# Patient Record
Sex: FEMALE | Race: WHITE | NOT HISPANIC OR LATINO | Employment: OTHER | URBAN - METROPOLITAN AREA
[De-identification: names, ages, dates, MRNs, and addresses within clinical notes are randomized per-mention and may not be internally consistent; named-entity substitution may affect disease eponyms.]

---

## 2018-01-01 ENCOUNTER — APPOINTMENT (OUTPATIENT)
Dept: GENERAL RADIOLOGY | Facility: HOSPITAL | Age: 78
End: 2018-01-01

## 2018-01-01 ENCOUNTER — APPOINTMENT (OUTPATIENT)
Dept: CT IMAGING | Facility: HOSPITAL | Age: 78
End: 2018-01-01

## 2018-01-01 ENCOUNTER — HOSPITAL ENCOUNTER (INPATIENT)
Facility: HOSPITAL | Age: 78
LOS: 21 days | Discharge: NURSING, REHAB OR LONG-TERM CARE | End: 2018-01-22
Attending: EMERGENCY MEDICINE | Admitting: INTERNAL MEDICINE
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
## 2018-01-01 DIAGNOSIS — R42 DIZZINESS: ICD-10-CM

## 2018-01-01 DIAGNOSIS — R26.81 UNSTEADINESS ON FEET: ICD-10-CM

## 2018-01-01 DIAGNOSIS — N28.9 RENAL INSUFFICIENCY: ICD-10-CM

## 2018-01-01 DIAGNOSIS — T79.6XXA TRAUMATIC RHABDOMYOLYSIS, INITIAL ENCOUNTER (HCC): Primary | ICD-10-CM

## 2018-01-01 PROBLEM — I10 HTN (HYPERTENSION): Status: ACTIVE | Noted: 2018-01-01

## 2018-01-01 PROBLEM — R53.1 GENERALIZED WEAKNESS: Status: ACTIVE | Noted: 2018-01-01

## 2018-01-01 PROBLEM — R41.82 ALTERED MENTAL STATUS: Status: ACTIVE | Noted: 2018-01-01

## 2018-01-01 PROBLEM — E11.9 DM (DIABETES MELLITUS) (HCC): Status: ACTIVE | Noted: 2018-01-01

## 2018-01-01 PROBLEM — N39.0 UTI (URINARY TRACT INFECTION): Status: ACTIVE | Noted: 2018-01-01

## 2018-01-01 PROBLEM — N18.9 CKD (CHRONIC KIDNEY DISEASE): Status: ACTIVE | Noted: 2018-01-01

## 2018-01-01 LAB
ALBUMIN SERPL-MCNC: 3.2 G/DL (ref 3.5–5.2)
ALBUMIN/GLOB SERPL: 0.7 G/DL
ALP SERPL-CCNC: 90 U/L (ref 39–117)
ALT SERPL W P-5'-P-CCNC: 27 U/L (ref 1–33)
ANION GAP SERPL CALCULATED.3IONS-SCNC: 15.8 MMOL/L
AST SERPL-CCNC: 49 U/L (ref 1–32)
BACTERIA UR QL AUTO: ABNORMAL /HPF
BASOPHILS # BLD AUTO: 0.03 10*3/MM3 (ref 0–0.2)
BASOPHILS NFR BLD AUTO: 0.1 % (ref 0–1.5)
BILIRUB SERPL-MCNC: 0.3 MG/DL (ref 0.1–1.2)
BILIRUB UR QL STRIP: NEGATIVE
BUN BLD-MCNC: 41 MG/DL (ref 8–23)
BUN/CREAT SERPL: 28.1 (ref 7–25)
CALCIUM SPEC-SCNC: 9.5 MG/DL (ref 8.6–10.5)
CHLORIDE SERPL-SCNC: 93 MMOL/L (ref 98–107)
CK SERPL-CCNC: 1161 U/L (ref 20–180)
CLARITY UR: ABNORMAL
CO2 SERPL-SCNC: 23.2 MMOL/L (ref 22–29)
COLOR UR: YELLOW
CREAT BLD-MCNC: 1.46 MG/DL (ref 0.57–1)
D-LACTATE SERPL-SCNC: 1.6 MMOL/L (ref 0.5–2)
DEPRECATED RDW RBC AUTO: 45.4 FL (ref 37–54)
EOSINOPHIL # BLD AUTO: 0 10*3/MM3 (ref 0–0.7)
EOSINOPHIL NFR BLD AUTO: 0 % (ref 0.3–6.2)
ERYTHROCYTE [DISTWIDTH] IN BLOOD BY AUTOMATED COUNT: 13.2 % (ref 11.7–13)
GFR SERPL CREATININE-BSD FRML MDRD: 35 ML/MIN/1.73
GLOBULIN UR ELPH-MCNC: 4.3 GM/DL
GLUCOSE BLD-MCNC: 297 MG/DL (ref 65–99)
GLUCOSE BLDC GLUCOMTR-MCNC: 155 MG/DL (ref 70–130)
GLUCOSE UR STRIP-MCNC: NEGATIVE MG/DL
HCT VFR BLD AUTO: 31.9 % (ref 35.6–45.5)
HGB BLD-MCNC: 10.6 G/DL (ref 11.9–15.5)
HGB UR QL STRIP.AUTO: ABNORMAL
HOLD SPECIMEN: NORMAL
HOLD SPECIMEN: NORMAL
HYALINE CASTS UR QL AUTO: ABNORMAL /LPF
IMM GRANULOCYTES # BLD: 0.12 10*3/MM3 (ref 0–0.03)
IMM GRANULOCYTES NFR BLD: 0.6 % (ref 0–0.5)
KETONES UR QL STRIP: NEGATIVE
LEUKOCYTE ESTERASE UR QL STRIP.AUTO: ABNORMAL
LYMPHOCYTES # BLD AUTO: 1.93 10*3/MM3 (ref 0.9–4.8)
LYMPHOCYTES NFR BLD AUTO: 9 % (ref 19.6–45.3)
MAGNESIUM SERPL-MCNC: 1.6 MG/DL (ref 1.6–2.4)
MCH RBC QN AUTO: 31.5 PG (ref 26.9–32)
MCHC RBC AUTO-ENTMCNC: 33.2 G/DL (ref 32.4–36.3)
MCV RBC AUTO: 94.7 FL (ref 80.5–98.2)
MONOCYTES # BLD AUTO: 2.17 10*3/MM3 (ref 0.2–1.2)
MONOCYTES NFR BLD AUTO: 10.1 % (ref 5–12)
NEUTROPHILS # BLD AUTO: 17.18 10*3/MM3 (ref 1.9–8.1)
NEUTROPHILS NFR BLD AUTO: 80.2 % (ref 42.7–76)
NITRITE UR QL STRIP: NEGATIVE
PH UR STRIP.AUTO: 6 [PH] (ref 5–8)
PLAT MORPH BLD: NORMAL
PLATELET # BLD AUTO: 218 10*3/MM3 (ref 140–500)
PMV BLD AUTO: 10.2 FL (ref 6–12)
POTASSIUM BLD-SCNC: 4.4 MMOL/L (ref 3.5–5.2)
PROT SERPL-MCNC: 7.5 G/DL (ref 6–8.5)
PROT UR QL STRIP: ABNORMAL
RBC # BLD AUTO: 3.37 10*6/MM3 (ref 3.9–5.2)
RBC # UR: ABNORMAL /HPF
RBC MORPH BLD: NORMAL
REF LAB TEST METHOD: ABNORMAL
SODIUM BLD-SCNC: 132 MMOL/L (ref 136–145)
SP GR UR STRIP: 1.02 (ref 1–1.03)
SQUAMOUS #/AREA URNS HPF: ABNORMAL /HPF
TROPONIN T SERPL-MCNC: 0.04 NG/ML (ref 0–0.03)
UROBILINOGEN UR QL STRIP: ABNORMAL
WBC MORPH BLD: NORMAL
WBC NRBC COR # BLD: 21.43 10*3/MM3 (ref 4.5–10.7)
WBC UR QL AUTO: ABNORMAL /HPF
WHOLE BLOOD HOLD SPECIMEN: NORMAL
WHOLE BLOOD HOLD SPECIMEN: NORMAL

## 2018-01-01 PROCEDURE — 83605 ASSAY OF LACTIC ACID: CPT | Performed by: EMERGENCY MEDICINE

## 2018-01-01 PROCEDURE — 81001 URINALYSIS AUTO W/SCOPE: CPT | Performed by: EMERGENCY MEDICINE

## 2018-01-01 PROCEDURE — 85025 COMPLETE CBC W/AUTO DIFF WBC: CPT | Performed by: EMERGENCY MEDICINE

## 2018-01-01 PROCEDURE — 87798 DETECT AGENT NOS DNA AMP: CPT | Performed by: INTERNAL MEDICINE

## 2018-01-01 PROCEDURE — 87186 SC STD MICRODIL/AGAR DIL: CPT | Performed by: EMERGENCY MEDICINE

## 2018-01-01 PROCEDURE — 87486 CHLMYD PNEUM DNA AMP PROBE: CPT | Performed by: INTERNAL MEDICINE

## 2018-01-01 PROCEDURE — 87633 RESP VIRUS 12-25 TARGETS: CPT | Performed by: INTERNAL MEDICINE

## 2018-01-01 PROCEDURE — 85007 BL SMEAR W/DIFF WBC COUNT: CPT | Performed by: EMERGENCY MEDICINE

## 2018-01-01 PROCEDURE — 87150 DNA/RNA AMPLIFIED PROBE: CPT | Performed by: EMERGENCY MEDICINE

## 2018-01-01 PROCEDURE — 87581 M.PNEUMON DNA AMP PROBE: CPT | Performed by: INTERNAL MEDICINE

## 2018-01-01 PROCEDURE — 83735 ASSAY OF MAGNESIUM: CPT | Performed by: EMERGENCY MEDICINE

## 2018-01-01 PROCEDURE — 99284 EMERGENCY DEPT VISIT MOD MDM: CPT

## 2018-01-01 PROCEDURE — 82550 ASSAY OF CK (CPK): CPT | Performed by: EMERGENCY MEDICINE

## 2018-01-01 PROCEDURE — 93010 ELECTROCARDIOGRAM REPORT: CPT | Performed by: INTERNAL MEDICINE

## 2018-01-01 PROCEDURE — 25010000002 CEFTRIAXONE IN SWFI 1 GRAM/10ML IV PUSH SYRINGE (SIMPLE): Performed by: INTERNAL MEDICINE

## 2018-01-01 PROCEDURE — 93005 ELECTROCARDIOGRAM TRACING: CPT

## 2018-01-01 PROCEDURE — 36415 COLL VENOUS BLD VENIPUNCTURE: CPT | Performed by: EMERGENCY MEDICINE

## 2018-01-01 PROCEDURE — 63710000001 INSULIN ASPART PER 5 UNITS: Performed by: INTERNAL MEDICINE

## 2018-01-01 PROCEDURE — 71046 X-RAY EXAM CHEST 2 VIEWS: CPT

## 2018-01-01 PROCEDURE — 80053 COMPREHEN METABOLIC PANEL: CPT | Performed by: EMERGENCY MEDICINE

## 2018-01-01 PROCEDURE — 82962 GLUCOSE BLOOD TEST: CPT

## 2018-01-01 PROCEDURE — 84484 ASSAY OF TROPONIN QUANT: CPT | Performed by: EMERGENCY MEDICINE

## 2018-01-01 PROCEDURE — 87040 BLOOD CULTURE FOR BACTERIA: CPT | Performed by: EMERGENCY MEDICINE

## 2018-01-01 PROCEDURE — 70450 CT HEAD/BRAIN W/O DYE: CPT

## 2018-01-01 PROCEDURE — 87086 URINE CULTURE/COLONY COUNT: CPT | Performed by: EMERGENCY MEDICINE

## 2018-01-01 RX ORDER — DEXTROSE MONOHYDRATE 25 G/50ML
25 INJECTION, SOLUTION INTRAVENOUS
Status: DISCONTINUED | OUTPATIENT
Start: 2018-01-01 | End: 2018-01-22 | Stop reason: HOSPADM

## 2018-01-01 RX ORDER — SODIUM CHLORIDE 0.9 % (FLUSH) 0.9 %
10 SYRINGE (ML) INJECTION AS NEEDED
Status: DISCONTINUED | OUTPATIENT
Start: 2018-01-01 | End: 2018-01-22 | Stop reason: HOSPADM

## 2018-01-01 RX ORDER — SODIUM CHLORIDE 0.9 % (FLUSH) 0.9 %
1-10 SYRINGE (ML) INJECTION AS NEEDED
Status: DISCONTINUED | OUTPATIENT
Start: 2018-01-01 | End: 2018-01-12

## 2018-01-01 RX ORDER — NICOTINE POLACRILEX 4 MG
15 LOZENGE BUCCAL
Status: DISCONTINUED | OUTPATIENT
Start: 2018-01-01 | End: 2018-01-22 | Stop reason: HOSPADM

## 2018-01-01 RX ORDER — ONDANSETRON 2 MG/ML
4 INJECTION INTRAMUSCULAR; INTRAVENOUS EVERY 6 HOURS PRN
Status: DISCONTINUED | OUTPATIENT
Start: 2018-01-01 | End: 2018-01-12

## 2018-01-01 RX ORDER — ACETAMINOPHEN 325 MG/1
650 TABLET ORAL EVERY 4 HOURS PRN
Status: DISCONTINUED | OUTPATIENT
Start: 2018-01-01 | End: 2018-01-05 | Stop reason: SDUPTHER

## 2018-01-01 RX ORDER — SODIUM CHLORIDE 9 MG/ML
125 INJECTION, SOLUTION INTRAVENOUS CONTINUOUS
Status: DISCONTINUED | OUTPATIENT
Start: 2018-01-01 | End: 2018-01-03

## 2018-01-01 RX ORDER — METOPROLOL SUCCINATE 25 MG/1
25 TABLET, EXTENDED RELEASE ORAL DAILY
Status: DISCONTINUED | OUTPATIENT
Start: 2018-01-02 | End: 2018-01-03

## 2018-01-01 RX ORDER — AMLODIPINE BESYLATE 5 MG/1
5 TABLET ORAL DAILY
Status: DISCONTINUED | OUTPATIENT
Start: 2018-01-02 | End: 2018-01-06

## 2018-01-01 RX ADMIN — CEFTRIAXONE SODIUM 1 G: 1 INJECTION, POWDER, FOR SOLUTION INTRAMUSCULAR; INTRAVENOUS at 23:31

## 2018-01-01 RX ADMIN — INSULIN ASPART 2 UNITS: 100 INJECTION, SOLUTION INTRAVENOUS; SUBCUTANEOUS at 22:41

## 2018-01-01 RX ADMIN — SODIUM CHLORIDE 125 ML/HR: 9 INJECTION, SOLUTION INTRAVENOUS at 23:31

## 2018-01-01 RX ADMIN — SODIUM CHLORIDE 500 ML: 9 INJECTION, SOLUTION INTRAVENOUS at 19:30

## 2018-01-01 RX ADMIN — SODIUM CHLORIDE 125 ML/HR: 9 INJECTION, SOLUTION INTRAVENOUS at 21:10

## 2018-01-01 NOTE — ED PROVIDER NOTES
EMERGENCY DEPARTMENT ENCOUNTER    CHIEF COMPLAINT  Chief Complaint: Light-headed   History given by: pt/ family present at bedside   History limited by: N/A  Room Number: 42/42  PMD: Provider Not In System      HPI:  Pt is a 77 y.o. female who presents complaining of intermittent light-headedness that began 2 days ago that caused pt to fall. Family states that 2 days ago when pt suffered from the fall, the pt was on the ground for approximately 13 hours. Pt/ family state that there is no definitve activity that aggravates/ alleviates the light-headedness. Pt also c/o chronic L knee pain that pt claims to be taking pain medication for. Pt states she has had a previous myocardial infarct and is a current smoker [reports smoking half a pack of cigarettes per a day]. Family states that pt has chronic elevated kidney function labs. Pt also c/o chronic cough that has progressed to productive cough with yellow sputum. Family expresses concern that pt cannot take care of herself. Family also reports pt experiencing previous similar episodes.     Duration:  2 days   Onset: gradual  Timing: intermittent   Location: N/A  Radiation: N/A  Quality: light-headed   Intensity/Severity: moderate   Progression: unchanged   Associated Symptoms: chronic cough, chronic L knee   Aggravating Factors: None reported   Alleviating Factors: None reported   Previous Episodes: Family reports that pt has experienced previous similar episodes.   Treatment before arrival: None reported     PAST MEDICAL HISTORY  Active Ambulatory Problems     Diagnosis Date Noted   • No Active Ambulatory Problems     Resolved Ambulatory Problems     Diagnosis Date Noted   • No Resolved Ambulatory Problems     Past Medical History:   Diagnosis Date   • Colon cancer    • Hypertension        PAST SURGICAL HISTORY  Past Surgical History:   Procedure Laterality Date   • CHOLECYSTECTOMY     • COLON SURGERY     • COLONOSCOPY     • HERNIA REPAIR         FAMILY  HISTORY  History reviewed. No pertinent family history.    SOCIAL HISTORY  Social History     Social History   • Marital status:      Spouse name: N/A   • Number of children: N/A   • Years of education: N/A     Occupational History   • Not on file.     Social History Main Topics   • Smoking status: Heavy Tobacco Smoker   • Smokeless tobacco: Not on file   • Alcohol use No   • Drug use: No   • Sexual activity: Defer     Other Topics Concern   • Not on file     Social History Narrative   • No narrative on file       ALLERGIES  Contrast dye and Sulfa antibiotics    REVIEW OF SYSTEMS  Review of Systems   Constitutional: Negative.  Negative for chills and fever.   HENT: Negative.  Negative for sore throat.    Eyes: Negative.    Respiratory: Negative.  Negative for cough.    Cardiovascular: Negative.  Negative for chest pain.   Gastrointestinal: Negative.    Genitourinary: Negative.  Negative for dysuria.   Musculoskeletal: Positive for arthralgias (chronic L knee pain). Negative for back pain.   Skin: Negative.  Negative for rash.   Neurological: Positive for light-headedness (causing pt to fall ). Negative for headaches.       PHYSICAL EXAM  ED Triage Vitals   Temp Heart Rate Resp BP SpO2   01/01/18 1554 01/01/18 1554 01/01/18 1554 01/01/18 1600 01/01/18 1554   98.8 °F (37.1 °C) 110 16 145/75 94 %      Temp src Heart Rate Source Patient Position BP Location FiO2 (%)   01/01/18 1554 01/01/18 1554 -- -- --   Tympanic Monitor          Physical Exam   Constitutional: She is oriented to person, place, and time and well-developed, well-nourished, and in no distress. No distress.   Elderly    HENT:   Head: Normocephalic and atraumatic.   Unremarkable    Eyes: EOM are normal. Pupils are equal, round, and reactive to light.   Neck: Normal range of motion. Neck supple.   Cardiovascular: Normal rate, regular rhythm, normal heart sounds and intact distal pulses.    Pulmonary/Chest: Effort normal. No respiratory distress. She  has wheezes (moderate bilateral wheezes ).   Pt has bilateral coarse rhonchi    Abdominal: Soft. There is no tenderness. There is no rebound and no guarding.   Musculoskeletal: Normal range of motion. She exhibits tenderness (moderate L knee tenderness ). She exhibits no edema or deformity.   Neurological: She is alert and oriented to person, place, and time. She has normal sensation and normal strength.   Pt has difficulty with L leg raise; however, pt relates this to the chronic L knee pain  No other deficits observed   Neurovascularly intact    Skin: Skin is warm and dry. No rash noted.   Psychiatric: Mood normal. She is agitated.   Nursing note and vitals reviewed.      LAB RESULTS  Lab Results (last 24 hours)     Procedure Component Value Units Date/Time    CBC & Differential [[id number]] Collected:  01/01/18 1631    Specimen:  Blood Updated:  01/01/18 1812    Narrative:       The following orders were created for panel order CBC & Differential.  Procedure                               Abnormality         Status                     ---------                               -----------         ------                     Scan Slide[[id number]]                   Normal              Final result               CBC Auto Differential[[id number]]        Abnormal            Final result                 Please view results for these tests on the individual orders.    Comprehensive Metabolic Panel [[id number]]  (Abnormal) Collected:  01/01/18 1631    Specimen:  Blood Updated:  01/01/18 1717     Glucose 297 (H) mg/dL      BUN 41 (H) mg/dL      Creatinine 1.46 (H) mg/dL      Sodium 132 (L) mmol/L      Potassium 4.4 mmol/L      Chloride 93 (L) mmol/L      CO2 23.2 mmol/L      Calcium 9.5 mg/dL      Total Protein 7.5 g/dL      Albumin 3.20 (L) g/dL      ALT (SGPT) 27 U/L      AST (SGOT) 49 (H) U/L      Alkaline Phosphatase 90 U/L      Total Bilirubin 0.3 mg/dL      eGFR Non African Amer 35 (L) mL/min/1.73      Globulin 4.3 gm/dL       A/G Ratio 0.7 g/dL      BUN/Creatinine Ratio 28.1 (H)     Anion Gap 15.8 mmol/L     Narrative:       The MDRD GFR formula is only valid for adults with stable renal function between ages 18 and 70.    Troponin [[id number]]  (Abnormal) Collected:  01/01/18 1631    Specimen:  Blood Updated:  01/01/18 1718     Troponin T 0.044 (H) ng/mL     Narrative:       Troponin T Reference Ranges:  Less than 0.03 ng/mL:    Negative for AMI  0.03 to 0.09 ng/mL:      Indeterminant for AMI  Greater than 0.09 ng/mL: Positive for AMI    Magnesium [[id number]]  (Normal) Collected:  01/01/18 1631    Specimen:  Blood Updated:  01/01/18 1717     Magnesium 1.6 mg/dL     CBC Auto Differential [[id number]]  (Abnormal) Collected:  01/01/18 1631    Specimen:  Blood Updated:  01/01/18 1812     WBC 21.43 (H) 10*3/mm3      RBC 3.37 (L) 10*6/mm3      Hemoglobin 10.6 (L) g/dL      Hematocrit 31.9 (L) %      MCV 94.7 fL      MCH 31.5 pg      MCHC 33.2 g/dL      RDW 13.2 (H) %      RDW-SD 45.4 fl      MPV 10.2 fL      Platelets 218 10*3/mm3      Neutrophil % 80.2 (H) %      Lymphocyte % 9.0 (L) %      Monocyte % 10.1 %      Eosinophil % 0.0 (L) %      Basophil % 0.1 %      Immature Grans % 0.6 (H) %      Neutrophils, Absolute 17.18 (H) 10*3/mm3      Lymphocytes, Absolute 1.93 10*3/mm3      Monocytes, Absolute 2.17 (H) 10*3/mm3      Eosinophils, Absolute 0.00 10*3/mm3      Basophils, Absolute 0.03 10*3/mm3      Immature Grans, Absolute 0.12 (H) 10*3/mm3     Scan Slide [[id number]]  (Normal) Collected:  01/01/18 1631    Specimen:  Blood Updated:  01/01/18 1812     RBC Morphology Normal     WBC Morphology Normal     Platelet Morphology Normal    CK [[id number]]  (Abnormal) Collected:  01/01/18 1631    Specimen:  Blood Updated:  01/01/18 1848     Creatine Kinase 1161 (H) U/L     Lactic Acid, Plasma [[id number]]  (Normal) Collected:  01/01/18 1832    Specimen:  Blood Updated:  01/01/18 1859     Lactate 1.6 mmol/L     Blood Culture - Blood, [[id number]] Collected:   01/01/18 1832    Specimen:  Blood from Arm, Left Updated:  01/01/18 1839    Blood Culture - Blood, [[id number]] Collected:  01/01/18 1845    Specimen:  Blood from Arm, Left Updated:  01/01/18 1848    Urinalysis With / Culture If Indicated - Urine, Clean Catch [[id number]]  (Abnormal) Collected:  01/01/18 1909    Specimen:  Urine from Urine, Catheter Updated:  01/01/18 1927     Color, UA Yellow     Appearance, UA Cloudy (A)     pH, UA 6.0     Specific Gravity, UA 1.016     Glucose, UA Negative     Ketones, UA Negative     Bilirubin, UA Negative     Blood, UA Large (3+) (A)     Protein, UA >=300 mg/dL (3+) (A)     Leuk Esterase, UA Moderate (2+) (A)     Nitrite, UA Negative     Urobilinogen, UA 0.2 E.U./dL    Urinalysis, Microscopic Only - Urine, Clean Catch [[id number]]  (Abnormal) Collected:  01/01/18 1909    Specimen:  Urine from Urine, Catheter Updated:  01/01/18 1927     RBC, UA 13-20 (A) /HPF      WBC, UA Too Numerous to Count (A) /HPF      Bacteria, UA 4+ (A) /HPF      Squamous Epithelial Cells, UA 0-2 /HPF      Hyaline Casts, UA None Seen /LPF      Methodology Automated Microscopy    Urine Culture - Urine, Urine, Clean Catch [[id number]] Collected:  01/01/18 1909    Specimen:  Urine from Urine, Catheter Updated:  01/01/18 1925          I ordered the above labs and reviewed the results    RADIOLOGY  CT Head Without Contrast   Final Result   There is no evidence for an acute intracranial abnormality.   Findings consistent with severe chronic small vessel ischemic change of   the cerebral white matter are noted.       Radiation dose reduction techniques were utilized, including automated   exposure control and exposure modulation based on body size.       This report was finalized on 1/1/2018 7:41 PM by Dr. Philip Soares MD.          XR Chest 2 View   Final Result   No focal pulmonary consolidation. Borderline heart size.   Tortuous aorta. Follow-up as clinically indicated.       This report was finalized on  1/1/2018 4:58 PM by Dr. Carlos Manuel Mary MD.               I ordered the above noted radiological studies. Interpreted by radiologist. Reviewed by me in PACS.       PROCEDURES  Procedures  EKG           EKG time: 1748  Rhythm/Rate: sinus rhythm, 85  P waves and WA: moderate baseline artificat  QRS, axis: normal axis, no conduction delays   ST and T waves: no acute      Interpreted Contemporaneously by me, independently viewed  No previous for comparison       PROGRESS AND CONSULTS  ED Course     1604  Triage ordered IVF, labs, Chest XR, and CT Head for further evaluation.     1815  Evaluated pt and discussed lab and radiology results. Discussed elevated troponin and WBC; therefore anticipate admission. Plan to conduct labs for further evaluation. Pt/ family understand and agree to plan, all questions addressed at this time.     1819  Ordered CK and lactic acid labs for further evaluation.     1902  Ordered IVF and placed consult to [facility].     1926  Discussed pt's case with Dr. Hendrickson [[facility]] who agrees to admit.     1948  Rechecked pt who is resting comfortably in bed and in no acute distress. Discussed lab results with pt/ family. Dsicussed admission plan with pt. PT understands and agrees to plan, all questions addressed at this time.     MEDICAL DECISION MAKING  Results were reviewed/discussed with the patient and they were also made aware of online access. Pt also made aware that some labs, such as cultures, will not be resulted during ER visit and follow up with PMD is necessary.     MDM  Number of Diagnoses or Management Options     Amount and/or Complexity of Data Reviewed  Clinical lab tests: ordered and reviewed (Troponin = 0.044, Glucose = 297, BUN = 41, Creatinine = 1.46, Sodium = 132, GFR = 35, WBC = 21.43, CK = 1161)  Tests in the radiology section of CPT®: ordered and reviewed (CT Head  Impression   There is no evidence for an acute intracranial abnormality. Findings consistent with severe chronic small  vessel ischemic change of the cerebral white matter are noted.     Chest XR  Impression   No focal pulmonary consolidation. Borderline heart size.  Tortuous aorta. Follow-up as clinically indicated.        )  Tests in the medicine section of CPT®: ordered and reviewed (Refer to procedure )  Discussion of test results with the performing providers: yes (Dr. Hendrickson ([facility]) )  Independent visualization of images, tracings, or specimens: yes           DIAGNOSIS  Final diagnoses:   Traumatic rhabdomyolysis, initial encounter   Fall, initial encounter   Renal insufficiency   Dizziness       DISPOSITION  ADMISSION    Discussed treatment plan and reason for admission with pt/family and admitting physician.  Pt/family voiced understanding of the plan for admission for further testing/treatment as needed.         Latest Documented Vital Signs:  As of 7:48 PM  BP- 135/71 HR- 84 Temp- 98.8 °F (37.1 °C) (Tympanic) O2 sat- 96%    --  Documentation assistance provided by ruy Valdes for Dr. Matute.  Information recorded by the scribe was done at my direction and has been verified and validated by me.               Elier Valdes  01/01/18 1948       Hector Matute MD  01/02/18 9577

## 2018-01-01 NOTE — ED TRIAGE NOTES
Daughter is concerned that patient can no longer live alone and stated she wanted her mom placed in nursing home.  Daughter states that she is going home to get something to eat and that she will be back in a few hours.  Patient has two other children that live out of state.  Patient is currently aox4 and sitting in wheelchair getting blood drawn for triage orders.  NAD.

## 2018-01-01 NOTE — ED TRIAGE NOTES
"Fell Saturday night and neighbor helped her up, was down for 13 hours, refused to be seen, fell ( scooted down from bed) this morning, daughter states hx of frequent falls, knee pain, daughter wants a full kidney work- up \" bc her dr says she is close to being on dialysis\"  "

## 2018-01-01 NOTE — ED TRIAGE NOTES
"Patient presents to ER with complaints of multiple falls for unknown reasons.  Patient lives alone.  Patient states \"I think I just push myself too much\"  Daughter reports that she is concerned patient can no longer take care of herself at home.  Patient is wearing dirty clothes and smells of urine.  Patient had a fall yesterday and was down unknown amount.    "

## 2018-01-02 PROBLEM — A41.9 SEPSIS (HCC): Status: ACTIVE | Noted: 2018-01-02

## 2018-01-02 PROBLEM — R78.81 BACTEREMIA: Status: ACTIVE | Noted: 2018-01-02

## 2018-01-02 PROBLEM — N30.00 ACUTE CYSTITIS WITHOUT HEMATURIA: Status: ACTIVE | Noted: 2018-01-01

## 2018-01-02 LAB
ALBUMIN SERPL-MCNC: 3 G/DL (ref 3.5–5.2)
ALBUMIN/GLOB SERPL: 0.8 G/DL
ALP SERPL-CCNC: 76 U/L (ref 39–117)
ALT SERPL W P-5'-P-CCNC: 28 U/L (ref 1–33)
ANION GAP SERPL CALCULATED.3IONS-SCNC: 14 MMOL/L
AST SERPL-CCNC: 45 U/L (ref 1–32)
B PERT DNA SPEC QL NAA+PROBE: NOT DETECTED
BACTERIA BLD CULT: ABNORMAL
BASOPHILS # BLD AUTO: 0.03 10*3/MM3 (ref 0–0.2)
BASOPHILS NFR BLD AUTO: 0.2 % (ref 0–1.5)
BILIRUB SERPL-MCNC: 0.2 MG/DL (ref 0.1–1.2)
BUN BLD-MCNC: 43 MG/DL (ref 8–23)
BUN/CREAT SERPL: 29.1 (ref 7–25)
C PNEUM DNA NPH QL NAA+NON-PROBE: NOT DETECTED
CALCIUM SPEC-SCNC: 8.9 MG/DL (ref 8.6–10.5)
CHLORIDE SERPL-SCNC: 96 MMOL/L (ref 98–107)
CK SERPL-CCNC: 751 U/L (ref 20–180)
CO2 SERPL-SCNC: 22 MMOL/L (ref 22–29)
CREAT BLD-MCNC: 1.48 MG/DL (ref 0.57–1)
DEPRECATED RDW RBC AUTO: 45.8 FL (ref 37–54)
EOSINOPHIL # BLD AUTO: 0.02 10*3/MM3 (ref 0–0.7)
EOSINOPHIL NFR BLD AUTO: 0.1 % (ref 0.3–6.2)
ERYTHROCYTE [DISTWIDTH] IN BLOOD BY AUTOMATED COUNT: 13.1 % (ref 11.7–13)
FLUAV H1 2009 PAND RNA NPH QL NAA+PROBE: NOT DETECTED
FLUAV H1 HA GENE NPH QL NAA+PROBE: NOT DETECTED
FLUAV H3 RNA NPH QL NAA+PROBE: NOT DETECTED
FLUAV SUBTYP SPEC NAA+PROBE: NOT DETECTED
FLUBV RNA ISLT QL NAA+PROBE: NOT DETECTED
GFR SERPL CREATININE-BSD FRML MDRD: 34 ML/MIN/1.73
GLOBULIN UR ELPH-MCNC: 3.8 GM/DL
GLUCOSE BLD-MCNC: 107 MG/DL (ref 65–99)
GLUCOSE BLDC GLUCOMTR-MCNC: 111 MG/DL (ref 70–130)
GLUCOSE BLDC GLUCOMTR-MCNC: 115 MG/DL (ref 70–130)
GLUCOSE BLDC GLUCOMTR-MCNC: 173 MG/DL (ref 70–130)
GLUCOSE BLDC GLUCOMTR-MCNC: 230 MG/DL (ref 70–130)
HADV DNA SPEC NAA+PROBE: NOT DETECTED
HBA1C MFR BLD: 6.5 % (ref 4.8–5.6)
HCOV 229E RNA SPEC QL NAA+PROBE: NOT DETECTED
HCOV HKU1 RNA SPEC QL NAA+PROBE: NOT DETECTED
HCOV NL63 RNA SPEC QL NAA+PROBE: NOT DETECTED
HCOV OC43 RNA SPEC QL NAA+PROBE: NOT DETECTED
HCT VFR BLD AUTO: 30.2 % (ref 35.6–45.5)
HGB BLD-MCNC: 9.9 G/DL (ref 11.9–15.5)
HMPV RNA NPH QL NAA+NON-PROBE: NOT DETECTED
HPIV1 RNA SPEC QL NAA+PROBE: NOT DETECTED
HPIV2 RNA SPEC QL NAA+PROBE: NOT DETECTED
HPIV3 RNA NPH QL NAA+PROBE: NOT DETECTED
HPIV4 P GENE NPH QL NAA+PROBE: NOT DETECTED
IMM GRANULOCYTES # BLD: 0.09 10*3/MM3 (ref 0–0.03)
IMM GRANULOCYTES NFR BLD: 0.5 % (ref 0–0.5)
INR PPP: 1.2 (ref 0.9–1.1)
LYMPHOCYTES # BLD AUTO: 2.23 10*3/MM3 (ref 0.9–4.8)
LYMPHOCYTES NFR BLD AUTO: 11.2 % (ref 19.6–45.3)
M PNEUMO IGG SER IA-ACNC: NOT DETECTED
MAGNESIUM SERPL-MCNC: 1.6 MG/DL (ref 1.6–2.4)
MCH RBC QN AUTO: 31.4 PG (ref 26.9–32)
MCHC RBC AUTO-ENTMCNC: 32.8 G/DL (ref 32.4–36.3)
MCV RBC AUTO: 95.9 FL (ref 80.5–98.2)
MONOCYTES # BLD AUTO: 1.7 10*3/MM3 (ref 0.2–1.2)
MONOCYTES NFR BLD AUTO: 8.5 % (ref 5–12)
NEUTROPHILS # BLD AUTO: 15.93 10*3/MM3 (ref 1.9–8.1)
NEUTROPHILS NFR BLD AUTO: 79.5 % (ref 42.7–76)
PHOSPHATE SERPL-MCNC: 2.5 MG/DL (ref 2.5–4.5)
PLATELET # BLD AUTO: 192 10*3/MM3 (ref 140–500)
PMV BLD AUTO: 9.8 FL (ref 6–12)
POTASSIUM BLD-SCNC: 3.8 MMOL/L (ref 3.5–5.2)
PROT SERPL-MCNC: 6.8 G/DL (ref 6–8.5)
PROTHROMBIN TIME: 14.8 SECONDS (ref 11.7–14.2)
RBC # BLD AUTO: 3.15 10*6/MM3 (ref 3.9–5.2)
RHINOVIRUS RNA SPEC NAA+PROBE: NOT DETECTED
RSV RNA NPH QL NAA+NON-PROBE: NOT DETECTED
SODIUM BLD-SCNC: 132 MMOL/L (ref 136–145)
TROPONIN T SERPL-MCNC: 0.05 NG/ML (ref 0–0.03)
WBC NRBC COR # BLD: 20 10*3/MM3 (ref 4.5–10.7)

## 2018-01-02 PROCEDURE — 82962 GLUCOSE BLOOD TEST: CPT

## 2018-01-02 PROCEDURE — 97162 PT EVAL MOD COMPLEX 30 MIN: CPT

## 2018-01-02 PROCEDURE — 85025 COMPLETE CBC W/AUTO DIFF WBC: CPT | Performed by: INTERNAL MEDICINE

## 2018-01-02 PROCEDURE — 83036 HEMOGLOBIN GLYCOSYLATED A1C: CPT | Performed by: INTERNAL MEDICINE

## 2018-01-02 PROCEDURE — 63710000001 INSULIN ASPART PER 5 UNITS: Performed by: INTERNAL MEDICINE

## 2018-01-02 PROCEDURE — 25010000003 CEFTRIAXONE PER 250 MG: Performed by: INTERNAL MEDICINE

## 2018-01-02 PROCEDURE — 97110 THERAPEUTIC EXERCISES: CPT

## 2018-01-02 PROCEDURE — 84100 ASSAY OF PHOSPHORUS: CPT | Performed by: INTERNAL MEDICINE

## 2018-01-02 PROCEDURE — 80053 COMPREHEN METABOLIC PANEL: CPT | Performed by: INTERNAL MEDICINE

## 2018-01-02 PROCEDURE — 87040 BLOOD CULTURE FOR BACTERIA: CPT | Performed by: INTERNAL MEDICINE

## 2018-01-02 PROCEDURE — 83735 ASSAY OF MAGNESIUM: CPT | Performed by: INTERNAL MEDICINE

## 2018-01-02 PROCEDURE — 85610 PROTHROMBIN TIME: CPT | Performed by: INTERNAL MEDICINE

## 2018-01-02 PROCEDURE — 82550 ASSAY OF CK (CPK): CPT | Performed by: INTERNAL MEDICINE

## 2018-01-02 PROCEDURE — 84484 ASSAY OF TROPONIN QUANT: CPT | Performed by: INTERNAL MEDICINE

## 2018-01-02 RX ORDER — CEFTRIAXONE SODIUM 2 G/50ML
2 INJECTION, SOLUTION INTRAVENOUS EVERY 24 HOURS
Status: DISCONTINUED | OUTPATIENT
Start: 2018-01-02 | End: 2018-01-03

## 2018-01-02 RX ADMIN — METOPROLOL SUCCINATE 25 MG: 25 TABLET, FILM COATED, EXTENDED RELEASE ORAL at 08:50

## 2018-01-02 RX ADMIN — AMLODIPINE BESYLATE 5 MG: 5 TABLET ORAL at 08:50

## 2018-01-02 RX ADMIN — SODIUM CHLORIDE 125 ML/HR: 9 INJECTION, SOLUTION INTRAVENOUS at 18:19

## 2018-01-02 RX ADMIN — ACETAMINOPHEN 650 MG: 325 TABLET ORAL at 08:50

## 2018-01-02 RX ADMIN — INSULIN ASPART 4 UNITS: 100 INJECTION, SOLUTION INTRAVENOUS; SUBCUTANEOUS at 11:46

## 2018-01-02 RX ADMIN — INSULIN ASPART 2 UNITS: 100 INJECTION, SOLUTION INTRAVENOUS; SUBCUTANEOUS at 20:57

## 2018-01-02 RX ADMIN — SODIUM CHLORIDE 125 ML/HR: 9 INJECTION, SOLUTION INTRAVENOUS at 08:50

## 2018-01-02 RX ADMIN — CEFTRIAXONE SODIUM 2 G: 2 INJECTION, SOLUTION INTRAVENOUS at 12:37

## 2018-01-02 NOTE — PROGRESS NOTES
Name: Sakshi Garcia ADMIT: 2018   : 1940  PCP: Provider Not In System    MRN: [id number] LOS: 1 days   AGE/SEX: 77 y.o. female  ROOM: [facility]   Subjective   Subjective  Cc- weakness  Still is weak  Had been found on the ground prior to admission  Ambulating some with help  On IV abx  +fever  Unsure about dysuria  +dry cough    ROS  +f/c  -n/v  +cough, - soa  No cp/palp    Objective   Vital Signs  Temp:  [98.4 °F (36.9 °C)-100 °F (37.8 °C)] 100 °F (37.8 °C)  Heart Rate:  [] 85  Resp:  [16-18] 18  BP: (127-174)/(63-76) 174/73  SpO2:  [93 %-98 %] 93 %  on  Flow (L/min):  [1] 1;   O2 Device: nasal cannula  Body mass index is 27.76 kg/(m^2).    Physical Exam    Alert, elderly  Acutely and chronically ill  Supple, no jvd  RRR, no mrumurs  +cough during exam, fairly clear  Soft, nt  No edema or cyanosis  Slight confusion    Results Review:       I reviewed the patient's new clinical results.    Results from last 7 days  Lab Units 18  0417 18  1631   WBC 10*3/mm3 20.00* 21.43*   HEMOGLOBIN g/dL 9.9* 10.6*   PLATELETS 10*3/mm3 192 218       Results from last 7 days  Lab Units 18  0417 18  1631   SODIUM mmol/L 132* 132*   POTASSIUM mmol/L 3.8 4.4   CHLORIDE mmol/L 96* 93*   CO2 mmol/L 22.0 23.2   BUN mg/dL 43* 41*   CREATININE mg/dL 1.48* 1.46*   GLUCOSE mg/dL 107* 297*   Estimated Creatinine Clearance: 28.5 mL/min (by C-G formula based on Cr of 1.48).    Results from last 7 days  Lab Units 18  0417 18  1631   CALCIUM mg/dL 8.9 9.5   ALBUMIN g/dL 3.00* 3.20*   MAGNESIUM mg/dL 1.6 1.6   PHOSPHORUS mg/dL 2.5  --      CT Head Without Contrast [[id number]] Not Reviewed        Order Status: Completed Collected: 18        Updated: 18       Narrative:         CT OF THE HEAD WITHOUT CONTRAST     HISTORY: 77-year-old female with a history of frequent falls and altered  mental status.     TECHNIQUE: Contiguous axial images were obtained through the head  without  IV contrast.     COMPARISON: None.     FINDINGS: There is a severe hypodense appearance seen within the  bilateral paraventricular and subcortical white matter of the cerebral  hemispheres. No territorial edema is appreciated. There is moderate  diffuse atrophy and proportionate ventriculomegaly. The osseous  structures of the skull have a normal appearance.          Impression:         There is no evidence for an acute intracranial abnormality.  Findings consistent with severe chronic small vessel ischemic change of  the cerebral white matter are noted.     Radiation dose reduction techniques were utilized, including automated  exposure control and exposure modulation based on body size.     This report was finalized on 1/1/2018 7:41 PM by Dr. Philip Soares MD.          XR Chest 2 View [[id number]] Not Reviewed       Order Status: Completed Collected: 01/01/18 1656        Updated: 01/01/18 1701       Narrative:         XR CHEST 2 VW-     HISTORY: Female who is 77 years-old,  weakness     TECHNIQUE: Frontal and lateral views of the chest     COMPARISON: None available     FINDINGS: Heart size is borderline. Pulmonary vasculature is  unremarkable. Aorta is tortuous. No focal pulmonary consolidation,  pleural effusion, or pneumothorax. Arterial calcifications are present.  Degenerative changes are seen at the shoulders. No acute osseous  process.          Impression:         No focal pulmonary consolidation. Borderline heart size.  Tortuous aorta. Follow-up as clinically indicated.        Respiratory Panel, PCR - Swab, Nasopharynx [[id number]] (Normal) Collected: 01/01/18 2244        Lab Status: Final result Specimen: Swab from Nasopharynx Updated: 01/02/18 0026        ADENOVIRUS, PCR Not Detected        Coronavirus 229E Not Detected        Coronavirus HKU1 Not Detected        Coronavirus NL63 Not Detected        Coronavirus OC43 Not Detected        Human Metapneumovirus Not Detected        Human Rhinovirus/Enterovirus  Not Detected        Influenza B PCR Not Detected        Parainfluenza Virus 1 Not Detected        Parainfluenza Virus 2 Not Detected        Parainfluenza Virus 3 Not Detected        Parainfluenza Virus 4 Not Detected        Bordetella pertussis pcr Not Detected        Influenza 2009 H1N1 by PCR Not Detected        Chlamydophila pneumoniae PCR Not Detected        Mycoplasma pneumo by PCR Not Detected        Influenza A PCR Not Detected        Influenza A H3 Not Detected        Influenza A H1 Not Detected        RSV, PCR Not Detected       Urine Culture - Urine, Urine, Clean Catch [[id number]] (Abnormal) Collected: 01/01/18 1909       Lab Status: Preliminary result Specimen: Urine from Urine, Catheter Updated: 01/02/18 0735        Urine Culture --         >100,000 CFU/mL Gram Negative Bacilli (A)       Blood Culture - Blood, [[id number]] (Normal) Collected: 01/01/18 1845       Lab Status: Preliminary result Specimen: Blood from Arm, Left Updated: 01/02/18 0701        Blood Culture No growth at less than 24 hours       Blood Culture - Blood, [[id number]] (Abnormal) Collected: 01/01/18 1832       Lab Status: Preliminary result Specimen: Blood from Arm, Left Updated: 01/02/18 1028        Blood Culture Abnormal Stain (A)        Gram Stain Result Anaerobic Bottle Gram negative bacilli       Blood Culture ID, PCR - Blood, [[id number]] (Abnormal) Collected: 01/01/18 1832       Lab Status: Final result Specimen: Blood from Arm, Left Updated: 01/02/18 1201        BCID, PCR Escherichia coli. Identification by BCID PCR. (C)            amLODIPine 5 mg Oral Daily   ceftriaxone 2 g Intravenous Q24H   insulin aspart 0-9 Units Subcutaneous 4x Daily With Meals & Nightly   metoprolol succinate XL 25 mg Oral Daily       sodium chloride 125 mL/hr Last Rate: 125 mL/hr (01/02/18 0850)   Diet Regular; Cardiac, Consistent Carbohydrate, Renal      Assessment/Plan   Active Hospital Problems (** Indicates Principal Problem)    Diagnosis Date Noted    • **Acute cystitis without hematuria [N30.00] 01/01/2018   • Sepsis [A41.9] 01/02/2018   • Bacteremia [R78.81] 01/02/2018   • Traumatic rhabdomyolysis [T79.6XXA] 01/01/2018   • Generalized weakness [R53.1] 01/01/2018   • Fall [W19.XXXA] 01/01/2018   • CKD (chronic kidney disease) [N18.9] 01/01/2018   • DM (diabetes mellitus) [E11.9] 01/01/2018   • HTN (hypertension) [I10] 01/01/2018   • Altered mental status [R41.82] 01/01/2018      Resolved Hospital Problems    Diagnosis Date Noted Date Resolved   No resolved problems to display.       Ms. Garcia is a 77 y.o. female who has been admitted with sepsis and rhabdo    · Change abx to 2g IV ceftriaxone q24h, follow cultures. Repeat blood cultures  · IVFs  · Monitor renal fx and CK  · PT consultation  · SSI    D/W RN  D/W Pharmacist  D/W Dr. Hendrickson  Reviewed records    Cheo Vora MD  [facility]  01/02/18  12:29 PM

## 2018-01-02 NOTE — CONSULTS
"Diabetes Education  Assessment/Teaching    Patient Name:  Sakshi Garcia  YOB: 1940  MRN: [id number]  Admit Date:  1/1/2018      Assessment Date:  1/2/2018       Most Recent Value    General Information      Referral From:  Other (comment) [RN consult for monitoring]    Height  157.5 cm (62.01\")    Weight  68.9 kg (151 lb 12.8 oz)    Weight Method  Bed scale    Pregnancy Assessment     Diabetes History     What type of diabetes do you have?  Type 2    Length of Diabetes Diagnosis  10 + years    Do you test your blood sugar at home?  yes    Frequency of checks  Every 2-3 weeks    Meter type  Unknown.  Pt states received through mail order.     Who performs the test?  self    Typical readings  80s-200s    Have you had low blood sugar? (<70mg/dl)  -- [Unknown but pt states has had symptoms but not correlated w/ testing]    Education Preferences     Nutrition Information     Assessment Topics     Monitoring - Assessment  Needs education    DM Goals                Most Recent Value    DM Education Needs     Meter  Has own    Frequency of Testing  -- [Encouraged pt to test more frequently especially during times when pt suspects she may be experiencing hypoglycemia.  ]    Reducing Risks  A1C testing [Pt does not recall A1c levels.  ]    Healthy Coping  Appropriate    Discharge Plan  Home    Motivation  Moderate    Teaching Method  Explanation, Discussion    Patient Response  Verbalized understanding            Other Comments:          Electronically signed by:  Krystal Gonzalez RN  01/02/18 10:11 AM  "

## 2018-01-02 NOTE — ED NOTES
Both sisters called to update on pt's status post receiving approval from pt to share her information.     Rut [phone]    Ernestina [phone](h) [phone] (c)     Vanda Bacon RN  01/01/18 1956

## 2018-01-02 NOTE — THERAPY EVALUATION
Acute Care - Physical Therapy Initial Evaluation  [facility]     Patient Name: Sakshi Garcia  : 1940  MRN: [id number]  Today's Date: 2018   Onset of Illness/Injury or Date of Surgery Date: 18     Referring Physician: Dr. Vora      Admit Date: 2018     Visit Dx:    ICD-10-CM ICD-9-CM   1. Traumatic rhabdomyolysis, initial encounter T79.6XXA 958.6   2. Fall, initial encounter W19.XXXA E888.9   3. Renal insufficiency N28.9 593.9   4. Dizziness R42 780.4   5. Unsteadiness on feet R26.81 781.2     Patient Active Problem List   Diagnosis   • Traumatic rhabdomyolysis   • Generalized weakness   • Fall   • Acute cystitis without hematuria   • CKD (chronic kidney disease)   • DM (diabetes mellitus)   • HTN (hypertension)   • Altered mental status   • Sepsis   • Bacteremia     Past Medical History:   Diagnosis Date   • Colon cancer    • Hypertension      Past Surgical History:   Procedure Laterality Date   • CHOLECYSTECTOMY     • COLON SURGERY     • COLONOSCOPY     • HERNIA REPAIR            PT ASSESSMENT (last 72 hours)      PT Evaluation       18 1300 18    Rehab Evaluation    Document Type evaluation  -AA     Subjective Information agree to therapy;complains of;weakness;fatigue  -AA     Patient Effort, Rehab Treatment good  -AA     Symptoms Noted During/After Treatment fatigue  -AA     General Information    Patient Profile Review yes  -AA     Onset of Illness/Injury or Date of Surgery Date 18  -AA     Referring Physician Dr. Vora  -AA     General Observations Pt supine in bed no signs distress/discomfort. Pt bed exit alarm activated  -AA     Pertinent History Of Current Problem Pt admitted with acute cystitis without hematuria.   -AA     Precautions/Limitations fall precautions;oxygen therapy device and L/min  -AA     Prior Level of Function independent:;all household mobility;ADL's  -AA     Equipment Currently Used at Home cane, straight  -AA cane, straight;glucometer   -RT    Plans/Goals Discussed With patient  -AA     Barriers to Rehab medically complex   does not want rehab or PT  -AA     Living Environment    Lives With alone  -AA alone  -RT    Living Arrangements house  -AA house  -RT    Home Accessibility stairs to enter home  -AA no concerns  -RT    Number of Stairs to Enter Home 4  -AA     Stair Railings at Home none  -AA none  -RT    Type of Financial/Environmental Concern  none  -RT    Transportation Available  family or friend will provide  -RT    Living Environment Comment Pt states someone across the street checks on her from time to time.   -AA     Clinical Impression    Patient/Family Goals Statement Pt would like to return home without assist or PT.   -AA     Criteria for Skilled Therapeutic Interventions Met yes  -AA     Pathology/Pathophysiology Noted (Describe Specifically for Each System) musculoskeletal  -AA     Impairments Found (describe specific impairments) gait, locomotion, and balance  -AA     Rehab Potential good, to achieve stated therapy goals  -AA     Pain Assessment    Pain Assessment No/denies pain  -AA     Cognitive Assessment/Intervention    Current Cognitive/Communication Assessment impaired  -AA     Orientation Status oriented to;person;place;time;situation  -AA     Follows Commands/Answers Questions 50% of the time;able to follow single-step instructions;needs cueing  -AA     Personal Safety moderate impairment;decreased awareness, need for assist;decreased awareness, need for safety;at risk behaviors demonstrated;impulsive  -AA     Personal Safety Interventions fall prevention program maintained;gait belt;nonskid shoes/slippers when out of bed  -AA     ROM (Range of Motion)    General ROM no range of motion deficits identified  -AA     MMT (Manual Muscle Testing)    General MMT Assessment Detail 4-/5 transfers/gait  -AA     Bed Mobility, Assessment/Treatment    Bed Mobility, Assistive Device bed rails;head of bed elevated  -AA     Bed Mob,  Supine to Sit, [place] moderate assist (50% patient effort);verbal cues required  -AA     Bed Mob, Sit to Supine, [place] moderate assist (50% patient effort);verbal cues required  -AA     Bed Mobility, Safety Issues decreased use of arms for pushing/pulling;decreased use of legs for bridging/pushing;impaired trunk control for bed mobility  -AA     Bed Mobility, Impairments strength decreased  -AA     Transfer Assessment/Treatment    Transfers, Sit-Stand [place] minimum assist (75% patient effort);verbal cues required  -AA     Transfers, Stand-Sit [place] moderate assist (50% patient effort);verbal cues required  -AA     Transfers, Sit-Stand-Sit, Assist Device rolling walker  -AA     Toilet Transfer, [place] moderate assist (50% patient effort);verbal cues required  -AA     Toilet Transfer, Assistive Device rolling walker  -AA     Transfer, Safety Issues weight-shifting ability decreased;loses balance backward  -AA     Transfer, Impairments strength decreased;impaired balance  -AA     Transfer, Comment Pt very impulsive with stand <> sit transfer with max cues for safety and hand placement. Pt unsteady picking up object from floor impulsively. Pt need frequent cues to stay on task.  -AA     Gait Assessment/Treatment    Gait, [place] Level minimum assist (75% patient effort);verbal cues required  -AA     Gait, Assistive Device rolling walker  -AA     Gait, Distance (Feet) 25  -AA     Gait, Gait Pattern Analysis swing-through gait  -AA     Gait, Gait Deviations nilda decreased;decreased heel strike;weight-shifting ability decreased;forward flexed posture  -AA     Gait, Safety Issues weight-shifting ability decreased;step length decreased;balance decreased during turns  -AA     Gait, Impairments strength decreased;impaired balance  -AA     Gait, Comment Pt able to ambulate to restroom from bed with min A for 25ft; however, pt requires significant cues for safety.   -AA     Positioning  and Restraints    Pre-Treatment Position in bed  -AA     Post Treatment Position bed  -AA     In Bed supine;call light within reach;encouraged to call for assist;exit alarm on;[place] [facility] [place]       User Key  (r) = Recorded By, (t) = Taken By, (c) = Cosigned By    Initials Name Provider Type    KOSTA Holley PT Physical Therapist    RT Sidney Short RN Registered Nurse          Physical Therapy Education     Title: PT OT SLP Therapies (Done)     Topic: Physical Therapy (Done)     Point: Mobility training (Done)    Learning Progress Summary    Learner Readiness Method Response Comment Documented by Status   Patient Acceptance E VU,NR   01/02/18 1359 Done               Point: Home exercise program (Done)    Learning Progress Summary    Learner Readiness Method Response Comment Documented by Status   Patient Acceptance E VU,NR   01/02/18 1359 Done               Point: Body mechanics (Done)    Learning Progress Summary    Learner Readiness Method Response Comment Documented by Status   Patient Acceptance E VU,NR   01/02/18 1359 Done               Point: Precautions (Done)    Learning Progress Summary    Learner Readiness Method Response Comment Documented by Status   Patient Acceptance E VU,NR   01/02/18 1359 Done                      User Key     Initials Effective Dates Name Provider Type [facility] 09/05/17 -  Paula Holley PT Physical Therapist PT                PT Recommendation and Plan  Anticipated Equipment Needs At Discharge: front wheeled walker  Anticipated Discharge Disposition: skilled nursing facility  Planned Therapy Interventions: balance training, bed mobility training, gait training, home exercise program, strengthening, transfer training  PT Frequency: daily  Plan of Care Review  Plan Of Care Reviewed With: patient  Outcome Summary/Follow up Plan: Pt admitted with acute cystitis without hematuria. Pt requires mod A bed mobility with use of HOB and handrails. Pt able to sit  <> stand min A with RW and ambulation min A for 25ft with RW; however, pt requires max cues for safety. Pt very impulsive and unsafe with mobility reaching to floor unsafely and pulling at IVs with toilet transfer. Pt may beenfit from skilled PT for mobility. PT recommends d/c to SNF for further rehab for safety and decreased risk for falls.          IP PT Goals       01/02/18 1359          Bed Mobility PT LTG    Bed Mobility PT LTG, Date Established 01/02/18  -AA      Bed Mobility PT LTG, Time to Achieve 1 wk  -AA      Bed Mobility PT LTG, Activity Type all bed mobility  -AA      Bed Mobility PT LTG, [place] Level minimum assist (75% patient effort)  -AA      Transfer Training PT LTG    Transfer Training PT LTG, Date Established 01/02/18  -AA      Transfer Training PT LTG, Time to Achieve 1 wk  -AA      Transfer Training PT LTG, Activity Type all transfers  -AA      Transfer Training PT LTG, [place] Level contact guard assist  -AA      Transfer Training PT LTG, Assist Device walker, rolling  -AA      Gait Training PT LTG    Gait Training Goal PT LTG, Date Established 01/02/18  -AA      Gait Training Goal PT LTG, Time to Achieve 1 wk  -AA      Gait Training Goal PT LTG, [place] Level contact guard assist  -AA      Gait Training Goal PT LTG, Assist Device walker, rolling  -AA      Gait Training Goal PT LTG, Distance to Achieve 100  -AA        User Key  (r) = Recorded By, (t) = Taken By, (c) = Cosigned By    Initials Name Provider Type    KOSTA Holley PT Physical Therapist                Outcome Measures       01/02/18 1400          How much help from another person do you currently need...    Turning from your back to your side while in flat bed without using bedrails? 3  -AA      Moving from lying on back to sitting on the side of a flat bed without bedrails? 2  -AA      Moving to and from a bed to a chair (including a wheelchair)? 3  -AA      Standing up from a chair using your arms (e.g.,  wheelchair, bedside chair)? 3  -AA      Climbing 3-5 steps with a railing? 1  -AA      To walk in hospital room? 3  -AA      AM-PAC 6 Clicks Score 15  -AA      Functional Assessment    Outcome Measure Options AM-PAC 6 Clicks Basic Mobility (PT)  -AA        User Key  (r) = Recorded By, (t) = Taken By, (c) = Cosigned By    Initials Name Provider Type    KOSTA Holley PT Physical Therapist           Time Calculation:         PT Charges       01/02/18 1348          Time Calculation    Start Time 1325  -AA      Stop Time 1348  -AA      Time Calculation (min) 23 min  -AA      PT Received On 01/02/18  -AA      PT - Next Appointment 01/03/18  -AA      PT Goal Re-Cert Due Date 01/09/18  -AA        User Key  (r) = Recorded By, (t) = Taken By, (c) = Cosigned By    Initials Name Provider Type    KOSTA Holley PT Physical Therapist          Therapy Charges for Today     Code Description Service Date Service Provider Modifiers Qty    90630073345 HC PT EVAL MOD COMPLEXITY 2 1/2/2018 Paula Holley, PT GP 1    82740424089 HC PT THER PROC EA 15 MIN 1/2/2018 Paula Holley, PT GP 1    90605523587 HC PT THER SUPP EA 15 MIN 1/2/2018 Paula Holley, PT GP 1          PT G-Codes  Outcome Measure Options: AM-PAC 6 Clicks Basic Mobility (PT)      Paula Holley PT  1/2/2018

## 2018-01-02 NOTE — CONSULTS
"Adult Nutrition  Assessment/PES    Patient Name:  Sakshi Garcia  YOB: 1940  MRN: [id number]  Admit Date:  1/1/2018    Assessment Date:  1/2/2018    Comments:  Nutrition Screen completed per RN trigger          Reason for Assessment       01/02/18 1153    Reason for Assessment    Reason For Assessment/Visit identified at risk by screening criteria    Identified At Risk By Screening Criteria MST SCORE 2+    Diagnosis Diagnosis    Infectious Disease UTI    Renal CKD    Other diagnosis weakness s/p fall    Factors Affecting Nutrition Factors                Anthropometrics       01/02/18 1156    Anthropometrics    Height 157.5 cm (62.01\")    RD Documented Current Weight  68.9 kg (151 lb 12.8 oz)    Ideal Body Weight (IBW)    Ideal Body Weight (IBW), Female 50.85    Usual Body Weight (UBW)    Weight Loss --   no wt loss per pt    Body Mass Index (BMI)    BMI Grade 25 - 29.9 - overweight            Labs/Tests/Procedures/Meds       01/02/18 1157    Labs/Tests/Procedures/Meds    Diagnostic Test/Procedure Review reviewed    Labs/Tests Review Reviewed;Na+;BUN;Creat;Alb;Hgb A1C;Hgb Hct;WBC    Medication Review Reviewed, pertinent;Antibiotic;Insulin    Significant Vitals reviewed            Physical Findings       01/02/18 1157    Physical Findings/Assessment    Additional Documentation Physical Appearance (Group)    Physical Appearance    Overall Physical Appearance overweight    Skin other (see comments)   no PU            Estimated/Assessed Needs       01/02/18 1158    Calculation Measurements    Weight Used For Calculations 68.9 kg (151 lb 14.4 oz)    Estimated/Assessed Energy Needs    Energy Need Method Kcal/kg    kcal/kg 25    25 Kcal/Kg (kcal) 1722.5    Estimated/Assessed Protein Needs    Weight Used for Protein Calculation 68.9 kg (151 lb 14.4 oz)    Protein (gm/kg) 1.0    1.0 Gm Protein (gm) 68.9    Estimated/Assessed Fluid Needs    Fluid Need Method RDA method    RDA Method (mL)  1722            Nutrition " Prescription Ordered       01/02/18 1158    Nutrition Prescription PO    Current PO Diet Regular    Common Modifiers Cardiac;Renal;Consistent Carbohydrate            Evaluation of Received Nutrient/Fluid Intake       01/02/18 1159    PO Evaluation    % PO Intake usually good            Problem/Interventions:        Problem 1       01/02/18 1159    Nutrition Diagnoses Problem 1    Problem 1 Nutrition Appropriate for Condition at this Time    Etiology (related to) Medical Diagnosis    Infectious Disease UTI                    Intervention Goal       01/02/18 1159    Intervention Goal    General Maintain nutrition    PO Maintain intake    Weight Maintain weight            Nutrition Intervention       01/02/18 1159    Nutrition Intervention    RD/Tech Action Follow Tx progress;Care plan reviewd;Interview for preference              Education/Evaluation       01/02/18 1159    Education    Education Will Instruct as appropriate    Monitor/Evaluation    Monitor Per protocol        Electronically signed by:  Ngoc Santana RD  01/02/18 12:00 PM

## 2018-01-02 NOTE — ED NOTES
SPOKE WITH MEDICAL RECORDS AT [place]. PT HAS NO LAB RECORDS ON FILE THERE.     Yelitza Liu  01/01/18 1928       Yelitza Liu  01/01/18 1930
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
## 2018-01-02 NOTE — H&P
Internal medicine history and physical   INTERNAL MEDICINE   [facility]       Patient Identification:  Name: Sakshi Garcia  Age: 77 y.o.  Sex: female  :  1940  MRN: [id number]                    Primary Care Physician: Provider Not In System                                   Chief Complaint:  Falls    History of Present Illness:   History is limited as patient is all over the place in terms of describing her symptoms.  Most information was gathered from the patient's records and emergency room providers notes who have gathered the history from patient's family members.  No family members available at the time of my interview.    Patient is a 77-year-old female who has been falling a lot lately according to her and has fallen multiple times in the last 3 months.  Evidently she started having lightheadedness that started 2 days prior to this admission and resulting in a fall.  She remained on the floor for 13 hours before being picked up by the patient's family members.  Patient has 2 daughters one lives in [place] and one lives 5 miles away from her house patient usually lives by herself.  Patient is very perturbed when it is expressed that she is having a hard time taking care of herself and she can't remember things in proper sequence.  At this juncture patient states that she is having some cough and produces yellowish color phlegm.  She denies any other symptoms such as burning in urination frequency or urgency.  She is complaining of left knee pain.  In the emergency room she was noted to have elevated CPK abnormal urinalysis and creatinine of 1.46 with elevated blood sugar.  She will also noted to have indeterminate elevation of troponin in the range of 0.044 with no EKG changes and no symptoms of chest pain.      Past Medical History:  Past Medical History:   Diagnosis Date   • Colon cancer    • Hypertension      Past Surgical History:  Past Surgical History:   Procedure Laterality Date    • CHOLECYSTECTOMY     • COLON SURGERY     • COLONOSCOPY     • HERNIA REPAIR        Home Meds:  Prescriptions Prior to Admission   Medication Sig Dispense Refill Last Dose   • amLODIPine (NORVASC) 5 MG tablet Take 5 mg by mouth Daily.   Past Week at Unknown time   • glipiZIDE (GLUCOTROL) 5 MG ER tablet Take 5 mg by mouth Daily.   1/1/2018 at Unknown time   • lisinopril (PRINIVIL,ZESTRIL) 20 MG tablet Take 20 mg by mouth Daily.   Past Week at Unknown time   • metoprolol succinate XL (TOPROL-XL) 25 MG 24 hr tablet Take 25 mg by mouth Daily.   Past Week at Unknown time   • cephalexin (KEFLEX) 500 MG capsule Take 1 capsule by mouth 2 (Two) Times a Day. 20 capsule 0    • linagliptin (TRADJENTA) 5 MG tablet tablet Take 5 mg by mouth Daily.   Unknown at Unknown time   • triamcinolone (KENALOG) 0.1 % cream Apply  topically 3 (Three) Times a Day. Apply sparingly. 45 g 0      Current Meds:     Current Facility-Administered Medications:   •  sodium chloride 0.9 % flush 10 mL, 10 mL, Intravenous, PRN, Hector Matute MD  •  sodium chloride 0.9 % infusion, 125 mL/hr, Intravenous, Continuous, Hector Matute MD  Allergies:  Allergies   Allergen Reactions   • Contrast Dye    • Sulfa Antibiotics Itching     Social History:   Social History   Substance Use Topics   • Smoking status: Heavy Tobacco Smoker   • Smokeless tobacco: Not on file   • Alcohol use No      Family History:  History reviewed. No pertinent family history.       Review of Systems  See history of present illness and past medical history.  Patient denies headache, dizziness, syncope, falls, trauma, change in vision, change in hearing, change in taste, changes in weight, changes in appetite, focal weakness, numbness, or paresthesia.  Patient denies chest pain, palpitations, dyspnea, orthopnea, PND, cough, sinus pressure, rhinorrhea, epistaxis, hemoptysis, nausea, vomiting,hematemesis, diarrhea, constipation or hematchezia.  Denies cold or heat intolerance, polydipsia,  "polyuria, polyphagia. Denies hematuria, pyuria, dysuria, hesitancy, frequency or urgency. Denies consumption of raw and under cooked meats foods or change in water source.  Denies fever, chills, sweats, night sweats.  Denies missing any routine medications. Remainder of ROS is negative.      Vitals:   /71  Pulse 85  Temp 98.8 °F (37.1 °C) (Tympanic)   Resp 16  Ht 157.5 cm (62\")  Wt 69.4 kg (153 lb)  SpO2 96%  BMI 27.98 kg/m2  I/O:   Intake/Output Summary (Last 24 hours) at 01/01/18 2106  Last data filed at 01/01/18 2002   Gross per 24 hour   Intake              500 ml   Output                0 ml   Net              500 ml     Exam:  General Appearance:    Chronically ill appearing female who at times gets agitated during conversation.  Oriented to name and place but not time     Head:    Normocephalic, without obvious abnormality, atraumatic   Eyes:    PERRL, conjunctiva/corneas clear, EOM's intact, both eyes   Ears:    Normal external ear canals, both ears   Nose:   Nares normal, septum midline, mucosa normal, no drainage    or sinus tenderness   Throat:   Lips, tongue, gums normal; oral mucosa pink and moist   Neck:   Supple, symmetrical, trachea midline, no adenopathy;     thyroid:  no enlargement/tenderness/nodules; no carotid    bruit or JVD   Back:     Symmetric, no curvature, ROM normal, no CVA tenderness   Lungs:     Bilateral air entry transfer sounds from the throat no obvious use of accessory muscles of breathing noted    Chest Wall:    No tenderness or deformity    Heart:    Regular rate and rhythm, S1 and S2 normal, no murmur, rub   or gallop   Abdomen:     Soft, non-tender, bowel sounds active all four quadrants,     no masses, no hepatomegaly, no splenomegaly   Extremities:   Abrasion on the right knee with no obvious deformity no warmth and tenderness    Pulses:   Pulses palpable in all extremities; symmetric all extremities   Skin:   Skin color normal, Skin is warm and dry,  no rashes " or palpable lesions   Neurologic:   Grossly nonfocal, poor memory       Data Review:      I reviewed the patient's new clinical results.    Results from last 7 days  Lab Units 01/01/18  1631   WBC 10*3/mm3 21.43*   HEMOGLOBIN g/dL 10.6*   PLATELETS 10*3/mm3 218       Results from last 7 days  Lab Units 01/01/18  1631   SODIUM mmol/L 132*   POTASSIUM mmol/L 4.4   CHLORIDE mmol/L 93*   CO2 mmol/L 23.2   BUN mg/dL 41*   CREATININE mg/dL 1.46*   CALCIUM mg/dL 9.5   GLUCOSE mg/dL 297*   EKG                                                     EKG time: 1748  Rhythm/Rate: sinus rhythm, 85  P waves and WV: moderate baseline artificat  QRS, axis: normal axis, no conduction delays   ST and T waves: no acute    CT scan of the head without contrast  Impression:         There is no evidence for an acute intracranial abnormality.  Findings consistent with severe chronic small vessel ischemic change of  the cerebral white matter are noted.      Chest x-ray  Impression:         No focal pulmonary consolidation. Borderline heart size.  Tortuous aorta. Follow-up as clinically indicated.         Assessment:  Active Hospital Problems (** Indicates Principal Problem)    Diagnosis Date Noted   • **Generalized weakness [R53.1] 01/01/2018   • Traumatic rhabdomyolysis [T79.6XXA] 01/01/2018   • Fall [W19.XXXA] 01/01/2018   • UTI (urinary tract infection) [N39.0] 01/01/2018   • CKD (chronic kidney disease) [N18.9] 01/01/2018   • DM (diabetes mellitus) [E11.9] 01/01/2018   • HTN (hypertension) [I10] 01/01/2018   • Altered mental status [R41.82] 01/01/2018      Resolved Hospital Problems    Diagnosis Date Noted Date Resolved   No resolved problems to display.       Plan:  Generalized weakness with recurrent falls - likely secondary to urinary tract infection along with systemic viral illness - cautious hydration, IV Rocephin and follow up on urine culture.  Acute on chronic renal failure-multifactorial including dehydration, plan is to avoid  nephrotoxic agent cautious hydration.  Diabetes mellitus appears to be poorly controlled-plan hold oral hypoglycemic agents Accu-Cheks and sliding scale coverage and based oral intake and reduction of long-acting insulin.  Urinary tract infection-follow-up on urine culture continue Rocephin  Altered mental status-likely toxic metabolic encephalopathy due to UTI and dehydration  Recurrent falls secondary to generalized weakness and uncontrolled diabetes with rhabdo.  Declining mental faculties prohibiting dignified self-care going forward - plan is to consult case management for placement along with physical therapy.  Right knee contusion with no evidence of fracture- observe and pain control  Chronic left knee pain with no evidence of any inflammation on examination likely due to chronic osteoarthritis  Anemia likely anemia of chronic disease-monitor if it shows decline may need workup  To Hendrickson MD   1/1/2018  9:06 PM  Much of this encounter note is an electronic transcription/translation of spoken language to printed text. The electronic translation of spoken language may permit erroneous, or at times, nonsensical words or phrases to be inadvertently transcribed; Although I have reviewed the note for such errors, some may still exist

## 2018-01-02 NOTE — NURSING NOTE
Patients daughter Rut, at bedside with granddaughter. Rut concerned about discharge plans, and home safety. She would like to be involved in planning. Patient is alert and oriented. Gave adriana Bettencourt Case Mgmt card, and informed her I would also involve a .

## 2018-01-02 NOTE — PLAN OF CARE
Problem: Pressure Ulcer Risk (Tank Scale) (Adult,Obstetrics,Pediatric)  Goal: Identify Related Risk Factors and Signs and Symptoms  Outcome: Ongoing (interventions implemented as appropriate)    Goal: Skin Integrity  Outcome: Ongoing (interventions implemented as appropriate)      Problem: Fall Risk (Adult)  Goal: Identify Related Risk Factors and Signs and Symptoms  Outcome: Ongoing (interventions implemented as appropriate)    Goal: Absence of Falls  Outcome: Ongoing (interventions implemented as appropriate)

## 2018-01-02 NOTE — PLAN OF CARE
Problem: Patient Care Overview (Adult)  Goal: Plan of Care Review   01/02/18 1359   Coping/Psychosocial Response Interventions   Plan Of Care Reviewed With patient   Outcome Evaluation   Outcome Summary/Follow up Plan Pt admitted with acute cystitis without hematuria. Pt requires mod A bed mobility with use of HOB and handrails. Pt able to sit <> stand min A with RW and ambulation min A for 25ft with RW; however, pt requires max cues for safety. Pt very impulsive and unsafe with mobility reaching to floor unsafely and pulling at IVs with toilet transfer. Pt may beenfit from skilled PT for mobility. PT recommends d/c to SNF for further rehab for safety and decreased risk for falls.       Problem: Inpatient Physical Therapy  Goal: Bed Mobility Goal LTG- PT   01/02/18 1359   Bed Mobility PT LTG   Bed Mobility PT LTG, Date Established 01/02/18   Bed Mobility PT LTG, Time to Achieve 1 wk   Bed Mobility PT LTG, Activity Type all bed mobility   Bed Mobility PT LTG, [place] Level minimum assist (75% patient effort)     Goal: Transfer Training Goal 1 LTG- PT   01/02/18 1359   Transfer Training PT LTG   Transfer Training PT LTG, Date Established 01/02/18   Transfer Training PT LTG, Time to Achieve 1 wk   Transfer Training PT LTG, Activity Type all transfers   Transfer Training PT LTG, [place] Level contact guard assist   Transfer Training PT LTG, Assist Device walker, rolling     Goal: Gait Training Goal LTG- PT   01/02/18 1359   Gait Training PT LTG   Gait Training Goal PT LTG, Date Established 01/02/18   Gait Training Goal PT LTG, Time to Achieve 1 wk   Gait Training Goal PT LTG, [place] Level contact guard assist   Gait Training Goal PT LTG, Assist Device walker, rolling   Gait Training Goal PT LTG, Distance to Achieve 100

## 2018-01-02 NOTE — PLAN OF CARE
Problem: Patient Care Overview (Adult)  Goal: Plan of Care Review  Outcome: Ongoing (interventions implemented as appropriate)   01/02/18 7044   Coping/Psychosocial Response Interventions   Plan Of Care Reviewed With patient;daughter   Patient Care Overview   Progress improving   Outcome Evaluation   Outcome Summary/Follow up Plan Ligia reports cognition has improved, ambulating with x1 assist, PT recomending Skilled rehab and patient declines. Family dynamics ongoing, daughters not on speaking turms and patient is requesting us not speak to in town daughter about care.      Goal: Adult Individualization and Mutuality  Outcome: Ongoing (interventions implemented as appropriate)    Goal: Discharge Needs Assessment  Outcome: Ongoing (interventions implemented as appropriate)      Problem: Diabetes, Type 2 (Adult)  Goal: Signs and Symptoms of Listed Potential Problems Will be Absent or Manageable (Diabetes, Type 2)  Outcome: Ongoing (interventions implemented as appropriate)      Problem: Pressure Ulcer Risk (Tank Scale) (Adult,Obstetrics,Pediatric)  Goal: Identify Related Risk Factors and Signs and Symptoms  Outcome: Ongoing (interventions implemented as appropriate)    Goal: Skin Integrity  Outcome: Ongoing (interventions implemented as appropriate)      Problem: Fall Risk (Adult)  Goal: Identify Related Risk Factors and Signs and Symptoms  Outcome: Ongoing (interventions implemented as appropriate)    Goal: Absence of Falls  Outcome: Ongoing (interventions implemented as appropriate)
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
## 2018-01-03 PROBLEM — Z66 DNR (DO NOT RESUSCITATE): Status: ACTIVE | Noted: 2018-01-03

## 2018-01-03 LAB
ANION GAP SERPL CALCULATED.3IONS-SCNC: 15.4 MMOL/L
BACTERIA SPEC AEROBE CULT: ABNORMAL
BACTERIA SPEC AEROBE CULT: ABNORMAL
BUN BLD-MCNC: 44 MG/DL (ref 8–23)
BUN/CREAT SERPL: 29.7 (ref 7–25)
CALCIUM SPEC-SCNC: 8.8 MG/DL (ref 8.6–10.5)
CHLORIDE SERPL-SCNC: 102 MMOL/L (ref 98–107)
CK SERPL-CCNC: 664 U/L (ref 20–180)
CO2 SERPL-SCNC: 18.6 MMOL/L (ref 22–29)
CREAT BLD-MCNC: 1.48 MG/DL (ref 0.57–1)
DEPRECATED RDW RBC AUTO: 47.7 FL (ref 37–54)
ERYTHROCYTE [DISTWIDTH] IN BLOOD BY AUTOMATED COUNT: 13.6 % (ref 11.7–13)
GFR SERPL CREATININE-BSD FRML MDRD: 34 ML/MIN/1.73
GLUCOSE BLD-MCNC: 132 MG/DL (ref 65–99)
GLUCOSE BLDC GLUCOMTR-MCNC: 129 MG/DL (ref 70–130)
GLUCOSE BLDC GLUCOMTR-MCNC: 205 MG/DL (ref 70–130)
GLUCOSE BLDC GLUCOMTR-MCNC: 278 MG/DL (ref 70–130)
GLUCOSE BLDC GLUCOMTR-MCNC: 289 MG/DL (ref 70–130)
HCT VFR BLD AUTO: 28.5 % (ref 35.6–45.5)
HGB BLD-MCNC: 9.2 G/DL (ref 11.9–15.5)
MCH RBC QN AUTO: 31.2 PG (ref 26.9–32)
MCHC RBC AUTO-ENTMCNC: 32.3 G/DL (ref 32.4–36.3)
MCV RBC AUTO: 96.6 FL (ref 80.5–98.2)
PLATELET # BLD AUTO: 173 10*3/MM3 (ref 140–500)
PMV BLD AUTO: 10.1 FL (ref 6–12)
POTASSIUM BLD-SCNC: 3.5 MMOL/L (ref 3.5–5.2)
RBC # BLD AUTO: 2.95 10*6/MM3 (ref 3.9–5.2)
SODIUM BLD-SCNC: 136 MMOL/L (ref 136–145)
WBC NRBC COR # BLD: 18.66 10*3/MM3 (ref 4.5–10.7)

## 2018-01-03 PROCEDURE — 80048 BASIC METABOLIC PNL TOTAL CA: CPT | Performed by: INTERNAL MEDICINE

## 2018-01-03 PROCEDURE — 82962 GLUCOSE BLOOD TEST: CPT

## 2018-01-03 PROCEDURE — 94640 AIRWAY INHALATION TREATMENT: CPT

## 2018-01-03 PROCEDURE — 94799 UNLISTED PULMONARY SVC/PX: CPT

## 2018-01-03 PROCEDURE — 85027 COMPLETE CBC AUTOMATED: CPT | Performed by: INTERNAL MEDICINE

## 2018-01-03 PROCEDURE — 82550 ASSAY OF CK (CPK): CPT | Performed by: INTERNAL MEDICINE

## 2018-01-03 PROCEDURE — 25010000002 HYDRALAZINE PER 20 MG: Performed by: HOSPITALIST

## 2018-01-03 PROCEDURE — 63710000001 INSULIN ASPART PER 5 UNITS: Performed by: INTERNAL MEDICINE

## 2018-01-03 PROCEDURE — 25010000003 CEFTRIAXONE PER 250 MG: Performed by: INTERNAL MEDICINE

## 2018-01-03 RX ORDER — METOPROLOL SUCCINATE 50 MG/1
50 TABLET, EXTENDED RELEASE ORAL DAILY
Status: DISCONTINUED | OUTPATIENT
Start: 2018-01-04 | End: 2018-01-06

## 2018-01-03 RX ORDER — HYDRALAZINE HYDROCHLORIDE 25 MG/1
25 TABLET, FILM COATED ORAL EVERY 6 HOURS PRN
Status: DISCONTINUED | OUTPATIENT
Start: 2018-01-03 | End: 2018-01-05

## 2018-01-03 RX ORDER — SODIUM CHLORIDE 9 MG/ML
75 INJECTION, SOLUTION INTRAVENOUS CONTINUOUS
Status: DISCONTINUED | OUTPATIENT
Start: 2018-01-03 | End: 2018-01-04

## 2018-01-03 RX ORDER — LEVOFLOXACIN 500 MG/1
500 TABLET, FILM COATED ORAL EVERY 24 HOURS
Status: DISCONTINUED | OUTPATIENT
Start: 2018-01-03 | End: 2018-01-04

## 2018-01-03 RX ORDER — IPRATROPIUM BROMIDE AND ALBUTEROL SULFATE 2.5; .5 MG/3ML; MG/3ML
3 SOLUTION RESPIRATORY (INHALATION)
Status: DISCONTINUED | OUTPATIENT
Start: 2018-01-03 | End: 2018-01-04

## 2018-01-03 RX ORDER — LISINOPRIL 10 MG/1
10 TABLET ORAL
Status: DISCONTINUED | OUTPATIENT
Start: 2018-01-03 | End: 2018-01-06

## 2018-01-03 RX ORDER — BENZONATATE 100 MG/1
200 CAPSULE ORAL 3 TIMES DAILY PRN
Status: DISCONTINUED | OUTPATIENT
Start: 2018-01-03 | End: 2018-01-22 | Stop reason: HOSPADM

## 2018-01-03 RX ORDER — HYDRALAZINE HYDROCHLORIDE 20 MG/ML
10 INJECTION INTRAMUSCULAR; INTRAVENOUS ONCE
Status: COMPLETED | OUTPATIENT
Start: 2018-01-03 | End: 2018-01-03

## 2018-01-03 RX ADMIN — CEFTRIAXONE SODIUM 2 G: 2 INJECTION, SOLUTION INTRAVENOUS at 12:14

## 2018-01-03 RX ADMIN — HYDRALAZINE HYDROCHLORIDE 10 MG: 20 INJECTION INTRAMUSCULAR; INTRAVENOUS at 04:26

## 2018-01-03 RX ADMIN — AMLODIPINE BESYLATE 5 MG: 5 TABLET ORAL at 08:29

## 2018-01-03 RX ADMIN — ACETAMINOPHEN 325 MG: 325 TABLET ORAL at 03:04

## 2018-01-03 RX ADMIN — INSULIN ASPART 6 UNITS: 100 INJECTION, SOLUTION INTRAVENOUS; SUBCUTANEOUS at 17:11

## 2018-01-03 RX ADMIN — SODIUM CHLORIDE 75 ML/HR: 9 INJECTION, SOLUTION INTRAVENOUS at 06:07

## 2018-01-03 RX ADMIN — INSULIN ASPART 6 UNITS: 100 INJECTION, SOLUTION INTRAVENOUS; SUBCUTANEOUS at 12:13

## 2018-01-03 RX ADMIN — SODIUM CHLORIDE 125 ML/HR: 9 INJECTION, SOLUTION INTRAVENOUS at 03:05

## 2018-01-03 RX ADMIN — METOPROLOL TARTRATE 25 MG: 25 TABLET ORAL at 19:21

## 2018-01-03 RX ADMIN — INSULIN ASPART 2 UNITS: 100 INJECTION, SOLUTION INTRAVENOUS; SUBCUTANEOUS at 21:02

## 2018-01-03 RX ADMIN — IPRATROPIUM BROMIDE AND ALBUTEROL SULFATE 3 ML: .5; 3 SOLUTION RESPIRATORY (INHALATION) at 21:06

## 2018-01-03 RX ADMIN — SODIUM CHLORIDE 75 ML/HR: 9 INJECTION, SOLUTION INTRAVENOUS at 21:53

## 2018-01-03 RX ADMIN — HYDRALAZINE HYDROCHLORIDE 25 MG: 50 TABLET, FILM COATED ORAL at 12:14

## 2018-01-03 RX ADMIN — LEVOFLOXACIN 500 MG: 500 TABLET, FILM COATED ORAL at 21:02

## 2018-01-03 RX ADMIN — METOPROLOL SUCCINATE 25 MG: 25 TABLET, FILM COATED, EXTENDED RELEASE ORAL at 08:29

## 2018-01-03 RX ADMIN — LISINOPRIL 10 MG: 10 TABLET ORAL at 19:21

## 2018-01-03 NOTE — PROGRESS NOTES
Continued Stay Note  [facility]     Patient Name: Sakshi Garcia  MRN: [id number]  Today's Date: 1/3/2018    Admit Date: 1/1/2018          Discharge Plan       01/03/18 1141    Case Management/Social Work Plan    Additional Comments . Notified that [facility] can not guarentee payment of services for ambulance. Pt may qualify if needs to remain on O2. [facility] has recevied numerous calls from [facility]. Discussed with Manager Chandrika TORREZ who suggests making pt a privacy pt. Disucssed with pt who would like this as well. Pt states I don't need this right now. Call to dtr an dPOA Ernestina. She would also like pt to be a privacy pt. [facility] also requested POA paperwork. She has and will fax. [facility] following      01/03/18 1110    Case Management/Social Work Plan    Plan sub acute rehab    Patient/Family In Agreement With Plan yes   poariana Ernestina Gamboa [phone]    Additional Comments Call x2 with voice mail left for CCP from daughter Rut. Permission from pt and POA to notify [facility] that they do not want CCP to dicuss any DC plans with her. Call to nuber left by daughter Autumn no answer but message left on identified voice mail. Call from POA and daughter Ernestina they would like a referral to [facility] in [place] as this is close to son Forrest's home. Call to Nissa with [facility] and referral made.  Daughter with questions regarding transportation and if pt will be approved by      01/03/18 1048    Case Management/Social Work Plan    Plan Sub-Acute Rehab pending referrals     Additional Comments Following up with pt regarding DCP.   Call placed to APS hotline [phone] and confirmed pt does not have an open APS case at this time.  Spoke with pt at bedside to discuss pt going to sub-acute rehab upon D/C.  Pt did state that she is agreeable to short term rehab upon D/C.   Pt did request that CCP call her daughter Ernestina Gamboa who is pt's [phone] and lives in [place] to discuss DC planning.  Pt stated that she does not want her daughter  Rut Page [phone] making any decision concerning her D/C plan.  Call placed and spoke with pt's daughter Ernestina Gamboa concerning pt's DC plan.  Ernestina did confirm that they would like pt to go for subacute rehab for a short stay and then transition back to her home.   Ernestina also stated that her sister Rut is not to make any decisions for pt and does not want her to have any information concerning pt's.   Ernestina stated that she is pt's POA but her and her brother Forrest Garcia [phone] (who lives in [facility]) make decisions concerning their mother together.   [facility] has emailed Ernestina a list of subacute nursing homes in the [place] area to [email or web address].  Ernestina stated that she will discuss list with her brother and call a few friends who live locally in [place] to pick some NH's to make referrals to for sub-acute rehab.  Informed pt's daughter that referrals need to be made ASAP since pt could be ready for D/C as soon as today.   Ernestina voiced understanding.   [facility] will be following for referrals for sub-acute rehab.               Discharge Codes     None        Expected Discharge Date and Time     Expected Discharge Date Expected Discharge Time    Jan 4, 2018             Radha Shukla RN

## 2018-01-03 NOTE — SIGNIFICANT NOTE
01/03/18 1458   Rehab Treatment   Discipline physical therapy assistant   Treatment Not Performed unable to treat, medical status change  (Pt on hold for today secondary to high BP.  PT to follow up tomorrow.)   Recommendation   PT - Next Appointment 01/04/18

## 2018-01-03 NOTE — PROGRESS NOTES
Continued Stay Note  [facility]     Patient Name: Sakshi Garcia  MRN: [id number]  Today's Date: 1/3/2018    Admit Date: 1/1/2018          Discharge Plan       01/03/18 1048    Case Management/Social Work Plan    Plan Sub-Acute Rehab pending referrals     Additional Comments Following up with pt regarding DCP.   Call placed to APS hotline [phone] and confirmed pt does not have an open APS case at this time.  Spoke with pt at bedside to discuss pt going to sub-acute rehab upon D/C.  Pt did state that she is agreeable to short term rehab upon D/C.   Pt did request that [facility] call her daughter Ernestina Gamboa who is pt's [phone] and lives in [place] to discuss DC planning.  Pt stated that she does not want her daughter Rut Singherd [phone] making any decision concerning her D/C plan.  Call placed and spoke with pt's daughter Ernestina Gamboa concerning pt's DC plan.  Ernestina did confirm that they would like pt to go for subacute rehab for a short stay and then transition back to her home.   Ernestina also stated that her sister Rut is not to make any decisions for pt and does not want her to have any information concerning pt's.   Ernestina stated that she is pt's POA but her and her brother Forrest Garcia [phone] (who lives in [place]) make decisions concerning their mother together.   [facility] has emailed Enrestina a list of subacute nursing homes in the [facility] to [email or web address].  Ernestina stated that she will discuss list with her brother and call a few friends who live locally in [place] to pick some NH's to make referrals to for sub-acute rehab.  Informed pt's daughter that referrals need to be made ASAP since pt could be ready for D/C as soon as today.   Ernestina voiced understanding.   [facility] will be following for referrals for sub-acute rehab.               Discharge Codes     None            YUAN Keller

## 2018-01-03 NOTE — PROGRESS NOTES
Name: Sakshi Garcia ADMIT: 2018   : 1940  PCP: Provider Not In System    MRN: [id number] LOS: 2 days   AGE/SEX: 77 y.o. female  ROOM: 81/1   Subjective   Subjective  Cc- weakness    Still is weak  Had been found on the ground prior to admission      On IV abx  Fever is resolved  +dry cough still present  Transferred to [facility] due to some family issues and privacy patient and does not want one of her daughters to know about her care (see RN and CCP notes)    ROS  +f/c  -n/v  +cough, - soa  No cp/palp    Objective   Vital Signs  Temp:  [98.6 °F (37 °C)-100 °F (37.8 °C)] 99.4 °F (37.4 °C)  Heart Rate:  [] 89  Resp:  [18] 18  BP: ()/(63-88) 187/73  SpO2:  [94 %-96 %] 94 %  on  Flow (L/min):  [1-2] 1;   O2 Device: nasal cannula  Body mass index is 27.76 kg/(m^2).    Physical Exam    Alert, elderly  Acutely and chronically ill  Supple, no jvd  RRR, no mrumurs  +cough during exam  No rhonchi  Soft, nt  No edema or cyanosis  Slight confusion    Results Review:       I reviewed the patient's new clinical results.    Results from last 7 days  Lab Units 18  0509 18  0417 18  1631   WBC 10*3/mm3 18.66* 20.00* 21.43*   HEMOGLOBIN g/dL 9.2* 9.9* 10.6*   PLATELETS 10*3/mm3 173 192 218       Results from last 7 days  Lab Units 18  0509 18  0417 18  1631   SODIUM mmol/L 136 132* 132*   POTASSIUM mmol/L 3.5 3.8 4.4   CHLORIDE mmol/L 102 96* 93*   CO2 mmol/L 18.6* 22.0 23.2   BUN mg/dL 44* 43* 41*   CREATININE mg/dL 1.48* 1.48* 1.46*   GLUCOSE mg/dL 132* 107* 297*   Estimated Creatinine Clearance: 28.5 mL/min (by C-G formula based on Cr of 1.48).    Results from last 7 days  Lab Units 18  0509 18  0417 18  1631   CALCIUM mg/dL 8.8 8.9 9.5   ALBUMIN g/dL  --  3.00* 3.20*   MAGNESIUM mg/dL  --  1.6 1.6   PHOSPHORUS mg/dL  --  2.5  --      CT Head Without Contrast [[id number]] Not Reviewed        Order Status: Completed Collected: 18 1710        Updated:  01/01/18 1944       Narrative:         CT OF THE HEAD WITHOUT CONTRAST     HISTORY: 77-year-old female with a history of frequent falls and altered  mental status.     TECHNIQUE: Contiguous axial images were obtained through the head  without IV contrast.     COMPARISON: None.     FINDINGS: There is a severe hypodense appearance seen within the  bilateral paraventricular and subcortical white matter of the cerebral  hemispheres. No territorial edema is appreciated. There is moderate  diffuse atrophy and proportionate ventriculomegaly. The osseous  structures of the skull have a normal appearance.          Impression:         There is no evidence for an acute intracranial abnormality.  Findings consistent with severe chronic small vessel ischemic change of  the cerebral white matter are noted.     Radiation dose reduction techniques were utilized, including automated  exposure control and exposure modulation based on body size.     This report was finalized on 1/1/2018 7:41 PM by Dr. Philip Soares MD.          XR Chest 2 View [[id number]] Not Reviewed       Order Status: Completed Collected: 01/01/18 1656        Updated: 01/01/18 1701       Narrative:         XR CHEST 2 VW-     HISTORY: Female who is 77 years-old,  weakness     TECHNIQUE: Frontal and lateral views of the chest     COMPARISON: None available     FINDINGS: Heart size is borderline. Pulmonary vasculature is  unremarkable. Aorta is tortuous. No focal pulmonary consolidation,  pleural effusion, or pneumothorax. Arterial calcifications are present.  Degenerative changes are seen at the shoulders. No acute osseous  process.          Impression:         No focal pulmonary consolidation. Borderline heart size.  Tortuous aorta. Follow-up as clinically indicated.        Respiratory Panel, PCR - Swab, Nasopharynx [[id number]] (Normal) Collected: 01/01/18 2244        Lab Status: Final result Specimen: Swab from Nasopharynx Updated: 01/02/18 0026        ADENOVIRUS,  PCR Not Detected        Coronavirus 229E Not Detected        Coronavirus HKU1 Not Detected        Coronavirus NL63 Not Detected        Coronavirus OC43 Not Detected        Human Metapneumovirus Not Detected        Human Rhinovirus/Enterovirus Not Detected        Influenza B PCR Not Detected        Parainfluenza Virus 1 Not Detected        Parainfluenza Virus 2 Not Detected        Parainfluenza Virus 3 Not Detected        Parainfluenza Virus 4 Not Detected        Bordetella pertussis pcr Not Detected        Influenza 2009 H1N1 by PCR Not Detected        Chlamydophila pneumoniae PCR Not Detected        Mycoplasma pneumo by PCR Not Detected        Influenza A PCR Not Detected        Influenza A H3 Not Detected        Influenza A H1 Not Detected        RSV, PCR Not Detected       Urine Culture - Urine, Urine, Clean Catch [[id number]] (Abnormal) Collected: 01/01/18 1909       Lab Status: Preliminary result Specimen: Urine from Urine, Catheter Updated: 01/02/18 0735        Urine Culture --         >100,000 CFU/mL Gram Negative Bacilli (A)       Blood Culture - Blood, [[id number]] (Normal) Collected: 01/01/18 1845       Lab Status: Preliminary result Specimen: Blood from Arm, Left Updated: 01/02/18 0701        Blood Culture No growth at less than 24 hours       Blood Culture - Blood, [[id number]] (Abnormal) Collected: 01/01/18 1832       Lab Status: Preliminary result Specimen: Blood from Arm, Left Updated: 01/02/18 1028        Blood Culture Abnormal Stain (A)        Gram Stain Result Anaerobic Bottle Gram negative bacilli       Blood Culture ID, PCR - Blood, [[id number]] (Abnormal) Collected: 01/01/18 1832       Lab Status: Final result Specimen: Blood from Arm, Left Updated: 01/02/18 1201        BCID, PCR Escherichia coli. Identification by BCID PCR. (C)            amLODIPine 5 mg Oral Daily   insulin aspart 0-9 Units Subcutaneous 4x Daily With Meals & Nightly   levoFLOXacin 500 mg Oral Q24H   lisinopril 10 mg Oral Q24H    [START ON 1/4/2018] metoprolol succinate XL 50 mg Oral Daily   metoprolol tartrate 25 mg Oral Once       sodium chloride 75 mL/hr Last Rate: 75 mL/hr (01/03/18 0607)   Diet Regular; Cardiac, Consistent Carbohydrate, Renal      Assessment/Plan   Active Hospital Problems (** Indicates Principal Problem)    Diagnosis Date Noted   • **Acute cystitis without hematuria [N30.00] 01/01/2018   • DNR (do not resuscitate) [Z66] 01/03/2018   • Sepsis [A41.9] 01/02/2018   • Bacteremia [R78.81] 01/02/2018   • Traumatic rhabdomyolysis [T79.6XXA] 01/01/2018   • Generalized weakness [R53.1] 01/01/2018   • Fall [W19.XXXA] 01/01/2018   • CKD (chronic kidney disease) [N18.9] 01/01/2018   • DM (diabetes mellitus) [E11.9] 01/01/2018   • HTN (hypertension) [I10] 01/01/2018   • Altered mental status [R41.82] 01/01/2018      Resolved Hospital Problems    Diagnosis Date Noted Date Resolved   No resolved problems to display.       Ms. Garcia is a 77 y.o. female who has been admitted with sepsis and rhabdo    · Change abx to levaquin based on cultures  · Discussed code status and she would like to be DNR/DNI  · IVFs  · Monitor renal fx and CK  · PT consultation  · SSI  · Add duoneb to help with cough. Add prn tessalon  · Restart lisinopril with elevated BP and stable renal fx    D/W RN  Reviewed records    Transferred to [facility] due to some family issues and privacy patient and does not want one of her daughters to know about her care (see RN and CCP notes)    Hopefully discharge to rehab soon    Cheo Vora MD  [facility]  01/03/18  6:31 PM

## 2018-01-03 NOTE — DISCHARGE PLACEMENT REQUEST
"Jalyn Garcia (77 y.o. Female)     Date of Birth Social Security Number Address Home Phone MRN    1940  [id number] [phone] [id number]    [employer] Marital Status          Anabaptism        Admission Date Admission Type Admitting Provider Attending Provider Department, Room/Bed    1/1/18 Emergency To [facility] MD Jackson, Alan David, MD [address], 457/1    Discharge Date Discharge Disposition Discharge Destination                      Attending Provider: Cheo Vora MD     Allergies:  Contrast Dye, Sulfa Antibiotics    Isolation:  None   Infection:  None   Code Status:  FULL    Ht:  157.5 cm (62.01\")   Wt:  68.9 kg (151 lb 12.8 oz)    Admission Cmt:  None   Principal Problem:  Acute cystitis without hematuria [N30.00]                 Active Insurance as of 1/1/2018     Primary Coverage     Payor Plan Insurance Group Employer/Plan Group    MEDICARE MEDICARE A & B      Payor Plan Address Payor Plan Phone Number Effective From Effective To    [address] [phone] 4/1/1997     [place] [address]       Subscriber Name Subscriber Birth Date Member ID       JALYN GARCIA 1940 [id number]           Secondary Coverage     Payor Plan Insurance Group Employer/Plan Group    AARP MED SUPP AAR HEALTH CARE OPTIONS      Payor Plan Address Payor Plan Phone Number Effective From Effective To    [facility] [phone] 1/1/2017     [address]       [place] [address]       Subscriber Name Subscriber Birth Date Member ID       JALYN GARCIA 1940 [id number]                 Emergency Contacts      (Rel.) Home Phone Work Phone Mobile Phone    Eri Gamboae (Poa) (Daughter) [phone] --    Forrest Garcia (Son) [phone] -- --               "

## 2018-01-03 NOTE — PROGRESS NOTES
Continued Stay Note  [facility]     Patient Name: Sakshi Garcia  MRN: [id number]  Today's Date: 1/3/2018    Admit Date: 1/1/2018          Discharge Plan       01/03/18 1419    Case Management/Social Work Plan    Plan [facility] [place]    Patient/Family In Agreement With Plan yes   MARINE Do     Additional Comments Call from Nissa pt accepted at [facility] and bed is available tomorow. Call to MARINE Do and notified. Pt has moved to . Unable to notify pt but did inform CCP Nicki. Plan at [place] is skilled bed at [facility]......[facility]      01/03/18 1141    Case Management/Social Work Plan    Additional Comments . Notified that [facility] can not guarentee payment of services for ambulance. Pt may qualify if needs to remain on O2. [facility] has recevied numerous calls from [place]. Discussed with Manager Chandrika TORREZ who suggests making pt a privacy pt. Disucssed with pt who would like this as well. Pt states I don't need this right now. Call to dtr an Bakari Do. She would also like pt to be a privacy pt. [facility] also requested POA paperwork. She has and will fax. [facility] following              Discharge Codes     None        Expected Discharge Date and Time     Expected Discharge Date Expected Discharge Time    Jan 4, 2018             Radha Shukla RN

## 2018-01-03 NOTE — PLAN OF CARE
Problem: Patient Care Overview (Adult)  Goal: Plan of Care Review  Outcome: Ongoing (interventions implemented as appropriate)   01/03/18 0410   Coping/Psychosocial Response Interventions   Plan Of Care Reviewed With patient   Patient Care Overview   Progress improving   Outcome Evaluation   Outcome Summary/Follow up Plan A&Ox4. Still producing a lot of yellow sputum. Up to bsc 2 or 3x with assistance (increased pain when getting up). 1.5L n/c through nx. B/p very high around 0400 at 195/70. One time dose of hydralazine given. Will continue to monitor.      Goal: Adult Individualization and Mutuality  Outcome: Ongoing (interventions implemented as appropriate)    Goal: Discharge Needs Assessment  Outcome: Ongoing (interventions implemented as appropriate)      Problem: Diabetes, Type 2 (Adult)  Goal: Signs and Symptoms of Listed Potential Problems Will be Absent or Manageable (Diabetes, Type 2)  Outcome: Ongoing (interventions implemented as appropriate)      Problem: Pressure Ulcer Risk (Tank Scale) (Adult,Obstetrics,Pediatric)  Goal: Identify Related Risk Factors and Signs and Symptoms  Outcome: Ongoing (interventions implemented as appropriate)    Goal: Skin Integrity  Outcome: Ongoing (interventions implemented as appropriate)      Problem: Fall Risk (Adult)  Goal: Identify Related Risk Factors and Signs and Symptoms  Outcome: Ongoing (interventions implemented as appropriate)    Goal: Absence of Falls  Outcome: Ongoing (interventions implemented as appropriate)

## 2018-01-04 ENCOUNTER — APPOINTMENT (OUTPATIENT)
Dept: GENERAL RADIOLOGY | Facility: HOSPITAL | Age: 78
End: 2018-01-04
Attending: INTERNAL MEDICINE

## 2018-01-04 ENCOUNTER — APPOINTMENT (OUTPATIENT)
Dept: CT IMAGING | Facility: HOSPITAL | Age: 78
End: 2018-01-04

## 2018-01-04 LAB
ANION GAP SERPL CALCULATED.3IONS-SCNC: 13.8 MMOL/L
B PERT DNA SPEC QL NAA+PROBE: NOT DETECTED
BACTERIA SPEC AEROBE CULT: ABNORMAL
BACTERIA SPEC AEROBE CULT: ABNORMAL
BUN BLD-MCNC: 39 MG/DL (ref 8–23)
BUN/CREAT SERPL: 28.1 (ref 7–25)
C PNEUM DNA NPH QL NAA+NON-PROBE: NOT DETECTED
CALCIUM SPEC-SCNC: 8.9 MG/DL (ref 8.6–10.5)
CHLORIDE SERPL-SCNC: 103 MMOL/L (ref 98–107)
CK SERPL-CCNC: 415 U/L (ref 20–180)
CO2 SERPL-SCNC: 20.2 MMOL/L (ref 22–29)
CREAT BLD-MCNC: 1.39 MG/DL (ref 0.57–1)
DEPRECATED RDW RBC AUTO: 47.7 FL (ref 37–54)
ERYTHROCYTE [DISTWIDTH] IN BLOOD BY AUTOMATED COUNT: 13.5 % (ref 11.7–13)
FLUAV H1 2009 PAND RNA NPH QL NAA+PROBE: NOT DETECTED
FLUAV H1 HA GENE NPH QL NAA+PROBE: NOT DETECTED
FLUAV H3 RNA NPH QL NAA+PROBE: NOT DETECTED
FLUAV SUBTYP SPEC NAA+PROBE: NOT DETECTED
FLUBV RNA ISLT QL NAA+PROBE: NOT DETECTED
GFR SERPL CREATININE-BSD FRML MDRD: 37 ML/MIN/1.73
GLUCOSE BLD-MCNC: 189 MG/DL (ref 65–99)
GLUCOSE BLDC GLUCOMTR-MCNC: 212 MG/DL (ref 70–130)
GLUCOSE BLDC GLUCOMTR-MCNC: 230 MG/DL (ref 70–130)
GLUCOSE BLDC GLUCOMTR-MCNC: 230 MG/DL (ref 70–130)
GLUCOSE BLDC GLUCOMTR-MCNC: 254 MG/DL (ref 70–130)
GLUCOSE BLDC GLUCOMTR-MCNC: 255 MG/DL (ref 70–130)
GRAM STN SPEC: ABNORMAL
GRAM STN SPEC: ABNORMAL
HADV DNA SPEC NAA+PROBE: NOT DETECTED
HCOV 229E RNA SPEC QL NAA+PROBE: NOT DETECTED
HCOV HKU1 RNA SPEC QL NAA+PROBE: NOT DETECTED
HCOV NL63 RNA SPEC QL NAA+PROBE: NOT DETECTED
HCOV OC43 RNA SPEC QL NAA+PROBE: NOT DETECTED
HCT VFR BLD AUTO: 26.5 % (ref 35.6–45.5)
HGB BLD-MCNC: 8.5 G/DL (ref 11.9–15.5)
HMPV RNA NPH QL NAA+NON-PROBE: NOT DETECTED
HPIV1 RNA SPEC QL NAA+PROBE: NOT DETECTED
HPIV2 RNA SPEC QL NAA+PROBE: NOT DETECTED
HPIV3 RNA NPH QL NAA+PROBE: NOT DETECTED
HPIV4 P GENE NPH QL NAA+PROBE: NOT DETECTED
ISOLATED FROM: ABNORMAL
M PNEUMO IGG SER IA-ACNC: NOT DETECTED
MCH RBC QN AUTO: 30.7 PG (ref 26.9–32)
MCHC RBC AUTO-ENTMCNC: 32.1 G/DL (ref 32.4–36.3)
MCV RBC AUTO: 95.7 FL (ref 80.5–98.2)
PLATELET # BLD AUTO: 156 10*3/MM3 (ref 140–500)
PMV BLD AUTO: 10.2 FL (ref 6–12)
POTASSIUM BLD-SCNC: 3.4 MMOL/L (ref 3.5–5.2)
RBC # BLD AUTO: 2.77 10*6/MM3 (ref 3.9–5.2)
RHINOVIRUS RNA SPEC NAA+PROBE: NOT DETECTED
RSV RNA NPH QL NAA+NON-PROBE: NOT DETECTED
SODIUM BLD-SCNC: 137 MMOL/L (ref 136–145)
WBC NRBC COR # BLD: 17.01 10*3/MM3 (ref 4.5–10.7)

## 2018-01-04 PROCEDURE — 85027 COMPLETE CBC AUTOMATED: CPT | Performed by: INTERNAL MEDICINE

## 2018-01-04 PROCEDURE — 92610 EVALUATE SWALLOWING FUNCTION: CPT

## 2018-01-04 PROCEDURE — 71045 X-RAY EXAM CHEST 1 VIEW: CPT

## 2018-01-04 PROCEDURE — 82550 ASSAY OF CK (CPK): CPT | Performed by: INTERNAL MEDICINE

## 2018-01-04 PROCEDURE — 82962 GLUCOSE BLOOD TEST: CPT

## 2018-01-04 PROCEDURE — 97110 THERAPEUTIC EXERCISES: CPT

## 2018-01-04 PROCEDURE — 25010000002 PIPERACILLIN SOD-TAZOBACTAM PER 1 G: Performed by: INTERNAL MEDICINE

## 2018-01-04 PROCEDURE — 87486 CHLMYD PNEUM DNA AMP PROBE: CPT | Performed by: INTERNAL MEDICINE

## 2018-01-04 PROCEDURE — 87081 CULTURE SCREEN ONLY: CPT | Performed by: INTERNAL MEDICINE

## 2018-01-04 PROCEDURE — 80048 BASIC METABOLIC PNL TOTAL CA: CPT | Performed by: INTERNAL MEDICINE

## 2018-01-04 PROCEDURE — 87581 M.PNEUMON DNA AMP PROBE: CPT | Performed by: INTERNAL MEDICINE

## 2018-01-04 PROCEDURE — 63710000001 INSULIN ASPART PER 5 UNITS: Performed by: INTERNAL MEDICINE

## 2018-01-04 PROCEDURE — 94799 UNLISTED PULMONARY SVC/PX: CPT

## 2018-01-04 PROCEDURE — 87798 DETECT AGENT NOS DNA AMP: CPT | Performed by: INTERNAL MEDICINE

## 2018-01-04 PROCEDURE — 87633 RESP VIRUS 12-25 TARGETS: CPT | Performed by: INTERNAL MEDICINE

## 2018-01-04 PROCEDURE — 70450 CT HEAD/BRAIN W/O DYE: CPT

## 2018-01-04 RX ORDER — GLIPIZIDE 5 MG/1
2.5 TABLET ORAL
Status: DISCONTINUED | OUTPATIENT
Start: 2018-01-04 | End: 2018-01-16

## 2018-01-04 RX ORDER — GLIPIZIDE 5 MG/1
5 TABLET, FILM COATED, EXTENDED RELEASE ORAL DAILY
Status: DISCONTINUED | OUTPATIENT
Start: 2018-01-04 | End: 2018-01-04 | Stop reason: CLARIF

## 2018-01-04 RX ORDER — IPRATROPIUM BROMIDE AND ALBUTEROL SULFATE 2.5; .5 MG/3ML; MG/3ML
3 SOLUTION RESPIRATORY (INHALATION)
Status: DISCONTINUED | OUTPATIENT
Start: 2018-01-04 | End: 2018-01-17

## 2018-01-04 RX ORDER — GUAIFENESIN/DEXTROMETHORPHAN 100-10MG/5
5 SYRUP ORAL EVERY 4 HOURS PRN
Status: DISCONTINUED | OUTPATIENT
Start: 2018-01-04 | End: 2018-01-22 | Stop reason: HOSPADM

## 2018-01-04 RX ORDER — ASPIRIN 300 MG/1
300 SUPPOSITORY RECTAL ONCE
Status: COMPLETED | OUTPATIENT
Start: 2018-01-04 | End: 2018-01-05

## 2018-01-04 RX ORDER — SODIUM CHLORIDE 9 MG/ML
75 INJECTION, SOLUTION INTRAVENOUS CONTINUOUS
Status: DISCONTINUED | OUTPATIENT
Start: 2018-01-04 | End: 2018-01-06

## 2018-01-04 RX ORDER — QUETIAPINE FUMARATE 25 MG/1
6.25 TABLET, FILM COATED ORAL AS NEEDED
Status: DISCONTINUED | OUTPATIENT
Start: 2018-01-04 | End: 2018-01-22 | Stop reason: HOSPADM

## 2018-01-04 RX ADMIN — INSULIN ASPART 6 UNITS: 100 INJECTION, SOLUTION INTRAVENOUS; SUBCUTANEOUS at 12:48

## 2018-01-04 RX ADMIN — INSULIN ASPART 4 UNITS: 100 INJECTION, SOLUTION INTRAVENOUS; SUBCUTANEOUS at 09:08

## 2018-01-04 RX ADMIN — IPRATROPIUM BROMIDE AND ALBUTEROL SULFATE 3 ML: .5; 3 SOLUTION RESPIRATORY (INHALATION) at 12:53

## 2018-01-04 RX ADMIN — IPRATROPIUM BROMIDE AND ALBUTEROL SULFATE 3 ML: .5; 3 SOLUTION RESPIRATORY (INHALATION) at 08:08

## 2018-01-04 RX ADMIN — LISINOPRIL 10 MG: 10 TABLET ORAL at 09:09

## 2018-01-04 RX ADMIN — TAZOBACTAM SODIUM AND PIPERACILLIN SODIUM 3.38 G: 375; 3 INJECTION, SOLUTION INTRAVENOUS at 22:35

## 2018-01-04 RX ADMIN — SODIUM CHLORIDE 500 ML: 9 INJECTION, SOLUTION INTRAVENOUS at 19:00

## 2018-01-04 RX ADMIN — TAZOBACTAM SODIUM AND PIPERACILLIN SODIUM 3.38 G: 375; 3 INJECTION, SOLUTION INTRAVENOUS at 17:00

## 2018-01-04 RX ADMIN — HYDRALAZINE HYDROCHLORIDE 25 MG: 50 TABLET, FILM COATED ORAL at 03:27

## 2018-01-04 RX ADMIN — AMLODIPINE BESYLATE 5 MG: 5 TABLET ORAL at 09:09

## 2018-01-04 RX ADMIN — METOPROLOL SUCCINATE 50 MG: 50 TABLET, FILM COATED, EXTENDED RELEASE ORAL at 09:09

## 2018-01-04 RX ADMIN — IPRATROPIUM BROMIDE AND ALBUTEROL SULFATE 3 ML: .5; 3 SOLUTION RESPIRATORY (INHALATION) at 23:18

## 2018-01-04 RX ADMIN — INSULIN ASPART 4 UNITS: 100 INJECTION, SOLUTION INTRAVENOUS; SUBCUTANEOUS at 22:44

## 2018-01-04 RX ADMIN — SODIUM CHLORIDE 75 ML/HR: 9 INJECTION, SOLUTION INTRAVENOUS at 22:34

## 2018-01-04 NOTE — THERAPY EVALUATION
Acute Care - Speech Language Pathology   Swallow Initial Evaluation [facility]     Patient Name: Sakshi Garcia  : 1940  MRN: [id number]  Today's Date: 2018  Onset of Illness/Injury or Date of Surgery Date: 18            Admit Date: 2018    SPEECH-LANGUAGE PATHOLOGY EVALUATION - SWALLOW  Subjective: The patient was seen on this date for a Clinical Swallow evaluation.  Pt confused, anomia noted at times.  Significant history: acute right weakness and AMS. RN and CNA report coughing with lunch. Recent cxr revealed RLL infiltrate. CXR clear of acute process 18. Pt originally admitted with sepsis and rhabdo with plans to D/C to rehab.   Objective: Textures given included ice, thin liquid, nectar thick liquid, puree consistency, mechanical soft consistency and regular consistency.  Assessment: Difficulties were noted with thin liquid, nectar thick liquid and regular consistency.  Observations:  Voice change with thins via cup and nectar via straw. Immediate cough following audible swallow trial with thins via straw with subsequent desaturations to 70. 02 quickly sheryl back to 96%. No overt s/s of asp with nectar via cup or puree. Adequate mastication with mech soft, no oral residue. Oral residue post swallow with regular, pt required cues to clear. Of note, patient often speaking to SLP with bolus in mouth.   SLP Findings:  Patient presents with s/s of dysphagia.   Recommendations: Diet Textures: nectar thick liquid, mechanical soft consistency with no mixed textures food.  Medications should be taken whole with puree. May have ice between meals after oral care, under staff or family supervision and with the recommended strategies for safe swallowing.   Recommended Strategies: Upright for PO, small bites and sips, no straw and supervision with all PO. Oral care before breakfast, after all meals and PRN.  Other Recommended Evaluations: VFSS    Dysphagia therapy is recommended. Rationale: to  establish least restrictive diet.        Visit Dx:     ICD-10-CM ICD-9-CM   1. Traumatic rhabdomyolysis, initial encounter T79.6XXA 958.6   2. Fall, initial encounter W19.XXXA E888.9   3. Renal insufficiency N28.9 593.9   4. Dizziness R42 780.4   5. Unsteadiness on feet R26.81 781.2     Patient Active Problem List   Diagnosis   • Traumatic rhabdomyolysis   • Generalized weakness   • Fall   • Acute cystitis without hematuria   • CKD (chronic kidney disease)   • DM (diabetes mellitus)   • HTN (hypertension)   • Altered mental status   • Sepsis   • Bacteremia   • DNR (do not resuscitate)     Past Medical History:   Diagnosis Date   • Colon cancer    • Hypertension      Past Surgical History:   Procedure Laterality Date   • CHOLECYSTECTOMY     • COLON SURGERY     • COLONOSCOPY     • HERNIA REPAIR            SWALLOW EVALUATION (last 72 hours)      Swallow Evaluation       01/04/18 1600                Rehab Evaluation    Document Type evaluation  -        Subjective Information no complaints  -        Patient Effort, Rehab Treatment fair  -        Symptoms Noted During/After Treatment fatigue  -        General Information    Patient Profile Review yes  -SH        Subjective Patient Observations Pt confused, anomia noted, acute R weakness  -        Pertinent History Of Current Problem admitted with sepsis and rabdo  -        Current Diet Limitations thin liquids;regular solid  -        Prior Level of Function- Communication functional in all spheres  -        Prior Level of Function- Swallowing no diet consistency restrictions  -        Plans/Goals Discussed With patient  -        Barriers to Rehab none identified  -        Clinical Impression    Patient's Goals For Discharge return to regular diet  -        SLP Swallowing Diagnosis oral dysfunction;pharyngeal dysfunction  -        Rehab Potential/Prognosis, Swallowing good, to achieve stated therapy goals  -        Criteria for Skilled  Therapeutic Interventions Met skilled criteria for dysphagia intervention met  -        FCM, Swallowing 4-->Level 4  -        Therapy Frequency PRN  -        Predicted Duration Therapy Interv (days) until discharge  -        Expected Duration Therapy Session (min) 15-30 minutes  [facility]        SLP Diet Recommendation IV - mechanical soft, no mixed consistencies;nectar/syrup-thick liquids  -        Recommended Diagnostics VFSS ([facility])  -        Recommended Feeding/Eating Techniques no straws  -        SLP Rec. for Method of Medication Administration meds whole in pudding/applesauce  -        Monitor For Signs Of Aspiration cough;elevated WBC count;gurgly voice;throat clearing;fever;upper respiratory infection;pneumonia;right lower lobe infiltrates  -        Anticipated Discharge Disposition [facility]  -        Pain Assessment    Pain Assessment No/denies pain  -        Cognitive Assessment/Intervention    Current Cognitive/Communication Assessment impaired  -        Oral Motor Structure and Function    Oral Motor Assessment Comment Bottom dentures, upper partials. No focal weakness, but poor participation in OME.   -        Dysphagia Treatment Objectives and Progress    Dysphagia Treatment Objectives Other 1  -        Dysphagia Other 1    Dysphagia Other 1 Objective Pt will participate in [facility]  -          User Key  (r) = Recorded By, (t) = Taken By, (c) = Cosigned By    Initials Name Effective Dates     Lisa Mcadams MS [facility]-SLP 07/13/17 -         EDUCATION  The patient has been educated in the following areas:   Modified Diet Instruction.    SLP Recommendation and Plan  SLP Swallowing Diagnosis: oral dysfunction, pharyngeal dysfunction  SLP Diet Recommendation: IV - mechanical soft, no mixed consistencies, nectar/syrup-thick liquids  Recommended Feeding/Eating Techniques: no straws  SLP Rec. for Method of Medication Administration: meds whole in pudding/applesauce  Monitor For  Signs Of Aspiration: cough, elevated WBC count, gurgly voice, throat clearing, fever, upper respiratory infection, pneumonia, right lower lobe infiltrates  Recommended Diagnostics: VFSS ([facility])  Criteria for Skilled Therapeutic Interventions Met: skilled criteria for dysphagia intervention met  Anticipated Discharge Disposition: skilled nursing facility  Rehab Potential/Prognosis, Swallowing: good, to achieve stated therapy goals  Therapy Frequency: PRN             Plan of Care Review  Plan Of Care Reviewed With: patient  Progress: declining  Outcome Summary/Follow up Plan: SLP recs downgrade to nectar and mech soft, no mixed, no straws, with meds whole in puree.          IP SLP Goals       01/04/18 1547          Safely Consume Diet    Safely Consume Diet- SLP, Date Established 01/04/18  -      Safely Consume Diet- SLP, Time to Achieve by discharge  -        User Key  (r) = Recorded By, (t) = Taken By, (c) = Cosigned By    Initials Name Provider Type    FLORENCE Mcadams MS CCC-SLP Speech and Language Pathologist             SLP Outcome Measures (last 72 hours)      SLP Outcome Measures       01/04/18 1600          SLP Outcome Measures    Outcome Measure Used? Adult NOMS  -      FCM Scores    FCM Chosen Swallowing  -      Swallowing FCM Score 4  -        User Key  (r) = Recorded By, (t) = Taken By, (c) = Cosigned By    Initials Name Effective Dates    FLORENCE Mcadams MS CCC-SLP 07/13/17 -            Time Calculation:         Time Calculation- SLP       01/04/18 1612          Time Calculation- [facility]    SLP Start Time 1500  -      SLP Stop Time 1600  -[facility] Time Calculation (min) 60 min  -      SLP Received On 01/04/18  -        User Key  (r) = Recorded By, (t) = Taken By, (c) = Cosigned By    Initials Name Provider Type    FLORENCE Mcadams MS CCC-SLP Speech and Language Pathologist          Therapy Charges for Today     Code Description Service Date Service Provider Modifiers Qty    [id number]    GEORGE ORAL PHARYNG SWALLOW 4 1/4/2018 Lisa Mcadams, MS CCC-SLP GN 1               Lisa Mcadams MS CCC-SLP  1/4/2018

## 2018-01-04 NOTE — PLAN OF CARE
Problem: Patient Care Overview (Adult)  Goal: Plan of Care Review   01/04/18 1547   Coping/Psychosocial Response Interventions   Plan Of Care Reviewed With patient   Patient Care Overview   Progress declining   Outcome Evaluation   Outcome Summary/Follow up Plan SLP recs downgrade to nectar and mech soft, no mixed, no straws, with meds whole in puree.       Problem: Inpatient SLP  Goal: Dysphagia- Patient will safely consume diet as per recommendation with no signs/symptoms of aspiration   01/04/18 1547   Safely Consume Diet   Safely Consume Diet- SLP, Date Established 01/04/18   Safely Consume Diet- SLP, Time to Achieve by discharge

## 2018-01-04 NOTE — CODE DOCUMENTATION
1724 CALL ATTEMPTED DR MERRITT LINE BUSY  STAT CALL  NO RETURN CALL 1726  2ND ATTEMPT 1730 LINE STILL BUSY

## 2018-01-04 NOTE — THERAPY TREATMENT NOTE
Acute Care - Physical Therapy Treatment Note  [facility]     Patient Name: Sakshi Garcia  : 1940  MRN: [id number]  Today's Date: 2018  Onset of Illness/Injury or Date of Surgery Date: 18     Referring Physician: Dr. Vora    Admit Date: 2018    Visit Dx:    ICD-10-CM ICD-9-CM   1. Traumatic rhabdomyolysis, initial encounter T79.6XXA 958.6   2. Fall, initial encounter W19.XXXA E888.9   3. Renal insufficiency N28.9 593.9   4. Dizziness R42 780.4   5. Unsteadiness on feet R26.81 781.2     Patient Active Problem List   Diagnosis   • Traumatic rhabdomyolysis   • Generalized weakness   • Fall   • Acute cystitis without hematuria   • CKD (chronic kidney disease)   • DM (diabetes mellitus)   • HTN (hypertension)   • Altered mental status   • Sepsis   • Bacteremia   • DNR (do not resuscitate)               Adult Rehabilitation Note       18 1414          Rehab Assessment/Intervention    Discipline physical therapy assistant  -      Document Type therapy note (daily note)  -      Subjective Information agree to therapy;complains of;weakness;fatigue  -      Precautions/Limitations fall precautions  -      Specific Treatment Considerations RT side wkness and neglect noted w/sitting EOB   nsg called immediately  -      Recorded by [NEO] Amarilis Johnson PTA      Pain Assessment    Pain Assessment No/denies pain  -      Recorded by [NEO] Amarilis Johnson PTA      Bed Mobility, Assessment/Treatment    Bed Mobility, Assistive Device draw sheet  -      Bed Mob, Supine to Sit, Emmett moderate assist (50% patient effort);maximum assist (25% patient effort);2 person assist required  -      Bed Mobility, Safety Issues decreased use of arms for pushing/pulling;decreased use of legs for bridging/pushing;impaired trunk control for bed mobility  -      Bed Mobility, Comment mod 2 to sit EOB, unable to hold self upright w/RUE  -      Recorded by [NEO] Amarilis Johnson PTA      Transfer  Assessment/Treatment    Transfers, Sit-Stand Emmons maximum assist (25% patient effort);2 person assist required;hand held assist  -JM      Transfers, Stand-Sit Emmons moderate assist (50% patient effort);2 person assist required  -JM      Toilet Transfer, Emmons maximum assist (25% patient effort);2 person assist required  -      Toilet Transfer, Assistive Device bedside commode without drop arms  -JM      Recorded by [NEO] Amarilis Johnson PTA      Gait Assessment/Treatment    Gait, Emmons Level not appropriate to assess  -JM      Gait, Comment RN w/pt assessing-earlier pt able to use rt UE equally per WINSTON Fraser  -NEO      Recorded by [NEO] Amarilis Johnson PTA      Positioning and Restraints    Pre-Treatment Position in bed  -JM      Post Treatment Position bed  -JM      In Bed fowlers;with nsg  -JM      Recorded by [NEO] Amarilis Johnson PTA        User Key  (r) = Recorded By, (t) = Taken By, (c) = Cosigned By    Initials Name Effective Dates    NEO Johnson PTA 02/18/16 -                 IP PT Goals       01/02/18 1359          Bed Mobility PT LTG    Bed Mobility PT LTG, Date Established 01/02/18  -AA      Bed Mobility PT LTG, Time to Achieve 1 wk  -AA      Bed Mobility PT LTG, Activity Type all bed mobility  -AA      Bed Mobility PT LTG, Emmons Level minimum assist (75% patient effort)  -AA      Transfer Training PT LTG    Transfer Training PT LTG, Date Established 01/02/18  -AA      Transfer Training PT LTG, Time to Achieve 1 wk  -AA      Transfer Training PT LTG, Activity Type all transfers  -AA      Transfer Training PT LTG, Emmons Level contact guard assist  -AA      Transfer Training PT LTG, Assist Device walker, rolling  -AA      Gait Training PT LTG    Gait Training Goal PT LTG, Date Established 01/02/18  -AA      Gait Training Goal PT LTG, Time to Achieve 1 wk  -AA      Gait Training Goal PT LTG, Emmons Level contact guard assist  -AA      Gait Training  Goal PT LTG, Assist Device walker, rolling  -AA      Gait Training Goal PT LTG, Distance to Achieve 100  -AA        User Key  (r) = Recorded By, (t) = Taken By, (c) = Cosigned By    Initials Name Provider Type    AA Paula Holley, PT Physical Therapist          Physical Therapy Education     Title: PT OT SLP Therapies (Active)     Topic: Physical Therapy (Active)     Point: Mobility training (Active)    Learning Progress Summary    Learner Readiness Method Response Comment Documented by Status   Patient Acceptance E,D NR,NL   01/04/18 1702 Active    Acceptance E VU,NR   01/02/18 1359 Done               Point: Home exercise program (Active)    Learning Progress Summary    Learner Readiness Method Response Comment Documented by Status   Patient Acceptance E,D NR,NL   01/04/18 1702 Active    Acceptance E VU,NR   01/02/18 1359 Done               Point: Body mechanics (Active)    Learning Progress Summary    Learner Readiness Method Response Comment Documented by Status   Patient Acceptance E,D NR,NL   01/04/18 1702 Active    Acceptance E VU,NR   01/02/18 1359 Done               Point: Precautions (Active)    Learning Progress Summary    Learner Readiness Method Response Comment Documented by Status   Patient Acceptance E,D NR,NL   01/04/18 1702 Active    Acceptance E VU,NR   01/02/18 1359 Done                      User Key     Initials Effective Dates Name Provider Type [facility] 02/18/16 -  Amarilis Johnson, PTA Physical Therapy Assistant PT     09/05/17 -  Paula Holley PT Physical Therapist PT                    PT Recommendation and Plan  Anticipated Equipment Needs At Discharge: front wheeled walker  Anticipated Discharge Disposition: skilled nursing facility  Planned Therapy Interventions: balance training, bed mobility training, gait training, home exercise program, strengthening, transfer training  PT Frequency: daily  Plan of Care Review  Plan Of Care Reviewed With:  patient  Progress: declining  Outcome Summary/Follow up Plan: noted rt UE wkness, some rt neglect, more assist req to sit EOB-nsg notified immediately and in room to assess           Outcome Measures       01/04/18 1400 01/02/18 1400       How much help from another person do you currently need...    Turning from your back to your side while in flat bed without using bedrails? 2  -JM 3  -AA     Moving from lying on back to sitting on the side of a flat bed without bedrails? 2  -JM 2  -AA     Moving to and from a bed to a chair (including a wheelchair)? 2  -JM 3  -AA     Standing up from a chair using your arms (e.g., wheelchair, bedside chair)? 2  -JM 3  -AA     Climbing 3-5 steps with a railing? 1  -JM 1  -AA     To walk in hospital room? 1  -JM 3  -AA     AM-PAC 6 Clicks Score 10  -JM 15  -AA     Functional Assessment    Outcome Measure Options  AM-PAC 6 Clicks Basic Mobility (PT)  -AA       User Key  (r) = Recorded By, (t) = Taken By, (c) = Cosigned By    Initials Name Provider Type    NEO Johnson PTA Physical Therapy Assistant    KOSTA Holley, PT Physical Therapist           Time Calculation:         PT Charges       01/04/18 1446          Time Calculation    Start Time 1330  -      Stop Time 1412  -      Time Calculation (min) 42 min  -      PT Received On 01/04/18  -NEO      PT - Next Appointment 01/05/18  -        User Key  (r) = Recorded By, (t) = Taken By, (c) = Cosigned By    Initials Name Provider Type    NEO Johnson PTA Physical Therapy Assistant          Therapy Charges for Today     Code Description Service Date Service Provider Modifiers Qty    59377056620 HC PT THER PROC EA 15 MIN 1/4/2018 Amarilis Johnson PTA GP 3    66035536874 HC PT THER SUPP EA 15 MIN 1/4/2018 Amarilis Johnson PTA GP 2          PT G-Codes  Outcome Measure Options: AM-PAC 6 Clicks Basic Mobility (PT)    Amarilis Jonhson PTA  1/4/2018

## 2018-01-04 NOTE — PROGRESS NOTES
"  Name: Sakshi Garcia  ADMIT: 2018   Age/Sex: 77 y.o.female LOS:  LOS: 3 days    :    1940     ROOM: [facility]   MRN:    [id number]    PCP:    Provider Not In System     Subjective   States she has a cold. Has runny nose, cough.     Objective   Vital Signs  Temp:  [97.3 °F (36.3 °C)-99.8 °F (37.7 °C)] 98.4 °F (36.9 °C)  Heart Rate:  [71-98] 83  Resp:  [16-18] 16  BP: (151-198)/(62-88) 152/62  Body mass index is 27.76 kg/(m^2).    Objective:  General Appearance:  Comfortable and well-appearing.    Vital signs: (most recent): Blood pressure 152/62, pulse 83, temperature 98.4 °F (36.9 °C), temperature source Oral, resp. rate 16, height 157.5 cm (62.01\"), weight 68.9 kg (151 lb 12.8 oz), SpO2 96 %.  Vital signs are normal.    HEENT: Normal HEENT exam.    Lungs:  Normal effort.  There are wheezes.  (Crackles at right base)  Heart: Normal rate.  Regular rhythm.    Abdomen: Abdomen is soft and non-distended.  Bowel sounds are normal.   There is no abdominal tenderness.     Extremities: Normal range of motion.  There is no dependent edema.    Neurological: Patient is alert and oriented to person, place and time.    Skin:  Warm and dry.  No rash.             Results Review:       I reviewed the patient's new clinical results.    Results from last 7 days  Lab Units 18  0354 18  05018  1631   WBC 10*3/mm3 17.01* 18.66* 20.00* 21.43*   HEMOGLOBIN g/dL 8.5* 9.2* 9.9* 10.6*   PLATELETS 10*3/mm3 156 173 192 218     Results from last 7 days  Lab Units 18  0354 18  0509 18  0417 18  1631   SODIUM mmol/L 137 136 132* 132*   POTASSIUM mmol/L 3.4* 3.5 3.8 4.4   CHLORIDE mmol/L 103 102 96* 93*   CO2 mmol/L 20.2* 18.6* 22.0 23.2   BUN mg/dL 39* 44* 43* 41*   CREATININE mg/dL 1.39* 1.48* 1.48* 1.46*   GLUCOSE mg/dL 189* 132* 107* 297*   Estimated Creatinine Clearance: 30.3 mL/min (by C-G formula based on Cr of 1.39).  Results from last 7 days  Lab Units 18  0354 " 01/03/18  0509 01/02/18  0417 01/01/18  1631   CALCIUM mg/dL 8.9 8.8 8.9 9.5   ALBUMIN g/dL  --   --  3.00* 3.20*   MAGNESIUM mg/dL  --   --  1.6 1.6   PHOSPHORUS mg/dL  --   --  2.5  --        RADIOLOGY  1/4/2017  Pending      amLODIPine 5 mg Oral Daily   glipiZIDE 5 mg Oral Daily   insulin aspart 0-9 Units Subcutaneous 4x Daily With Meals & Nightly   ipratropium-albuterol 3 mL Nebulization Q4H - RT   levoFLOXacin 500 mg Oral Q24H   linagliptin 5 mg Oral Daily   lisinopril 10 mg Oral Q24H   metoprolol succinate XL 50 mg Oral Daily      Diet Regular; Cardiac, Consistent Carbohydrate, Renal      Assessment/Plan   Assessment:   Principal Problem:    Acute cystitis without hematuria  Active Problems:    Traumatic rhabdomyolysis    Generalized weakness    Fall    CKD (chronic kidney disease)    DM (diabetes mellitus)    HTN (hypertension)    Altered mental status    Sepsis    Bacteremia    DNR (do not resuscitate)        Plan:   1. Ecoli UTI with bacteremia  - switched to levaquin to complete a 14 day course  - repeat BCx NGTD  - WBC slow to improve, recheck in am  - cont to monitor    2. URI  - CXR looks to have RLL infiltrate. Will await official read from radiology  - RVP  - ca as she had some wheezing on exam  - may need to add steroids    3. Rhabdo  -resolved  - d/c IVF    4. DM2  - restarted home oral meds  - cont to monitor    5. CKD3  - Cr stable  - recheck in am after stopping IVF        Disposition  Hopefully tomorrow if resp complaints are better      Chace Chester MD  [facility]  01/04/18  11:56 AM

## 2018-01-04 NOTE — PLAN OF CARE
Problem: Patient Care Overview (Adult)  Goal: Plan of Care Review  Outcome: Ongoing (interventions implemented as appropriate)   01/03/18 1800   Coping/Psychosocial Response Interventions   Plan Of Care Reviewed With patient   Patient Care Overview   Progress progress towards functional goals is fair   Outcome Evaluation   Outcome Summary/Follow up Plan Received from [facility]. Privacy Patient. IV fluids infusing. In nasal oxygen. AAOX 4. Required sliding scale insulin. Blood Sugar 289. B/P remains elevated. Systolic 180s/ Diastolic in 70s. Incont. of urine at times. Up to BR X 1 assist. c/o of weakness when ambulating. Educated on the importance of monitoring B/P and taking B/P meds. as directed by physician due to history of hypertension. Possible discharge to nursing center tomorrow.       Problem: Diabetes, Type 2 (Adult)  Goal: Signs and Symptoms of Listed Potential Problems Will be Absent or Manageable (Diabetes, Type 2)  Outcome: Ongoing (interventions implemented as appropriate)      Problem: Pressure Ulcer Risk (Tank Scale) (Adult,Obstetrics,Pediatric)  Goal: Identify Related Risk Factors and Signs and Symptoms  Outcome: Ongoing (interventions implemented as appropriate)    Goal: Skin Integrity  Outcome: Ongoing (interventions implemented as appropriate)      Problem: Fall Risk (Adult)  Goal: Identify Related Risk Factors and Signs and Symptoms  Outcome: Ongoing (interventions implemented as appropriate)    Goal: Absence of Falls  Outcome: Ongoing (interventions implemented as appropriate)

## 2018-01-04 NOTE — PLAN OF CARE
Problem: Patient Care Overview (Adult)  Goal: Plan of Care Review  Outcome: Ongoing (interventions implemented as appropriate)   01/04/18 0915   Coping/Psychosocial Response Interventions   Plan Of Care Reviewed With patient   Outcome Evaluation   Outcome Summary/Follow up Plan apresoline ordered for elevated B/P as needed every 6 hours, chest with expiratory wheezes with large thick white sputum, order for repeat chest x-ray and nebs every 4 hours routine, very weak to ambulate to bathroom, recomend bedside commode. educated on importance of monitoring blood pressure at home before taking blood pressure meds. and notify MD for adustments when necessary.     Goal: Adult Individualization and Mutuality  Outcome: Ongoing (interventions implemented as appropriate)    Goal: Discharge Needs Assessment  Outcome: Ongoing (interventions implemented as appropriate)      Problem: Diabetes, Type 2 (Adult)  Goal: Signs and Symptoms of Listed Potential Problems Will be Absent or Manageable (Diabetes, Type 2)  Outcome: Ongoing (interventions implemented as appropriate)      Problem: Pressure Ulcer Risk (Tank Scale) (Adult,Obstetrics,Pediatric)  Goal: Skin Integrity  Outcome: Ongoing (interventions implemented as appropriate)      Problem: Fall Risk (Adult)  Goal: Absence of Falls  Outcome: Ongoing (interventions implemented as appropriate)

## 2018-01-04 NOTE — PLAN OF CARE
Problem: Patient Care Overview (Adult)  Goal: Plan of Care Review  Outcome: Ongoing (interventions implemented as appropriate)   01/04/18 1701   Coping/Psychosocial Response Interventions   Plan Of Care Reviewed With patient   Patient Care Overview   Progress declining   Outcome Evaluation   Outcome Summary/Follow up Plan noted rt UE wkness, some rt neglect, more assist req to sit EOB-nsg notified immediately and in room to assess

## 2018-01-04 NOTE — CODE DOCUMENTATION
Team D # called using CCU phone able to get thru to Dr Tovar given pt hx symptoms started 1PM NIH4 will do CT plain due to Allergy unknown reaction and elevated creatinin.  Further orders obtained

## 2018-01-05 ENCOUNTER — APPOINTMENT (OUTPATIENT)
Dept: MRI IMAGING | Facility: HOSPITAL | Age: 78
End: 2018-01-05

## 2018-01-05 LAB
ANION GAP SERPL CALCULATED.3IONS-SCNC: 13.3 MMOL/L
BASOPHILS # BLD MANUAL: 0.15 10*3/MM3 (ref 0–0.2)
BASOPHILS NFR BLD AUTO: 1 % (ref 0–1.5)
BUN BLD-MCNC: 31 MG/DL (ref 8–23)
BUN/CREAT SERPL: 22 (ref 7–25)
CALCIUM SPEC-SCNC: 9 MG/DL (ref 8.6–10.5)
CHLORIDE SERPL-SCNC: 106 MMOL/L (ref 98–107)
CO2 SERPL-SCNC: 22.7 MMOL/L (ref 22–29)
CREAT BLD-MCNC: 1.41 MG/DL (ref 0.57–1)
DEPRECATED RDW RBC AUTO: 47.2 FL (ref 37–54)
EOSINOPHIL # BLD MANUAL: 0.15 10*3/MM3 (ref 0–0.7)
EOSINOPHIL NFR BLD MANUAL: 1 % (ref 0.3–6.2)
ERYTHROCYTE [DISTWIDTH] IN BLOOD BY AUTOMATED COUNT: 13.7 % (ref 11.7–13)
GFR SERPL CREATININE-BSD FRML MDRD: 36 ML/MIN/1.73
GLUCOSE BLD-MCNC: 238 MG/DL (ref 65–99)
GLUCOSE BLDC GLUCOMTR-MCNC: 163 MG/DL (ref 70–130)
GLUCOSE BLDC GLUCOMTR-MCNC: 306 MG/DL (ref 70–130)
GLUCOSE BLDC GLUCOMTR-MCNC: 87 MG/DL (ref 70–130)
HCT VFR BLD AUTO: 26.4 % (ref 35.6–45.5)
HGB BLD-MCNC: 8.8 G/DL (ref 11.9–15.5)
HYPOCHROMIA BLD QL: ABNORMAL
LYMPHOCYTES # BLD MANUAL: 0.44 10*3/MM3 (ref 0.9–4.8)
LYMPHOCYTES NFR BLD MANUAL: 15 % (ref 5–12)
LYMPHOCYTES NFR BLD MANUAL: 3 % (ref 19.6–45.3)
MCH RBC QN AUTO: 31.4 PG (ref 26.9–32)
MCHC RBC AUTO-ENTMCNC: 33.3 G/DL (ref 32.4–36.3)
MCV RBC AUTO: 94.3 FL (ref 80.5–98.2)
METAMYELOCYTES NFR BLD MANUAL: 1 % (ref 0–0)
MONOCYTES # BLD AUTO: 2.21 10*3/MM3 (ref 0.2–1.2)
MYELOCYTES NFR BLD MANUAL: 1 % (ref 0–0)
NEUTROPHILS # BLD AUTO: 11.48 10*3/MM3 (ref 1.9–8.1)
NEUTROPHILS NFR BLD MANUAL: 78 % (ref 42.7–76)
PLAT MORPH BLD: NORMAL
PLATELET # BLD AUTO: 177 10*3/MM3 (ref 140–500)
PMV BLD AUTO: 10 FL (ref 6–12)
POTASSIUM BLD-SCNC: 2.9 MMOL/L (ref 3.5–5.2)
PROCALCITONIN SERPL-MCNC: 0.86 NG/ML (ref 0.1–0.25)
RBC # BLD AUTO: 2.8 10*6/MM3 (ref 3.9–5.2)
SCAN SLIDE: NORMAL
SODIUM BLD-SCNC: 142 MMOL/L (ref 136–145)
TARGETS BLD QL SMEAR: ABNORMAL
WBC MORPH BLD: NORMAL
WBC NRBC COR # BLD: 14.72 10*3/MM3 (ref 4.5–10.7)

## 2018-01-05 PROCEDURE — 87040 BLOOD CULTURE FOR BACTERIA: CPT | Performed by: INTERNAL MEDICINE

## 2018-01-05 PROCEDURE — 63710000001 INSULIN ASPART PER 5 UNITS: Performed by: INTERNAL MEDICINE

## 2018-01-05 PROCEDURE — 70551 MRI BRAIN STEM W/O DYE: CPT

## 2018-01-05 PROCEDURE — 70547 MR ANGIOGRAPHY NECK W/O DYE: CPT

## 2018-01-05 PROCEDURE — 25010000002 HALOPERIDOL LACTATE PER 5 MG: Performed by: INTERNAL MEDICINE

## 2018-01-05 PROCEDURE — 84145 PROCALCITONIN (PCT): CPT | Performed by: INTERNAL MEDICINE

## 2018-01-05 PROCEDURE — 25010000002 HYDRALAZINE PER 20 MG: Performed by: INTERNAL MEDICINE

## 2018-01-05 PROCEDURE — 25010000002 VANCOMYCIN 10 G RECONSTITUTED SOLUTION: Performed by: INTERNAL MEDICINE

## 2018-01-05 PROCEDURE — 85007 BL SMEAR W/DIFF WBC COUNT: CPT | Performed by: INTERNAL MEDICINE

## 2018-01-05 PROCEDURE — 70544 MR ANGIOGRAPHY HEAD W/O DYE: CPT

## 2018-01-05 PROCEDURE — 99223 1ST HOSP IP/OBS HIGH 75: CPT | Performed by: RADIOLOGY

## 2018-01-05 PROCEDURE — 25010000002 PIPERACILLIN SOD-TAZOBACTAM PER 1 G: Performed by: INTERNAL MEDICINE

## 2018-01-05 PROCEDURE — 82962 GLUCOSE BLOOD TEST: CPT

## 2018-01-05 PROCEDURE — 94799 UNLISTED PULMONARY SVC/PX: CPT

## 2018-01-05 PROCEDURE — 85025 COMPLETE CBC W/AUTO DIFF WBC: CPT | Performed by: INTERNAL MEDICINE

## 2018-01-05 PROCEDURE — 80048 BASIC METABOLIC PNL TOTAL CA: CPT | Performed by: INTERNAL MEDICINE

## 2018-01-05 RX ORDER — POTASSIUM CHLORIDE 1.5 G/1.77G
20 POWDER, FOR SOLUTION ORAL 3 TIMES DAILY
Status: DISCONTINUED | OUTPATIENT
Start: 2018-01-05 | End: 2018-01-05

## 2018-01-05 RX ORDER — ACETAMINOPHEN 325 MG/1
650 TABLET ORAL EVERY 4 HOURS PRN
Status: DISCONTINUED | OUTPATIENT
Start: 2018-01-05 | End: 2018-01-17

## 2018-01-05 RX ORDER — POTASSIUM CHLORIDE 750 MG/1
20 CAPSULE, EXTENDED RELEASE ORAL ONCE
Status: COMPLETED | OUTPATIENT
Start: 2018-01-05 | End: 2018-01-05

## 2018-01-05 RX ORDER — HYDRALAZINE HYDROCHLORIDE 20 MG/ML
10 INJECTION INTRAMUSCULAR; INTRAVENOUS EVERY 6 HOURS PRN
Status: DISCONTINUED | OUTPATIENT
Start: 2018-01-05 | End: 2018-01-22 | Stop reason: HOSPADM

## 2018-01-05 RX ORDER — ATORVASTATIN CALCIUM 80 MG/1
80 TABLET, FILM COATED ORAL DAILY
Status: DISCONTINUED | OUTPATIENT
Start: 2018-01-05 | End: 2018-01-12

## 2018-01-05 RX ORDER — POTASSIUM CHLORIDE 1.5 G/1.77G
20 POWDER, FOR SOLUTION ORAL ONCE
Status: COMPLETED | OUTPATIENT
Start: 2018-01-05 | End: 2018-01-05

## 2018-01-05 RX ORDER — ONDANSETRON 2 MG/ML
4 INJECTION INTRAMUSCULAR; INTRAVENOUS EVERY 6 HOURS PRN
Status: DISCONTINUED | OUTPATIENT
Start: 2018-01-05 | End: 2018-01-22 | Stop reason: HOSPADM

## 2018-01-05 RX ORDER — SODIUM CHLORIDE 0.9 % (FLUSH) 0.9 %
1-10 SYRINGE (ML) INJECTION AS NEEDED
Status: DISCONTINUED | OUTPATIENT
Start: 2018-01-05 | End: 2018-01-12

## 2018-01-05 RX ORDER — ACETAMINOPHEN 650 MG/1
650 SUPPOSITORY RECTAL EVERY 4 HOURS PRN
Status: DISCONTINUED | OUTPATIENT
Start: 2018-01-05 | End: 2018-01-14

## 2018-01-05 RX ORDER — SODIUM CHLORIDE 9 MG/ML
100 INJECTION, SOLUTION INTRAVENOUS CONTINUOUS
Status: DISCONTINUED | OUTPATIENT
Start: 2018-01-05 | End: 2018-01-05

## 2018-01-05 RX ORDER — CLOPIDOGREL BISULFATE 75 MG/1
75 TABLET ORAL DAILY
Status: DISCONTINUED | OUTPATIENT
Start: 2018-01-06 | End: 2018-01-16

## 2018-01-05 RX ORDER — CLOPIDOGREL BISULFATE 75 MG/1
300 TABLET ORAL ONCE
Status: DISCONTINUED | OUTPATIENT
Start: 2018-01-05 | End: 2018-01-12

## 2018-01-05 RX ORDER — VANCOMYCIN HYDROCHLORIDE 1 G/200ML
15 INJECTION, SOLUTION INTRAVENOUS EVERY 12 HOURS
Status: DISCONTINUED | OUTPATIENT
Start: 2018-01-05 | End: 2018-01-05

## 2018-01-05 RX ORDER — HALOPERIDOL 5 MG/ML
2 INJECTION INTRAMUSCULAR EVERY 6 HOURS PRN
Status: DISCONTINUED | OUTPATIENT
Start: 2018-01-05 | End: 2018-01-17

## 2018-01-05 RX ORDER — HALOPERIDOL 5 MG/ML
2 INJECTION INTRAMUSCULAR ONCE
Status: COMPLETED | OUTPATIENT
Start: 2018-01-05 | End: 2018-01-05

## 2018-01-05 RX ADMIN — INSULIN ASPART 2 UNITS: 100 INJECTION, SOLUTION INTRAVENOUS; SUBCUTANEOUS at 09:03

## 2018-01-05 RX ADMIN — TAZOBACTAM SODIUM AND PIPERACILLIN SODIUM 3.38 G: 375; 3 INJECTION, SOLUTION INTRAVENOUS at 21:34

## 2018-01-05 RX ADMIN — TAZOBACTAM SODIUM AND PIPERACILLIN SODIUM 3.38 G: 375; 3 INJECTION, SOLUTION INTRAVENOUS at 06:26

## 2018-01-05 RX ADMIN — LISINOPRIL 10 MG: 10 TABLET ORAL at 13:13

## 2018-01-05 RX ADMIN — INSULIN ASPART 2 UNITS: 100 INJECTION, SOLUTION INTRAVENOUS; SUBCUTANEOUS at 18:26

## 2018-01-05 RX ADMIN — GLIPIZIDE 2.5 MG: 5 TABLET ORAL at 13:13

## 2018-01-05 RX ADMIN — INSULIN ASPART 7 UNITS: 100 INJECTION, SOLUTION INTRAVENOUS; SUBCUTANEOUS at 12:18

## 2018-01-05 RX ADMIN — POTASSIUM CHLORIDE 20 MEQ: 750 CAPSULE, EXTENDED RELEASE ORAL at 21:34

## 2018-01-05 RX ADMIN — ASPIRIN 300 MG: 300 SUPPOSITORY RECTAL at 12:18

## 2018-01-05 RX ADMIN — TAZOBACTAM SODIUM AND PIPERACILLIN SODIUM 3.38 G: 375; 3 INJECTION, SOLUTION INTRAVENOUS at 11:44

## 2018-01-05 RX ADMIN — LINAGLIPTIN 5 MG: 5 TABLET, FILM COATED ORAL at 13:13

## 2018-01-05 RX ADMIN — POTASSIUM CHLORIDE 20 MEQ: 1.5 POWDER, FOR SOLUTION ORAL at 13:22

## 2018-01-05 RX ADMIN — HYDRALAZINE HYDROCHLORIDE 10 MG: 20 INJECTION INTRAMUSCULAR; INTRAVENOUS at 09:00

## 2018-01-05 RX ADMIN — ATORVASTATIN CALCIUM 80 MG: 80 TABLET, FILM COATED ORAL at 12:17

## 2018-01-05 RX ADMIN — HYDRALAZINE HYDROCHLORIDE 25 MG: 50 TABLET, FILM COATED ORAL at 01:01

## 2018-01-05 RX ADMIN — AMLODIPINE BESYLATE 5 MG: 5 TABLET ORAL at 13:14

## 2018-01-05 RX ADMIN — HALOPERIDOL LACTATE 2 MG: 5 INJECTION, SOLUTION INTRAMUSCULAR at 16:58

## 2018-01-05 RX ADMIN — VANCOMYCIN HYDROCHLORIDE 1500 MG: 10 INJECTION, POWDER, LYOPHILIZED, FOR SOLUTION INTRAVENOUS at 15:22

## 2018-01-05 RX ADMIN — ACETAMINOPHEN 650 MG: 325 TABLET ORAL at 12:17

## 2018-01-05 RX ADMIN — METOPROLOL SUCCINATE 50 MG: 50 TABLET, FILM COATED, EXTENDED RELEASE ORAL at 13:13

## 2018-01-05 NOTE — SIGNIFICANT NOTE
01/05/18 1015   Rehab Treatment   Discipline speech language pathologist   Rehab Evaluation   Evaluation Not Performed other (see comments)  (Patient unable to participate in VFSS this date. )   Recommendations   SLP - Next Appointment (Will follow up for evaluation at bedside. )

## 2018-01-05 NOTE — PLAN OF CARE
Problem: Patient Care Overview (Adult)  Goal: Plan of Care Review  Outcome: Ongoing (interventions implemented as appropriate)   01/05/18 0335   Coping/Psychosocial Response Interventions   Plan Of Care Reviewed With patient   Patient Care Overview   Progress improving   Outcome Evaluation   Outcome Summary/Follow up Plan Transferred from [facility] around midnight. Alert to self, confusion present. Unable to perform NIH upon transfer, pt claimed being too sleepy, will try again toward end of shift. Denied pain. SBP > 190, PRN Hydralazine administered, now B/P 170/72. On IV Zosyn and IVF NS at 75 ml/hr. In bed w/o SOA at rest, and bilateral wrist restraints in place. NIH ?  Private pt.       Problem: Diabetes, Type 2 (Adult)  Goal: Signs and Symptoms of Listed Potential Problems Will be Absent or Manageable (Diabetes, Type 2)  Outcome: Ongoing (interventions implemented as appropriate)      Problem: Pressure Ulcer Risk (Tank Scale) (Adult,Obstetrics,Pediatric)  Goal: Identify Related Risk Factors and Signs and Symptoms  Outcome: Ongoing (interventions implemented as appropriate)    Goal: Skin Integrity  Outcome: Ongoing (interventions implemented as appropriate)      Problem: Fall Risk (Adult)  Goal: Identify Related Risk Factors and Signs and Symptoms  Outcome: Ongoing (interventions implemented as appropriate)    Goal: Absence of Falls  Outcome: Ongoing (interventions implemented as appropriate)

## 2018-01-05 NOTE — PROGRESS NOTES
"Pharmacokinetic Consult - Vancomycin Dosing (Initial Note)    Sakshi Garcia has been consulted for pharmacy to dose vancomycin for pneumonia.  Pharmacy dosing vancomycin per Dr. Chester's request.   Goal trough: 15-20 mg/L   Other antimicrobials: zosyn 3.375 g iv q8h    Relevant clinical data and objective history reviewed:  77 y.o. female 157.5 cm (62.01\") 71.2 kg (157 lb)    Past Medical History:   Diagnosis Date   • Colon cancer    • Hypertension      Creatinine   Date Value Ref Range Status   01/05/2018 1.41 (H) 0.57 - 1.00 mg/dL Final   01/04/2018 1.39 (H) 0.57 - 1.00 mg/dL Final   01/03/2018 1.48 (H) 0.57 - 1.00 mg/dL Final     BUN   Date Value Ref Range Status   01/05/2018 31 (H) 8 - 23 mg/dL Final     Estimated Creatinine Clearance: 30.4 mL/min (by C-G formula based on Cr of 1.41).    Lab Results   Component Value Date    WBC 14.72 (H) 01/05/2018     Temp Readings from Last 3 Encounters:   01/05/18 100.4 °F (38 °C) (Oral)   02/18/17 98.2 °F (36.8 °C) (Oral)      Baseline culture/source/susceptibility:   1/1 BCx: 2/2 E.coli  1/1 Ucx: E.coli  1/1 RVP negative  1/2 BCx: NGTD  1/4 MRSA nares negative  1/4 RVP negative    Assessment/Plan  1. Given patient's age, weight and renal function, will start vancomycin 1500 mg IV once and check AM vancomycin random level.   2. Will monitor serum creatinine every 24 hours for the first 3 days then at least every 48 hours per dosing recommendations.   Thank you Dr. Chester for the consult and Pharmacy will continue to follow daily while on vancomycin and adjust as needed.     Cesilia Mohan, Pharm.D.      "

## 2018-01-05 NOTE — PLAN OF CARE
Problem: SAFETY - NON-VIOLENT RESTRAINT  Goal: Free from restraint(s) (Non-Violent Restraint)  Outcome: Outcome(s) achieved Date Met: 01/05/18

## 2018-01-05 NOTE — SIGNIFICANT NOTE
Noted weakness in right arm. Limited ability to move arm on own.  weak. Right arm drift. . B/P 167/68.    P - 76. R - 16. Oxygen sats decreased to 88%. Placed back in nasal oxygen at 2 l/min. [place], alert, cooperative. Oriented to person and place. Dr. Chester contacted regarding weakness of RUE. Order received to consult neurology.     01/04/18 7432   Neuro   RUE Muscle Tone Assessment other (see comments)  (weakness)

## 2018-01-05 NOTE — NURSING NOTE
Spoke with Eri Gamboa per phone patient's daughter POA. Informed that patient was placed in soft wrist restraints, due to pulling out IV and O2 with some agitation. Notified of room change to 502. Questions answered

## 2018-01-05 NOTE — SIGNIFICANT NOTE
01/05/18 1455   Rehab Treatment   Discipline physical therapy assistant   Treatment Not Performed patient/family declined treatment  (Pt refused PT. Agitated and would not allow any education or encouragement to participate.)   Recommendation   PT - Next Appointment 01/06/18

## 2018-01-05 NOTE — CONSULTS
"DOS: 2018  NAME: Sakshi Garcia   : 1940  PCP: Provider Not In System  CC: Stroke  Referring MD: To Hendrickson MD  History is obtained from reviewing the medical chart and interviewing the patient    Neurological Problem and Interval History:  77 y.o. RHW female with a Hx of hypertension and stroke and \"atrial fibrillation\".  The patient does not know she is in the hospital and does not know why she is here but simply states that she wants to go home.  The emergency department history and physical states that the patient presented complaining of intermittent lightheadedness 2 days prior to admission.  She also had increasing falls.  Apparently 2 days before admission she was on the ground for 13 hours.  She was also complaining of knee pain.  She was admitted and diagnosed with Escherichia coli \"cystitis\".  She was also diagnosed with altered mental status.  Then yesterday around 1 PM she was noted to have right sided weakness. I received a call at about 5:59 PM from the rapid response team because the patient was confused and had persistent right-sided weakness with NIH stroke scale score of 4.  The patient was reported to have an anaphylactic reaction to contrast dye.  A stat CT scan of the brain was performed.  On repeated neurochecks the patient was somewhat improved and able to move her right arm and was transferred to the neurology floor.  The patient now admits that she cannot move her right side.  She denies any headache.  She denies any arm or leg pain but she does have a history of neck pain.  She states she had a stroke 2 years prior that affected her left side from which she recovered fully.  She does not recall the cause of his stroke but when I gave her a list of options she thought she might have had atrial fibrillation was treated with blood thinners for a while.  She denies any other stroke or TIA symptoms.  She states that she was living independently and active at home.  Her daughter who " "is the POA lives in [place].  By report her daughter said she \"may have a plate in her head\"    Past Medical/Surgical Hx:  Past Medical History:   Diagnosis Date   • Colon cancer    • Hypertension      Past Surgical History:   Procedure Laterality Date   • CHOLECYSTECTOMY     • COLON SURGERY     • COLONOSCOPY     • HERNIA REPAIR         Review of Systems:        A complete review of all systems is negative except as described above.    Medications On Admission  Prescriptions Prior to Admission   Medication Sig Dispense Refill Last Dose   • amLODIPine (NORVASC) 5 MG tablet Take 5 mg by mouth Daily.   Past Week at Unknown time   • glipiZIDE (GLUCOTROL) 5 MG ER tablet Take 5 mg by mouth Daily.   1/1/2018 at Unknown time   • lisinopril (PRINIVIL,ZESTRIL) 20 MG tablet Take 20 mg by mouth Daily.   Past Week at Unknown time   • metoprolol succinate XL (TOPROL-XL) 25 MG 24 hr tablet Take 25 mg by mouth Daily.   Past Week at Unknown time   • cephalexin (KEFLEX) 500 MG capsule Take 1 capsule by mouth 2 (Two) Times a Day. 20 capsule 0    • linagliptin (TRADJENTA) 5 MG tablet tablet Take 5 mg by mouth Daily.   Unknown at Unknown time   • triamcinolone (KENALOG) 0.1 % cream Apply  topically 3 (Three) Times a Day. Apply sparingly. 45 g 0        Allergies:  Allergies   Allergen Reactions   • Contrast Dye      \"Kidneys Shutdown\"   • Sulfa Antibiotics Itching       Social Hx:  Social History     Social History   • Marital status:      Spouse name: N/A   • Number of children: N/A   • Years of education: N/A     Occupational History   • Not on file.     Social History Main Topics   • Smoking status: Heavy Tobacco Smoker   • Smokeless tobacco: Not on file   • Alcohol use No   • Drug use: No   • Sexual activity: Defer     Other Topics Concern   • Not on file     Social History Narrative       Family Hx:  History reviewed. No pertinent family history.    Review of Imaging (Interpretation of images not reports):  CT scan of the " "brain 1/4/18: No acute stroke mass or hemorrhage, there is extensive confluent white matter disease in the periventricular and subcortex most prominent in the bifrontal regions, there is evidence of old lacunar strokes in the thalami as well as right anterior limb internal capsule, this is unchanged compared with the scan from 1/1/18    Laboratory Results:   Lab Results   Component Value Date    GLUCOSE 238 (H) 01/05/2018    CALCIUM 9.0 01/05/2018     01/05/2018    K 2.9 (L) 01/05/2018    CO2 22.7 01/05/2018     01/05/2018    BUN 31 (H) 01/05/2018    CREATININE 1.41 (H) 01/05/2018    EGFRIFNONA 36 (L) 01/05/2018    BCR 22.0 01/05/2018    ANIONGAP 13.3 01/05/2018     Lab Results   Component Value Date    WBC 14.72 (H) 01/05/2018    HGB 8.8 (L) 01/05/2018    HCT 26.4 (L) 01/05/2018    MCV 94.3 01/05/2018     01/05/2018     No results found for: LDLCALC  Lab Results   Component Value Date    HGBA1C 6.50 (H) 01/02/2018     Lab Results   Component Value Date    INR 1.20 (H) 01/02/2018    PROTIME 14.8 (H) 01/02/2018   EKG- SR      Physical Examination: Tcurrent- 100.4  BP (!) 198/83  Pulse 92  Temp 98.9 °F (37.2 °C) (Oral)   Resp 18  Ht 157.5 cm (62.01\")  Wt 71.2 kg (157 lb)  SpO2 93%  BMI 28.71 kg/m2  General Appearance:   Well developed, well nourished, well groomed, slightly sleepy, ill appearing, and cooperative.  HEENT: Normocephalic.  Normal fundoscopic exam including normal retina, discs are flat with sharp margins, normal vasculature. Sylvester's sign.  Neck and Spine: Normal range of motion.  Normal alignment. No mass or tenderness. No bruits.  Cardiac: Regular rate and rhythm. No murmurs.  Peripheral Vasculature: Radial and pedal pulses are 1+ equal and symmetric. No signs of distal embolization.  Extremities:    No edema or deformities. Normal joint ROM. POP hoses and SCD's in place  Skin:    No rashes or birth marks.  Abrasion over her right knee.    Neurological examination:  Higher " "Integrative  Function: Oriented to January 2004 but when given a choice she was able to  2018 and person.  She thought she was at her Mu-ism pew.  Intermittently confused. She was complaining that her right arm was not working because \"they grab me by the neck\". Normal registration, recall 0/3.  Decreased attention span and concentration. Normal language including comprehension, spontaneous speech, repetition, naming and vocabulary however she had some difficulty with reading and she could not write with her right hand and refused to do so with her left. No neglect with normal visual-spatial function and construction. Fair fund of knowledge and decreased higher integrative function.  CN II: Pupils are equal, round, and reactive to light. Normal visual acuity and visual fields.    CN III IV VI: Extraocular movements are full without nystagmus.   CN V: Normal facial sensation and strength of muscles of mastication.  CN VII: Facial movements are symmetric. No weakness.  CN VIII:   Auditory acuity is normal.  CN IX & X:   Symmetric palatal movement.  CN XI: Sternocleidomastoid and trapezius are normal.  No weakness.  CN XII:   The tongue is midline.  No atrophy or fasciculations.  Motor: Normal muscle strength, bulk and tone in L upper, 3 out of 5 in the right arm proximally 2 out of 5 distally, 0 out of 5 right leg and 3 out of 5 left leg. No fasciculations, rigidity, spasticity, or abnormal movements.  Reflexes: 2+ in the upper and lower extremities. Plantar responses are flexor on the left and extensor on the right.  Sensation: Normal to light touch, temperature, and proprioception in arms and legs.  Unable to cooperate with graphesthesia and no extinction on DSS.  Station and Gait: Unable to get up or walk.    Coordination: Finger to nose test shows no dysmetria.  Did not cooperate with rapid alternating movements and Heel to shin.  NIHSS: 7    Diagnoses / Discussion:  77 y.o. who presents with Sx of " confusion as well as right sided weakness involving the leg more than arm.  She most likely has had an ischemic stroke and was not a candidate for IV thrombolysis due to being outside the time window and not a candidate for endovascular therapy yesterday because the deficit was less than the  AHA guidelines of an NIHSS greater equal to 6, she had a history of anaphylaxis to IV contrast and she has borderline renal function.  She is worse today but she is also currently afebrile and possibly septic.  The etiology to stroke remains unclear and could be due to atrial fibrillation if she in fact has it although the history is very unreliable.  The other possibility given her bacteremia and continued fever is endocarditis.  Artery to artery embolism also needs to be excluded.  She therefore needs completion of the stroke workup.  Aspirin should be sufficient for now.  She also has a toxic metabolic encephalopathy and this should be treated with supportive therapy but may use low-dose Seroquel (6.25mg to 12.5mg) if she becomes very agitated.  In reviewing the CT scan there are no intracranial or extracranial implants.    Plan:  Aspirin 325mg  Hydrate well  Permissive hypertension, keep SBP less than 220 mmHg for now  Neurochecks and transfer to neuro floor (done)  Check Lipid panel, Hgb A1C  Lipitor 80mg  Non-pharmacological DVT prophylaxis  MRI/MRA  TTE  ?FIDE if above negative for source  CT stat (done)  EKG Tele  PT/OT/ST  Stroke Education  Blood pressure control to <130/80 eventually  Goal LDL <70-recommend high dose statins-   Serum glucose < 140     Call 911 for stroke any stroke symptoms    I have discussed the above with the patient and nurse- I left voice mail at her daughter's phone number asking her to call the unit  Time spent with patient: 60min    MDM  Reviewed: vitals  Interpretation: CT scan and labs      Dictated using Dragon dictation.

## 2018-01-05 NOTE — PLAN OF CARE
Problem: SAFETY - NON-VIOLENT RESTRAINT  Goal: Remains free of injury from restraints (Non-Violent Restraint)  Outcome: Outcome(s) achieved Date Met: 01/05/18

## 2018-01-05 NOTE — SIGNIFICANT NOTE
01/05/18 1430   Rehab Treatment   Discipline speech language pathologist   Rehab Evaluation   Evaluation Not Performed other (see comments)  (Pt. refused to participate in evaluation. Confused, became agitated, refused all attempts at PO trials. )   Recommendations   SLP - Next Appointment (Follow for swallow evaluation as patient better able to participate. NPO is likely safest option until swallow evaluation can be completed. )

## 2018-01-05 NOTE — SIGNIFICANT NOTE
At approx. 5:00 pm  Patient had a sudden change in behavior. Pt. Agitated, angry. Refusing medications.. Uncooperative. Slightly combative and making threatening remarks to staff. Very confused to person, place and time. Speech clear. B/P 169/69, P 74. R-16. Blood Sugar = 254. Rapid called. Rapid Team arrived on site. CT Scans requested. Transported to CT via stretcher, Refer to Rapid Response team for documentation of findings. Returned from CT at [id number]. 500 cc's normal saline bolus given.  Earlier obtained nasal swabs. Speech Eval done.   POA contacted and informed of change in patient's condition.      01/04/18 1700   Cognitive   Cognitive/Neuro/Behavioral WDL ex;mood/behavior   Mood/Behavior agitated;angry;threatening;uncooperative   Safety   Safety bed in low position;wheels locked;call light in reach;upper side rails raised x 2;ID band on  (Rapid rsponse team at bedside)   Safety Interventions   Safety Promotion/Fall Prevention safety round/check completed;fall prevention program maintained;nonskid shoes/slippers when out of bed   All Alarms alarm(s) activated and audible   Safety/Security Measures bed alarm set   Environmental Safety Modification assistive device/personal items within reach;clutter free environment maintained;lighting adjusted;room organization consistent

## 2018-01-05 NOTE — PROGRESS NOTES
Continued Stay Note  [facility]     Patient Name: Sakshi Garcia  MRN: [id number]  Today's Date: 1/5/2018    Admit Date: 1/1/2018          Discharge Plan       01/05/18 1347    Case Management/Social Work Plan    Plan Pt has been approved @ [place] [address].  [address] [place] [address] [place] [id number]. Will need pre cert.  CCP will follow up on Monday              Discharge Codes     None        Expected Discharge Date and Time     Expected Discharge Date Expected Discharge Time    Jan 4, 2018             Oly Gautam RN

## 2018-01-05 NOTE — PLAN OF CARE
Problem: Patient Care Overview (Adult)  Goal: Plan of Care Review  Outcome: Ongoing (interventions implemented as appropriate)   01/04/18 1800   Coping/Psychosocial Response Interventions   Plan Of Care Reviewed With patient   Patient Care Overview   Progress declining   Outcome Evaluation   Outcome Summary/Follow up Plan In the am was alert awake, talkative. sad. Talking about family and family situation. IV saline locked. OUt of nasal oxygen. This afternoon noted change in patient status. RUE drift of arm. Became agitated and confused. Rapid response called. Had CT scans. Bolused with normal saline. Nasal swabs obtained. POA informed of change is patient's condition. Weak  with right hand. Refused medications. Unable to educate on coromobity due to lack of cooperation, confusion and agitation.       Problem: Pressure Ulcer Risk (Tank Scale) (Adult,Obstetrics,Pediatric)  Goal: Identify Related Risk Factors and Signs and Symptoms  Outcome: Ongoing (interventions implemented as appropriate)    Goal: Skin Integrity  Outcome: Ongoing (interventions implemented as appropriate)      Problem: Fall Risk (Adult)  Goal: Identify Related Risk Factors and Signs and Symptoms  Outcome: Ongoing (interventions implemented as appropriate)    Goal: Absence of Falls  Outcome: Ongoing (interventions implemented as appropriate)

## 2018-01-05 NOTE — PROGRESS NOTES
"  Name: Sakshi Garcia  ADMIT: 2018   Age/Sex: 77 y.o.female LOS:  LOS: 4 days    :    1940     ROOM: [facility]   MRN:    [id number]    PCP:    Provider Not In System     Subjective   Rapid response called yesterday for right sided weakness and confusion. Agitated and confused. Coughing per nurse     Objective   Vital Signs  Temp:  [97 °F (36.1 °C)-100.4 °F (38 °C)] 100.4 °F (38 °C)  Heart Rate:  [70-96] 92  Resp:  [16-20] 18  BP: (144-205)/(60-83) 198/83  Body mass index is 28.71 kg/(m^2).    Objective:  General Appearance:  Comfortable and well-appearing.    Vital signs: (most recent): Blood pressure (!) 198/83, pulse 92, temperature 100.4 °F (38 °C), temperature source Oral, resp. rate 18, height 157.5 cm (62.01\"), weight 71.2 kg (157 lb), SpO2 93 %.  Vital signs are normal.    HEENT: Normal HEENT exam.    Lungs:  Normal effort.  (Crackles and dec breath sounds at right base)  Heart: Normal rate.  Regular rhythm.    Abdomen: Abdomen is soft and non-distended.  Bowel sounds are normal.   There is no abdominal tenderness.     Extremities: There is no deformity or dependent edema.    Neurological: Patient is alert.  (Oriented x1).    Skin:  Warm and dry.  No rash.             Results Review:       I reviewed the patient's new clinical results.    Results from last 7 days  Lab Units 18  0858 18  0354 18  05018  1631   WBC 10*3/mm3 14.72* 17.01* 18.66* 20.00* 21.43*   HEMOGLOBIN g/dL 8.8* 8.5* 9.2* 9.9* 10.6*   PLATELETS 10*3/mm3 177 156 173 192 218       Results from last 7 days  Lab Units 18  0858 18  0354 18  0509 18  04118  1631   SODIUM mmol/L 142 137 136 132* 132*   POTASSIUM mmol/L 2.9* 3.4* 3.5 3.8 4.4   CHLORIDE mmol/L 106 103 102 96* 93*   CO2 mmol/L 22.7 20.2* 18.6* 22.0 23.2   BUN mg/dL 31* 39* 44* 43* 41*   CREATININE mg/dL 1.41* 1.39* 1.48* 1.48* 1.46*   GLUCOSE mg/dL 238* 189* 132* 107* 297*   Estimated Creatinine Clearance: " 30.4 mL/min (by C-G formula based on Cr of 1.41).    Results from last 7 days  Lab Units 01/05/18  0858 01/04/18  0354 01/03/18  0509 01/02/18  0417 01/01/18  1631   CALCIUM mg/dL 9.0 8.9 8.8 8.9 9.5   ALBUMIN g/dL  --   --   --  3.00* 3.20*   MAGNESIUM mg/dL  --   --   --  1.6 1.6   PHOSPHORUS mg/dL  --   --   --  2.5  --        RADIOLOGY  1/5/2018  Pending      amLODIPine 5 mg Oral Daily   aspirin 300 mg Rectal Once   atorvastatin 80 mg Oral Daily   glipiZIDE 2.5 mg Oral BID AC   insulin aspart 0-9 Units Subcutaneous 4x Daily With Meals & Nightly   insulin detemir 15 Units Subcutaneous QAM   ipratropium-albuterol 3 mL Nebulization Q4H - RT   linagliptin 5 mg Oral Daily   lisinopril 10 mg Oral Q24H   metoprolol succinate XL 50 mg Oral Daily   piperacillin-tazobactam 3.375 g Intravenous Q8H   potassium chloride 20 mEq Oral Once       Pharmacy to dose vancomycin     sodium chloride 75 mL/hr Last Rate: 75 mL/hr (01/04/18 2234)   NPO Diet      Assessment/Plan   Assessment:   Principal Problem:    Acute cystitis without hematuria  Active Problems:    Traumatic rhabdomyolysis    Generalized weakness    Fall    CKD (chronic kidney disease)    DM (diabetes mellitus)    HTN (hypertension)    Altered mental status    Sepsis    Bacteremia    DNR (do not resuscitate)        Plan:   1. Ecoli UTI with bacteremia  - switched to levaquin to complete a 14 day course but now on Zosyn for likely aspiration pna  - repeat BCx NGTD  - WBC improving  - cont to monitor     2. Aspiration vs Hospital acquired PNA  - Cont zosyn. Add Vanc  - recheck BCx  - check procalcitonin  - SLP eval  - recheck Xray in a couple of days    3. Encephalopathy  - CT head negative  - defer to Neuro for any additional imaging  - cont as needed anti-psychotics     4. Rhabdo  -resolved     5. DM2  - restarted home oral meds  - refusing home meds currently  - add levemir unless she starts to take oral meds     6. CKD3  - Cr stable  - cont to  moniotr      Disposition  TBD.      Chace Chester MD  [facility]  01/05/18  12:16 PM

## 2018-01-06 ENCOUNTER — APPOINTMENT (OUTPATIENT)
Dept: CARDIOLOGY | Facility: HOSPITAL | Age: 78
End: 2018-01-06
Attending: RADIOLOGY

## 2018-01-06 ENCOUNTER — APPOINTMENT (OUTPATIENT)
Dept: CARDIOLOGY | Facility: HOSPITAL | Age: 78
End: 2018-01-06

## 2018-01-06 LAB
ANION GAP SERPL CALCULATED.3IONS-SCNC: 13 MMOL/L
AORTIC DIMENSIONLESS INDEX: 0.8 (DI)
BASOPHILS # BLD AUTO: 0.07 10*3/MM3 (ref 0–0.2)
BASOPHILS NFR BLD AUTO: 0.5 % (ref 0–1.5)
BH CV ECHO MEAS - ACS: 1.7 CM
BH CV ECHO MEAS - AO MAX PG (FULL): 6.4 MMHG
BH CV ECHO MEAS - AO MAX PG: 12.1 MMHG
BH CV ECHO MEAS - AO MEAN PG (FULL): 2 MMHG
BH CV ECHO MEAS - AO MEAN PG: 5 MMHG
BH CV ECHO MEAS - AO ROOT AREA (BSA CORRECTED): 1.7
BH CV ECHO MEAS - AO ROOT AREA: 7.1 CM^2
BH CV ECHO MEAS - AO ROOT DIAM: 3 CM
BH CV ECHO MEAS - AO V2 MAX: 174 CM/SEC
BH CV ECHO MEAS - AO V2 MEAN: 103 CM/SEC
BH CV ECHO MEAS - AO V2 VTI: 38.7 CM
BH CV ECHO MEAS - AVA(I,A): 2.6 CM^2
BH CV ECHO MEAS - AVA(I,D): 2.6 CM^2
BH CV ECHO MEAS - AVA(V,A): 2.4 CM^2
BH CV ECHO MEAS - AVA(V,D): 2.4 CM^2
BH CV ECHO MEAS - BSA(HAYCOCK): 1.8 M^2
BH CV ECHO MEAS - BSA: 1.7 M^2
BH CV ECHO MEAS - BZI_BMI: 29.2 KILOGRAMS/M^2
BH CV ECHO MEAS - BZI_METRIC_HEIGHT: 157 CM
BH CV ECHO MEAS - BZI_METRIC_WEIGHT: 72 KG
BH CV ECHO MEAS - CONTRAST EF (2CH): 66 ML/M^2
BH CV ECHO MEAS - CONTRAST EF 4CH: 71 ML/M^2
BH CV ECHO MEAS - EDV(CUBED): 85.2 ML
BH CV ECHO MEAS - EDV(MOD-SP2): 47 ML
BH CV ECHO MEAS - EDV(MOD-SP4): 69 ML
BH CV ECHO MEAS - EDV(TEICH): 87.7 ML
BH CV ECHO MEAS - EF(CUBED): 68.3 %
BH CV ECHO MEAS - EF(MOD-SP2): 66 %
BH CV ECHO MEAS - EF(MOD-SP4): 71 %
BH CV ECHO MEAS - EF(TEICH): 60.1 %
BH CV ECHO MEAS - ESV(CUBED): 27 ML
BH CV ECHO MEAS - ESV(MOD-SP2): 16 ML
BH CV ECHO MEAS - ESV(MOD-SP4): 20 ML
BH CV ECHO MEAS - ESV(TEICH): 35 ML
BH CV ECHO MEAS - FS: 31.8 %
BH CV ECHO MEAS - IVS/LVPW: 0.91
BH CV ECHO MEAS - IVSD: 1 CM
BH CV ECHO MEAS - LAT PEAK E' VEL: 6 CM/SEC
BH CV ECHO MEAS - LV DIASTOLIC VOL/BSA (35-75): 39.9 ML/M^2
BH CV ECHO MEAS - LV MASS(C)D: 158.2 GRAMS
BH CV ECHO MEAS - LV MASS(C)DI: 91.5 GRAMS/M^2
BH CV ECHO MEAS - LV MAX PG: 5.8 MMHG
BH CV ECHO MEAS - LV MEAN PG: 3 MMHG
BH CV ECHO MEAS - LV SYSTOLIC VOL/BSA (12-30): 11.6 ML/M^2
BH CV ECHO MEAS - LV V1 MAX: 120 CM/SEC
BH CV ECHO MEAS - LV V1 MEAN: 79.1 CM/SEC
BH CV ECHO MEAS - LV V1 VTI: 29.3 CM
BH CV ECHO MEAS - LVIDD: 4.4 CM
BH CV ECHO MEAS - LVIDS: 3 CM
BH CV ECHO MEAS - LVLD AP2: 6.6 CM
BH CV ECHO MEAS - LVLD AP4: 6.6 CM
BH CV ECHO MEAS - LVLS AP2: 5.9 CM
BH CV ECHO MEAS - LVLS AP4: 6 CM
BH CV ECHO MEAS - LVOT AREA (M): 3.5 CM^2
BH CV ECHO MEAS - LVOT AREA: 3.5 CM^2
BH CV ECHO MEAS - LVOT DIAM: 2.1 CM
BH CV ECHO MEAS - LVPWD: 1.1 CM
BH CV ECHO MEAS - MED PEAK E' VEL: 5 CM/SEC
BH CV ECHO MEAS - MV A DUR: 144 SEC
BH CV ECHO MEAS - MV A MAX VEL: 148 CM/SEC
BH CV ECHO MEAS - MV DEC SLOPE: 664 CM/SEC^2
BH CV ECHO MEAS - MV DEC TIME: 225 SEC
BH CV ECHO MEAS - MV E MAX VEL: 132 CM/SEC
BH CV ECHO MEAS - MV E/A: 0.89
BH CV ECHO MEAS - MV MAX PG: 13.7 MMHG
BH CV ECHO MEAS - MV MEAN PG: 6 MMHG
BH CV ECHO MEAS - MV P1/2T MAX VEL: 183 CM/SEC
BH CV ECHO MEAS - MV P1/2T: 80.7 MSEC
BH CV ECHO MEAS - MV V2 MAX: 185 CM/SEC
BH CV ECHO MEAS - MV V2 MEAN: 114 CM/SEC
BH CV ECHO MEAS - MV V2 VTI: 59.9 CM
BH CV ECHO MEAS - MVA P1/2T LCG: 1.2 CM^2
BH CV ECHO MEAS - MVA(P1/2T): 2.7 CM^2
BH CV ECHO MEAS - MVA(VTI): 1.7 CM^2
BH CV ECHO MEAS - PA ACC TIME: 0.09 SEC
BH CV ECHO MEAS - PA MAX PG (FULL): 1 MMHG
BH CV ECHO MEAS - PA MAX PG: 3.2 MMHG
BH CV ECHO MEAS - PA PR(ACCEL): 40.8 MMHG
BH CV ECHO MEAS - PA V2 MAX: 89.6 CM/SEC
BH CV ECHO MEAS - PULM A REVS DUR: 113 SEC
BH CV ECHO MEAS - PULM A REVS VEL: 30.6 CM/SEC
BH CV ECHO MEAS - PULM DIAS VEL: 61.1 CM/SEC
BH CV ECHO MEAS - PULM S/D: 1.4
BH CV ECHO MEAS - PULM SYS VEL: 84.4 CM/SEC
BH CV ECHO MEAS - RAP SYSTOLE: 3 MMHG
BH CV ECHO MEAS - RV MAX PG: 2.2 MMHG
BH CV ECHO MEAS - RV MEAN PG: 1 MMHG
BH CV ECHO MEAS - RV V1 MAX: 74.2 CM/SEC
BH CV ECHO MEAS - RV V1 MEAN: 50.4 CM/SEC
BH CV ECHO MEAS - RV V1 VTI: 12.7 CM
BH CV ECHO MEAS - RVSP: 46 MMHG
BH CV ECHO MEAS - SI(AO): 158.2 ML/M^2
BH CV ECHO MEAS - SI(CUBED): 33.7 ML/M^2
BH CV ECHO MEAS - SI(LVOT): 58.7 ML/M^2
BH CV ECHO MEAS - SI(MOD-SP2): 17.9 ML/M^2
BH CV ECHO MEAS - SI(MOD-SP4): 28.3 ML/M^2
BH CV ECHO MEAS - SI(TEICH): 30.5 ML/M^2
BH CV ECHO MEAS - SV(AO): 273.6 ML
BH CV ECHO MEAS - SV(CUBED): 58.2 ML
BH CV ECHO MEAS - SV(LVOT): 101.5 ML
BH CV ECHO MEAS - SV(MOD-SP2): 31 ML
BH CV ECHO MEAS - SV(MOD-SP4): 49 ML
BH CV ECHO MEAS - SV(TEICH): 52.7 ML
BH CV ECHO MEAS - TAPSE (>1.6): 2.3 CM2
BH CV ECHO MEAS - TR MAX VEL: 328 CM/SEC
BH CV VAS BP RIGHT ARM: NORMAL MMHG
BH CV XLRA - RV BASE: 4.6 CM
BH CV XLRA - TDI S': 15 CM/SEC
BUN BLD-MCNC: 29 MG/DL (ref 8–23)
BUN/CREAT SERPL: 19.9 (ref 7–25)
CALCIUM SPEC-SCNC: 8.6 MG/DL (ref 8.6–10.5)
CHLORIDE SERPL-SCNC: 111 MMOL/L (ref 98–107)
CHOLEST SERPL-MCNC: 106 MG/DL (ref 0–200)
CO2 SERPL-SCNC: 22 MMOL/L (ref 22–29)
CREAT BLD-MCNC: 1.46 MG/DL (ref 0.57–1)
DEPRECATED RDW RBC AUTO: 51.7 FL (ref 37–54)
E/E' RATIO: 23
EOSINOPHIL # BLD AUTO: 0.2 10*3/MM3 (ref 0–0.7)
EOSINOPHIL NFR BLD AUTO: 1.3 % (ref 0.3–6.2)
ERYTHROCYTE [DISTWIDTH] IN BLOOD BY AUTOMATED COUNT: 14.5 % (ref 11.7–13)
GFR SERPL CREATININE-BSD FRML MDRD: 35 ML/MIN/1.73
GLUCOSE BLD-MCNC: 149 MG/DL (ref 65–99)
GLUCOSE BLDC GLUCOMTR-MCNC: 148 MG/DL (ref 70–130)
GLUCOSE BLDC GLUCOMTR-MCNC: 162 MG/DL (ref 70–130)
GLUCOSE BLDC GLUCOMTR-MCNC: 244 MG/DL (ref 70–130)
GLUCOSE BLDC GLUCOMTR-MCNC: 271 MG/DL (ref 70–130)
HCT VFR BLD AUTO: 25.3 % (ref 35.6–45.5)
HDLC SERPL-MCNC: 16 MG/DL (ref 40–60)
HGB BLD-MCNC: 8.1 G/DL (ref 11.9–15.5)
IMM GRANULOCYTES # BLD: 0.58 10*3/MM3 (ref 0–0.03)
IMM GRANULOCYTES NFR BLD: 3.8 % (ref 0–0.5)
LDLC SERPL CALC-MCNC: 68 MG/DL (ref 0–100)
LDLC/HDLC SERPL: 4.26 {RATIO}
LEFT ATRIUM VOLUME INDEX: 28.6 ML/M2
LV EF 2D ECHO EST: 71 %
LYMPHOCYTES # BLD AUTO: 1.13 10*3/MM3 (ref 0.9–4.8)
LYMPHOCYTES NFR BLD AUTO: 7.4 % (ref 19.6–45.3)
MAXIMAL PREDICTED HEART RATE: 142 BPM
MCH RBC QN AUTO: 31.2 PG (ref 26.9–32)
MCHC RBC AUTO-ENTMCNC: 32 G/DL (ref 32.4–36.3)
MCV RBC AUTO: 97.3 FL (ref 80.5–98.2)
MONOCYTES # BLD AUTO: 1.82 10*3/MM3 (ref 0.2–1.2)
MONOCYTES NFR BLD AUTO: 11.9 % (ref 5–12)
MRSA SPEC QL CULT: NORMAL
NEUTROPHILS # BLD AUTO: 11.51 10*3/MM3 (ref 1.9–8.1)
NEUTROPHILS NFR BLD AUTO: 75.1 % (ref 42.7–76)
PLATELET # BLD AUTO: 215 10*3/MM3 (ref 140–500)
PMV BLD AUTO: 10 FL (ref 6–12)
POTASSIUM BLD-SCNC: 3.1 MMOL/L (ref 3.5–5.2)
RBC # BLD AUTO: 2.6 10*6/MM3 (ref 3.9–5.2)
SODIUM BLD-SCNC: 146 MMOL/L (ref 136–145)
STRESS TARGET HR: 121 BPM
TRIGL SERPL-MCNC: 109 MG/DL (ref 0–150)
TSH SERPL DL<=0.05 MIU/L-ACNC: 0.41 MIU/ML (ref 0.27–4.2)
VANCOMYCIN SERPL-MCNC: 17 MCG/ML (ref 5–40)
VLDLC SERPL-MCNC: 21.8 MG/DL (ref 5–40)
WBC NRBC COR # BLD: 15.31 10*3/MM3 (ref 4.5–10.7)

## 2018-01-06 PROCEDURE — 93306 TTE W/DOPPLER COMPLETE: CPT | Performed by: INTERNAL MEDICINE

## 2018-01-06 PROCEDURE — 99233 SBSQ HOSP IP/OBS HIGH 50: CPT | Performed by: NURSE PRACTITIONER

## 2018-01-06 PROCEDURE — 94799 UNLISTED PULMONARY SVC/PX: CPT

## 2018-01-06 PROCEDURE — 97164 PT RE-EVAL EST PLAN CARE: CPT

## 2018-01-06 PROCEDURE — 63710000001 INSULIN ASPART PER 5 UNITS: Performed by: INTERNAL MEDICINE

## 2018-01-06 PROCEDURE — 97110 THERAPEUTIC EXERCISES: CPT

## 2018-01-06 PROCEDURE — 25010000002 VANCOMYCIN PER 500 MG: Performed by: INTERNAL MEDICINE

## 2018-01-06 PROCEDURE — 80202 ASSAY OF VANCOMYCIN: CPT | Performed by: INTERNAL MEDICINE

## 2018-01-06 PROCEDURE — 80061 LIPID PANEL: CPT | Performed by: NURSE PRACTITIONER

## 2018-01-06 PROCEDURE — 84443 ASSAY THYROID STIM HORMONE: CPT | Performed by: NURSE PRACTITIONER

## 2018-01-06 PROCEDURE — 25010000002 PIPERACILLIN SOD-TAZOBACTAM PER 1 G: Performed by: INTERNAL MEDICINE

## 2018-01-06 PROCEDURE — 92610 EVALUATE SWALLOWING FUNCTION: CPT

## 2018-01-06 PROCEDURE — 85025 COMPLETE CBC W/AUTO DIFF WBC: CPT | Performed by: INTERNAL MEDICINE

## 2018-01-06 PROCEDURE — 97535 SELF CARE MNGMENT TRAINING: CPT | Performed by: OCCUPATIONAL THERAPIST

## 2018-01-06 PROCEDURE — 82962 GLUCOSE BLOOD TEST: CPT

## 2018-01-06 PROCEDURE — 93306 TTE W/DOPPLER COMPLETE: CPT

## 2018-01-06 PROCEDURE — 80048 BASIC METABOLIC PNL TOTAL CA: CPT | Performed by: INTERNAL MEDICINE

## 2018-01-06 PROCEDURE — 97166 OT EVAL MOD COMPLEX 45 MIN: CPT | Performed by: OCCUPATIONAL THERAPIST

## 2018-01-06 PROCEDURE — 93880 EXTRACRANIAL BILAT STUDY: CPT

## 2018-01-06 RX ORDER — POTASSIUM CHLORIDE 1.5 G/1.77G
20 POWDER, FOR SOLUTION ORAL 3 TIMES DAILY
Status: DISCONTINUED | OUTPATIENT
Start: 2018-01-06 | End: 2018-01-06 | Stop reason: CLARIF

## 2018-01-06 RX ORDER — METOPROLOL SUCCINATE 25 MG/1
25 TABLET, EXTENDED RELEASE ORAL
Status: DISCONTINUED | OUTPATIENT
Start: 2018-01-06 | End: 2018-01-16

## 2018-01-06 RX ORDER — ASPIRIN 325 MG
325 TABLET ORAL DAILY
Status: DISCONTINUED | OUTPATIENT
Start: 2018-01-06 | End: 2018-01-22 | Stop reason: HOSPADM

## 2018-01-06 RX ORDER — SODIUM CHLORIDE 450 MG/100ML
50 INJECTION, SOLUTION INTRAVENOUS CONTINUOUS
Status: DISCONTINUED | OUTPATIENT
Start: 2018-01-06 | End: 2018-01-09

## 2018-01-06 RX ORDER — DEXTROSE MONOHYDRATE 50 MG/ML
100 INJECTION, SOLUTION INTRAVENOUS CONTINUOUS
Status: DISCONTINUED | OUTPATIENT
Start: 2018-01-06 | End: 2018-01-06

## 2018-01-06 RX ORDER — POTASSIUM CHLORIDE 750 MG/1
20 CAPSULE, EXTENDED RELEASE ORAL 3 TIMES DAILY
Status: DISCONTINUED | OUTPATIENT
Start: 2018-01-06 | End: 2018-01-16

## 2018-01-06 RX ORDER — VANCOMYCIN HYDROCHLORIDE 1 G/200ML
1000 INJECTION, SOLUTION INTRAVENOUS ONCE
Status: COMPLETED | OUTPATIENT
Start: 2018-01-06 | End: 2018-01-06

## 2018-01-06 RX ADMIN — LISINOPRIL 10 MG: 10 TABLET ORAL at 09:21

## 2018-01-06 RX ADMIN — ASPIRIN 325 MG: 325 TABLET ORAL at 13:04

## 2018-01-06 RX ADMIN — POTASSIUM CHLORIDE 20 MEQ: 750 CAPSULE, EXTENDED RELEASE ORAL at 20:31

## 2018-01-06 RX ADMIN — TAZOBACTAM SODIUM AND PIPERACILLIN SODIUM 3.38 G: 375; 3 INJECTION, SOLUTION INTRAVENOUS at 17:30

## 2018-01-06 RX ADMIN — GLIPIZIDE 2.5 MG: 5 TABLET ORAL at 17:31

## 2018-01-06 RX ADMIN — SODIUM CHLORIDE 125 ML/HR: 4.5 INJECTION, SOLUTION INTRAVENOUS at 11:23

## 2018-01-06 RX ADMIN — SODIUM CHLORIDE 125 ML/HR: 4.5 INJECTION, SOLUTION INTRAVENOUS at 20:46

## 2018-01-06 RX ADMIN — VANCOMYCIN HYDROCHLORIDE 1000 MG: 1 INJECTION, SOLUTION INTRAVENOUS at 13:04

## 2018-01-06 RX ADMIN — AMLODIPINE BESYLATE 5 MG: 5 TABLET ORAL at 09:21

## 2018-01-06 RX ADMIN — IPRATROPIUM BROMIDE AND ALBUTEROL SULFATE 3 ML: .5; 3 SOLUTION RESPIRATORY (INHALATION) at 22:35

## 2018-01-06 RX ADMIN — GLIPIZIDE 2.5 MG: 5 TABLET ORAL at 09:21

## 2018-01-06 RX ADMIN — CLOPIDOGREL 75 MG: 75 TABLET, FILM COATED ORAL at 09:21

## 2018-01-06 RX ADMIN — INSULIN ASPART 4 UNITS: 100 INJECTION, SOLUTION INTRAVENOUS; SUBCUTANEOUS at 17:54

## 2018-01-06 RX ADMIN — TAZOBACTAM SODIUM AND PIPERACILLIN SODIUM 3.38 G: 375; 3 INJECTION, SOLUTION INTRAVENOUS at 05:36

## 2018-01-06 RX ADMIN — SODIUM CHLORIDE 75 ML/HR: 9 INJECTION, SOLUTION INTRAVENOUS at 05:42

## 2018-01-06 RX ADMIN — LINAGLIPTIN 5 MG: 5 TABLET, FILM COATED ORAL at 09:21

## 2018-01-06 RX ADMIN — METOPROLOL SUCCINATE 50 MG: 50 TABLET, FILM COATED, EXTENDED RELEASE ORAL at 09:21

## 2018-01-06 RX ADMIN — ATORVASTATIN CALCIUM 80 MG: 80 TABLET, FILM COATED ORAL at 09:21

## 2018-01-06 RX ADMIN — INSULIN ASPART 6 UNITS: 100 INJECTION, SOLUTION INTRAVENOUS; SUBCUTANEOUS at 13:05

## 2018-01-06 RX ADMIN — TAZOBACTAM SODIUM AND PIPERACILLIN SODIUM 3.38 G: 375; 3 INJECTION, SOLUTION INTRAVENOUS at 11:23

## 2018-01-06 RX ADMIN — POTASSIUM CHLORIDE 20 MEQ: 1.5 POWDER, FOR SOLUTION ORAL at 17:31

## 2018-01-06 NOTE — THERAPY EVALUATION
Acute Care - Speech Language Pathology   Swallow Initial Evaluation [facility]     Patient Name: Sakshi Garcia  : 1940  MRN: [id number]  Today's Date: 2018  Onset of Illness/Injury or Date of Surgery Date: 18          Admit Date: 2018    SPEECH-LANGUAGE PATHOLOGY EVALUATION - SWALLOW  Subjective: The patient was seen on this date for a Clinical Swallow evaluation.  Patient was alert and cooperative. Confusion improving.   Significant history: Rapid response on 18. Now with new stroke. Concern for aspiration pna.   Objective: Textures given included thin liquid, nectar thick liquid, puree consistency and mechanical soft consistency.  Assessment: Difficulties were noted with thin liquid and mixed consistency. Drop in O2 sats to 89% with thins and 87% with mixed consistency. No overt s/s of aspiration with nectar thick liquids, puree, or mechanical soft.   SLP Findings:  Patient presents with moderate oropharyngeal dysphagia, without esophageal component.   Recommendations: Diet Textures: nectar thick liquid, mechanical soft consistency with no mixed textures food.  Medications should be taken whole or crushed with puree. May have water and ice between meals after oral care, under staff or family supervision and with the recommended strategies for safe swallowing.   Recommended Strategies: Upright for PO, small bites and sips, no straw and supervision with all PO. Oral care before breakfast, after all meals and PRN.  Other Recommended Evaluations: VFSS    Dysphagia therapy is recommended. Rationale: safe swallow with least restrictive diet.    Visit Dx:     ICD-10-CM ICD-9-CM   1. Traumatic rhabdomyolysis, initial encounter T79.6XXA 958.6   2. Fall, initial encounter W19.XXXA E888.9   3. Renal insufficiency N28.9 593.9   4. Dizziness R42 780.4   5. Unsteadiness on feet R26.81 781.2     Patient Active Problem List   Diagnosis   • Traumatic rhabdomyolysis   • Generalized weakness   • Fall   •  Acute cystitis without hematuria   • CKD (chronic kidney disease)   • DM (diabetes mellitus)   • HTN (hypertension)   • Altered mental status   • Sepsis   • Bacteremia   • DNR (do not resuscitate)     Past Medical History:   Diagnosis Date   • Colon cancer    • Hypertension      Past Surgical History:   Procedure Laterality Date   • CHOLECYSTECTOMY     • COLON SURGERY     • COLONOSCOPY     • HERNIA REPAIR            SWALLOW EVALUATION (last 72 hours)      Swallow Evaluation       01/06/18 1415 01/04/18 1600             Rehab Evaluation    Document Type evaluation  -HS evaluation  -       Subjective Information agree to therapy  -HS no complaints  -SH       Patient Effort, Rehab Treatment good  -HS fair  -SH       Symptoms Noted During/After Treatment none  -HS fatigue  -SH       General Information    Patient Profile Review yes  -HS yes  -SH       Subjective Patient Observations  Pt confused, anomia noted, acute R weakness  -SH       Pertinent History Of Current Problem Rapid response 1/4/18. Now with new stroke.  -HS admitted with sepsis and rabdo  -SH       Current Diet Limitations NPO  -HS thin liquids;regular solid  -SH       Prior Level of Function- Communication  functional in all spheres  -SH       Prior Level of Function- Swallowing other (comment)   MS/NTL per BSE 1/4/18.  -HS no diet consistency restrictions  -SH       Plans/Goals Discussed With patient  -HS patient  -SH       Barriers to Rehab none identified  -HS none identified  -SH       Clinical Impression    Patient's Goals For Discharge return to PO diet  -HS return to regular diet  -SH       SLP Swallowing Diagnosis moderate dysphagia  -HS oral dysfunction;pharyngeal dysfunction  -SH       Rehab Potential/Prognosis, Swallowing good, to achieve stated therapy goals  -HS good, to achieve stated therapy goals  -SH       Criteria for Skilled Therapeutic Interventions Met skilled criteria for dysphagia intervention met  -HS skilled criteria for  dysphagia intervention met  -       FCM, Swallowing 4-->Level 4  -HS 4-->Level 4  -SH       Therapy Frequency PRN  -HS PRN  -       Predicted Duration Therapy Interv (days) until discharge  -HS until discharge  -       Expected Duration Therapy Session (min)  15-30 minutes  -       SLP Diet Recommendation IV - mechanical soft, no mixed consistencies;nectar/syrup-thick liquids  - IV - mechanical soft, no mixed consistencies;nectar/syrup-thick liquids  -       Recommended Diagnostics VFSS (MBS)  - VFSS (MBS)  -       Recommended Feeding/Eating Techniques maintain upright posture during/after eating for 30 mins;small sips/bites  - no straws  -       SLP Rec. for Method of Medication Administration meds whole in pudding/applesauce;meds crushed in pudding/applesauce  - meds whole in pudding/applesauce  -       Monitor For Signs Of Aspiration cough;throat clearing;fever  - cough;elevated WBC count;gurgly voice;throat clearing;fever;upper respiratory infection;pneumonia;right lower lobe infiltrates  -       Anticipated Discharge Disposition other (see comments)   pending plan of care  - skilled nursing facility  -       Pain Assessment    Pain Assessment  No/denies pain  -       Cognitive Assessment/Intervention    Current Cognitive/Communication Assessment impaired   Confused, but improving  - impaired  -       Oral Motor Structure and Function    Oral Motor Anatomy and Physiology patient demonstrates anatomy and physiology that is WNL  -        Secretion Management other (see comments)   Dried, thick secretions  -        Mucosal Quality dry;sticky  -        Oral Musculature General Assessment lingual impairment;oral labial impairment  -        Oral Motor Assessment Comment  Bottom dentures, upper partials. No focal weakness, but poor participation in OME.   -       Oral Labial Strength and Mobility reduced strength bilaterally  -HS        Lingual Strength and Mobility  reduced strength bilaterally  -        Dysphagia Treatment Objectives and Progress    Dysphagia Treatment Objectives  Other 1  -SH       Dysphagia Other 1    Dysphagia Other 1 Objective  Pt will participate in [facility]  -         User Key  (r) = Recorded By, (t) = Taken By, (c) = Cosigned By    Initials Name Effective Dates    HS Alisa Buchanan, MS CCC-SLP 04/13/15 -     SH Lisa Mcadams, MS [facility]-SLP 07/13/17 -         EDUCATION  The patient has been educated in the following areas:   Dysphagia (Swallowing Impairment) Oral Care/Hydration.    SLP Recommendation and Plan  SLP Swallowing Diagnosis: moderate dysphagia  SLP Diet Recommendation: IV - mechanical soft, no mixed consistencies, nectar/syrup-thick liquids  Recommended Feeding/Eating Techniques: maintain upright posture during/after eating for 30 mins, small sips/bites  SLP Rec. for Method of Medication Administration: meds whole in pudding/applesauce, meds crushed in pudding/applesauce  Monitor For Signs Of Aspiration: cough, throat clearing, fever  Recommended Diagnostics: VFSS ([facility])  Criteria for Skilled Therapeutic Interventions Met: skilled criteria for dysphagia intervention met  Anticipated Discharge Disposition: other (see comments) (pending plan of care)  Rehab Potential/Prognosis, Swallowing: good, to achieve stated therapy goals  Therapy Frequency: PRN              IP SLP Goals       01/06/18 1437 01/04/18 1547       Safely Consume Diet    Safely Consume Diet- SLP, Date Established 01/06/18  - 01/04/18  -     Safely Consume Diet- SLP, Time to Achieve by discharge  - by discharge  -     Safely Consume Diet- SLP, Additional Goal Bedside swallow evaluation completed. Recommend a mechanical soft diet with nectar thick liquids. Meds whole or crushed in puree. Ice chips or small sips of water 30 minutes after meals only after oral care and with supervision. Plan to proceed with VFSS early next week to further assess swallow function.  -        User  Key  (r) = Recorded By, (t) = Taken By, (c) = Cosigned By    Initials Name Provider Type    HS Alisa Buchanan MS CCC-SLP Speech and Language Pathologist     Lisa Mcadams MS CCC-SLP Speech and Language Pathologist             SLP Outcome Measures (last 72 hours)      SLP Outcome Measures       01/06/18 1400 01/04/18 1600       SLP Outcome Measures    Outcome Measure Used? Adult NOMS  -HS Adult NOMS  -SH     FCM Scores    FCM Chosen Swallowing  -HS Swallowing  -SH     Swallowing FCM Score 4  -HS 4  -SH       User Key  (r) = Recorded By, (t) = Taken By, (c) = Cosigned By    Initials Name Effective Dates    HS Alisa Buchanan MS CCC-SLP 04/13/15 -      Lisa Mcadams MS CCC-ZAKIA 07/13/17 -            Time Calculation:         Time Calculation- SLP       01/06/18 1415          Time Calculation- SLP    SLP Received On 01/06/18  -        User Key  (r) = Recorded By, (t) = Taken By, (c) = Cosigned By    Initials Name Provider Type     Alisa Buchanan MS CCC-SLP Speech and Language Pathologist          Therapy Charges for Today     Code Description Service Date Service Provider Modifiers Qty    [id number] HC ST EVAL ORAL PHARYNG SWALLOW 4 1/6/2018 Alisa Buchanan MS CCC-SLP GN 1               MS CARRI Villarreal  1/6/2018

## 2018-01-06 NOTE — THERAPY EVALUATION
Acute Care - Physical Therapy Re-Evaluation  [facility]     Patient Name: Sakshi Garcia  : 1940  MRN: [id number]  Today's Date: 2018   Onset of Illness/Injury or Date of Surgery Date: 18  Date of Referral to PT: 18  Referring Physician: Dr. Vora      Admit Date: 2018     Visit Dx:    ICD-10-CM ICD-9-CM   1. Traumatic rhabdomyolysis, initial encounter T79.6XXA 958.6   2. Fall, initial encounter W19.XXXA E888.9   3. Renal insufficiency N28.9 593.9   4. Dizziness R42 780.4   5. Unsteadiness on feet R26.81 781.2     Patient Active Problem List   Diagnosis   • Traumatic rhabdomyolysis   • Generalized weakness   • Fall   • Acute cystitis without hematuria   • CKD (chronic kidney disease)   • DM (diabetes mellitus)   • HTN (hypertension)   • Altered mental status   • Sepsis   • Bacteremia   • DNR (do not resuscitate)     Past Medical History:   Diagnosis Date   • Colon cancer    • Hypertension      Past Surgical History:   Procedure Laterality Date   • CHOLECYSTECTOMY     • COLON SURGERY     • COLONOSCOPY     • HERNIA REPAIR            PT ASSESSMENT (last 72 hours)      PT Evaluation       18 1713 18 1415    Rehab Evaluation    Document Type re-evaluation  -PC evaluation  -HS    Subjective Information agree to therapy   confused  -PC agree to therapy  -HS    Patient Effort, Rehab Treatment fair  -PC good  -HS    Symptoms Noted During/After Treatment none  -PC none  -HS    General Information    Patient Profile Review yes  -PC     General Observations pt is supine in bed, no acute distress, confused  -PC     Pertinent History Of Current Problem pt with new L CVA with R hemiparesis  -PC     Precautions/Limitations fall precautions  -PC     Barriers to Rehab medically complex  -PC     Clinical Impression    Date of Referral to PT 18  -PC     Criteria for Skilled Therapeutic Interventions Met yes  -PC     Pain Assessment    Pain Assessment No/denies pain  -PC     Cognitive  Assessment/Intervention    Current Cognitive/Communication Assessment impaired  -PC impaired   Confused, but improving  -    Orientation Status oriented to;person;disoriented to;place;time;situation  -PC     Follows Commands/Answers Questions needs cueing;able to follow single-step instructions;50% of the time  -     Personal Safety decreased awareness, need for safety;severe impairment  -     Personal Safety Interventions fall prevention program maintained;gait belt  -PC     ROM (Range of Motion)    General ROM Detail PROM WFL  -PC     MMT (Manual Muscle Testing)    General MMT Assessment Detail RUE 0/5, RLE 2/5, LUE and LLE WNL  -PC     Muscle Tone Assessment    Muscle Tone Assessment RLE  -PC     RUE Muscle Tone Assessment flaccid  -PC     RLE Muscle Tone Assessment moderately decreased tone  -     Bed Mobility, Assessment/Treatment    Bed Mob, Supine to Sit, [place] maximum assist (25% patient effort)  -PC     Bed Mob, Sit to Supine, [place] maximum assist (25% patient effort)  -     Transfer Assessment/Treatment    Transfers, Sit-Stand [place] maximum assist (25% patient effort);2 person assist required  -PC     Transfers, Stand-Sit [place] maximum assist (25% patient effort);2 person assist required  -PC     Transfer, Comment RLE knee buckling  -     Motor Skills/Interventions    Additional Documentation Balance Skills Training (Group)  -     Balance Skills Training    Sitting-Level of Assistance Minimum assistance  -     Sitting-Balance Support Feet supported;Left upper extremity supported;Right upper extremity supported  -     Sitting # of Minutes 6  -PC     Standing-Level of Assistance Moderate assistance;Maximum assistance;x2  -PC     Static Standing Balance Support Left upper extremity supported  -     Standing-Balance Activities --   stood x 2 for 10-15 sec each  -     Positioning and Restraints    Pre-Treatment Position in bed  -PC     Post Treatment Position  bed  -PC     In Bed supine;call light within reach;encouraged to call for assist;exit alarm on  -PC       01/06/18 1141 01/05/18 1430    Rehab Evaluation    Document Type evaluation  -     Subjective Information agree to therapy;complains of   not having any water and being weak  -     Evaluation Not Performed  other (see comments)   Pt. refused to participate in evaluation. Became agitated, refused all attempts at PO trials.   -HS    Patient Effort, Rehab Treatment good  -KP     Symptoms Noted During/After Treatment fatigue  -     General Information    Patient Profile Review yes  -KP     Onset of Illness/Injury or Date of Surgery Date 01/01/18  -     General Observations pt supine in bed. no distress. and in good mood  -     Pertinent History Of Current Problem admitted w. acute cystitis without hamaturia  -     Precautions/Limitations fall precautions  -     Prior Level of Function independent:;ADL's;transfer  -     Equipment Currently Used at Home cane, straight  -     Plans/Goals Discussed With patient;agreed upon  -     Benefits Reviewed patient:;improve function;increase independence;increase strength;increase balance  -     Pain Assessment    Pain Assessment No/denies pain  -     Vision Assessment/Intervention    Visual Impairment WFL  -     Cognitive Assessment/Intervention    Current Cognitive/Communication Assessment impaired  -     Orientation Status oriented to;person;time;situation  -     Follows Commands/Answers Questions 100% of the time;able to follow single-step instructions;needs cueing  -     Personal Safety decreased insight to deficits;decreased awareness, need for safety  -     Personal Safety Interventions fall prevention program maintained;muscle strengthening facilitated;nonskid shoes/slippers when out of bed  -     ROM (Range of Motion)    General ROM upper extremity range of motion deficits identified  -     General ROM Detail R UE noted some movement  in shoulder when pt moved her arm back while sitting EOB w. hand wt bearing on bed. Slight movement in digits approx 1/4 to 1/4. fl. 0 extension. L UE 8/8  -KP     MMT (Manual Muscle Testing)    General MMT Assessment upper extremity strength deficits identified  -     General MMT Assessment Detail R UE 0 to 1/5 strength  L UE 3+/5  -KP     Bed Mobility, Assessment/Treatment    Bed Mobility, Scoot/Bridge, [place] set up required;moderate assist (50% patient effort);2 person assist required  -     Bed Mob, Supine to Sit, [place] set up required;maximum assist (25% patient effort)  -     Bed Mob, Sit to Supine, [place] maximum assist (25% patient effort)  -     Motor Skills/Interventions    Additional Documentation Balance Skills Training (Group);Fine Motor Coordination Training (Group);Gross Motor Coordination Training (Group);Functional Endurance (Group)  -     Balance Skills Training    Sitting-Level of Assistance Contact guard;Close supervision  -     Sitting-Balance Support Feet supported;Right upper extremity supported  -     Sitting # of Minutes 3  -     Fine Motor Coordination Training    Opposition Right:;impaired  -     Functional Endurance    Detail (Functional Endurance) fair +  -KP     Sensory Assessment/Intervention    Light Touch RUE  -KP     RUE Light Touch WNL  -KP     Positioning and Restraints    Pre-Treatment Position in bed  -KP     Post Treatment Position bed  -KP     In Bed supine;call light within reach;exit alarm on;encouraged to call for assist   qSocrates thorpe aide present  -       01/05/18 1015 01/04/18 2210    Rehab Evaluation    Evaluation Not Performed other (see comments)   Patient unable to participate in VFSS this date.   -HS     Muscle Tone Assessment    RUE Muscle Tone Assessment  other (see comments)   weakness  -SD    Bilateral Upper Extremities Muscle Tone Assessment  other (see comments)   right arm weak, unable to follow commands to lift arm  -SD       01/04/18 2030 01/04/18 1600    Rehab Evaluation    Document Type  evaluation  -    Subjective Information  no complaints  -SH    Patient Effort, Rehab Treatment  fair  -SH    Symptoms Noted During/After Treatment  fatigue  -SH    Pain Assessment    Pain Assessment  No/denies pain  -SH    Cognitive Assessment/Intervention    Current Cognitive/Communication Assessment  impaired  -SH    Muscle Tone Assessment    RUE Muscle Tone Assessment other (see comments)   weakness  -SD     Bilateral Upper Extremities Muscle Tone Assessment other (see comments)   right arm weak, unable to follow commands to lift arm.  -SD       01/04/18 1456 01/04/18 1414    Rehab Evaluation    Document Type  therapy note (daily note)  -    Subjective Information  agree to therapy;complains of;weakness;fatigue  -    General Information    Precautions/Limitations  fall precautions  -    Pain Assessment    Pain Assessment  No/denies pain  -    Muscle Tone Assessment    RUE Muscle Tone Assessment other (see comments)   weakness  -LS     Bed Mobility, Assessment/Treatment    Bed Mobility, Assistive Device  draw sheet  -    Bed Mob, Supine to Sit, [place]  moderate assist (50% patient effort);maximum assist (25% patient effort);2 person assist required  -    Bed Mobility, Safety Issues  decreased use of arms for pushing/pulling;decreased use of legs for bridging/pushing;impaired trunk control for bed mobility  -    Bed Mobility, Comment  mod 2 to sit EOB, unable to hold self upright w/RUE  -    Transfer Assessment/Treatment    Transfers, Sit-Stand [place]  maximum assist (25% patient effort);2 person assist required;hand held assist  -    Transfers, Stand-Sit [place]  moderate assist (50% patient effort);2 person assist required  -    Toilet Transfer, [place]  maximum assist (25% patient effort);2 person assist required  -    Toilet Transfer, Assistive Device  bedside commode without drop arms  -    Gait  Assessment/Treatment    Gait, [place] Level  not appropriate to assess  -    Gait, Comment  RN w/pt assessing-earlier pt able to use rt UE equally per WINSTON Fraser  -NEO    Positioning and Restraints    Pre-Treatment Position  in bed  -    Post Treatment Position  bed  -JM    In Bed  fowlers;with nsg  -JM      User Key  (r) = Recorded By, (t) = Taken By, (c) = Cosigned By    Initials Name Provider Type    NASIR Barkley, OTR Occupational Therapist    BIB Buchanan, MS CCC-SLP Speech and Language Pathologist    PC Mellissa Mendez, PT Physical Therapist    SD Lisa Pritchard, RN Registered Nurse    NEO Johnson, PTA Physical Therapy Assistant    SH Lisa Mcadams, MS CCC-SLP Speech and Language Pathologist    JORDYN Andres, RN Registered Nurse          Physical Therapy Education     Title: PT OT SLP Therapies (Active)     Topic: Physical Therapy (Active)     Point: Mobility training (Active)    Learning Progress Summary    Learner Readiness Method Response Comment Documented by Status   Patient Acceptance E,D NR   01/06/18 1721 Active    Acceptance E,D KENDY ROBERT   01/04/18 1702 Active    Acceptance E VU,NR   01/02/18 1359 Done               Point: Home exercise program (Active)    Learning Progress Summary    Learner Readiness Method Response Comment Documented by Status   Patient Acceptance E,D NR   01/06/18 1721 Active    Acceptance E,D KENDY ROBERT   01/04/18 1702 Active    Acceptance E VU,NR   01/02/18 1359 Done               Point: Body mechanics (Active)    Learning Progress Summary    Learner Readiness Method Response Comment Documented by Status   Patient Acceptance E,D NR   01/06/18 1721 Active    Acceptance E,D NR,NL   01/04/18 1702 Active    Acceptance E VU,NR   01/02/18 1359 Done               Point: Precautions (Active)    Learning Progress Summary    Learner Readiness Method Response Comment Documented by Status   Patient Acceptance E,D NR   01/06/18 1721 Active     Acceptance E,D NR,NL   01/04/18 1702 Active    Acceptance E VU,NR   01/02/18 1359 Done                      User Key     Initials Effective Dates Name Provider Type Discipline    PC 12/01/15 -  Mellissa Mendez, PT Physical Therapist PT     02/18/16 -  Amarilis Johnson, PTA Physical Therapy Assistant PT     09/05/17 -  Paula Holley, PT Physical Therapist PT                PT Recommendation and Plan  Anticipated Equipment Needs At Discharge: front wheeled walker  Anticipated Discharge Disposition: skilled nursing facility  Planned Therapy Interventions: bed mobility training, transfer training, strengthening, gait training  PT Frequency: daily  Plan of Care Review  Plan Of Care Reviewed With: patient  Outcome Summary/Follow up Plan: pt presents now with L CVA and R UE/RLE weakness and impaired functional mobility, she will benefit from PT to address and rec snf at d/c          IP PT Goals       01/06/18 1721 01/02/18 1359       Bed Mobility PT LTG    Bed Mobility PT LTG, Date Established  01/02/18  -     Bed Mobility PT LTG, Time to Achieve  1 wk  -AA     Bed Mobility PT LTG, Activity Type  all bed mobility  -AA     Bed Mobility PT LTG, [place] Level  minimum assist (75% patient effort)  -AA     Bed Mobility PT LTG, Outcome goal ongoing  -PC      Transfer Training PT LTG    Transfer Training PT LTG, Date Established  01/02/18  -     Transfer Training PT LTG, Time to Achieve 1 wk  -PC 1 wk  -AA     Transfer Training PT LTG, Activity Type sit to stand/stand to sit;bed to chair /chair to bed  -PC all transfers  -AA     Transfer Training PT LTG, [place] Level moderate assist (50% patient effort)  -PC contact guard assist  -AA     Transfer Training PT LTG, Assist Device  walker, rolling  -AA     Gait Training PT LTG    Gait Training Goal PT LTG, Date Established  01/02/18  -AA     Gait Training Goal PT LTG, Time to Achieve 1 wk  -PC 1 wk  -AA     Gait Training Goal PT LTG, [place] Level  moderate assist (50% patient effort);2 person assist required  -PC contact guard assist  -AA     Gait Training Goal PT LTG, Assist Device  walker, rolling  -AA     Gait Training Goal PT LTG, Distance to Achieve 5 ft  -  -AA     Gait Training Goal PT LTG, Outcome goal revised  -PC      Gait Training Goal PT LTG, Reason Goal Not Met other (see comments)   new CVA  -PC        User Key  (r) = Recorded By, (t) = Taken By, (c) = Cosigned By    Initials Name Provider Type    PC Mellissa Mendez, PT Physical Therapist    KOSTA Holley, PT Physical Therapist                Outcome Measures       01/06/18 1700 01/06/18 1151 01/04/18 1400    How much help from another person do you currently need...    Turning from your back to your side while in flat bed without using bedrails? 2  -PC  2  -JM    Moving from lying on back to sitting on the side of a flat bed without bedrails? 2  -PC  2  -JM    Moving to and from a bed to a chair (including a wheelchair)? 1  -PC  2  -JM    Standing up from a chair using your arms (e.g., wheelchair, bedside chair)? 1  -PC  2  -JM    Climbing 3-5 steps with a railing? 1  -PC  1  -JM    To walk in hospital room? 1  -PC  1  -JM    AM-PAC 6 Clicks Score 8  -PC  10  -JM    How much help from another is currently needed...    Putting on and taking off regular lower body clothing?  1  -KP     Bathing (including washing, rinsing, and drying)  2  -KP     Toileting (which includes using toilet bed pan or urinal)  2  -KP     Putting on and taking off regular upper body clothing  2  -KP     Taking care of personal grooming (such as brushing teeth)  3  -KP     Eating meals  3  -KP     Score  13  -KP     Functional Assessment    Outcome Measure Options  AM-PAC 6 Clicks Daily Activity (OT)  -KP       User Key  (r) = Recorded By, (t) = Taken By, (c) = Cosigned By    Initials Name Provider Type    NASIR Barkley, OTR Occupational Therapist    BASSAM Mendez, PT Physical Therapist    NEO  Amarilis Johnson, PTA Physical Therapy Assistant           Time Calculation:         PT Charges       01/06/18 1726          Time Calculation    Start Time 1655  -PC      Stop Time 1710  -PC      Time Calculation (min) 15 min  -PC      PT Received On 01/06/18  -PC      PT - Next Appointment 01/08/18  -PC        User Key  (r) = Recorded By, (t) = Taken By, (c) = Cosigned By    Initials Name Provider Type    PC Mellissa Mendez, PT Physical Therapist          Therapy Charges for Today     Code Description Service Date Service Provider Modifiers Qty    50658068796 HC PT THER PROC EA 15 MIN 1/6/2018 Mellissa Mendez, PT GP 1    32672253873 HC PT RE-EVAL ESTABLISHED PLAN 2 1/6/2018 Mellissa Mendez, PT GP 1    26671903923 HC PT THER SUPP EA 15 MIN 1/6/2018 Mellissa Mendez, PT GP 1          PT G-Codes  Outcome Measure Options: AM-PAC 6 Clicks Daily Activity (OT)      Mellissa Mendez PT  1/6/2018

## 2018-01-06 NOTE — PROGRESS NOTES
"  Name: Sakshi Garcia  ADMIT: 2018   Age/Sex: 77 y.o.female LOS:  LOS: 5 days    :    1940     ROOM: [facility]   MRN:    [id number]    PCP:    Provider Not In System     Subjective   Doing ok. No real sob. Some cough. Still confused    Objective   Vital Signs  Temp:  [98.2 °F (36.8 °C)-100.4 °F (38 °C)] 98.7 °F (37.1 °C)  Heart Rate:  [81-96] 84  Resp:  [18] 18  BP: (145-204)/(56-82) 201/79  Body mass index is 29.26 kg/(m^2).    Objective:  General Appearance:  Comfortable and well-appearing.    Vital signs: (most recent): Blood pressure (!) 201/79, pulse 84, temperature 98.7 °F (37.1 °C), temperature source Oral, resp. rate 18, height 157.5 cm (62.01\"), weight 72.6 kg (160 lb), SpO2 92 %.  Vital signs are normal.    HEENT: Normal HEENT exam.    Lungs:  Normal effort.  There are decreased breath sounds.    Heart: Normal rate.  Regular rhythm.    Abdomen: Abdomen is soft and non-distended.  Bowel sounds are normal.   There is no abdominal tenderness.     Extremities: There is no deformity or dependent edema.    Neurological: Patient is alert.  (Oriented x1).    Skin:  Warm and dry.  No rash.             Results Review:       I reviewed the patient's new clinical results.    Results from last 7 days  Lab Units 18  05018  1631   WBC 10*3/mm3 15.31* 14.72* 17.01* 18.66* 20.00* 21.43*   HEMOGLOBIN g/dL 8.1* 8.8* 8.5* 9.2* 9.9* 10.6*   PLATELETS 10*3/mm3 215 177 156 173 192 218     Results from last 7 days  Lab Units 18  04318  0858 18  0354 18  0509 18  1631   SODIUM mmol/L 146* 142 137 136 132* 132*   POTASSIUM mmol/L 3.1* 2.9* 3.4* 3.5 3.8 4.4   CHLORIDE mmol/L 111* 106 103 102 96* 93*   CO2 mmol/L 22.0 22.7 20.2* 18.6* 22.0 23.2   BUN mg/dL 29* 31* 39* 44* 43* 41*   CREATININE mg/dL 1.46* 1.41* 1.39* 1.48* 1.48* 1.46*   GLUCOSE mg/dL 149* 238* 189* 132* 107* 297*   Estimated Creatinine " Clearance: 29.6 mL/min (by C-G formula based on Cr of 1.46).  Results from last 7 days  Lab Units 01/06/18  0437 01/05/18  0858 01/04/18  0354 01/03/18  0509 01/02/18  0417 01/01/18  1631   CALCIUM mg/dL 8.6 9.0 8.9 8.8 8.9 9.5   ALBUMIN g/dL  --   --   --   --  3.00* 3.20*   MAGNESIUM mg/dL  --   --   --   --  1.6 1.6   PHOSPHORUS mg/dL  --   --   --   --  2.5  --        RADIOLOGY  1/6/2018  Pending      amLODIPine 5 mg Oral Daily   aspirin 325 mg Oral Daily   atorvastatin 80 mg Oral Daily   clopidogrel 300 mg Oral Once   clopidogrel 75 mg Oral Daily   glipiZIDE 2.5 mg Oral BID AC   insulin aspart 0-9 Units Subcutaneous 4x Daily With Meals & Nightly   ipratropium-albuterol 3 mL Nebulization Q4H - RT   linagliptin 5 mg Oral Daily   lisinopril 10 mg Oral Q24H   metoprolol succinate XL 50 mg Oral Daily   piperacillin-tazobactam 3.375 g Intravenous Q8H   vancomycin 1,000 mg Intravenous Once   Vancomycin Pharmacy Intermittent Dosing  Does not apply Daily       Pharmacy to dose vancomycin     sodium chloride 75 mL/hr Last Rate: 75 mL/hr (01/06/18 0542)   NPO Diet      Assessment/Plan   Assessment:   Principal Problem:    Acute cystitis without hematuria  Active Problems:    Traumatic rhabdomyolysis    Generalized weakness    Fall    CKD (chronic kidney disease)    DM (diabetes mellitus)    HTN (hypertension)    Altered mental status    Sepsis    Bacteremia    DNR (do not resuscitate)        Plan:   1. Ecoli UTI with bacteremia  - switched to levaquin to complete a 14 day course but now on Zosyn for likely aspiration pna  - repeat BCx NGTD  - WBC stable  - cont to monitor      2. Aspiration vs Hospital acquired PNA  - Cont zosyn and Vanc  - BCx NGTD  - procal mildly elevated  - SLP eval  - recheck Xray tomorrow along with procalcitonin. If pleural effusion still present then will likely need thoracentesis     3. Encephalopathy 2/2 Acute CVA and above infections  - CT head negative  - on ASA and plavix. MRI with multiple  CVA c/w cardio-embolic source or atherosclerotic plaque. Carotid u/s ordered  - will need FIDE  - cont as needed anti-psychotics  - discussed with joseph stroke APRN, and want to allow permissive HTN with goal of 140s-170s. Will d/c oral meds. If needs treatment will place on cardene drip      4. Rhabdo  -resolved      5. DM2  - restarted home oral meds  - better now that taking oral meds      6. CKD3  - Cr stable  - cont to moniotr    7. Anemia  - unclear source of drop  - cont to monitor    8 Hypernatremia and hypokalemia  - start 1/2NS  - replace K orally      Disposition  TBD.      Chace Chester MD  [facility]  01/06/18  10:25 AM

## 2018-01-06 NOTE — THERAPY EVALUATION
Acute Care - Occupational Therapy Initial Evaluation  [facility]     Patient Name: Sakshi Garcia  : 1940  MRN: [id number]  Today's Date: 2018  Onset of Illness/Injury or Date of Surgery Date: 18     Referring Physician: Dr. Vora    Admit Date: 2018       ICD-10-CM ICD-9-CM   1. Traumatic rhabdomyolysis, initial encounter T79.6XXA 958.6   2. Fall, initial encounter W19.XXXA E888.9   3. Renal insufficiency N28.9 593.9   4. Dizziness R42 780.4   5. Unsteadiness on feet R26.81 781.2     Patient Active Problem List   Diagnosis   • Traumatic rhabdomyolysis   • Generalized weakness   • Fall   • Acute cystitis without hematuria   • CKD (chronic kidney disease)   • DM (diabetes mellitus)   • HTN (hypertension)   • Altered mental status   • Sepsis   • Bacteremia   • DNR (do not resuscitate)     Past Medical History:   Diagnosis Date   • Colon cancer    • Hypertension      Past Surgical History:   Procedure Laterality Date   • CHOLECYSTECTOMY     • COLON SURGERY     • COLONOSCOPY     • HERNIA REPAIR            OT ASSESSMENT FLOWSHEET (last 72 hours)      OT Evaluation       18 1141 18 1430 18 1015 18 2210 18 2030    Rehab Evaluation    Document Type evaluation  -KP        Subjective Information agree to therapy;complains of   not having any water and being weak  -KP        Evaluation Not Performed  other (see comments)   Pt. refused to participate in evaluation. Became agitated, refused all attempts at PO trials.   -HS other (see comments)   Patient unable to participate in VFSS this date.   -HS      Patient Effort, Rehab Treatment good  -KP        Symptoms Noted During/After Treatment fatigue  -KP        General Information    Patient Profile Review yes  -KP        Onset of Illness/Injury or Date of Surgery Date 18  -KP        General Observations pt supine in bed. no distress. and in good mood  -KP        Pertinent History Of Current Problem admitted w. acute  cystitis without hamaturia  -        Precautions/Limitations fall precautions  -        Prior Level of Function independent:;ADL's;transfer  -        Equipment Currently Used at Home cane, straight  -        Plans/Goals Discussed With patient;agreed upon  -        Benefits Reviewed patient:;improve function;increase independence;increase strength;increase balance  -        Clinical Impression    Criteria for Skilled Therapeutic Interventions Met yes;treatment indicated  -        Rehab Potential good, to achieve stated therapy goals  -        Therapy Frequency 3-5 times/wk  -        Anticipated Equipment Needs At Discharge tub bench  -        Anticipated Discharge Disposition skilled nursing facility;inpatient rehabilitation facility  -        Pain Assessment    Pain Assessment No/denies pain  -        Vision Assessment/Intervention    Visual Impairment WFL  -        Cognitive Assessment/Intervention    Current Cognitive/Communication Assessment impaired  -        Orientation Status oriented to;person;time;situation  -        Follows Commands/Answers Questions 100% of the time;able to follow single-step instructions;needs cueing  -        Personal Safety decreased insight to deficits;decreased awareness, need for safety  -        Personal Safety Interventions fall prevention program maintained;muscle strengthening facilitated;nonskid shoes/slippers when out of bed  -        ROM (Range of Motion)    General ROM upper extremity range of motion deficits identified  -        General ROM Detail R UE noted some movement in shoulder when pt moved her arm back while sitting EOB w. hand wt bearing on bed. Slight movement in digits approx 1/4 to 1/4. fl. 0 extension. L UE 8/8  -KP        MMT (Manual Muscle Testing)    General MMT Assessment upper extremity strength deficits identified  -        General MMT Assessment Detail R UE 0 to 1/5 strength  L UE 3+/5  -KP        Muscle Tone  Assessment    RUE Muscle Tone Assessment    other (see comments)   weakness  -SD other (see comments)   weakness  -SD    Bilateral Upper Extremities Muscle Tone Assessment    other (see comments)   right arm weak, unable to follow commands to lift arm  -SD other (see comments)   right arm weak, unable to follow commands to lift arm.  -SD    Bed Mobility, Assessment/Treatment    Bed Mobility, Scoot/Bridge, [place] set up required;moderate assist (50% patient effort);2 person assist required  -        Bed Mob, Supine to Sit, [place] set up required;maximum assist (25% patient effort)  -        Bed Mob, Sit to Supine, [place] maximum assist (25% patient effort)  -        Upper Body Bathing Assessment/Training    UB Bathing Assess/Train Assistive Device chyna technique  -        UB Bathing Assess/Train, Position sitting;supine  -        UB Bathing Assess/Train, [place] Level set up required;moderate assist (50% patient effort)  -        Lower Body Bathing Assessment/Training    LB Bathing Assess/Train, Position supine  -        LB Bathing Assess/Train, [place] Level set up required;maximum assist (25% patient effort)  -        Upper Body Dressing Assessment/Training    UB Dressing Assess/Train, Clothing Type doffing:;donning:;hospital gown  -        UB Dressing Assess/Train, Position supine  -        UB Dressing Assess/Train, [place] set up required;maximum assist (25% patient effort)  -        Toileting Assessment/Training    Toileting Assess/Train, Position supine  -        Toileting Assess/Train, [place]epen Level set up required;moderate assist (50% patient effort)  -        Toileting Assess/Train, Comment pt incontienent. changing brief dependent. pt washes laura yang  -        Grooming Assessment/Training    Grooming Assess/Train, Position supine  -        Grooming Assess/Train, Indepen Level set up required;supervision required  -        Grooming  Assess/Train, Comment hcyna techique using L UE  -        Motor Skills/Interventions    Additional Documentation Balance Skills Training (Group);Fine Motor Coordination Training (Group);Gross Motor Coordination Training (Group);Functional Endurance (Group)  -        Balance Skills Training    Sitting-Level of Assistance Contact guard;Close supervision  -        Sitting-Balance Support Feet supported;Right upper extremity supported  -        Sitting # of Minutes 3  -KP        Fine Motor Coordination Training    Opposition Right:;impaired  -        Functional Endurance    Detail (Functional Endurance) fair +  -        Sensory Assessment/Intervention    Light Touch RUE  -KP        RUE Light Touch WNL  -        General Therapy Interventions    Planned Therapy Interventions ADL retraining;activity intolerance;home exercise program;transfer training;strengthening;neuromuscular re-education  -        Positioning and Restraints    Pre-Treatment Position in bed  -        Post Treatment Position bed  -        In Bed supine;call light within reach;exit alarm on;encouraged to call for assist   q. nsg aide present  -          01/04/18 1600 01/04/18 1456 01/04/18 1414          Rehab Evaluation    Document Type evaluation  -  therapy note (daily note)  -      Subjective Information no complaints  -  agree to therapy;complains of;weakness;fatigue  -      Patient Effort, Rehab Treatment fair  -        Symptoms Noted During/After Treatment fatigue  -        General Information    Precautions/Limitations   fall precautions  -      Pain Assessment    Pain Assessment No/denies pain  -  No/denies pain  -      Cognitive Assessment/Intervention    Current Cognitive/Communication Assessment impaired  -        Muscle Tone Assessment    RUE Muscle Tone Assessment  other (see comments)   weakness  -       Bed Mobility, Assessment/Treatment    Bed Mobility, Assistive Device   draw sheet  -      Bed  Mob, Supine to Sit, [place]   moderate assist (50% patient effort);maximum assist (25% patient effort);2 person assist required  -      Bed Mobility, Safety Issues   decreased use of arms for pushing/pulling;decreased use of legs for bridging/pushing;impaired trunk control for bed mobility  -      Bed Mobility, Comment   mod 2 to sit EOB, unable to hold self upright w/RUE  -JM      Transfer Assessment/Treatment    Transfers, Sit-Stand [place]   maximum assist (25% patient effort);2 person assist required;hand held assist  -      Transfers, Stand-Sit [place]   moderate assist (50% patient effort);2 person assist required  -JM      Toilet Transfer, [place]   maximum assist (25% patient effort);2 person assist required  -      Toilet Transfer, Assistive Device   bedside commode without drop arms  -      Positioning and Restraints    Pre-Treatment Position   in bed  -JM      Post Treatment Position   bed  -JM      In Bed   fowlers;with nsg  -JM        User Key  (r) = Recorded By, (t) = Taken By, (c) = Cosigned By    Initials Name Effective Dates     Yaima Barkley, OTR 04/13/15 -     BIB Buchanan, MS CCC-SLP 04/13/15 -     LYNN Pritchard, RN 06/16/16 -     JM Amarilis Johnson, CRISTÓBAL 02/18/16 -     SH Lisa Mcadams, MS [facility]-SLP 07/13/17 -     JORDYN Andres, WINSTON 08/08/16 -            Occupational Therapy Education     Title: PT OT SLP Therapies (Active)     Topic: Occupational Therapy (Active)     Point: ADL training (Done)    Description: Instruct learner(s) on proper safety adaptation and remediation techniques during self care or transfers.   Instruct in proper use of assistive devices.    Learning Progress Summary    Learner Readiness Method Response Comment Documented by Status   Patient Acceptance E,D VU,NR ed pt on role of OT. benefit of therapy. POC w. OT. ed on chyna- technique using L UE for bathing and dressing. pt very weak and had to complete bathing in bed NASIR  01/06/18 1148 Done               Point: Precautions (Done)    Description: Instruct learner(s) on prescribed precautions during self-care and functional transfers.    Learning Progress Summary    Learner Readiness Method Response Comment Documented by Status   Patient Acceptance MIKE DASH,NR ed pt on role of OT. benefit of therapy. POC w. OT. ed on chyna- technique using L UE for bathing and dressing. pt very weak and had to complete bathing in bed  01/06/18 1148 Done               Point: Body mechanics (Done)    Description: Instruct learner(s) on proper positioning and spine alignment during self-care, functional mobility activities and/or exercises.    Learning Progress Summary    Learner Readiness Method Response Comment Documented by Status   Patient Acceptance MIKE DASH,NR ed pt on role of OT. benefit of therapy. POC w. OT. ed on chyna- technique using L UE for bathing and dressing. pt very weak and had to complete bathing in bed  01/06/18 1148 Done                      User Key     Initials Effective Dates Name Provider Type Discipline     04/13/15 -  Yaima Barkley, OTR Occupational Therapist OT                  OT Recommendation and Plan  Anticipated Equipment Needs At Discharge: tub bench  Anticipated Discharge Disposition: skilled nursing facility, inpatient rehabilitation facility  Planned Therapy Interventions: ADL retraining, activity intolerance, home exercise program, transfer training, strengthening, neuromuscular re-education  Therapy Frequency: 3-5 times/wk  Plan of Care Review  Plan Of Care Reviewed With: patient  Progress: no change  Outcome Summary/Follow up Plan: pt evaluated for OT. pt completed bathing in bed w. Max A. pt required Max A w. que/doff gown. Pt using chyna technique for bathing , grooming etc. due to decr movement in R UE. pt sitting EOB SBA to CGA. Bed mobility was max A today. pt cont to benefit from OT to address R UE, ADLs, and Tsf          OT Goals       01/06/18 7005           Patient Education OT LTG    Patient Education OT LTG, Date Established 01/06/18  -      Patient Education OT LTG, Time to Achieve 1 wk  -KP      Patient Education OT LTG, Education Type 1 hand/chyna technique;HEP  -KP      Patient Education OT LTG, Education Understanding independent;demonstrates adequately;verbalizes understanding  -KP      Isolated Movement OT LTG    Isolated Movement OT LTG, Date Established 01/06/18  -      Isolated Movement OT LTG, Time to Achieve 1 wk  -KP      Isolated Movement OT LTG, Body Area right scapula;right shoulder;right elbow;right forearm;right wrist;right fingers  -      Isolated Movement OT LTG, Level facilitate movement;1/2;1/4  -KP      ADL OT LTG    ADL OT LTG, Date Established 01/06/18  -      ADL OT LTG, Time to Achieve 1 wk  -KP      ADL OT LTG, Activity Type ADL skills  -KP      ADL OT LTG, [place] Level setup;assistive device;mod assist  -KP        User Key  (r) = Recorded By, (t) = Taken By, (c) = Cosigned By    Initials Name Provider Type     Yaima Barkley, OTR Occupational Therapist                Outcome Measures       01/06/18 1151 01/04/18 1400       How much help from another person do you currently need...    Turning from your back to your side while in flat bed without using bedrails?  2  -JM     Moving from lying on back to sitting on the side of a flat bed without bedrails?  2  -JM     Moving to and from a bed to a chair (including a wheelchair)?  2  -JM     Standing up from a chair using your arms (e.g., wheelchair, bedside chair)?  2  -JM     Climbing 3-5 steps with a railing?  1  -JM     To walk in hospital room?  1  -JM     AM-PAC 6 Clicks Score  10  -JM     How much help from another is currently needed...    Putting on and taking off regular lower body clothing? 1  -KP      Bathing (including washing, rinsing, and drying) 2  -KP      Toileting (which includes using toilet bed pan or urinal) 2  -KP      Putting on and taking  off regular upper body clothing 2  -KP      Taking care of personal grooming (such as brushing teeth) 3  -KP      Eating meals 3  -KP      Score 13  -KP      Functional Assessment    Outcome Measure Options AM-PAC 6 Clicks Daily Activity (OT)  -KP        User Key  (r) = Recorded By, (t) = Taken By, (c) = Cosigned By    Initials Name Provider Type     ISELA Araujo Occupational Therapist    NEO Johnson, PTA Physical Therapy Assistant          Time Calculation:   OT Start Time: 0827  OT Stop Time: 0911  OT Time Calculation (min): 44 min    Therapy Charges for Today     Code Description Service Date Service Provider Modifiers Qty    [id number]  OT EVAL MOD COMPLEXITY 2 1/6/2018 ISELA Araujo GO 1    [id number]  OT SELF CARE/MGMT/TRAIN EA 15 MIN 1/6/2018 ISELA Araujo GO 2               ISELA Araujo  1/6/2018

## 2018-01-06 NOTE — PROGRESS NOTES
"Pharmacokinetic Consult - Vancomycin Dosing (Follow-up Note)    Sakshi Garcia is a 77 y.o. female who is on day 2 pharmacy to dose vancomycin for PNA (ASP vs HCAP).  Pharmacy dosing vancomycin per Dr. Chester's request.   Other antimicrobials: Zosyn 3.375 gram IV q8h EI  Goal trough: 15-20 mg/L    Relevant clinical data and objective history reviewed:  157.5 cm (62.01\")  72.6 kg (160 lb)  Body mass index is 29.26 kg/(m^2).     She has a past medical history of Colon cancer and Hypertension.    Allergies as of 01/01/2018 - Hector as Reviewed 01/01/2018   Allergen Reaction Noted   • Contrast dye  02/18/2017   • Sulfa antibiotics Itching 02/18/2017     Vital Signs (last 24 hours)       01/05 0700  -  01/06 0659 01/06 0700  -  01/06 0910   Most Recent    Temp (°F) 98.2 -  100.4      98.7     98.7 (37.1)    Heart Rate 81 -  98      84     84    Resp 18 -  20      18     18    /56 -  (!) 205/82      (!) 201/79     (!) 201/79    SpO2 (%) 92 -  97      92     92        Estimated Creatinine Clearance: 29.6 mL/min (by C-G formula based on Cr of 1.46).    Results from last 7 days  Lab Units 01/06/18  0437 01/05/18  0858 01/04/18  0354   CREATININE mg/dL 1.46* 1.41* 1.39*       Results from last 7 days  Lab Units 01/06/18  0437 01/05/18  0858 01/04/18  0354   WBC 10*3/mm3 15.31* 14.72* 17.01*     Baseline culture/source/susceptibility:   1/1 BCx: 2/2 E.coli (sens to Zosyn among others)  1/1 Ucx: E.coli  1/1 RVP negative  1/2 BCx: NGTD  1/4 MRSA nares negative  1/4 RVP negative  1/5 BC NG<24h    Labs:  1/5 PCT 0.86    Lab Results   Component Value Date    VANCORANDOM 17.00 01/06/2018     Recent Vancomycin Dosing History:  1/5 1522 Vancomycin 1500mg IV (~20mg/kg)   1/6 0437 Vancomycin random level 17 mcg/mL (~12h level)    Assessment/Plan  1) Will continue to pulse dose Vancomycin until a sustainable pattern is determined.   Plan today for Vancomycin 1 gram IV (~14mg/kg) with random level in AM  2) Will monitor serum " creatinine at least every 24h for first 72h followed by at least q48 hours per dosing recommendations.    3) Encourage hydration as allowed by MD to help prevent toxic accumulation; monitor for s/sxn of toxicity including increase in SCr and decrease in UOP.    Pharmacy will continue to follow daily while on vancomycin and adjust as needed.     Thanks, Bandar Pineda, PharmD, BCPS

## 2018-01-06 NOTE — PLAN OF CARE
Problem: Patient Care Overview (Adult)  Goal: Plan of Care Review  Outcome: Ongoing (interventions implemented as appropriate)   01/06/18 1721   Coping/Psychosocial Response Interventions   Plan Of Care Reviewed With patient   Outcome Evaluation   Outcome Summary/Follow up Plan pt presents now with L CVA and R UE/RLE weakness and impaired functional mobility, she will benefit from PT to address and rec snf at d/c       Problem: Inpatient Physical Therapy  Goal: Bed Mobility Goal LTG- PT   01/02/18 1359 01/06/18 1721   Bed Mobility PT LTG   Bed Mobility PT LTG, Date Established 01/02/18 --    Bed Mobility PT LTG, Time to Achieve 1 wk --    Bed Mobility PT LTG, Activity Type all bed mobility --    Bed Mobility PT LTG, [place] Level minimum assist (75% patient effort) --    Bed Mobility PT LTG, Outcome --  goal ongoing     Goal: Transfer Training Goal 1 LTG- PT   01/06/18 1721   Transfer Training PT LTG   Transfer Training PT LTG, Time to Achieve 1 wk   Transfer Training PT LTG, Activity Type sit to stand/stand to sit;bed to chair /chair to bed   Transfer Training PT LTG, [place] Level moderate assist (50% patient effort)     Goal: Gait Training Goal LTG- PT   01/06/18 1721   Gait Training PT LTG   Gait Training Goal PT LTG, Time to Achieve 1 wk   Gait Training Goal PT LTG, [place] Level moderate assist (50% patient effort);2 person assist required   Gait Training Goal PT LTG, Distance to Achieve 5 ft   Gait Training Goal PT LTG, Outcome goal revised   Gait Training Goal PT LTG, Reason Goal Not Met other (see comments)  (new CVA)

## 2018-01-06 NOTE — PLAN OF CARE
Problem: Patient Care Overview (Adult)  Goal: Plan of Care Review  Outcome: Ongoing (interventions implemented as appropriate)   01/06/18 0652   Coping/Psychosocial Response Interventions   Plan Of Care Reviewed With patient   Patient Care Overview   Progress improving   Outcome Evaluation   Outcome Summary/Follow up Plan Alert to self, confused. Last B/P 165/72. Slept throughout noc, but still some agitation present . Turned + repositioned Q 2 hours + PRN. Sleeping w/o SOA at rest.       Problem: Diabetes, Type 2 (Adult)  Goal: Signs and Symptoms of Listed Potential Problems Will be Absent or Manageable (Diabetes, Type 2)  Outcome: Ongoing (interventions implemented as appropriate)      Problem: Pressure Ulcer Risk (Tank Scale) (Adult,Obstetrics,Pediatric)  Goal: Identify Related Risk Factors and Signs and Symptoms  Outcome: Ongoing (interventions implemented as appropriate)    Goal: Skin Integrity  Outcome: Ongoing (interventions implemented as appropriate)      Problem: Fall Risk (Adult)  Goal: Identify Related Risk Factors and Signs and Symptoms  Outcome: Ongoing (interventions implemented as appropriate)    Goal: Absence of Falls  Outcome: Ongoing (interventions implemented as appropriate)      Problem: Skin Integrity Impairment, Risk/Actual (Adult)  Goal: Identify Related Risk Factors and Signs and Symptoms  Outcome: Ongoing (interventions implemented as appropriate)    Goal: Skin Integrity/Wound Healing  Outcome: Ongoing (interventions implemented as appropriate)

## 2018-01-06 NOTE — PLAN OF CARE
Problem: Patient Care Overview (Adult)  Goal: Plan of Care Review   01/06/18 1149   Coping/Psychosocial Response Interventions   Plan Of Care Reviewed With patient   Patient Care Overview   Progress no change   Outcome Evaluation   Outcome Summary/Follow up Plan pt evaluated for OT. pt completed bathing in bed w. Max A. pt required Max A w. que/doff gown. Pt using chyna technique for bathing , grooming etc. due to decr movement in R UE. pt sitting EOB SBA to CGA. Bed mobility was max A today. pt cont to benefit from OT to address R UE, ADLs, and Tsf       Problem: Inpatient Occupational Therapy  Goal: Patient Education Goal LTG- OT   01/06/18 1149   Patient Education OT LTG   Patient Education OT LTG, Date Established 01/06/18   Patient Education OT LTG, Time to Achieve 1 wk   Patient Education OT LTG, Education Type 1 hand/chyna technique;HEP   Patient Education OT LTG, Education Understanding independent;demonstrates adequately;verbalizes understanding     Goal: Isolated Movement Goal LTG- OT   01/06/18 1149   Isolated Movement OT LTG   Isolated Movement OT LTG, Date Established 01/06/18   Isolated Movement OT LTG, Time to Achieve 1 wk   Isolated Movement OT LTG, Body Area right scapula;right shoulder;right elbow;right forearm;right wrist;right fingers   Isolated Movement OT LTG, Level facilitate movement;1/2;1/4     Goal: ADL Goal LTG- OT   01/06/18 1149   ADL OT LTG   ADL OT LTG, Date Established 01/06/18   ADL OT LTG, Time to Achieve 1 wk   ADL OT LTG, Activity Type ADL skills   ADL OT LTG, [place] Level setup;assistive device;mod assist

## 2018-01-06 NOTE — PLAN OF CARE
Problem: Inpatient SLP  Goal: Dysphagia- Patient will safely consume diet as per recommendation with no signs/symptoms of aspiration   01/06/18 1437   Safely Consume Diet   Safely Consume Diet- SLP, Date Established 01/06/18   Safely Consume Diet- SLP, Time to Achieve by discharge   Safely Consume Diet- SLP, Additional Goal Bedside swallow evaluation completed. Recommend a mechanical soft diet with nectar thick liquids. Meds whole or crushed in puree. Ice chips or small sips of water 30 minutes after meals only after oral care and with supervision. Plan to proceed with VFSS early next week to further assess swallow function.

## 2018-01-06 NOTE — PROGRESS NOTES
"DOS: 2018  NAME: Sakshi Garcia   : 1940  PCP: Provider Not In System  Chief Complaint   Patient presents with   • Fall     CC: Stroke    Subjective: No new events overnight per RN, no neuro changes. Patient denies headache or any new stroke/TIA symptoms. She continues with baseline confusion.     Interval History  History taken from: patient chart RN    Objective:  Vital signs: BP (!) 201/79 (BP Location: Left arm, Patient Position: Lying)  Pulse 84  Temp 98.7 °F (37.1 °C) (Oral)   Resp 18  Ht 157.5 cm (62.01\")  Wt 72.6 kg (160 lb)  SpO2 92%  BMI 29.26 kg/m2      Physical Exam:  GENERAL: NAD  HEENT: Normocephalic, atraumatic   COR: RRR  Resp: Even and unlabored  Extremities: No signs of distal embolization.     Neurological:   MS: Alert. Oriented to \"[place]\", \"my grandpa's old house\", \"\", \"[place]\" given multiple choice. Naming, repetition intact. normal. Some riight side neglect.   CN: II-XII normal.  Motor: Normal strength and tone on the left. RUE strength at least 2/5 proximally, 1/5 distally. RLE at least 2-3/5.   Sensory: Intact in all extremities to noxious stimulus.   Coordination: Normal on the left.     Results Review:     I reviewed the patient's new clinical results.    Current Medications:    amLODIPine 5 mg Oral Daily   atorvastatin 80 mg Oral Daily   clopidogrel 300 mg Oral Once   clopidogrel 75 mg Oral Daily   glipiZIDE 2.5 mg Oral BID AC   insulin aspart 0-9 Units Subcutaneous 4x Daily With Meals & Nightly   insulin detemir 15 Units Subcutaneous QAM   ipratropium-albuterol 3 mL Nebulization Q4H - RT   linagliptin 5 mg Oral Daily   lisinopril 10 mg Oral Q24H   metoprolol succinate XL 50 mg Oral Daily   piperacillin-tazobactam 3.375 g Intravenous Q8H   Vancomycin Pharmacy Intermittent Dosing  Does not apply Daily       Pharmacy to dose vancomycin     sodium chloride 75 mL/hr Last Rate: 75 mL/hr (18 0542)       Medications Reviewed    Laboratory results:  Lab Results "   Component Value Date    GLUCOSE 149 (H) 01/06/2018    CALCIUM 8.6 01/06/2018     (H) 01/06/2018    K 3.1 (L) 01/06/2018    CO2 22.0 01/06/2018     (H) 01/06/2018    BUN 29 (H) 01/06/2018    CREATININE 1.46 (H) 01/06/2018    EGFRIFNONA 35 (L) 01/06/2018    BCR 19.9 01/06/2018    ANIONGAP 13.0 01/06/2018     Lab Results   Component Value Date    WBC 15.31 (H) 01/06/2018    HGB 8.1 (L) 01/06/2018    HCT 25.3 (L) 01/06/2018    MCV 97.3 01/06/2018     01/06/2018        Results from last 7 days  Lab Units 01/06/18  0437   CHOLESTEROL mg/dL 106     Lab Results   Component Value Date    INR 1.20 (H) 01/02/2018    PROTIME 14.8 (H) 01/02/2018     Lab Results   Component Value Date    TSH 0.411 01/06/2018     Lab Results   Component Value Date    LDLCALC 68 01/06/2018     Lab Results   Component Value Date    HGBA1C 6.50 (H) 01/02/2018     No components found for: B12    Review and interpretation of imaging: Per Dr. Greer,  C1/4/18:T brain  No acute stroke mass or hemorrhage, there is extensive confluent white matter disease in the periventricular and subcortex most prominent in the bifrontal regions, there is evidence of old lacunar strokes in the thalami as well as right anterior limb internal capsule, this is unchanged compared with the scan from 1/1/18    MRI brain, MRA h/n 1/5/18: IMPRESSION:  1. Multiple lacunar infarcts are appreciated involving the left cerebral  hemisphere suggesting a watershed type distribution with infarcts  involving the frontal, parietal and occipital lobes. There are  questionable areas of restricted diffusion involving the right frontal  lobe. If true this would suggest a proximal cardiac source. However, the  MRA of the neck did demonstrate signal loss involving the proximal  aspect of the left internal carotid artery where there is likely  moderate-to-severe if not severe stenosis using NASCET criteria. There  is signal loss at the origin of the left common carotid  artery  suggesting a severe stenosis. There is severe stenosis involving the  right vertebral artery approximately 3 cm beyond the origin and likely  moderate if not moderate-to-severe stenosis involving the right internal  carotid artery. Further evaluation with a CT angiogram of the neck and  head is recommended. A cardiac echo could also be performed.  2. A right pleural effusion is present.    EKG 1/1/18: SR    Carotid US: Pending  TTE: Pending    Impression: Ms. Garcia is a 78 yo RHW female with HTN, colon CA, ?afib and stroke and who presented on 1/1/18 with frequent falls, confusion. A rapid response was called due to right sided weakness. I discussed her imaging with Dr. Greer which shows left hemispheric strokes with severe left ICA stenosis. Carotid US pending. TTE pending. For now, allow for permissive hypertension, avoiding hypotension. SBP goal 150-200. Keep well-hydrated. Continue ASA, Plavix and Lipitor. Gradual mobilization. Please call for any changes in neuro status. PT/OT/ST and CCP for rehab placement. Please corie with questions or concerns.     Plan:  Carotid US, TTE - Pending  Plavix 300 mg load - done  Aspirin 325mg, Plavix 75mg daily  Lipitor 80 mg  Keep well-hydrated  Avoid hypotension, SBP goal 150-200  Avoid sedation  Neurochecks  Non-pharmacological DVT prophylaxis  EKG Tele  PT/OT/ST  Stroke Education  Goal LDL <70-recommend high dose statins-   Serum glucose < 140    I have discussed the above with Dr. Greer, RN and  patient.  Tiffany Bran, JIMENA  01/06/18  9:44 AM      Principal Problem:    Acute cystitis without hematuria  Active Problems:    Traumatic rhabdomyolysis    Generalized weakness    Fall    CKD (chronic kidney disease)    DM (diabetes mellitus)    HTN (hypertension)    Altered mental status    Sepsis    Bacteremia    DNR (do not resuscitate)

## 2018-01-07 ENCOUNTER — APPOINTMENT (OUTPATIENT)
Dept: GENERAL RADIOLOGY | Facility: HOSPITAL | Age: 78
End: 2018-01-07

## 2018-01-07 LAB
ANION GAP SERPL CALCULATED.3IONS-SCNC: 14.2 MMOL/L
BACTERIA SPEC AEROBE CULT: ABNORMAL
BACTERIA SPEC AEROBE CULT: ABNORMAL
BACTERIA SPEC AEROBE CULT: NORMAL
BACTERIA SPEC AEROBE CULT: NORMAL
BASOPHILS # BLD AUTO: 0.02 10*3/MM3 (ref 0–0.2)
BASOPHILS NFR BLD AUTO: 0.1 % (ref 0–1.5)
BUN BLD-MCNC: 22 MG/DL (ref 8–23)
BUN/CREAT SERPL: 16.2 (ref 7–25)
CALCIUM SPEC-SCNC: 8.9 MG/DL (ref 8.6–10.5)
CHLORIDE SERPL-SCNC: 108 MMOL/L (ref 98–107)
CO2 SERPL-SCNC: 22.8 MMOL/L (ref 22–29)
CREAT BLD-MCNC: 1.36 MG/DL (ref 0.57–1)
DEPRECATED RDW RBC AUTO: 51.3 FL (ref 37–54)
EOSINOPHIL # BLD AUTO: 0.3 10*3/MM3 (ref 0–0.7)
EOSINOPHIL NFR BLD AUTO: 2 % (ref 0.3–6.2)
ERYTHROCYTE [DISTWIDTH] IN BLOOD BY AUTOMATED COUNT: 14.3 % (ref 11.7–13)
GFR SERPL CREATININE-BSD FRML MDRD: 38 ML/MIN/1.73
GLUCOSE BLD-MCNC: 157 MG/DL (ref 65–99)
GLUCOSE BLDC GLUCOMTR-MCNC: 159 MG/DL (ref 70–130)
GLUCOSE BLDC GLUCOMTR-MCNC: 194 MG/DL (ref 70–130)
GLUCOSE BLDC GLUCOMTR-MCNC: 206 MG/DL (ref 70–130)
GLUCOSE BLDC GLUCOMTR-MCNC: 219 MG/DL (ref 70–130)
GRAM STN SPEC: ABNORMAL
GRAM STN SPEC: ABNORMAL
HCT VFR BLD AUTO: 27.1 % (ref 35.6–45.5)
HGB BLD-MCNC: 8.5 G/DL (ref 11.9–15.5)
IMM GRANULOCYTES # BLD: 0.53 10*3/MM3 (ref 0–0.03)
IMM GRANULOCYTES NFR BLD: 3.6 % (ref 0–0.5)
ISOLATED FROM: ABNORMAL
LYMPHOCYTES # BLD AUTO: 1.54 10*3/MM3 (ref 0.9–4.8)
LYMPHOCYTES NFR BLD AUTO: 10.5 % (ref 19.6–45.3)
MCH RBC QN AUTO: 30.7 PG (ref 26.9–32)
MCHC RBC AUTO-ENTMCNC: 31.4 G/DL (ref 32.4–36.3)
MCV RBC AUTO: 97.8 FL (ref 80.5–98.2)
MONOCYTES # BLD AUTO: 1.76 10*3/MM3 (ref 0.2–1.2)
MONOCYTES NFR BLD AUTO: 12 % (ref 5–12)
NEUTROPHILS # BLD AUTO: 10.49 10*3/MM3 (ref 1.9–8.1)
NEUTROPHILS NFR BLD AUTO: 71.8 % (ref 42.7–76)
PLATELET # BLD AUTO: 237 10*3/MM3 (ref 140–500)
PMV BLD AUTO: 9.9 FL (ref 6–12)
POTASSIUM BLD-SCNC: 3.1 MMOL/L (ref 3.5–5.2)
PROCALCITONIN SERPL-MCNC: 0.33 NG/ML (ref 0.1–0.25)
RBC # BLD AUTO: 2.77 10*6/MM3 (ref 3.9–5.2)
SODIUM BLD-SCNC: 145 MMOL/L (ref 136–145)
VANCOMYCIN SERPL-MCNC: 16.2 MCG/ML (ref 5–40)
WBC NRBC COR # BLD: 14.64 10*3/MM3 (ref 4.5–10.7)

## 2018-01-07 PROCEDURE — 25010000003 POTASSIUM CHLORIDE 10 MEQ/100ML SOLUTION: Performed by: INTERNAL MEDICINE

## 2018-01-07 PROCEDURE — 84145 PROCALCITONIN (PCT): CPT | Performed by: INTERNAL MEDICINE

## 2018-01-07 PROCEDURE — 80048 BASIC METABOLIC PNL TOTAL CA: CPT | Performed by: INTERNAL MEDICINE

## 2018-01-07 PROCEDURE — 82962 GLUCOSE BLOOD TEST: CPT

## 2018-01-07 PROCEDURE — 85025 COMPLETE CBC W/AUTO DIFF WBC: CPT | Performed by: INTERNAL MEDICINE

## 2018-01-07 PROCEDURE — 25010000002 HYDRALAZINE PER 20 MG: Performed by: INTERNAL MEDICINE

## 2018-01-07 PROCEDURE — 94799 UNLISTED PULMONARY SVC/PX: CPT

## 2018-01-07 PROCEDURE — 63710000001 INSULIN ASPART PER 5 UNITS: Performed by: INTERNAL MEDICINE

## 2018-01-07 PROCEDURE — 80202 ASSAY OF VANCOMYCIN: CPT | Performed by: INTERNAL MEDICINE

## 2018-01-07 PROCEDURE — 25010000002 VANCOMYCIN PER 500 MG: Performed by: INTERNAL MEDICINE

## 2018-01-07 PROCEDURE — 25010000002 PIPERACILLIN SOD-TAZOBACTAM PER 1 G: Performed by: INTERNAL MEDICINE

## 2018-01-07 PROCEDURE — 71046 X-RAY EXAM CHEST 2 VIEWS: CPT

## 2018-01-07 PROCEDURE — 99233 SBSQ HOSP IP/OBS HIGH 50: CPT | Performed by: NURSE PRACTITIONER

## 2018-01-07 RX ORDER — VANCOMYCIN HYDROCHLORIDE 1 G/200ML
1000 INJECTION, SOLUTION INTRAVENOUS ONCE
Status: COMPLETED | OUTPATIENT
Start: 2018-01-07 | End: 2018-01-07

## 2018-01-07 RX ORDER — POTASSIUM CHLORIDE 7.45 MG/ML
10 INJECTION INTRAVENOUS
Status: COMPLETED | OUTPATIENT
Start: 2018-01-07 | End: 2018-01-07

## 2018-01-07 RX ADMIN — POTASSIUM CHLORIDE 20 MEQ: 750 CAPSULE, EXTENDED RELEASE ORAL at 16:45

## 2018-01-07 RX ADMIN — SODIUM CHLORIDE 100 ML/HR: 4.5 INJECTION, SOLUTION INTRAVENOUS at 20:39

## 2018-01-07 RX ADMIN — LINAGLIPTIN 5 MG: 5 TABLET, FILM COATED ORAL at 08:16

## 2018-01-07 RX ADMIN — POTASSIUM CHLORIDE 10 MEQ: 7.46 INJECTION, SOLUTION INTRAVENOUS at 13:31

## 2018-01-07 RX ADMIN — POTASSIUM CHLORIDE 10 MEQ: 7.46 INJECTION, SOLUTION INTRAVENOUS at 15:32

## 2018-01-07 RX ADMIN — NICARDIPINE HYDROCHLORIDE 2.5 MG/HR: 2.5 INJECTION INTRAVENOUS at 05:13

## 2018-01-07 RX ADMIN — TAZOBACTAM SODIUM AND PIPERACILLIN SODIUM 3.38 G: 375; 3 INJECTION, SOLUTION INTRAVENOUS at 16:45

## 2018-01-07 RX ADMIN — Medication 5 ML: at 06:39

## 2018-01-07 RX ADMIN — INSULIN ASPART 4 UNITS: 100 INJECTION, SOLUTION INTRAVENOUS; SUBCUTANEOUS at 18:28

## 2018-01-07 RX ADMIN — POTASSIUM CHLORIDE 20 MEQ: 750 CAPSULE, EXTENDED RELEASE ORAL at 08:15

## 2018-01-07 RX ADMIN — HYDRALAZINE HYDROCHLORIDE 10 MG: 20 INJECTION INTRAMUSCULAR; INTRAVENOUS at 20:36

## 2018-01-07 RX ADMIN — INSULIN ASPART 2 UNITS: 100 INJECTION, SOLUTION INTRAVENOUS; SUBCUTANEOUS at 08:16

## 2018-01-07 RX ADMIN — GLIPIZIDE 2.5 MG: 5 TABLET ORAL at 08:16

## 2018-01-07 RX ADMIN — TAZOBACTAM SODIUM AND PIPERACILLIN SODIUM 3.38 G: 375; 3 INJECTION, SOLUTION INTRAVENOUS at 02:14

## 2018-01-07 RX ADMIN — POTASSIUM CHLORIDE 10 MEQ: 7.46 INJECTION, SOLUTION INTRAVENOUS at 14:29

## 2018-01-07 RX ADMIN — ATORVASTATIN CALCIUM 80 MG: 80 TABLET, FILM COATED ORAL at 08:16

## 2018-01-07 RX ADMIN — IPRATROPIUM BROMIDE AND ALBUTEROL SULFATE 3 ML: .5; 3 SOLUTION RESPIRATORY (INHALATION) at 19:57

## 2018-01-07 RX ADMIN — POTASSIUM CHLORIDE 10 MEQ: 7.46 INJECTION, SOLUTION INTRAVENOUS at 12:00

## 2018-01-07 RX ADMIN — METOPROLOL SUCCINATE 25 MG: 25 TABLET, FILM COATED, EXTENDED RELEASE ORAL at 08:15

## 2018-01-07 RX ADMIN — GLIPIZIDE 2.5 MG: 5 TABLET ORAL at 16:45

## 2018-01-07 RX ADMIN — ACETAMINOPHEN 650 MG: 325 TABLET ORAL at 21:04

## 2018-01-07 RX ADMIN — POTASSIUM CHLORIDE 20 MEQ: 750 CAPSULE, EXTENDED RELEASE ORAL at 20:33

## 2018-01-07 RX ADMIN — IPRATROPIUM BROMIDE AND ALBUTEROL SULFATE 3 ML: .5; 3 SOLUTION RESPIRATORY (INHALATION) at 07:58

## 2018-01-07 RX ADMIN — INSULIN ASPART 2 UNITS: 100 INJECTION, SOLUTION INTRAVENOUS; SUBCUTANEOUS at 21:01

## 2018-01-07 RX ADMIN — Medication 10 ML: at 05:45

## 2018-01-07 RX ADMIN — CLOPIDOGREL 75 MG: 75 TABLET, FILM COATED ORAL at 08:16

## 2018-01-07 RX ADMIN — TAZOBACTAM SODIUM AND PIPERACILLIN SODIUM 3.38 G: 375; 3 INJECTION, SOLUTION INTRAVENOUS at 10:08

## 2018-01-07 RX ADMIN — SODIUM CHLORIDE 125 ML/HR: 4.5 INJECTION, SOLUTION INTRAVENOUS at 05:34

## 2018-01-07 RX ADMIN — NICARDIPINE HYDROCHLORIDE 7.5 MG/HR: 2.5 INJECTION INTRAVENOUS at 06:45

## 2018-01-07 RX ADMIN — IPRATROPIUM BROMIDE AND ALBUTEROL SULFATE 3 ML: .5; 3 SOLUTION RESPIRATORY (INHALATION) at 12:26

## 2018-01-07 RX ADMIN — VANCOMYCIN HYDROCHLORIDE 1000 MG: 1 INJECTION, SOLUTION INTRAVENOUS at 10:08

## 2018-01-07 RX ADMIN — IPRATROPIUM BROMIDE AND ALBUTEROL SULFATE 3 ML: .5; 3 SOLUTION RESPIRATORY (INHALATION) at 23:49

## 2018-01-07 RX ADMIN — NICARDIPINE HYDROCHLORIDE 10 MG/HR: 2.5 INJECTION INTRAVENOUS at 07:18

## 2018-01-07 RX ADMIN — ASPIRIN 325 MG: 325 TABLET ORAL at 08:16

## 2018-01-07 NOTE — PROGRESS NOTES
"Pharmacokinetic Consult - Vancomycin Dosing     Sakshi Garcia is a 77 y.o. female who is on day 3 pharmacy to dose vancomycin for PNA (ASP vs HCAP).  Pharmacy dosing vancomycin per Dr. Chester's request.   Other antimicrobials: Zosyn 3.375 gram IV q8h EI  Goal trough: 15-20 mg/L    Relevant clinical data and objective history reviewed:  157.5 cm (62.01\")  75.8 kg (167 lb)  Body mass index is 30.54 kg/(m^2).     She has a past medical history of Colon cancer and Hypertension.    Allergies as of 01/01/2018 - Hector as Reviewed 01/01/2018   Allergen Reaction Noted   • Contrast dye  02/18/2017   • Sulfa antibiotics Itching 02/18/2017     Vital Signs (last 24 hours)       01/06 0700  -  01/07 0659 01/07 0700  -  01/07 0810   Most Recent    Temp (°F) 98.6 -  98.7      98.4     98.4 (36.9)    Heart Rate 67 -  86    83 -  91     91    Resp   18 18     18    BP (!) 154/38 -  (!) 206/78    (!) 193/86 -  (!) 225/88     (!) 193/86    SpO2 (%) 91 -  99      97     97        Estimated Creatinine Clearance: 32.5 mL/min (by C-G formula based on Cr of 1.36).    Results from last 7 days  Lab Units 01/07/18  0635 01/06/18  0437 01/05/18  0858   CREATININE mg/dL 1.36* 1.46* 1.41*       Results from last 7 days  Lab Units 01/07/18  0635 01/06/18  0437 01/05/18  0858   WBC 10*3/mm3 14.64* 15.31* 14.72*     Baseline culture/source/susceptibility:   1/1 BCx: 2/2 E.coli (sens to Zosyn among others)  1/1 Ucx: E.coli  1/1 RVP negative  1/2 BCx: NG4d  1/4 MRSA nares negative (final)  1/4 RVP negative  1/5 BC NG24     Labs:  1/5 PCT 0.86           Lab Results   Component Value Date     VANCORANDOM 17.00 01/06/2018      Recent Vancomycin Dosing History:  1/5 1522 Vancomycin 1500mg IV (~20mg/kg)   1/6 0437 Vancomycin random level 17 mcg/mL (~12h level)  1/6 1304 Vancomycin 1000mg IV (~13mg/kg)   1/7 0635 Vancomycin random level 16.2 mcg/mL    Lab Results   Component Value Date    MUNANDOM 16.20 01/07/2018     Assessment/Plan  1) Plan today " for Vancomycin 1 gram IV (~14mg/kg) with random level in AM.  Depending on result in AM, may consider scheduling 1 gram IV q24h  2) Will monitor serum creatinine at least every 48 hours per dosing recommendations.    3) Encourage hydration as allowed by MD to help prevent toxic accumulation; monitor for s/sxn of toxicity including increase in SCr and decrease in UOP.    Pharmacy will continue to follow daily while on vancomycin and adjust as needed.     Thanks, Bandar Pineda, PharmD, BCPS

## 2018-01-07 NOTE — PLAN OF CARE
Problem: Patient Care Overview (Adult)  Goal: Plan of Care Review  Outcome: Ongoing (interventions implemented as appropriate)   01/07/18 1430   Coping/Psychosocial Response Interventions   Plan Of Care Reviewed With patient;daughter   Patient Care Overview   Progress improving   Outcome Evaluation   Outcome Summary/Follow up Plan Patient doing well this shift. has not been agitated or argumentative toward staff today. Continues with intermittent confusion. replacing potassium per md order. denies pain. able to feeds self. remains incontinent of bowel and bladder. at the beginning of the shift b/p was in the low 200's. after increasing lizzeth gtt, b/p 180-190's.      Goal: Discharge Needs Assessment  Outcome: Ongoing (interventions implemented as appropriate)   01/02/18 1544 01/06/18 1141   Discharge Needs Assessment   Concerns To Be Addressed discharge planning concerns --    Readmission Within The Last 30 Days no previous admission in last 30 days --    Equipment Needed After Discharge none --    Current Health   Outpatient/Agency/Support Group Needs homecare agency (specify level of care) --    Anticipated Changes Related to Illness none --    Living Environment   Transportation Available family or friend will provide --    Self-Care   Equipment Currently Used at Home --  cane, straight       Problem: Diabetes, Type 2 (Adult)  Goal: Signs and Symptoms of Listed Potential Problems Will be Absent or Manageable (Diabetes, Type 2)  Outcome: Ongoing (interventions implemented as appropriate)   01/07/18 0519 01/07/18 1430   Diabetes, Type 2   Problems Assessed (Type 2 Diabetes) --  all   Problems Present (Type 2 Diabetes) impaired glycemic control --        Problem: Pressure Ulcer Risk (Tank Scale) (Adult,Obstetrics,Pediatric)  Goal: Identify Related Risk Factors and Signs and Symptoms  Outcome: Ongoing (interventions implemented as appropriate)   01/07/18 0519   Pressure Ulcer Risk (Tank Scale)   Related Risk  Factors (Pressure Ulcer Risk (Tank Scale)) excretions/secretions;mental impairment     Goal: Skin Integrity  Outcome: Ongoing (interventions implemented as appropriate)   01/07/18 0519   Pressure Ulcer Risk (Tank Scale) (Adult,Obstetrics,Pediatric)   Skin Integrity making progress toward outcome       Problem: Fall Risk (Adult)  Goal: Identify Related Risk Factors and Signs and Symptoms  Outcome: Ongoing (interventions implemented as appropriate)   01/04/18 1800 01/07/18 0519   Fall Risk   Fall Risk: Related Risk Factors --  gait/mobility problems;environment unfamiliar   Fall Risk: Signs and Symptoms presence of risk factors --      Goal: Absence of Falls  Outcome: Ongoing (interventions implemented as appropriate)   01/07/18 1430   Fall Risk (Adult)   Absence of Falls making progress toward outcome       Problem: Skin Integrity Impairment, Risk/Actual (Adult)  Goal: Identify Related Risk Factors and Signs and Symptoms  Outcome: Ongoing (interventions implemented as appropriate)   01/06/18 0652   Skin Integrity Impairment, Risk/Actual   Skin Integrity Impairment, Risk/Actual: Related Risk Factors immobility     Goal: Skin Integrity/Wound Healing  Outcome: Ongoing (interventions implemented as appropriate)   01/07/18 1430   Skin Integrity Impairment, Risk/Actual (Adult)   Skin Integrity/Wound Healing making progress toward outcome       Problem: Infection, Risk/Actual (Adult)  Goal: Identify Related Risk Factors and Signs and Symptoms  Outcome: Ongoing (interventions implemented as appropriate)   01/07/18 0519   Infection, Risk/Actual   Infection, Risk/Actual: Related Risk Factors skin integrity impairment   Signs and Symptoms (Infection, Risk/Actual) blood glucose changes;mental/behavioral changes     Goal: Infection Prevention/Resolution  Outcome: Ongoing (interventions implemented as appropriate)   01/07/18 1430   Infection, Risk/Actual (Adult)   Infection Prevention/Resolution making progress toward outcome

## 2018-01-07 NOTE — PROGRESS NOTES
"DOS: 2018  NAME: Sakshi Garcia   : 1940  PCP: Provider Not In System  Chief Complaint   Patient presents with   • Fall     CC: Stroke    Subjective: No new events overnight per RN. Patient denies headache or any new stroke/TIA symptoms. I s/w daughter, Ernestina via phone who states that the has some \"memory problems\" but lives independently and is able to take care of herself. She also reports that the patient smokes ppd and has had \"leg pain\" in the past that interferes with her be able to walk long distances. She denies any previous heart problems or heart disease.     Interval History  History taken from: patient chart family RN    Objective:  Vital signs: BP (!) 193/86  Pulse 91  Temp 98.4 °F (36.9 °C) (Oral)   Resp 18  Ht 157.5 cm (62.01\")  Wt 75.8 kg (167 lb)  SpO2 97%  BMI 30.54 kg/m2      Physical Exam:  GENERAL: NAD  HEENT: Normocephalic, atraumatic   COR: RRR  Resp: Even and unlabored  Extremities: No signs of distal embolization.     Neurological:   MS: Alert. Oriented to \"[employer] house\", \"[place]\", president given multiple choice. Naming, repetition intact. Mild right side neglect.   CN: II-XII normal except for some mild dysarthria and decreased shoulder shrug on right.   Motor: Normal strength and tone on the left. RUE strength at least 2/5 proximally, 1/5 distally, RLE at least 2-3/5   Sensory: Intact to noxious stimulus in all extremities.   Coordination: Normal on the left    Results Review:     I reviewed the patient's new clinical results.    Current Medications:    aspirin 325 mg Oral Daily   atorvastatin 80 mg Oral Daily   clopidogrel 300 mg Oral Once   clopidogrel 75 mg Oral Daily   glipiZIDE 2.5 mg Oral BID AC   insulin aspart 0-9 Units Subcutaneous 4x Daily With Meals & Nightly   ipratropium-albuterol 3 mL Nebulization Q4H - RT   linagliptin 5 mg Oral Daily   metoprolol succinate XL 25 mg Oral Q24H   piperacillin-tazobactam 3.375 g Intravenous Q8H   potassium chloride 20 mEq " Oral TID   potassium chloride 10 mEq Intravenous Q1H   vancomycin 1,000 mg Intravenous Once   Vancomycin Pharmacy Intermittent Dosing  Does not apply Daily       niCARdipine 5-15 mg/hr Last Rate: 10 mg/hr (01/07/18 0718)   Pharmacy to dose vancomycin     sodium chloride 100 mL/hr Last Rate: 100 mL/hr (01/07/18 1032)       Medications Reviewed    Laboratory results:  Lab Results   Component Value Date    GLUCOSE 157 (H) 01/07/2018    CALCIUM 8.9 01/07/2018     01/07/2018    K 3.1 (L) 01/07/2018    CO2 22.8 01/07/2018     (H) 01/07/2018    BUN 22 01/07/2018    CREATININE 1.36 (H) 01/07/2018    EGFRIFNONA 38 (L) 01/07/2018    BCR 16.2 01/07/2018    ANIONGAP 14.2 01/07/2018     Lab Results   Component Value Date    WBC 14.64 (H) 01/07/2018    HGB 8.5 (L) 01/07/2018    HCT 27.1 (L) 01/07/2018    MCV 97.8 01/07/2018     01/07/2018        Results from last 7 days  Lab Units 01/06/18  0437   CHOLESTEROL mg/dL 106     Lab Results   Component Value Date    INR 1.20 (H) 01/02/2018    PROTIME 14.8 (H) 01/02/2018     Lab Results   Component Value Date    TSH 0.411 01/06/2018     Lab Results   Component Value Date    LDLCALC 68 01/06/2018     Lab Results   Component Value Date    HGBA1C 6.50 (H) 01/02/2018     No components found for: B12    Review and interpretation of imaging: Per Dr. Greer,  CT brain 1/4/18:  No acute stroke mass or hemorrhage, there is extensive confluent white matter disease in the periventricular and subcortex most prominent in the bifrontal regions, there is evidence of old lacunar strokes in the thalami as well as right anterior limb internal capsule, this is unchanged compared with the scan from 1/1/18     MRI brain, MRA h/n 1/5/18: IMPRESSION:  1. Multiple lacunar infarcts are appreciated involving the left cerebral  hemisphere suggesting a watershed type distribution with infarcts  involving the frontal, parietal and occipital lobes. There are  questionable areas of restricted  diffusion involving the right frontal  lobe. If true this would suggest a proximal cardiac source. However, the  MRA of the neck did demonstrate signal loss involving the proximal  aspect of the left internal carotid artery where there is likely  moderate-to-severe if not severe stenosis using NASCET criteria. There  is signal loss at the origin of the left common carotid artery  suggesting a severe stenosis. There is severe stenosis involving the  right vertebral artery approximately 3 cm beyond the origin and likely  moderate if not moderate-to-severe stenosis involving the right internal  carotid artery. Further evaluation with a CT angiogram of the neck and  head is recommended. A cardiac echo could also be performed.  2. A right pleural effusion is present.     EKG 1/1/18: SR     Carotid US 1/6/18: Per Dr. Greer, PSV on right 211/37, mild to mod stenosis; 557/216 on the left, severe stenosis    TTE 1/6/18: Interpretation Summary   · Left ventricular systolic function is hyperdynamic (EF > 70).  · calcification of the aortic valve  · Left ventricular wall thickness is consistent with mild concentric hypertrophy.  · Mild tricuspid valve regurgitation is present.  · Mild mitral valve regurgitation is present  · Estimated right ventricular systolic pressure from tricuspid regurgitation is moderately elevated (45-55 mmHg). Mild to moderate pulmonary hypertension is present.         Impression: Ms. Garcia is a 78 yo RHW female with HTN, colon CA, ?afib and stroke and who presented on 1/1/18 with frequent falls, confusion. A rapid response was called due to right sided weakness. Her imaging showed left hemispheric strokes with severe left ICA stenosis. I reviewed her carotid US with Dr. Greer which shows severe LICA stenosis that will need revascularization. Her TTE was essentially normal. I s/w patient's daughter (Ernestina) via phone and patient has difficulty walking long distances due to leg pain but there  is no history of cardiac disease. Will consult cardiology for surgical clearance. If she is a good surgical candidate, will then consult vascular for intervention; otherwise would be a candidate for endovascular therapy. I discussed this with patient and daughter. She is at risk for further ischemic event and therefore for now, allow for permissive hypertension, avoiding hypotension. SBP goal 150-200. Keep well-hydrated. Continue ASA, Plavix and Lipitor. Gradual mobilization. Please call for any changes in neuro status. PT/OT/ST and CCP for rehab placement.     Plan:  Consult cardiology, ?surgical clearance  Aspirin 325mg  Plavix 75mg  Lipitor 80 mg  Hydrate  Avoid hypotension, SBP goal 150-200  Neurochecks  Non-pharmacological DVT prophylaxis  EKG Tele  PT/OT/ST  Stroke Education  Goal LDL <70-recommend high dose statins-   Serum glucose < 140    I have discussed the above with Dr. Greer, RN, patient and family.  Tiffany Bran, APRN  01/07/18  10:42 AM      Principal Problem:    Acute cystitis without hematuria  Active Problems:    Traumatic rhabdomyolysis    Generalized weakness    Fall    CKD (chronic kidney disease)    DM (diabetes mellitus)    HTN (hypertension)    Altered mental status    Sepsis    Bacteremia    DNR (do not resuscitate)

## 2018-01-07 NOTE — CONSULTS
CONSULT NOTE    Patient Identification:  Sakshi Garcia  77 y.o.  female  1940  [id number]            Requesting physician: Dr Chester    Reason for Consultation:  plueral effusions, asp pna    CC: confused weak    History of Present Illness:  -year-old female who presents to the hospital with Escherichia coli bacteremia secondary to a urinary tract infection.  Essentially she also has some confusion found potentially have a CVA.  Is on aspirin and Plavix for this.  Also been treated for potential aspiration pneumonia.  Reconsult today to give an opinion regarding potential for pleural effusion.  Given that she is on antiplatelet therapy will obtain a CT scan the chest to better quantify this.  On review her history is very unreliable and scattered.  There is some confusion at baseline now.  From her family members she is a smoker.  Has been for a long time.  The daughter also relates there is a family history of bronchiectasis.  Unknown etiology.  Review of Systems:  CONSTITUTIONAL:  +fevers or chills  EYE:  No new vision changes  EAR:  No change in hearing  CARDIAC:  No chest pain  PULMONARY:  +non productive cough or shortness of breath  GI:  No diarrhea, hematemesis or hematochezia,  RENAL:  No dysuria or urinary frequency  MUSCULOSKELETAL:  Right shoulder pain  ENDOCRINE:  No heat or cold intolerance  INTEGUMENTARY: No skin rashes  NEUROLOGICAL:  No dizziness some confusion.  No seizure activity  PSYCHIATRIC:  No new anxiety or depression  12 system review of systems performed and all else negative    Past Medical History:   Diagnosis Date   • Colon cancer    • Hypertension        Past Surgical History:   Procedure Laterality Date   • CHOLECYSTECTOMY     • COLON SURGERY     • COLONOSCOPY     • HERNIA REPAIR          Prescriptions Prior to Admission   Medication Sig Dispense Refill Last Dose   • amLODIPine (NORVASC) 5 MG tablet Take 5 mg by mouth Daily.   Past Week at Unknown time   • glipiZIDE  "(GLUCOTROL) 5 MG ER tablet Take 5 mg by mouth Daily.   1/1/2018 at Unknown time   • lisinopril (PRINIVIL,ZESTRIL) 20 MG tablet Take 20 mg by mouth Daily.   Past Week at Unknown time   • metoprolol succinate XL (TOPROL-XL) 25 MG 24 hr tablet Take 25 mg by mouth Daily.   Past Week at Unknown time   • cephalexin (KEFLEX) 500 MG capsule Take 1 capsule by mouth 2 (Two) Times a Day. 20 capsule 0    • linagliptin (TRADJENTA) 5 MG tablet tablet Take 5 mg by mouth Daily.   Unknown at Unknown time   • triamcinolone (KENALOG) 0.1 % cream Apply  topically 3 (Three) Times a Day. Apply sparingly. 45 g 0        Allergies   Allergen Reactions   • Contrast Dye      \"Kidneys Shutdown\"   • Sulfa Antibiotics Itching       Social History     Social History   • Marital status:      Spouse name: N/A   • Number of children: N/A   • Years of education: N/A     Occupational History   • Not on file.     Social History Main Topics   • Smoking status: Heavy Tobacco Smoker   • Smokeless tobacco: Not on file   • Alcohol use No   • Drug use: No   • Sexual activity: Defer     Other Topics Concern   • Not on file     Social History Narrative       History reviewed. No pertinent family history.    Physical Exam:  /73 (BP Location: Left arm, Patient Position: Lying)  Pulse 81  Temp 98.8 °F (37.1 °C) (Oral)   Resp 18  Ht 157.5 cm (62.01\")  Wt 75.8 kg (167 lb)  SpO2 93%  BMI 30.54 kg/m2  Body mass index is 30.54 kg/(m^2).   GENERAL:  NAD,confused at times  HEENT:  EOMI, nonicteric sclera, no JVD  PULMONARY:    CTAB, no wheeze, no crackles, no rhonchi, symmetric air entry  CARDIAC:  RRR no MRG, S1 S2  ABD: SNTND BS+  EXT: no c/c/e, pulses symmetric 2+ bilaterally  NEURO:  CNs II-XII intact, no focal deficits  SKIN: no lesions, no rash  PSYCH: appropriate mood calm  LYMPH: no cervical LAD    LABS:    Results from last 7 days  Lab Units 01/07/18  0635 01/06/18  0437 01/05/18  0858 01/04/18  0354 01/03/18  0509 01/02/18  0417 " 01/01/18  1631   WBC 10*3/mm3 14.64* 15.31* 14.72* 17.01* 18.66* 20.00* 21.43*   HEMOGLOBIN g/dL 8.5* 8.1* 8.8* 8.5* 9.2* 9.9* 10.6*   PLATELETS 10*3/mm3 237 215 177 156 173 192 218     Results from last 7 days  Lab Units 01/07/18  0635 01/06/18  0437 01/05/18  0858 01/04/18  0354 01/03/18  0509 01/02/18  0417 01/01/18  1631   SODIUM mmol/L 145 146* 142 137 136 132* 132*   POTASSIUM mmol/L 3.1* 3.1* 2.9* 3.4* 3.5 3.8 4.4   CHLORIDE mmol/L 108* 111* 106 103 102 96* 93*   CO2 mmol/L 22.8 22.0 22.7 20.2* 18.6* 22.0 23.2   BUN mg/dL 22 29* 31* 39* 44* 43* 41*   CREATININE mg/dL 1.36* 1.46* 1.41* 1.39* 1.48* 1.48* 1.46*   GLUCOSE mg/dL 157* 149* 238* 189* 132* 107* 297*   CALCIUM mg/dL 8.9 8.6 9.0 8.9 8.8 8.9 9.5   MAGNESIUM mg/dL  --   --   --   --   --  1.6 1.6   PHOSPHORUS mg/dL  --   --   --   --   --  2.5  --    Estimated Creatinine Clearance: 32.5 mL/min (by C-G formula based on Cr of 1.36).    Imaging: I personally visualized the images of scans/x-rays performed within last 3 days.  Imaging Results (most recent)     Procedure Component Value Units Date/Time    XR Chest 2 View [[id number]] Collected:  01/01/18 1656     Updated:  01/01/18 1701    Narrative:       XR CHEST 2 VW-     HISTORY: Female who is 77 years-old,  weakness     TECHNIQUE: Frontal and lateral views of the chest     COMPARISON: None available     FINDINGS: Heart size is borderline. Pulmonary vasculature is  unremarkable. Aorta is tortuous. No focal pulmonary consolidation,  pleural effusion, or pneumothorax. Arterial calcifications are present.  Degenerative changes are seen at the shoulders. No acute osseous  process.       Impression:       No focal pulmonary consolidation. Borderline heart size.  Tortuous aorta. Follow-up as clinically indicated.     This report was finalized on 1/1/2018 4:58 PM by Dr. Carlos Manuel Mary MD.       CT Head Without Contrast [[id number]] Collected:  01/01/18 1810     Updated:  01/01/18 1944    Narrative:       CT OF  THE HEAD WITHOUT CONTRAST     HISTORY: 77-year-old female with a history of frequent falls and altered  mental status.     TECHNIQUE: Contiguous axial images were obtained through the head  without IV contrast.     COMPARISON: None.     FINDINGS: There is a severe hypodense appearance seen within the  bilateral paraventricular and subcortical white matter of the cerebral  hemispheres. No territorial edema is appreciated. There is moderate  diffuse atrophy and proportionate ventriculomegaly. The osseous  structures of the skull have a normal appearance.       Impression:       There is no evidence for an acute intracranial abnormality.  Findings consistent with severe chronic small vessel ischemic change of  the cerebral white matter are noted.     Radiation dose reduction techniques were utilized, including automated  exposure control and exposure modulation based on body size.     This report was finalized on 1/1/2018 7:41 PM by Dr. Philip Soares MD.       XR Chest 1 View [[id number]] Collected:  01/04/18 1211     Updated:  01/04/18 1219    Narrative:       XR CHEST 1 VW-     HISTORY: Female who is 77 years-old,  short of breath, cough     TECHNIQUE: Frontal view of the chest     COMPARISON: 01/01/2018     FINDINGS: Heart size is normal. Aorta is calcified. Old granulomatous  disease is seen.. Opacity has developed at the right base suggesting  atelectasis/infiltrate and pleural effusion, follow-up recommended No  pneumothorax. No acute osseous process.       Impression:       Opacity has developed at the right base suggesting  atelectasis/infiltrate and pleural effusion, follow-up recommended.       This report was finalized on 1/4/2018 12:15 PM by Dr. Carlos Manuel Mary MD.       CT Head Without Contrast [[id number]] Collected:  01/04/18 1816     Updated:  01/04/18 1822    Narrative:       CT HEAD WO CONTRAST-     CLINICAL HISTORY: Right-sided weakness. No onset confusion. Possible  acute CVA     TECHNIQUE:  Transverse 3 mm thick images were acquired from the base of  the skull to the vertex without IV contrast.     Radiation dose reduction techniques were utilized, including automated  exposure control and exposure modulation based on body size.     COMPARISON: CT head dated 1/1/2018.     FINDINGS: There is mild diffuse cortical atrophy. There is fairly  confluent abnormal diminished attenuation throughout the white matter of  both cerebral hemispheres that is compatible with sequela of extensive  small vessel chronic ischemic change. A tiny old lacunar infarct is  noted in the right thalamus. No acute infarct or hemorrhage is  identified. There is no mass effect. The paranasal sinuses appear well  aerated       Impression:       Evidence of extensive small vessel chronic ischemic change  as described. No acute intracranial abnormality is identified.     These findings were communicated to the stroke neurologist on 1/4/2018  at 6:15 PM.     This report was finalized on 1/4/2018 6:19 PM by Dr. Nico Tello MD.       MRI Angiogram Head Without Contrast [[id number]] Collected:  01/05/18 2132     Updated:  01/05/18 2132    Narrative:       MRI EXAMINATION OF THE BRAIN WITHOUT CONTRAST, MRA OF THE HEAD WITHOUT  CONTRAST AND MRA OF THE NECK WITHOUT CONTRAST     HISTORY: Stroke.     COMPARISON: CT head 01/04/2018. No prior MRI examination of the brain is  available for comparison.     MRI EXAMINATION OF THE BRAIN WITHOUT CONTRAST: The diffusion sequence  demonstrates an area of increased signal intensity involving the cortex  of the right middle frontal gyrus anteriorly and superiorly measuring 8  mm in size suspicious for small acute infarct. A 1 mm area of subtle  increased signal intensity is appreciated involving the corona radiata  on the right on the diffusion sequence as well. There are multiple  cortical and subcortical areas of restricted diffusion involving the  left frontal parietal and occipital lobes suggesting  a watershed  distribution infarct. The largest area of infarction involves the left  occipital lobe posteriorly measuring 18 mm in size.  The next largest area of infarction involves the subcortical white  matter of the left frontal lobe where an area of infarction measuring  approximately 13 mm in size is present.     There is moderate atrophy. There is extensive small vessel ischemic  disease. Small areas of signal loss are noted on the susceptibility  weighted imaging involving the posterior occipital infarct suggesting  small foci of blood products. There is no evidence of positive mass  effect, hydrocephalus or of midline shift. There is extensive small  vessel ischemic disease.     MRA OF THE HEAD WITHOUT CONTRAST: There is signal present within the  distal aspect of the vertebral arteries bilaterally. The vertebral  arteries are codominant. The basilar artery and the left posterior  cerebral artery appear unremarkable. There is signal present within the  distal aspects of the internal carotid arteries bilaterally. There is a  small focal protuberance involving the cavernous ICA laterally which may  be secondary to a small aneurysm or infundibulum. This does not project  into the subarachnoid space. The left A1 segment is mildly hypoplastic.  The anterior communicating artery is complex suggesting a fenestrated  right A1/A2 junction. No convincing aneurysm is identified. The study is  hampered by patient motion but the proximal aspects of the anterior and  middle cerebral arteries appeared unremarkable.     MRA OF THE NECK WITHOUT CONTRAST: There is a suggestion of a right  pleural effusion. There is signal loss appreciated involving the left  common carotid artery at its origin. The sensitivity of the study is  reduced as intravenous contrast was not utilized. I could not exclude a  severe stenosis at the origin of the left common carotid artery. There  is irregularity involving the carotid bifurcations  bilaterally with a  focal area of signal loss appreciated involving the left internal  carotid artery approximately 1.4 cm beyond its origin. I could not  exclude a severe stenosis at this location, greater than 80% using  NASCET criteria. There is a focal stenosis appreciated involving the  external carotid artery on the right and signal loss appreciated  involving the proximal aspect of the internal carotid artery on the  right. There is a focal eccentric stenosis involving the proximal aspect  of the right vertebral artery approximately 3 cm beyond its origin  resulting in a severe (greater than 90% stenosis). There is mild  irregularity but with no evidence of focal high-grade stenosis of the  left vertebral artery.       Impression:       1. Multiple lacunar infarcts are appreciated involving the left cerebral  hemisphere suggesting a watershed type distribution with infarcts  involving the frontal, parietal and occipital lobes. There are  questionable areas of restricted diffusion involving the right frontal  lobe. If true this would suggest a proximal cardiac source. However, the  MRA of the neck did demonstrate signal loss involving the proximal  aspect of the left internal carotid artery where there is likely  moderate-to-severe if not severe stenosis using NASCET criteria. There  is signal loss at the origin of the left common carotid artery  suggesting a severe stenosis. There is severe stenosis involving the  right vertebral artery approximately 3 cm beyond the origin and likely  moderate if not moderate-to-severe stenosis involving the right internal  carotid artery. Further evaluation with a CT angiogram of the neck and  head is recommended. A cardiac echo could also be performed.  2. A right pleural effusion is present.     The above information was called to and discussed with Dr. Greer on  01/05/2018 at 2108 hours.       MRI Brain Without Contrast [[id number]] Collected:  01/05/18 3945      Updated:  01/05/18 2132    Narrative:       MRI EXAMINATION OF THE BRAIN WITHOUT CONTRAST, MRA OF THE HEAD WITHOUT  CONTRAST AND MRA OF THE NECK WITHOUT CONTRAST     HISTORY: Stroke.     COMPARISON: CT head 01/04/2018. No prior MRI examination of the brain is  available for comparison.     MRI EXAMINATION OF THE BRAIN WITHOUT CONTRAST: The diffusion sequence  demonstrates an area of increased signal intensity involving the cortex  of the right middle frontal gyrus anteriorly and superiorly measuring 8  mm in size suspicious for small acute infarct. A 1 mm area of subtle  increased signal intensity is appreciated involving the corona radiata  on the right on the diffusion sequence as well. There are multiple  cortical and subcortical areas of restricted diffusion involving the  left frontal parietal and occipital lobes suggesting a watershed  distribution infarct. The largest area of infarction involves the left  occipital lobe posteriorly measuring 18 mm in size.  The next largest area of infarction involves the subcortical white  matter of the left frontal lobe where an area of infarction measuring  approximately 13 mm in size is present.     There is moderate atrophy. There is extensive small vessel ischemic  disease. Small areas of signal loss are noted on the susceptibility  weighted imaging involving the posterior occipital infarct suggesting  small foci of blood products. There is no evidence of positive mass  effect, hydrocephalus or of midline shift. There is extensive small  vessel ischemic disease.     MRA OF THE HEAD WITHOUT CONTRAST: There is signal present within the  distal aspect of the vertebral arteries bilaterally. The vertebral  arteries are codominant. The basilar artery and the left posterior  cerebral artery appear unremarkable. There is signal present within the  distal aspects of the internal carotid arteries bilaterally. There is a  small focal protuberance involving the cavernous ICA  laterally which may  be secondary to a small aneurysm or infundibulum. This does not project  into the subarachnoid space. The left A1 segment is mildly hypoplastic.  The anterior communicating artery is complex suggesting a fenestrated  right A1/A2 junction. No convincing aneurysm is identified. The study is  hampered by patient motion but the proximal aspects of the anterior and  middle cerebral arteries appeared unremarkable.     MRA OF THE NECK WITHOUT CONTRAST: There is a suggestion of a right  pleural effusion. There is signal loss appreciated involving the left  common carotid artery at its origin. The sensitivity of the study is  reduced as intravenous contrast was not utilized. I could not exclude a  severe stenosis at the origin of the left common carotid artery. There  is irregularity involving the carotid bifurcations bilaterally with a  focal area of signal loss appreciated involving the left internal  carotid artery approximately 1.4 cm beyond its origin. I could not  exclude a severe stenosis at this location, greater than 80% using  NASCET criteria. There is a focal stenosis appreciated involving the  external carotid artery on the right and signal loss appreciated  involving the proximal aspect of the internal carotid artery on the  right. There is a focal eccentric stenosis involving the proximal aspect  of the right vertebral artery approximately 3 cm beyond its origin  resulting in a severe (greater than 90% stenosis). There is mild  irregularity but with no evidence of focal high-grade stenosis of the  left vertebral artery.       Impression:       1. Multiple lacunar infarcts are appreciated involving the left cerebral  hemisphere suggesting a watershed type distribution with infarcts  involving the frontal, parietal and occipital lobes. There are  questionable areas of restricted diffusion involving the right frontal  lobe. If true this would suggest a proximal cardiac source. However,  the  MRA of the neck did demonstrate signal loss involving the proximal  aspect of the left internal carotid artery where there is likely  moderate-to-severe if not severe stenosis using NASCET criteria. There  is signal loss at the origin of the left common carotid artery  suggesting a severe stenosis. There is severe stenosis involving the  right vertebral artery approximately 3 cm beyond the origin and likely  moderate if not moderate-to-severe stenosis involving the right internal  carotid artery. Further evaluation with a CT angiogram of the neck and  head is recommended. A cardiac echo could also be performed.  2. A right pleural effusion is present.     The above information was called to and discussed with Dr. Greer on  01/05/2018 at 2108 hours.       MRI Angiogram Neck Without Contrast [[id number]] Collected:  01/05/18 2132     Updated:  01/05/18 2132    Narrative:       MRI EXAMINATION OF THE BRAIN WITHOUT CONTRAST, MRA OF THE HEAD WITHOUT  CONTRAST AND MRA OF THE NECK WITHOUT CONTRAST     HISTORY: Stroke.     COMPARISON: CT head 01/04/2018. No prior MRI examination of the brain is  available for comparison.     MRI EXAMINATION OF THE BRAIN WITHOUT CONTRAST: The diffusion sequence  demonstrates an area of increased signal intensity involving the cortex  of the right middle frontal gyrus anteriorly and superiorly measuring 8  mm in size suspicious for small acute infarct. A 1 mm area of subtle  increased signal intensity is appreciated involving the corona radiata  on the right on the diffusion sequence as well. There are multiple  cortical and subcortical areas of restricted diffusion involving the  left frontal parietal and occipital lobes suggesting a watershed  distribution infarct. The largest area of infarction involves the left  occipital lobe posteriorly measuring 18 mm in size.  The next largest area of infarction involves the subcortical white  matter of the left frontal lobe where an area of  infarction measuring  approximately 13 mm in size is present.     There is moderate atrophy. There is extensive small vessel ischemic  disease. Small areas of signal loss are noted on the susceptibility  weighted imaging involving the posterior occipital infarct suggesting  small foci of blood products. There is no evidence of positive mass  effect, hydrocephalus or of midline shift. There is extensive small  vessel ischemic disease.     MRA OF THE HEAD WITHOUT CONTRAST: There is signal present within the  distal aspect of the vertebral arteries bilaterally. The vertebral  arteries are codominant. The basilar artery and the left posterior  cerebral artery appear unremarkable. There is signal present within the  distal aspects of the internal carotid arteries bilaterally. There is a  small focal protuberance involving the cavernous ICA laterally which may  be secondary to a small aneurysm or infundibulum. This does not project  into the subarachnoid space. The left A1 segment is mildly hypoplastic.  The anterior communicating artery is complex suggesting a fenestrated  right A1/A2 junction. No convincing aneurysm is identified. The study is  hampered by patient motion but the proximal aspects of the anterior and  middle cerebral arteries appeared unremarkable.     MRA OF THE NECK WITHOUT CONTRAST: There is a suggestion of a right  pleural effusion. There is signal loss appreciated involving the left  common carotid artery at its origin. The sensitivity of the study is  reduced as intravenous contrast was not utilized. I could not exclude a  severe stenosis at the origin of the left common carotid artery. There  is irregularity involving the carotid bifurcations bilaterally with a  focal area of signal loss appreciated involving the left internal  carotid artery approximately 1.4 cm beyond its origin. I could not  exclude a severe stenosis at this location, greater than 80% using  NASCET criteria. There is a focal  stenosis appreciated involving the  external carotid artery on the right and signal loss appreciated  involving the proximal aspect of the internal carotid artery on the  right. There is a focal eccentric stenosis involving the proximal aspect  of the right vertebral artery approximately 3 cm beyond its origin  resulting in a severe (greater than 90% stenosis). There is mild  irregularity but with no evidence of focal high-grade stenosis of the  left vertebral artery.       Impression:       1. Multiple lacunar infarcts are appreciated involving the left cerebral  hemisphere suggesting a watershed type distribution with infarcts  involving the frontal, parietal and occipital lobes. There are  questionable areas of restricted diffusion involving the right frontal  lobe. If true this would suggest a proximal cardiac source. However, the  MRA of the neck did demonstrate signal loss involving the proximal  aspect of the left internal carotid artery where there is likely  moderate-to-severe if not severe stenosis using NASCET criteria. There  is signal loss at the origin of the left common carotid artery  suggesting a severe stenosis. There is severe stenosis involving the  right vertebral artery approximately 3 cm beyond the origin and likely  moderate if not moderate-to-severe stenosis involving the right internal  carotid artery. Further evaluation with a CT angiogram of the neck and  head is recommended. A cardiac echo could also be performed.  2. A right pleural effusion is present.     The above information was called to and discussed with Dr. Greer on  01/05/2018 at 2108 hours.       XR Chest PA & Lateral [[id number]] Collected:  01/07/18 1316     Updated:  01/07/18 1316    Narrative:       2 VIEWS CHEST     HISTORY:  Pneumonia, follow-up.     COMPARISON: Chest x-ray 01/04/2018.     FINDINGS: The heart is within normal limits in size. There is bibasilar  atelectasis/infiltrate more prominent on the right. The  lateral  projection demonstrates a moderate pleural effusion on the right. Given  differences in technique, this appears similar to minimally more  prominent as compared to the examination of 01/04/2018. The diaphragm is  flattened and AP diameter increased consistent with COPD.                   Assessment / Recommendations:  Ecoli bactermia 2/2UTI  PNA HAP  TME  CVA  Rhabdo resolved  DMII  Pleural effusion    -obtain ct chest without contrast  -if needed plavix may increase risk of thoracentesis.  -will see if any loculations on ct chest  -cont abx for now  Further recs pending above imaging        Freddie Ko MD  [facility]  01/07/18  5:05 PM

## 2018-01-07 NOTE — PROGRESS NOTES
"  Name: Sakshi Garcia  ADMIT: 2018   Age/Sex: 77 y.o.female LOS:  LOS: 6 days    :    1940     ROOM: [facility]   MRN:    [id number]    PCP:    Provider Not In System     Subjective   Doing better. Still confused. Placed on Cardene drip for elevated BP.     Objective   Vital Signs  Temp:  [98.4 °F (36.9 °C)-98.7 °F (37.1 °C)] 98.4 °F (36.9 °C)  Heart Rate:  [67-91] 91  Resp:  [18] 18  BP: (154-225)/(38-88) 193/86  Body mass index is 30.54 kg/(m^2).    Objective:  General Appearance:  Comfortable and well-appearing.    Vital signs: (most recent): Blood pressure (!) 193/86, pulse 91, temperature 98.4 °F (36.9 °C), temperature source Oral, resp. rate 18, height 157.5 cm (62.01\"), weight 75.8 kg (167 lb), SpO2 97 %.  Vital signs are normal.    HEENT: Normal HEENT exam.    Lungs:  Normal effort.  There are decreased breath sounds.    Heart: Normal rate.  Regular rhythm.    Abdomen: Abdomen is soft and non-distended.  Bowel sounds are normal.   There is no abdominal tenderness.     Extremities: There is no deformity or dependent edema.    Neurological: Patient is alert.  (Oriented x1).    Skin:  Warm and dry.  No rash.             Results Review:       I reviewed the patient's new clinical results.    Results from last 7 days  Lab Units 18  0635 18  0437 18  0858 18  0354 18  0509 18  0417 18  1631   WBC 10*3/mm3 14.64* 15.31* 14.72* 17.01* 18.66* 20.00* 21.43*   HEMOGLOBIN g/dL 8.5* 8.1* 8.8* 8.5* 9.2* 9.9* 10.6*   PLATELETS 10*3/mm3 237 215 177 156 173 192 218     Results from last 7 days  Lab Units 18  0635 18  0437 18  0858 18  0354 18  0509 18  0417 18  1631   SODIUM mmol/L 145 146* 142 137 136 132* 132*   POTASSIUM mmol/L 3.1* 3.1* 2.9* 3.4* 3.5 3.8 4.4   CHLORIDE mmol/L 108* 111* 106 103 102 96* 93*   CO2 mmol/L 22.8 22.0 22.7 20.2* 18.6* 22.0 23.2   BUN mg/dL 22 29* 31* 39* 44* 43* 41*   CREATININE mg/dL 1.36* 1.46* 1.41* " 1.39* 1.48* 1.48* 1.46*   GLUCOSE mg/dL 157* 149* 238* 189* 132* 107* 297*   Estimated Creatinine Clearance: 32.5 mL/min (by C-G formula based on Cr of 1.36).  Results from last 7 days  Lab Units 01/07/18  0635 01/06/18  0437 01/05/18  0858 01/04/18  0354 01/03/18  0509 01/02/18  0417 01/01/18  1631   CALCIUM mg/dL 8.9 8.6 9.0 8.9 8.8 8.9 9.5   ALBUMIN g/dL  --   --   --   --   --  3.00* 3.20*   MAGNESIUM mg/dL  --   --   --   --   --  1.6 1.6   PHOSPHORUS mg/dL  --   --   --   --   --  2.5  --        RADIOLOGY  1/7/2018  Pending      aspirin 325 mg Oral Daily   atorvastatin 80 mg Oral Daily   clopidogrel 300 mg Oral Once   clopidogrel 75 mg Oral Daily   glipiZIDE 2.5 mg Oral BID AC   insulin aspart 0-9 Units Subcutaneous 4x Daily With Meals & Nightly   ipratropium-albuterol 3 mL Nebulization Q4H - RT   linagliptin 5 mg Oral Daily   metoprolol succinate XL 25 mg Oral Q24H   piperacillin-tazobactam 3.375 g Intravenous Q8H   potassium chloride 20 mEq Oral TID   potassium chloride 10 mEq Intravenous Q1H   vancomycin 1,000 mg Intravenous Once   Vancomycin Pharmacy Intermittent Dosing  Does not apply Daily       niCARdipine 5-15 mg/hr Last Rate: 10 mg/hr (01/07/18 0718)   Pharmacy to dose vancomycin     sodium chloride 125 mL/hr Last Rate: 125 mL/hr (01/07/18 0755)   Diet Dysphagia; IV - Mechanical Soft No Mixed Consistencies; Nectar / Syrup Thick      Assessment/Plan   Assessment:   Principal Problem:    Acute cystitis without hematuria  Active Problems:    Traumatic rhabdomyolysis    Generalized weakness    Fall    CKD (chronic kidney disease)    DM (diabetes mellitus)    HTN (hypertension)    Altered mental status    Sepsis    Bacteremia    DNR (do not resuscitate)        Plan:   1. Ecoli UTI with bacteremia  - switched to levaquin to complete a 14 day course but now on Zosyn for likely aspiration pna. Day 6 overall of abx therapy  - repeat BCx NGTD  - WBC stable  - cont to monitor      2. Aspiration vs Hospital  acquired PNA  - Cont zosyn and Vanc, day 3  - BCx NGTD. MRSA screen negative, consider d/c'ing vanc in next day or so  - procal mildly elevated. Coming down this am  - SLP eval  - repeat Xray pending this am      3. Encephalopathy 2/2 Acute CVA and above infections  - CT head negative  - on ASA and plavix. MRI with multiple CVA c/w cardio-embolic source or atherosclerotic plaque. 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 Defer to neuro  - TTE results noted  - cont as needed anti-psychotics  - permissive HTN for now. Cardene drip as needed to keep -200      4. Rhabdo  -resolved      5. DM2  - restarted home oral meds  - better now that taking oral meds      6. CKD3  - Cr stable  - cont to moniotr     7. Anemia  - unclear source of drop  - stable     8 Hypernatremia and hypokalemia  - cont 1/2NS  - replace K IV today       Disposition  TBD.      Chace Chester MD  [facility]  01/07/18  9:56 AM

## 2018-01-07 NOTE — PLAN OF CARE
Problem: Patient Care Overview (Adult)  Goal: Plan of Care Review  Outcome: Ongoing (interventions implemented as appropriate)   01/07/18 0519   Coping/Psychosocial Response Interventions   Plan Of Care Reviewed With patient   Patient Care Overview   Progress improving   Outcome Evaluation   Outcome Summary/Follow up Plan Alert to self w/some confusion, but very pleasant + following simple commands w/o resistance. Denied pain. SB/P still not > 200 as per Dr. Oswald Carrera to start Cardene drip. DBP + HR very low at times tonight. Now B/P 208/88. HR 90, Started Cardene drip at 0543. Turned + repositioned on every 2 hrs + PRN basis. Sound very congested, able to cough, but unable to cough up secretions. Sleeping w/o SOA at rest.       Problem: Diabetes, Type 2 (Adult)  Goal: Signs and Symptoms of Listed Potential Problems Will be Absent or Manageable (Diabetes, Type 2)  Outcome: Ongoing (interventions implemented as appropriate)      Problem: Pressure Ulcer Risk (Tank Scale) (Adult,Obstetrics,Pediatric)  Goal: Identify Related Risk Factors and Signs and Symptoms  Outcome: Ongoing (interventions implemented as appropriate)    Goal: Skin Integrity  Outcome: Ongoing (interventions implemented as appropriate)      Problem: Fall Risk (Adult)  Goal: Identify Related Risk Factors and Signs and Symptoms  Outcome: Ongoing (interventions implemented as appropriate)    Goal: Absence of Falls  Outcome: Ongoing (interventions implemented as appropriate)      Problem: Infection, Risk/Actual (Adult)  Goal: Identify Related Risk Factors and Signs and Symptoms  Outcome: Ongoing (interventions implemented as appropriate)    Goal: Infection Prevention/Resolution  Outcome: Ongoing (interventions implemented as appropriate)

## 2018-01-08 ENCOUNTER — APPOINTMENT (OUTPATIENT)
Dept: NUCLEAR MEDICINE | Facility: HOSPITAL | Age: 78
End: 2018-01-08

## 2018-01-08 ENCOUNTER — APPOINTMENT (OUTPATIENT)
Dept: CT IMAGING | Facility: HOSPITAL | Age: 78
End: 2018-01-08
Attending: INTERNAL MEDICINE

## 2018-01-08 ENCOUNTER — APPOINTMENT (OUTPATIENT)
Dept: GENERAL RADIOLOGY | Facility: HOSPITAL | Age: 78
End: 2018-01-08

## 2018-01-08 LAB
ANION GAP SERPL CALCULATED.3IONS-SCNC: 16.9 MMOL/L
BASOPHILS # BLD AUTO: 0.04 10*3/MM3 (ref 0–0.2)
BASOPHILS NFR BLD AUTO: 0.3 % (ref 0–1.5)
BUN BLD-MCNC: 20 MG/DL (ref 8–23)
BUN/CREAT SERPL: 15.5 (ref 7–25)
CALCIUM SPEC-SCNC: 8.7 MG/DL (ref 8.6–10.5)
CHLORIDE SERPL-SCNC: 108 MMOL/L (ref 98–107)
CO2 SERPL-SCNC: 19.1 MMOL/L (ref 22–29)
CREAT BLD-MCNC: 1.29 MG/DL (ref 0.57–1)
DEPRECATED RDW RBC AUTO: 51.6 FL (ref 37–54)
EOSINOPHIL # BLD AUTO: 0.21 10*3/MM3 (ref 0–0.7)
EOSINOPHIL NFR BLD AUTO: 1.5 % (ref 0.3–6.2)
ERYTHROCYTE [DISTWIDTH] IN BLOOD BY AUTOMATED COUNT: 14.5 % (ref 11.7–13)
GFR SERPL CREATININE-BSD FRML MDRD: 40 ML/MIN/1.73
GLUCOSE BLD-MCNC: 176 MG/DL (ref 65–99)
GLUCOSE BLDC GLUCOMTR-MCNC: 164 MG/DL (ref 70–130)
GLUCOSE BLDC GLUCOMTR-MCNC: 219 MG/DL (ref 70–130)
GLUCOSE BLDC GLUCOMTR-MCNC: 228 MG/DL (ref 70–130)
GLUCOSE BLDC GLUCOMTR-MCNC: 329 MG/DL (ref 70–130)
HCT VFR BLD AUTO: 27.3 % (ref 35.6–45.5)
HGB BLD-MCNC: 8.6 G/DL (ref 11.9–15.5)
IMM GRANULOCYTES # BLD: 0.51 10*3/MM3 (ref 0–0.03)
IMM GRANULOCYTES NFR BLD: 3.6 % (ref 0–0.5)
LYMPHOCYTES # BLD AUTO: 1.3 10*3/MM3 (ref 0.9–4.8)
LYMPHOCYTES NFR BLD AUTO: 9.2 % (ref 19.6–45.3)
MCH RBC QN AUTO: 30.7 PG (ref 26.9–32)
MCHC RBC AUTO-ENTMCNC: 31.5 G/DL (ref 32.4–36.3)
MCV RBC AUTO: 97.5 FL (ref 80.5–98.2)
MONOCYTES # BLD AUTO: 1.4 10*3/MM3 (ref 0.2–1.2)
MONOCYTES NFR BLD AUTO: 10 % (ref 5–12)
NEUTROPHILS # BLD AUTO: 10.61 10*3/MM3 (ref 1.9–8.1)
NEUTROPHILS NFR BLD AUTO: 75.4 % (ref 42.7–76)
PLATELET # BLD AUTO: 254 10*3/MM3 (ref 140–500)
PMV BLD AUTO: 9.7 FL (ref 6–12)
POTASSIUM BLD-SCNC: 4 MMOL/L (ref 3.5–5.2)
RBC # BLD AUTO: 2.8 10*6/MM3 (ref 3.9–5.2)
SODIUM BLD-SCNC: 144 MMOL/L (ref 136–145)
VANCOMYCIN SERPL-MCNC: 20.3 MCG/ML (ref 5–40)
WBC NRBC COR # BLD: 14.07 10*3/MM3 (ref 4.5–10.7)

## 2018-01-08 PROCEDURE — 80048 BASIC METABOLIC PNL TOTAL CA: CPT | Performed by: INTERNAL MEDICINE

## 2018-01-08 PROCEDURE — 99222 1ST HOSP IP/OBS MODERATE 55: CPT | Performed by: INTERNAL MEDICINE

## 2018-01-08 PROCEDURE — 25010000002 PIPERACILLIN SOD-TAZOBACTAM PER 1 G: Performed by: INTERNAL MEDICINE

## 2018-01-08 PROCEDURE — 71250 CT THORAX DX C-: CPT

## 2018-01-08 PROCEDURE — 63710000001 INSULIN ASPART PER 5 UNITS: Performed by: INTERNAL MEDICINE

## 2018-01-08 PROCEDURE — 85025 COMPLETE CBC W/AUTO DIFF WBC: CPT | Performed by: INTERNAL MEDICINE

## 2018-01-08 PROCEDURE — 92611 MOTION FLUOROSCOPY/SWALLOW: CPT

## 2018-01-08 PROCEDURE — 74230 X-RAY XM SWLNG FUNCJ C+: CPT

## 2018-01-08 PROCEDURE — 99232 SBSQ HOSP IP/OBS MODERATE 35: CPT | Performed by: NURSE PRACTITIONER

## 2018-01-08 PROCEDURE — 82962 GLUCOSE BLOOD TEST: CPT

## 2018-01-08 PROCEDURE — 97110 THERAPEUTIC EXERCISES: CPT

## 2018-01-08 PROCEDURE — 94799 UNLISTED PULMONARY SVC/PX: CPT

## 2018-01-08 PROCEDURE — 80202 ASSAY OF VANCOMYCIN: CPT | Performed by: INTERNAL MEDICINE

## 2018-01-08 RX ADMIN — GLIPIZIDE 2.5 MG: 5 TABLET ORAL at 17:27

## 2018-01-08 RX ADMIN — SODIUM CHLORIDE 50 ML/HR: 4.5 INJECTION, SOLUTION INTRAVENOUS at 14:55

## 2018-01-08 RX ADMIN — IPRATROPIUM BROMIDE AND ALBUTEROL SULFATE 3 ML: .5; 3 SOLUTION RESPIRATORY (INHALATION) at 18:58

## 2018-01-08 RX ADMIN — IPRATROPIUM BROMIDE AND ALBUTEROL SULFATE 3 ML: .5; 3 SOLUTION RESPIRATORY (INHALATION) at 06:53

## 2018-01-08 RX ADMIN — POTASSIUM CHLORIDE 20 MEQ: 750 CAPSULE, EXTENDED RELEASE ORAL at 17:27

## 2018-01-08 RX ADMIN — POTASSIUM CHLORIDE 20 MEQ: 750 CAPSULE, EXTENDED RELEASE ORAL at 22:49

## 2018-01-08 RX ADMIN — TAZOBACTAM SODIUM AND PIPERACILLIN SODIUM 3.38 G: 375; 3 INJECTION, SOLUTION INTRAVENOUS at 01:47

## 2018-01-08 RX ADMIN — ATORVASTATIN CALCIUM 80 MG: 80 TABLET, FILM COATED ORAL at 10:49

## 2018-01-08 RX ADMIN — INSULIN ASPART 4 UNITS: 100 INJECTION, SOLUTION INTRAVENOUS; SUBCUTANEOUS at 22:51

## 2018-01-08 RX ADMIN — INSULIN ASPART 7 UNITS: 100 INJECTION, SOLUTION INTRAVENOUS; SUBCUTANEOUS at 17:27

## 2018-01-08 RX ADMIN — IPRATROPIUM BROMIDE AND ALBUTEROL SULFATE 3 ML: .5; 3 SOLUTION RESPIRATORY (INHALATION) at 11:55

## 2018-01-08 RX ADMIN — LINAGLIPTIN 5 MG: 5 TABLET, FILM COATED ORAL at 10:50

## 2018-01-08 RX ADMIN — IPRATROPIUM BROMIDE AND ALBUTEROL SULFATE 3 ML: .5; 3 SOLUTION RESPIRATORY (INHALATION) at 14:17

## 2018-01-08 RX ADMIN — GLIPIZIDE 2.5 MG: 5 TABLET ORAL at 10:49

## 2018-01-08 RX ADMIN — INSULIN ASPART 4 UNITS: 100 INJECTION, SOLUTION INTRAVENOUS; SUBCUTANEOUS at 12:56

## 2018-01-08 RX ADMIN — POTASSIUM CHLORIDE 20 MEQ: 750 CAPSULE, EXTENDED RELEASE ORAL at 10:50

## 2018-01-08 RX ADMIN — ASPIRIN 325 MG: 325 TABLET ORAL at 10:49

## 2018-01-08 RX ADMIN — ACETAMINOPHEN 650 MG: 325 TABLET ORAL at 22:49

## 2018-01-08 RX ADMIN — METOPROLOL SUCCINATE 25 MG: 25 TABLET, FILM COATED, EXTENDED RELEASE ORAL at 10:50

## 2018-01-08 RX ADMIN — TAZOBACTAM SODIUM AND PIPERACILLIN SODIUM 3.38 G: 375; 3 INJECTION, SOLUTION INTRAVENOUS at 22:50

## 2018-01-08 RX ADMIN — TAZOBACTAM SODIUM AND PIPERACILLIN SODIUM 3.38 G: 375; 3 INJECTION, SOLUTION INTRAVENOUS at 14:55

## 2018-01-08 RX ADMIN — NICARDIPINE HYDROCHLORIDE 2.5 MG/HR: 2.5 INJECTION INTRAVENOUS at 22:49

## 2018-01-08 RX ADMIN — NICARDIPINE HYDROCHLORIDE 5 MG/HR: 2.5 INJECTION INTRAVENOUS at 01:47

## 2018-01-08 RX ADMIN — CLOPIDOGREL 75 MG: 75 TABLET, FILM COATED ORAL at 10:49
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
## 2018-01-08 NOTE — CONSULTS
"Date of Consultation: 18    Referral Provider: To Hendrickson MD     Reason for Consultation: Pre-op clearance left carotid surgery    Encounter Provider: Bret Kaye MD    Group of Service: [facility]     Patient Name: Sakshi Garcia    :1940    Chief complaint: Light-headedness      History of Present Illness: Ms. Sakshi Garcia is a 77 year old female patient with a history of severe carotid stenosis s/p left stent, HTN, stroke, colon cancer and CKD. She is a current smoker.     Patient presented to the ED with the c/o intermittent light-headedness s/p fall. Labs included troponin 0.044, CK 1161, Cr 1.46, BUN 41, and glucose 297. She was given rocephin for UTI - recently switched to levaquin. Throughout patient has experienced weakness in right arm - neurology was consulted. CT head negative.  Her MRA and MRI of the brain showed multiple lacunar infarcts in the left cerebral hemisphere involving the frontal, parietal, and occipital lobes.  Most recent CXR showed pneumonia - pulmonology consulted. She was started on cardene gtt for elevated BP. Carotid duplex showed proximal right internal carotid artery moderate stenosis and severe in stent thrombosis on left. She had an ECHO which showed calcification of the aortic valve, mild tricuspid valve regurgitation, and mild mitral valve regurgitation with an EF >70%.      We were consulted surgical clearance. Most recent vitals include HR 90 and /68. Patient on cardene gtt and is receiving metoprolol.  She has not had any chest pain.  She denied any prior CAD.      Previous Testing:    ECHO 2018    Carotid Duplex 2018      Past Medical History:   Diagnosis Date   • Colon cancer    • Hypertension          Past Surgical History:   Procedure Laterality Date   • CHOLECYSTECTOMY     • COLON SURGERY     • COLONOSCOPY     • HERNIA REPAIR           Allergies   Allergen Reactions   • Contrast Dye      \"Kidneys Shutdown\"   • Sulfa " "Antibiotics Itching         No current facility-administered medications on file prior to encounter.      Current Outpatient Prescriptions on File Prior to Encounter   Medication Sig Dispense Refill   • amLODIPine (NORVASC) 5 MG tablet Take 5 mg by mouth Daily.     • glipiZIDE (GLUCOTROL) 5 MG ER tablet Take 5 mg by mouth Daily.     • lisinopril (PRINIVIL,ZESTRIL) 20 MG tablet Take 20 mg by mouth Daily.     • metoprolol succinate XL (TOPROL-XL) 25 MG 24 hr tablet Take 25 mg by mouth Daily.     • cephalexin (KEFLEX) 500 MG capsule Take 1 capsule by mouth 2 (Two) Times a Day. 20 capsule 0   • linagliptin (TRADJENTA) 5 MG tablet tablet Take 5 mg by mouth Daily.     • triamcinolone (KENALOG) 0.1 % cream Apply  topically 3 (Three) Times a Day. Apply sparingly. 45 g 0         Social History     Social History   • Marital status:      Spouse name: N/A   • Number of children: N/A   • Years of education: N/A     Occupational History   • Not on file.     Social History Main Topics   • Smoking status: Heavy Tobacco Smoker   • Smokeless tobacco: Not on file   • Alcohol use No   • Drug use: No   • Sexual activity: Defer     Other Topics Concern   • Not on file     Social History Narrative         History reviewed. No pertinent family history.      REVIEW OF SYSTEMS:   Pertinent positives are noted in the history of present illness above.  Otherwise, all other systems were reviewed, and were negative.    Objective:     Vitals:    01/07/18 2349 01/08/18 0010 01/08/18 0653 01/08/18 0700   BP:  178/63  173/68   BP Location:  Left arm  Left arm   Patient Position:  Lying  Lying   Pulse: 83 81 89 90   Resp: 18 22 22 22   Temp:  97.6 °F (36.4 °C)  98.2 °F (36.8 °C)   TempSrc:  Oral  Oral   SpO2: 96% 95% 95% 90%   Weight:       Height:         Body mass index is 30.54 kg/(m^2).  Flowsheet Rows         First Filed Value    Admission Height  157.5 cm (62\") Documented at 01/01/2018 1557    Admission Weight  69.4 kg (153 lb) " Documented at 01/01/2018 1557           General:    No acute distress, alert and oriented x4, pleasant                   Head:    Normocephalic, atraumatic.   Eyes:          Conjunctivae and sclerae normal, no icterus, PERRLA   Throat:   No oral lesions, no thrush, oral mucosa moist.    Neck:   Supple, trachea midline.   Lungs:     Decreased breath sounds at bases bilaterally     Heart:    Regular rhythm and normal rate.  No murmurs, gallops, or rubs noted.   Abdomen:     Soft, non-tender, non-distended, positive bowel sounds.    Extremities:   No clubbing, cyanosis, or edema.     Pulses:   Pulses palpable and equal bilaterally.    Skin:   No bleeding or rash.   Neuro:   Non-focal.     Psychiatric:   Normal mood and affect.     Lab Review:                  Results from last 7 days  Lab Units 01/08/18  0444   SODIUM mmol/L 144   POTASSIUM mmol/L 4.0   CHLORIDE mmol/L 108*   CO2 mmol/L 19.1*   BUN mg/dL 20   CREATININE mg/dL 1.29*   GLUCOSE mg/dL 176*   CALCIUM mg/dL 8.7       Results from last 7 days  Lab Units 01/04/18  0354 01/03/18  0509 01/02/18  0417 01/01/18  1631   CK TOTAL U/L 415* 664* 751* 1161*   TROPONIN T ng/mL  --   --  0.049* 0.044*       Results from last 7 days  Lab Units 01/08/18  0443   WBC 10*3/mm3 14.07*   HEMOGLOBIN g/dL 8.6*   HEMATOCRIT % 27.3*   PLATELETS 10*3/mm3 254       Results from last 7 days  Lab Units 01/02/18  0417   INR  1.20*       Results from last 7 days  Lab Units 01/06/18  0437   CHOLESTEROL mg/dL 106       Results from last 7 days  Lab Units 01/02/18  0417   MAGNESIUM mg/dL 1.6       Results from last 7 days  Lab Units 01/06/18  0437   CHOLESTEROL mg/dL 106   TRIGLYCERIDES mg/dL 109   HDL CHOL mg/dL 16*       EKG 1/1/2018      EKG (reviewed by me personally): NSR, LAE, prolonged QT interval.       Assessment:   1. Multiple lacunar infarcts left cerebral hemisphere (embolic)  2. Severe stenosis left internal carotid artery (with previous stenting)  3. E. coli bacteremia  secondary to UTI  4. Toxic metabolic encephalopathy  5. Hospital acquired pneumonia   6. Moderate right pleural effusion  7. Hypertension   8. Fall with traumatic rhabdomyolysis  9. Ongoing tobacco use  10. EF > 70%  11. Diabetes     Plan:       As noted, the patient does have severe stenosis of left internal carotid artery at the site of a previous stent.  This will very likely require revascularization.  Her ejection fraction was hyperdynamic at greater than 70%.  However, she does have multiple risk factors for coronary artery disease, including ongoing tobacco use, peripheral disease, and diabetes.  I am going to proceed with a Lexiscan Cardiolite stress test tomorrow morning to further risk stratify her prior to any revascularization.  If the stress test looks good, an endarterectomy could be considered from my standpoint.  If not, endovascular options could be considered.  She is currently on aspirin, Plavix, and Lipitor.  She is on a Cardene drip with permissive hypertension.  I will leave the blood pressure management up to neurology at this point.  She also does have a CT scan of her chest pending to further evaluate the right pleural effusion.  We will continue to follow the patient with you, and further plans will be made pending the results of the stress test tomorrow.    Thank you very much for this consult.    Ivan Kaye M.D.

## 2018-01-08 NOTE — THERAPY TREATMENT NOTE
Acute Care - Physical Therapy Treatment Note  [facility]     Patient Name: Sakshi Garcia  : 1940  MRN: [id number]  Today's Date: 2018  Onset of Illness/Injury or Date of Surgery Date: 18  Date of Referral to PT: 18  Referring Physician: Dr. Vora    Admit Date: 2018    Visit Dx:    ICD-10-CM ICD-9-CM   1. Traumatic rhabdomyolysis, initial encounter T79.6XXA 958.6   2. Fall, initial encounter W19.XXXA E888.9   3. Renal insufficiency N28.9 593.9   4. Dizziness R42 780.4   5. Unsteadiness on feet R26.81 781.2     Patient Active Problem List   Diagnosis   • Traumatic rhabdomyolysis   • Generalized weakness   • Fall   • Acute cystitis without hematuria   • CKD (chronic kidney disease)   • DM (diabetes mellitus)   • HTN (hypertension)   • Altered mental status   • Sepsis   • Bacteremia   • DNR (do not resuscitate)               Adult Rehabilitation Note       18 1300          Rehab Assessment/Intervention    Discipline physical therapy assistant  -RW      Document Type therapy note (daily note)  -RW      Patient Effort, Rehab Treatment fair  -RW      Precautions/Limitations fall precautions  -RW      Recorded by [RW] Lilliana Carroll PTA      Pain Assessment    Pain Assessment Gibson-Collins FACES  -RW      Gibson-Collins FACES Pain Rating 6  -RW      Pain Type Acute pain  -RW      Pain Location Elbow  -RW      Pain Orientation Right  -RW      Pain Intervention(s) Repositioned;Ambulation/increased activity;Elevated;Rest  -RW      Response to Interventions tolerated  -RW      Recorded by [RW] Lilliana Carroll PTA      Cognitive Assessment/Intervention    Current Cognitive/Communication Assessment impaired  -RW      Orientation Status oriented to;person  -RW      Follows Commands/Answers Questions 50% of the time;needs cueing;needs repetition  -RW      Personal Safety severe impairment;at risk behaviors demonstrated;decreased awareness, need for assist;decreased awareness, need for  safety;decreased insight to deficits  -RW      Personal Safety Interventions fall prevention program maintained;nonskid shoes/slippers when out of bed  -RW      Recorded by [RW] Lilliana Carroll PTA      Bed Mobility, Assessment/Treatment    Bed Mobility, Assistive Device head of bed elevated;draw sheet  -RW      Bed Mobility, Roll Left, [place] maximum assist (25% patient effort);verbal cues required;nonverbal cues required (demo/gesture)  -RW      Bed Mobility, Roll Right, [place] maximum assist (25% patient effort);verbal cues required;nonverbal cues required (demo/gesture)  -RW      Bed Mob, Supine to Sit, [place] maximum assist (25% patient effort);2 person assist required;verbal cues required;nonverbal cues required (demo/gesture)  -RW      Bed Mob, Sit to Supine, [place] maximum assist (25% patient effort);dependent (less than 25% patient effort);2 person assist required;verbal cues required;nonverbal cues required (demo/gesture)  -RW      Bed Mobility, Safety Issues decreased use of arms for pushing/pulling;decreased use of legs for bridging/pushing;impaired trunk control for bed mobility  -RW      Bed Mobility, Impairments strength decreased;impaired balance;coordination impaired;postural control impaired;muscle tone abnormal  -RW      Bed Mobility, Comment Assist to maintain sitting balance at EOB  -RW      Recorded by [RW] Lilliana Carroll PTA      Transfer Assessment/Treatment    Transfers, Sit-Stand [place] not tested  -RW      Transfer, Comment Pt w/ increased fatigue and unable to maintain sitting balance at EOB. Further activity deferred  -RW      Recorded by [RW] Lilliana Carroll PTA      Balance Skills Training    Sitting-Level of Assistance Minimum assistance;Moderate assistance  -RW      Sitting-Balance Support Feet supported;Left upper extremity supported  -RW      Sitting-Balance Activities Right UE Weight Bearing;Trunk control activities;Forward lean  -RW      Sitting  # of Minutes 6  -RW      Recorded by [RW] Lilliana Carroll PTA      Positioning and Restraints    Pre-Treatment Position in bed  -RW      Post Treatment Position bed  -RW      In Bed fowlers;call light within reach;encouraged to call for assist;exit alarm on;notified nsg  -RW      Recorded by [RW] Lilliana Carroll PTA        User Key  (r) = Recorded By, (t) = Taken By, (c) = Cosigned By    Initials Name Effective Dates    RW Lilliana Carroll PTA 04/06/16 -                 IP PT Goals       01/06/18 1721 01/02/18 1359       Bed Mobility PT LTG    Bed Mobility PT LTG, Date Established  01/02/18  -AA     Bed Mobility PT LTG, Time to Achieve  1 wk  -AA     Bed Mobility PT LTG, Activity Type  all bed mobility  -AA     Bed Mobility PT LTG, [place] Level  minimum assist (75% patient effort)  -AA     Bed Mobility PT LTG, Outcome goal ongoing  -PC      Transfer Training PT LTG    Transfer Training PT LTG, Date Established  01/02/18  -AA     Transfer Training PT LTG, Time to Achieve 1 wk  -PC 1 wk  -AA     Transfer Training PT LTG, Activity Type sit to stand/stand to sit;bed to chair /chair to bed  -PC all transfers  -AA     Transfer Training PT LTG, [place] Level moderate assist (50% patient effort)  -PC contact guard assist  -AA     Transfer Training PT LTG, Assist Device  walker, rolling  -AA     Gait Training PT LTG    Gait Training Goal PT LTG, Date Established  01/02/18  -AA     Gait Training Goal PT LTG, Time to Achieve 1 wk  -PC 1 wk  -AA     Gait Training Goal PT LTG, [place] Level moderate assist (50% patient effort);2 person assist required  -PC contact guard assist  -AA     Gait Training Goal PT LTG, Assist Device  walker, rolling  -AA     Gait Training Goal PT LTG, Distance to Achieve 5 ft  -  -AA     Gait Training Goal PT LTG, Outcome goal revised  -PC      Gait Training Goal PT LTG, Reason Goal Not Met other (see comments)   new CVA  -PC        User Key  (r) = Recorded By, (t) = Taken By,  (c) = Cosigned By    Initials Name Provider Type     Mellissa Mendez, PT Physical Therapist     Paula Holley, PT Physical Therapist          Physical Therapy Education     Title: PT OT SLP Therapies (Active)     Topic: Physical Therapy (Active)     Point: Mobility training (Active)    Learning Progress Summary    Learner Readiness Method Response Comment Documented by Status   Patient Acceptance E,TB,D NR   01/08/18 1334 Active    Acceptance E,D NR   01/06/18 1721 Active    Acceptance E,D NR,[facility] 01/04/18 1702 Active    Acceptance E VU,NR   01/02/18 1359 Done               Point: Home exercise program (Active)    Learning Progress Summary    Learner Readiness Method Response Comment Documented by Status   Patient Acceptance E,TB,D NR   01/08/18 1334 Active    Acceptance E,D NR   01/06/18 1721 Active    Acceptance E,D NR,[facility] 01/04/18 1702 Active    Acceptance E VU,NR   01/02/18 1359 Done               Point: Body mechanics (Active)    Learning Progress Summary    Learner Readiness Method Response Comment Documented by Status   Patient Acceptance E,TB,D NR   01/08/18 1334 Active    Acceptance E,D NR   01/06/18 1721 Active    Acceptance E,D NR,[facility] 01/04/18 1702 Active    Acceptance E VU,NR   01/02/18 1359 Done               Point: Precautions (Active)    Learning Progress Summary    Learner Readiness Method Response Comment Documented by Status   Patient Acceptance E,TB,D NR   01/08/18 1334 Active    Acceptance E,D NR   01/06/18 1721 Active    Acceptance E,D NR,[facility] 01/04/18 1702 Active    Acceptance E VU,NR   01/02/18 1359 Done                      User Key     Initials Effective Dates Name Provider Type Discipline     12/01/15 -  Mellissa Mendez, PT Physical Therapist PT     02/18/16 -  Amarilis Johnson, PTA Physical Therapy Assistant PT     04/06/16 -  Lilliana Carroll, PTA Physical Therapy Assistant PT     09/05/17 -  Paula Holley, PT Physical Therapist PT                     PT Recommendation and Plan  Anticipated Equipment Needs At Discharge: front wheeled walker  Anticipated Discharge Disposition: skilled nursing facility  Planned Therapy Interventions: bed mobility training, transfer training, strengthening, gait training  PT Frequency: daily  Plan of Care Review  Plan Of Care Reviewed With: patient  Outcome Summary/Follow up Plan: Pt w/ increased R elbow pain today w/ activity. Able to sit at EOB, but increased fatigue and nausea limiting further activity. Encouragement to participate w/ sitting exercises.          Outcome Measures       01/08/18 1300 01/06/18 1700 01/06/18 1151    How much help from another person do you currently need...    Turning from your back to your side while in flat bed without using bedrails? 2  -RW 2  -PC     Moving from lying on back to sitting on the side of a flat bed without bedrails? 2  -RW 2  -PC     Moving to and from a bed to a chair (including a wheelchair)? 1  -RW 1  -PC     Standing up from a chair using your arms (e.g., wheelchair, bedside chair)? 1  -RW 1  -PC     Climbing 3-5 steps with a railing? 1  -RW 1  -PC     To walk in hospital room? 1  -RW 1  -PC     AM-PAC 6 Clicks Score 8  -RW 8  -PC     How much help from another is currently needed...    Putting on and taking off regular lower body clothing?   1  -KP    Bathing (including washing, rinsing, and drying)   2  -KP    Toileting (which includes using toilet bed pan or urinal)   2  -KP    Putting on and taking off regular upper body clothing   2  -KP    Taking care of personal grooming (such as brushing teeth)   3  -KP    Eating meals   3  -KP    Score   13  -KP    Functional Assessment    Outcome Measure Options AM-PAC 6 Clicks Basic Mobility (PT)  -RW  AM-PAC 6 Clicks Daily Activity (OT)  -KP      User Key  (r) = Recorded By, (t) = Taken By, (c) = Cosigned By    Initials Name Provider Type    NASIR Barkley, OTR Occupational Therapist    PC Mellissa Mendez, PT  Physical Therapist    RW Lilliana Carroll PTA Physical Therapy Assistant           Time Calculation:         PT Charges       01/08/18 1318          Time Calculation    Start Time 1315  -RW      Stop Time 1330  -RW      Time Calculation (min) 15 min  -RW      PT Received On 01/08/18  -RW      PT - Next Appointment 01/09/18  -        User Key  (r) = Recorded By, (t) = Taken By, (c) = Cosigned By    Initials Name Provider Type     Lilliana Carroll PTA Physical Therapy Assistant          Therapy Charges for Today     Code Description Service Date Service Provider Modifiers Qty    75397029785 HC PT THER PROC EA 15 MIN 1/8/2018 Lilliana Carroll PTA GP 1    08270656641 HC PT THER SUPP EA 15 MIN 1/8/2018 Lilliana Carroll PTA GP 1          PT G-Codes  Outcome Measure Options: AM-PAC 6 Clicks Basic Mobility (PT)    Lilliana Carroll PTA  1/8/2018

## 2018-01-08 NOTE — PLAN OF CARE
Problem: Patient Care Overview (Adult)  Goal: Plan of Care Review   01/08/18 1001   Coping/Psychosocial Response Interventions   Plan Of Care Reviewed With patient   Patient Care Overview   Progress no change   Outcome Evaluation   Outcome Summary/Follow up Plan SLP recs continuing current diet of nectar and mech soft, free water protocol is appropriate.        Problem: Inpatient SLP  Goal: Dysphagia- Patient will safely consume diet as per recommendation with no signs/symptoms of aspiration   01/08/18 1001   Safely Consume Diet   Safely Consume Diet- SLP, Outcome goal ongoing

## 2018-01-08 NOTE — PLAN OF CARE
Problem: Patient Care Overview (Adult)  Goal: Plan of Care Review  Outcome: Ongoing (interventions implemented as appropriate)   01/08/18 1543   Coping/Psychosocial Response Interventions   Plan Of Care Reviewed With patient   Outcome Evaluation   Outcome Summary/Follow up Plan Pt w/ increased R elbow pain today w/ activity. Able to sit at EOB, but increased fatigue and nausea limiting further activity. Encouragement to participate w/ sitting exercises.

## 2018-01-08 NOTE — SIGNIFICANT NOTE
01/08/18 1012   Rehab Treatment   Discipline occupational therapist   Treatment Not Performed patient unavailable for treatment  (Spoke with RN, pt off floor for swallow study. Will attempt again later this date if time permits.)   Recommendation   OT - Next Appointment 01/08/18

## 2018-01-08 NOTE — PROGRESS NOTES
Name: Sakshi Garcia  ADMIT: 2018   Age/Sex: 77 y.o.female LOS:  LOS: 7 days    :    1940     ROOM: [facility] [place]   MRN:    [id number]    PCP:    Provider Not In System     Subjective   About the same. Still confused and on cardene drip    Objective   Vital Signs  Temp:  [97.6 °F (36.4 °C)-98.8 °F (37.1 °C)] 98.2 °F (36.8 °C)  Heart Rate:  [71-90] 90  Resp:  [18-22] 22  BP: (165-197)/(63-87) 173/68  Body mass index is 30.54 kg/(m^2).    Objective  General Appearance:  Comfortable and well-appearing.     HEENT: Normal HEENT exam.    Lungs:  Normal effort.  There are decreased breath sounds.    Heart: Normal rate.  Regular rhythm.    Abdomen: Abdomen is soft and non-distended.  Bowel sounds are normal.   There is no abdominal tenderness.     Extremities: There is no deformity or dependent edema.    Neurological: Patient is alert.  (Oriented x1).    Skin:  Warm and dry.  No rash.     Results Review:       I reviewed the patient's new clinical results.    Results from last 7 days  Lab Units 18  0443 18  0635 18  0437 18  0858 18  0354 18  0509 18  0417   WBC 10*3/mm3 14.07* 14.64* 15.31* 14.72* 17.01* 18.66* 20.00*   HEMOGLOBIN g/dL 8.6* 8.5* 8.1* 8.8* 8.5* 9.2* 9.9*   PLATELETS 10*3/mm3 254 237 215 177 156 173 192     Results from last 7 days  Lab Units 18  0444 18  0635 18  0437 18  0858 18  0354 18  0509 18  0417   SODIUM mmol/L 144 145 146* 142 137 136 132*   POTASSIUM mmol/L 4.0 3.1* 3.1* 2.9* 3.4* 3.5 3.8   CHLORIDE mmol/L 108* 108* 111* 106 103 102 96*   CO2 mmol/L 19.1* 22.8 22.0 22.7 20.2* 18.6* 22.0   BUN mg/dL 20 22 29* 31* 39* 44* 43*   CREATININE mg/dL 1.29* 1.36* 1.46* 1.41* 1.39* 1.48* 1.48*   GLUCOSE mg/dL 176* 157* 149* 238* 189* 132* 107*   Estimated Creatinine Clearance: 34.3 mL/min (by C-G formula based on Cr of 1.29).  Results from last 7 days  Lab Units 18  0444 18  0635 18  0437 18  0858  01/04/18  0354 01/03/18  0509 01/02/18  0417 01/01/18  1631   CALCIUM mg/dL 8.7 8.9 8.6 9.0 8.9 8.8 8.9 9.5   ALBUMIN g/dL  --   --   --   --   --   --  3.00* 3.20*   MAGNESIUM mg/dL  --   --   --   --   --   --  1.6 1.6   PHOSPHORUS mg/dL  --   --   --   --   --   --  2.5  --        RADIOLOGY  1/8/2018  Pending      aspirin 325 mg Oral Daily   atorvastatin 80 mg Oral Daily   clopidogrel 300 mg Oral Once   clopidogrel 75 mg Oral Daily   glipiZIDE 2.5 mg Oral BID AC   insulin aspart 0-9 Units Subcutaneous 4x Daily With Meals & Nightly   ipratropium-albuterol 3 mL Nebulization Q4H - RT   linagliptin 5 mg Oral Daily   metoprolol succinate XL 25 mg Oral Q24H   piperacillin-tazobactam 3.375 g Intravenous Q8H   potassium chloride 20 mEq Oral TID       niCARdipine 5-15 mg/hr Last Rate: 5 mg/hr (01/08/18 0147)   sodium chloride 100 mL/hr Last Rate: 100 mL/hr (01/07/18 2039)   Diet Dysphagia; IV - Mechanical Soft No Mixed Consistencies; Nectar / Syrup Thick  NPO Diet Sips With Meds      Assessment/Plan   Assessment:   Principal Problem:    Acute cystitis without hematuria  Active Problems:    Traumatic rhabdomyolysis    Generalized weakness    Fall    CKD (chronic kidney disease)    DM (diabetes mellitus)    HTN (hypertension)    Altered mental status    Sepsis    Bacteremia    DNR (do not resuscitate)        Plan:   1. Ecoli UTI with bacteremia  - switched to levaquin to complete a 14 day course but now on Zosyn for likely aspiration pna. Day 7 overall of abx therapy  - repeat BCx NGTD  - WBC stable  - cont to monitor      2. Aspiration vs Hospital acquired PNA with Moderate R pleural effusion  - Cont zosyn, day 4. Vanc d/c'd 1/8 as MRSA screen negative  - BCx NGTD.   - procal mildly elevated. Coming down at last check  - SLP eval  - mod R pleural effusion. Pulm consulted and CT chest pending. May need thoracentesis      3. Encephalopathy 2/2 Acute CVA and above infections  - CT head negative. Wonder if this will be her new  baseline  - on ASA and plavix. MRI with multiple CVA c/w cardio-embolic source or atherosclerotic plaque. 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   - will need revascularization. Going for stress test this am. Final plan based on these results  - TTE results noted  - cont as needed anti-psychotics  - permissive HTN for now. Cardene drip as needed to keep -200      4. Rhabdo  -resolved      5. DM2  - controlled on oral meds      6. CKD3  - Cr stable      7. Anemia  - stable      8 Hypernatremia  - cont 1/2NS for another day, decrease rate      Disposition  TBD.      Chace Chester MD  [facility]  01/08/18  10:35 AM

## 2018-01-08 NOTE — PROGRESS NOTES
[facility] INPATIENT PROGRESS NOTE         [address]    1/8/2018      PATIENT IDENTIFICATION:   Name:  Sakshi Garcia      MRN:  [id number]     77 y.o.  female             LOS 7    Reason for visit: Follow-up pneumonia with shortness of breath and pleural effusion      SUBJECTIVE:    Complains of cough.  No current chest pain.  CT chest ordered and pending still.    Objective   OBJECTIVE:    Vital Sign Min/Max for last 24 hours  Temp  Min: 97.6 °F (36.4 °C)  Max: 98.8 °F (37.1 °C)   BP  Min: 165/83  Max: 197/78   Pulse  Min: 71  Max: 91   Resp  Min: 18  Max: 22   SpO2  Min: 90 %  Max: 100 %   Flow (L/min)  Min: 1  Max: 1   No Data Recorded                         Body mass index is 30.54 kg/(m^2).    Intake/Output Summary (Last 24 hours) at 01/08/18 0727  Last data filed at 01/07/18 2100   Gross per 24 hour   Intake              700 ml   Output              300 ml   Net              400 ml         Exam:  GEN:  No distress, appears stated age  EYES:   PERRL, anicteric sclera  ENT:    External ears/nose normal, OP clear  NECK:  No adenopathy, midline trachea  LUNGS: Normal chest on inspection, palpation and Diminished bilaterally on auscultation  CV:  Normal S1S2, without murmur  ABD:  Non tender, non distended, no hepatosplenomegaly, +BS  EXT:  No edema, cyanosis or clubbing    Scheduled meds:    aspirin 325 mg Oral Daily   atorvastatin 80 mg Oral Daily   clopidogrel 300 mg Oral Once   clopidogrel 75 mg Oral Daily   glipiZIDE 2.5 mg Oral BID AC   insulin aspart 0-9 Units Subcutaneous 4x Daily With Meals & Nightly   ipratropium-albuterol 3 mL Nebulization Q4H - RT   linagliptin 5 mg Oral Daily   metoprolol succinate XL 25 mg Oral Q24H   piperacillin-tazobactam 3.375 g Intravenous Q8H   potassium chloride 20 mEq Oral TID   Vancomycin Pharmacy Intermittent Dosing  Does not apply Daily     IV meds:                        niCARdipine 5-15 mg/hr Last Rate: 5 mg/hr (01/08/18 0147)   Pharmacy to dose  vancomycin     sodium chloride 100 mL/hr Last Rate: 100 mL/hr (01/07/18 2039)     Data Review:    Results from last 7 days  Lab Units 01/08/18 0444 01/07/18  0635 01/06/18  0437 01/05/18  0858 01/04/18  0354   SODIUM mmol/L 144 145 146* 142 137   POTASSIUM mmol/L 4.0 3.1* 3.1* 2.9* 3.4*   CHLORIDE mmol/L 108* 108* 111* 106 103   CO2 mmol/L 19.1* 22.8 22.0 22.7 20.2*   BUN mg/dL 20 22 29* 31* 39*   CREATININE mg/dL 1.29* 1.36* 1.46* 1.41* 1.39*   GLUCOSE mg/dL 176* 157* 149* 238* 189*   CALCIUM mg/dL 8.7 8.9 8.6 9.0 8.9         Estimated Creatinine Clearance: 34.3 mL/min (by C-G formula based on Cr of 1.29).    Results from last 7 days  Lab Units 01/08/18  0443 01/07/18  0635 01/06/18  0437 01/05/18  0858 01/04/18  0354   WBC 10*3/mm3 14.07* 14.64* 15.31* 14.72* 17.01*   HEMOGLOBIN g/dL 8.6* 8.5* 8.1* 8.8* 8.5*   PLATELETS 10*3/mm3 254 237 215 177 156       Results from last 7 days  Lab Units 01/02/18  0417   INR  1.20*       Results from last 7 days  Lab Units 01/02/18  0417 01/01/18  1631   ALT (SGPT) U/L 28 27   AST (SGOT) U/L 45* 49*           Results from last 7 days  Lab Units 01/07/18  0635 01/05/18  0858 01/01/18  1832   PROCALCITONIN ng/mL 0.33* 0.86*  --    LACTATE mmol/L  --   --  1.6         2-D echo reviewed: EF hyperdynamic greater than 70%.  Moderate pulmonary hypertension    Microbiology reviewed      Chest x-ray viewed 1/7/18        )Assessment   ASSESSMENT:   PNA HAP  Moderate pulmonary hypertension  Ecoli bactermia 2/2UTI  TME  CVA  Rhabdo resolved  DMII  Pleural effusion  HTN uncontrolled    PLAN:  CT ordered by Dr. Ko yesterday is pending.  Continue bronchodilators and encourage pulmonary toilet.  Continue antibiotics for bacterial pneumonia and narrow based on culture results.  DC vancomycin.    Hector Dailey MD  Pulmonary and Critical Care Medicine  [facility], [facility]  1/8/2018    7:27 AM

## 2018-01-08 NOTE — THERAPY EVALUATION
Acute Care - Speech Language Pathology   Swallow VFSS [facility]     Patient Name: Sakshi Garcia  : 1940  MRN: [id number]  Today's Date: 2018  Onset of Illness/Injury or Date of Surgery Date: 18            Admit Date: 2018  SPEECH-LANGUAGE PATHOLOGY EVALUTION - VFSS  Subjective: The patient was seen on this date for a VFSS(Videofluoroscopic Swallowing Study).  Pt alert, agitated at times.    Significant history: L hemisphere stroke.   Objective: Risks/benefits were reviewed with the patient, and consent was obtained. The study was completed with SLP present and Radiologist review. The patient was seen in lateral view(s). Textures given included thin liquid, nectar thick liquid, puree consistency, mechanical soft consistency and regular consistency.  Assessment: Difficulties were noted with thin liquid, mechanical soft consistency and regular consistency.  Pt presents with mild oropharyngeal dysphagia. No penetration/aspiration with nectar, adequate swallow initiation. Spillage to pyriforms intermittently with thins. Pt with mild pharyngeal residue post swallow, pt did not clear without cues. Pt coughing consistently after trials of thins when fluoro not on. Spillage to valleculae with puree without penetration/aspiration. Mild diffuse pharyngeal residue post swallow. Spillage to pyriforms with mech soft without observed penetration. Mild BOT residue post swallow with regular.     Comments: Pt appropriate to upgrade to thins at the bedside without desaturations or coughing noted per SLP.   Recommendations: Diet Textures: nectar thick liquid, mechanical soft consistency with no mixed textures food. Medications should be taken whole with puree. May have water between meals after oral care, under staff or family supervision and with the recommended strategies for safe swallowing.  Recommended Strategies: Upright for PO, small bites and sips and may use straw. Oral care before breakfast, after all  meals and PRN.  Other Recommended Evaluations: Speech-Language Evaluation    Dysphagia therapy is recommended. Rationale: to improve swallow function.        Visit Dx:     ICD-10-CM ICD-9-CM   1. Traumatic rhabdomyolysis, initial encounter T79.6XXA 958.6   2. Fall, initial encounter W19.XXXA E888.9   3. Renal insufficiency N28.9 593.9   4. Dizziness R42 780.4   5. Unsteadiness on feet R26.81 781.2     Patient Active Problem List   Diagnosis   • Traumatic rhabdomyolysis   • Generalized weakness   • Fall   • Acute cystitis without hematuria   • CKD (chronic kidney disease)   • DM (diabetes mellitus)   • HTN (hypertension)   • Altered mental status   • Sepsis   • Bacteremia   • DNR (do not resuscitate)     Past Medical History:   Diagnosis Date   • Colon cancer    • Hypertension      Past Surgical History:   Procedure Laterality Date   • CHOLECYSTECTOMY     • COLON SURGERY     • COLONOSCOPY     • HERNIA REPAIR            SWALLOW EVALUATION (last 72 hours)      Swallow Evaluation       01/08/18 0930 01/06/18 1415             Rehab Evaluation    Document Type evaluation  - evaluation  -HS       Subjective Information agree to therapy  - agree to therapy  -HS       Evaluation Not Performed other (see comments)  -SH        Patient Effort, Rehab Treatment fair  -SH good  -HS       Symptoms Noted During/After Treatment none  -SH none  -HS       General Information    Patient Profile Review yes  -SH yes  -HS       Subjective Patient Observations Pt confused, agitated at times  -        Pertinent History Of Current Problem  Rapid response 1/4/18. Now with new stroke.  -HS       Current Diet Limitations mechanical soft;nectar thick liquids  -SH NPO  -HS       Prior Level of Function- Swallowing diet modifications- liquid;diet modifications- foods  -SH other (comment)   MS/NTL per BSE 1/4/18.  -HS       Plans/Goals Discussed With patient  -SH patient  -HS       Barriers to Rehab cognitive status  -SH none identified  -HS        Clinical Impression    Patient's Goals For Discharge return to regular diet  - return to PO diet  -       SLP Swallowing Diagnosis mild dysphagia  - moderate dysphagia  -HS       Rehab Potential/Prognosis, Swallowing good, to achieve stated therapy goals  - good, to achieve stated therapy goals  -       Criteria for Skilled Therapeutic Interventions Met skilled criteria for dysphagia intervention met  - skilled criteria for dysphagia intervention met  -HS       FCM, Swallowing 4-->Level 4  -SH 4-->Level 4  -HS       Therapy Frequency PRN  - PRN  -       Predicted Duration Therapy Interv (days) until discharge  - until discharge  -       Expected Duration Therapy Session (min) 15-30 minutes  -        SLP Diet Recommendation IV - mechanical soft, no mixed consistencies;nectar/syrup-thick liquids  - IV - mechanical soft, no mixed consistencies;nectar/syrup-thick liquids  -       Recommended Diagnostics reassess via clinical swallow (non-instrumental exam)  - VFSS (MBS)  -       Recommended Feeding/Eating Techniques maintain upright posture during/after eating for 30 mins  - maintain upright posture during/after eating for 30 mins;small sips/bites  -       SLP Rec. for Method of Medication Administration meds whole in pudding/applesauce  - meds whole in pudding/applesauce;meds crushed in pudding/applesauce  -       Monitor For Signs Of Aspiration cough;elevated WBC count;gurgly voice;throat clearing;fever;upper respiratory infection;pneumonia;right lower lobe infiltrates  - cough;throat clearing;fever  -       Anticipated Discharge Disposition inpatient rehabilitation facility  - other (see comments)   pending plan of care  -HS       Pain Assessment    Pain Assessment No/denies pain  -        Cognitive Assessment/Intervention    Current Cognitive/Communication Assessment  impaired   Confused, but improving  -       Oral Motor Structure and Function    Oral Motor  Anatomy and Physiology  patient demonstrates anatomy and physiology that is WNL  -HS       Secretion Management  other (see comments)   Dried, thick secretions  -HS       Mucosal Quality  dry;sticky  -HS       Oral Musculature General Assessment  lingual impairment;oral labial impairment  -HS       Oral Labial Strength and Mobility  reduced strength bilaterally  -HS       Lingual Strength and Mobility  reduced strength bilaterally  -HS       SLP Communication to Radiology    Summary Statement Pt presents with mild oropharyngeal dysphagia. No penetration/aspiration with nectar, adequate swallow initiation. Spillage to pyriforms intermittently with thins. Pt with mild pharyngeal residue post swallow, pt did not clear without cues. Pt coughing consistently after trials of thins when fluoro not on. Spillage to valleculae with puree without penetration/aspiration. Mild diffuse pharyngeal residue post swallow. Spillage to pyriforms with mech soft without observed penetration. Mild BOT residue post swallow with regular.     -        Dysphagia Treatment Objectives and Progress    Dysphagia Treatment Objectives Improve timing of pharyngeal response;Improve tongue base & pharyngeal wall squeeze  -        Improve timing of pharyngeal response    To improve timing of pharyngeal response, patient will: Swallow in timely way using three second prep;Swallow in timely way using Mendelsohn maneuver;80%;with inconsistent cues  -        Improve tongue base & pharyngeal wall squeeze    To improve tongue base & pharyngeal wall squeeze, patient will: Complete gargle/hold retraction;Complete yawn/hold retractions;Complete tongue base retraction;Complete effortful swallow;Complete tongue hold swallow;80%;with inconsistent cues  -SH        Dysphagia Other 1    Status: Dysphagia Other 1 Achieved  -          User Key  (r) = Recorded By, (t) = Taken By, (c) = Cosigned By    Initials Name Effective Dates    BIB Buchanan MS  [facility]-SLP 04/13/15 -     SH Lisa Mcadams, MS [facility]-SLP 07/13/17 -         EDUCATION  The patient has been educated in the following areas:   Modified Diet Instruction.    SLP Recommendation and Plan  SLP Swallowing Diagnosis: mild dysphagia  SLP Diet Recommendation: IV - mechanical soft, no mixed consistencies, nectar/syrup-thick liquids  Recommended Feeding/Eating Techniques: maintain upright posture during/after eating for 30 mins  SLP Rec. for Method of Medication Administration: meds whole in pudding/applesauce  Monitor For Signs Of Aspiration: cough, elevated WBC count, gurgly voice, throat clearing, fever, upper respiratory infection, pneumonia, right lower lobe infiltrates  Recommended Diagnostics: reassess via clinical swallow (non-instrumental exam)  Criteria for Skilled Therapeutic Interventions Met: skilled criteria for dysphagia intervention met  Anticipated Discharge Disposition: inpatient rehabilitation facility  Rehab Potential/Prognosis, Swallowing: good, to achieve stated therapy goals  Therapy Frequency: PRN             Plan of Care Review  Plan Of Care Reviewed With: patient  Progress: no change  Outcome Summary/Follow up Plan: SLP recs continuing current diet of nectar and mech soft, free water protocol is appropriate.           IP SLP Goals       01/08/18 1001 01/06/18 1437 01/04/18 1547    Safely Consume Diet    Safely Consume Diet- SLP, Date Established  01/06/18  -HS 01/04/18  -    Safely Consume Diet- SLP, Time to Achieve  by discharge  - by discharge  -    Safely Consume Diet- SLP, Additional Goal  Bedside swallow evaluation completed. Recommend a mechanical soft diet with nectar thick liquids. Meds whole or crushed in puree. Ice chips or small sips of water 30 minutes after meals only after oral care and with supervision. Plan to proceed with VFSS early next week to further assess swallow function.  -HS     Safely Consume Diet- SLP, Outcome goal ongoing  -        User Key  (r) =  Recorded By, (t) = Taken By, (c) = Cosigned By    Initials Name Provider Type     Alisa Buchanan, MS CCC-SLP Speech and Language Pathologist     Lisa Mcadams MS CCC-SLP Speech and Language Pathologist             SLP Outcome Measures (last 72 hours)      SLP Outcome Measures       01/08/18 1200 01/06/18 1400       SLP Outcome Measures    Outcome Measure Used? Adult NOMS  - Adult NOMS  -HS     FCM Scores    FCM Chosen Swallowing  - Swallowing  -HS     Swallowing FCM Score 4  - 4  -HS       User Key  (r) = Recorded By, (t) = Taken By, (c) = Cosigned By    Initials Name Effective Dates     Alisa Dewayne, MS CCC-SLP 04/13/15 -      Lisa Mcadams MS CCC-SLP 07/13/17 -            Time Calculation:         Time Calculation- SLP       01/08/18 1208          Time Calculation- [facility]    SLP Start Time 0830  -      SLP Stop Time 0930  -      SLP Time Calculation (min) 60 min  -      SLP Received On 01/08/18  -        User Key  (r) = Recorded By, (t) = Taken By, (c) = Cosigned By    Initials Name Provider Type     Lisa Mcadams MS CCC-SLP Speech and Language Pathologist          Therapy Charges for Today     Code Description Service Date Service Provider Modifiers Qty    [id number] HC ST MOTION FLUORO EVAL SWALLOW 4 1/8/2018 Lisa Mcadams MS CCC-SLP GN 1               Lisa Mcadams MS CCC-ZAKIA  1/8/2018

## 2018-01-09 ENCOUNTER — APPOINTMENT (OUTPATIENT)
Dept: NUCLEAR MEDICINE | Facility: HOSPITAL | Age: 78
End: 2018-01-09

## 2018-01-09 ENCOUNTER — APPOINTMENT (OUTPATIENT)
Dept: GENERAL RADIOLOGY | Facility: HOSPITAL | Age: 78
End: 2018-01-09

## 2018-01-09 ENCOUNTER — APPOINTMENT (OUTPATIENT)
Dept: CARDIOLOGY | Facility: HOSPITAL | Age: 78
End: 2018-01-09
Attending: INTERNAL MEDICINE

## 2018-01-09 LAB
ANION GAP SERPL CALCULATED.3IONS-SCNC: 12.4 MMOL/L
BASOPHILS # BLD AUTO: 0.03 10*3/MM3 (ref 0–0.2)
BASOPHILS NFR BLD AUTO: 0.2 % (ref 0–1.5)
BH CV STRESS COMMENTS STAGE 1: NORMAL
BH CV STRESS DOSE REGADENOSON STAGE 1: 0.4
BH CV STRESS DURATION MIN STAGE 1: 0
BH CV STRESS DURATION SEC STAGE 1: 15
BH CV STRESS PROTOCOL 1: NORMAL
BH CV STRESS RECOVERY BP: NORMAL MMHG
BH CV STRESS RECOVERY HR: 92 BPM
BH CV STRESS STAGE 1: 1
BH CV UPPER VENOUS LEFT INTERNAL JUGULAR AUGMENT: NORMAL
BH CV UPPER VENOUS LEFT INTERNAL JUGULAR COMPETENT: NORMAL
BH CV UPPER VENOUS LEFT INTERNAL JUGULAR COMPRESS: NORMAL
BH CV UPPER VENOUS LEFT INTERNAL JUGULAR PHASIC: NORMAL
BH CV UPPER VENOUS LEFT INTERNAL JUGULAR SPONT: NORMAL
BH CV UPPER VENOUS LEFT SUBCLAVIAN AUGMENT: NORMAL
BH CV UPPER VENOUS LEFT SUBCLAVIAN COMPETENT: NORMAL
BH CV UPPER VENOUS LEFT SUBCLAVIAN COMPRESS: NORMAL
BH CV UPPER VENOUS LEFT SUBCLAVIAN PHASIC: NORMAL
BH CV UPPER VENOUS LEFT SUBCLAVIAN SPONT: NORMAL
BH CV UPPER VENOUS RIGHT AXILLARY AUGMENT: NORMAL
BH CV UPPER VENOUS RIGHT AXILLARY COMPETENT: NORMAL
BH CV UPPER VENOUS RIGHT AXILLARY COMPRESS: NORMAL
BH CV UPPER VENOUS RIGHT AXILLARY PHASIC: NORMAL
BH CV UPPER VENOUS RIGHT AXILLARY SPONT: NORMAL
BH CV UPPER VENOUS RIGHT BASILIC FOREARM COMPRESS: NORMAL
BH CV UPPER VENOUS RIGHT BASILIC UPPER COMPRESS: NORMAL
BH CV UPPER VENOUS RIGHT BRACHIAL COMPRESS: NORMAL
BH CV UPPER VENOUS RIGHT CEPHALIC FOREARM COMPRESS: NORMAL
BH CV UPPER VENOUS RIGHT CEPHALIC UPPER COLOR: 1
BH CV UPPER VENOUS RIGHT CEPHALIC UPPER COMPRESS: NORMAL
BH CV UPPER VENOUS RIGHT CEPHALIC UPPER THROMBUS: NORMAL
BH CV UPPER VENOUS RIGHT INTERNAL JUGULAR AUGMENT: NORMAL
BH CV UPPER VENOUS RIGHT INTERNAL JUGULAR COMPETENT: NORMAL
BH CV UPPER VENOUS RIGHT INTERNAL JUGULAR COMPRESS: NORMAL
BH CV UPPER VENOUS RIGHT INTERNAL JUGULAR PHASIC: NORMAL
BH CV UPPER VENOUS RIGHT INTERNAL JUGULAR SPONT: NORMAL
BH CV UPPER VENOUS RIGHT RADIAL COMPRESS: NORMAL
BH CV UPPER VENOUS RIGHT SUBCLAVIAN AUGMENT: NORMAL
BH CV UPPER VENOUS RIGHT SUBCLAVIAN COMPETENT: NORMAL
BH CV UPPER VENOUS RIGHT SUBCLAVIAN COMPRESS: NORMAL
BH CV UPPER VENOUS RIGHT SUBCLAVIAN PHASIC: NORMAL
BH CV UPPER VENOUS RIGHT SUBCLAVIAN SPONT: NORMAL
BH CV UPPER VENOUS RIGHT ULNAR COMPRESS: NORMAL
BUN BLD-MCNC: 21 MG/DL (ref 8–23)
BUN/CREAT SERPL: 16.8 (ref 7–25)
CALCIUM SPEC-SCNC: 8.5 MG/DL (ref 8.6–10.5)
CHLORIDE SERPL-SCNC: 105 MMOL/L (ref 98–107)
CO2 SERPL-SCNC: 23.6 MMOL/L (ref 22–29)
CREAT BLD-MCNC: 1.25 MG/DL (ref 0.57–1)
DEPRECATED RDW RBC AUTO: 52 FL (ref 37–54)
EOSINOPHIL # BLD AUTO: 0.19 10*3/MM3 (ref 0–0.7)
EOSINOPHIL NFR BLD AUTO: 1.4 % (ref 0.3–6.2)
ERYTHROCYTE [DISTWIDTH] IN BLOOD BY AUTOMATED COUNT: 14.8 % (ref 11.7–13)
GFR SERPL CREATININE-BSD FRML MDRD: 41 ML/MIN/1.73
GLUCOSE BLD-MCNC: 195 MG/DL (ref 65–99)
GLUCOSE BLDC GLUCOMTR-MCNC: 149 MG/DL (ref 70–130)
GLUCOSE BLDC GLUCOMTR-MCNC: 164 MG/DL (ref 70–130)
GLUCOSE BLDC GLUCOMTR-MCNC: 180 MG/DL (ref 70–130)
GLUCOSE BLDC GLUCOMTR-MCNC: 217 MG/DL (ref 70–130)
HCT VFR BLD AUTO: 26 % (ref 35.6–45.5)
HGB BLD-MCNC: 8.1 G/DL (ref 11.9–15.5)
IMM GRANULOCYTES # BLD: 0.49 10*3/MM3 (ref 0–0.03)
IMM GRANULOCYTES NFR BLD: 3.6 % (ref 0–0.5)
LV EF NUC BP: 65 %
LYMPHOCYTES # BLD AUTO: 1.41 10*3/MM3 (ref 0.9–4.8)
LYMPHOCYTES NFR BLD AUTO: 10.2 % (ref 19.6–45.3)
MAXIMAL PREDICTED HEART RATE: 142 BPM
MCH RBC QN AUTO: 30.5 PG (ref 26.9–32)
MCHC RBC AUTO-ENTMCNC: 31.2 G/DL (ref 32.4–36.3)
MCV RBC AUTO: 97.7 FL (ref 80.5–98.2)
MONOCYTES # BLD AUTO: 1.55 10*3/MM3 (ref 0.2–1.2)
MONOCYTES NFR BLD AUTO: 11.2 % (ref 5–12)
NEUTROPHILS # BLD AUTO: 10.12 10*3/MM3 (ref 1.9–8.1)
NEUTROPHILS NFR BLD AUTO: 73.4 % (ref 42.7–76)
PERCENT MAX PREDICTED HR: 68.31 %
PLATELET # BLD AUTO: 274 10*3/MM3 (ref 140–500)
PMV BLD AUTO: 9.8 FL (ref 6–12)
POTASSIUM BLD-SCNC: 4 MMOL/L (ref 3.5–5.2)
RBC # BLD AUTO: 2.66 10*6/MM3 (ref 3.9–5.2)
SODIUM BLD-SCNC: 141 MMOL/L (ref 136–145)
STRESS BASELINE BP: NORMAL MMHG
STRESS BASELINE HR: 81 BPM
STRESS PERCENT HR: 80 %
STRESS POST PEAK BP: NORMAL MMHG
STRESS POST PEAK HR: 97 BPM
STRESS TARGET HR: 121 BPM
WBC NRBC COR # BLD: 13.79 10*3/MM3 (ref 4.5–10.7)

## 2018-01-09 PROCEDURE — 94799 UNLISTED PULMONARY SVC/PX: CPT

## 2018-01-09 PROCEDURE — A9500 TC99M SESTAMIBI: HCPCS | Performed by: INTERNAL MEDICINE

## 2018-01-09 PROCEDURE — 73070 X-RAY EXAM OF ELBOW: CPT

## 2018-01-09 PROCEDURE — 93017 CV STRESS TEST TRACING ONLY: CPT

## 2018-01-09 PROCEDURE — 0 TECHNETIUM SESTAMIBI: Performed by: INTERNAL MEDICINE

## 2018-01-09 PROCEDURE — 25010000002 PIPERACILLIN SOD-TAZOBACTAM PER 1 G: Performed by: INTERNAL MEDICINE

## 2018-01-09 PROCEDURE — 85025 COMPLETE CBC W/AUTO DIFF WBC: CPT | Performed by: INTERNAL MEDICINE

## 2018-01-09 PROCEDURE — 99232 SBSQ HOSP IP/OBS MODERATE 35: CPT | Performed by: INTERNAL MEDICINE

## 2018-01-09 PROCEDURE — 78452 HT MUSCLE IMAGE SPECT MULT: CPT | Performed by: INTERNAL MEDICINE

## 2018-01-09 PROCEDURE — 82962 GLUCOSE BLOOD TEST: CPT

## 2018-01-09 PROCEDURE — 25010000002 REGADENOSON 0.4 MG/5ML SOLUTION: Performed by: INTERNAL MEDICINE

## 2018-01-09 PROCEDURE — 99232 SBSQ HOSP IP/OBS MODERATE 35: CPT | Performed by: NURSE PRACTITIONER

## 2018-01-09 PROCEDURE — 80048 BASIC METABOLIC PNL TOTAL CA: CPT | Performed by: INTERNAL MEDICINE

## 2018-01-09 PROCEDURE — 97110 THERAPEUTIC EXERCISES: CPT

## 2018-01-09 PROCEDURE — 92526 ORAL FUNCTION THERAPY: CPT

## 2018-01-09 PROCEDURE — 78452 HT MUSCLE IMAGE SPECT MULT: CPT

## 2018-01-09 PROCEDURE — 92523 SPEECH SOUND LANG COMPREHEN: CPT

## 2018-01-09 PROCEDURE — 93016 CV STRESS TEST SUPVJ ONLY: CPT | Performed by: INTERNAL MEDICINE

## 2018-01-09 PROCEDURE — 63710000001 INSULIN ASPART PER 5 UNITS: Performed by: INTERNAL MEDICINE

## 2018-01-09 PROCEDURE — 93971 EXTREMITY STUDY: CPT

## 2018-01-09 PROCEDURE — 93018 CV STRESS TEST I&R ONLY: CPT | Performed by: INTERNAL MEDICINE

## 2018-01-09 RX ADMIN — TAZOBACTAM SODIUM AND PIPERACILLIN SODIUM 3.38 G: 375; 3 INJECTION, SOLUTION INTRAVENOUS at 06:35

## 2018-01-09 RX ADMIN — IPRATROPIUM BROMIDE AND ALBUTEROL SULFATE 3 ML: .5; 3 SOLUTION RESPIRATORY (INHALATION) at 03:19

## 2018-01-09 RX ADMIN — ASPIRIN 325 MG: 325 TABLET ORAL at 14:24

## 2018-01-09 RX ADMIN — TECHNETIUM TC 99M SESTAMIBI 1 DOSE: 1 INJECTION INTRAVENOUS at 10:30

## 2018-01-09 RX ADMIN — CLOPIDOGREL 75 MG: 75 TABLET, FILM COATED ORAL at 14:24

## 2018-01-09 RX ADMIN — ATORVASTATIN CALCIUM 80 MG: 80 TABLET, FILM COATED ORAL at 14:24

## 2018-01-09 RX ADMIN — TECHNETIUM TC 99M SESTAMIBI 1 DOSE: 1 INJECTION INTRAVENOUS at 08:15

## 2018-01-09 RX ADMIN — TAZOBACTAM SODIUM AND PIPERACILLIN SODIUM 3.38 G: 375; 3 INJECTION, SOLUTION INTRAVENOUS at 16:29

## 2018-01-09 RX ADMIN — REGADENOSON 0.4 MG: 0.08 INJECTION, SOLUTION INTRAVENOUS at 10:30

## 2018-01-09 RX ADMIN — IPRATROPIUM BROMIDE AND ALBUTEROL SULFATE 3 ML: .5; 3 SOLUTION RESPIRATORY (INHALATION) at 18:59

## 2018-01-09 RX ADMIN — TAZOBACTAM SODIUM AND PIPERACILLIN SODIUM 3.38 G: 375; 3 INJECTION, SOLUTION INTRAVENOUS at 23:34

## 2018-01-09 RX ADMIN — INSULIN ASPART 2 UNITS: 100 INJECTION, SOLUTION INTRAVENOUS; SUBCUTANEOUS at 18:49

## 2018-01-09 RX ADMIN — IPRATROPIUM BROMIDE AND ALBUTEROL SULFATE 3 ML: .5; 3 SOLUTION RESPIRATORY (INHALATION) at 08:31

## 2018-01-09 RX ADMIN — GLIPIZIDE 2.5 MG: 5 TABLET ORAL at 18:48

## 2018-01-09 RX ADMIN — METOPROLOL SUCCINATE 25 MG: 25 TABLET, FILM COATED, EXTENDED RELEASE ORAL at 14:24

## 2018-01-09 RX ADMIN — POTASSIUM CHLORIDE 20 MEQ: 750 CAPSULE, EXTENDED RELEASE ORAL at 20:57

## 2018-01-09 RX ADMIN — POTASSIUM CHLORIDE 20 MEQ: 750 CAPSULE, EXTENDED RELEASE ORAL at 16:29

## 2018-01-09 RX ADMIN — INSULIN ASPART 4 UNITS: 100 INJECTION, SOLUTION INTRAVENOUS; SUBCUTANEOUS at 20:58

## 2018-01-09 NOTE — THERAPY TREATMENT NOTE
Acute Care - Physical Therapy Treatment Note  [facility]     Patient Name: Sakshi Garcia  : 1940  MRN: [id number]  Today's Date: 2018  Onset of Illness/Injury or Date of Surgery Date: 18  Date of Referral to PT: 18  Referring Physician: Dr. Vora    Admit Date: 2018    Visit Dx:    ICD-10-CM ICD-9-CM   1. Traumatic rhabdomyolysis, initial encounter T79.6XXA 958.6   2. Fall, initial encounter W19.XXXA E888.9   3. Renal insufficiency N28.9 593.9   4. Dizziness R42 780.4   5. Unsteadiness on feet R26.81 781.2     Patient Active Problem List   Diagnosis   • Traumatic rhabdomyolysis   • Generalized weakness   • Fall   • Acute cystitis without hematuria   • CKD (chronic kidney disease)   • DM (diabetes mellitus)   • HTN (hypertension)   • Altered mental status   • Sepsis   • Bacteremia   • DNR (do not resuscitate)               Adult Rehabilitation Note       18 0900 18 1300       Rehab Assessment/Intervention    Discipline physical therapy assistant  -RW physical therapy assistant  -RW     Document Type therapy note (daily note)  -RW therapy note (daily note)  -RW     Subjective Information agree to therapy;complains of;pain  -RW      Patient Effort, Rehab Treatment good  -RW fair  -RW     Symptoms Noted During/After Treatment increased pain  -RW      Precautions/Limitations fall precautions;oxygen therapy device and L/min   2L O2  -RW fall precautions  -RW     Recorded by [RW] Lilliana Carroll PTA [RW] Lilliana Carroll PTA     Pain Assessment    Pain Assessment Gibson-Collins FACES  -RW Gibson-Baker FACES  -RW     Gibson-Collins FACES Pain Rating 8  -RW 6  -RW     Pain Type Acute pain  -RW Acute pain  -RW     Pain Location Elbow  -RW Elbow  -RW     Pain Orientation Right  -RW Right  -RW     Pain Intervention(s) Repositioned;Elevated;Rest  -RW Repositioned;Ambulation/increased activity;Elevated;Rest  -RW     Response to Interventions tolerated at rest  -RW tolerated  -RW     Recorded by  [RW] Lilliana Carroll PTA [RW] Lilliana Carroll PTA     Cognitive Assessment/Intervention    Current Cognitive/Communication Assessment impaired  -RW impaired  -RW     Orientation Status oriented to;person  -RW oriented to;person  -RW     Follows Commands/Answers Questions 75% of the time;needs cueing;needs repetition  -RW 50% of the time;needs cueing;needs repetition  -RW     Personal Safety severe impairment;at risk behaviors demonstrated;decreased awareness, need for assist;decreased awareness, need for safety;decreased insight to deficits  -RW severe impairment;at risk behaviors demonstrated;decreased awareness, need for assist;decreased awareness, need for safety;decreased insight to deficits  -RW     Personal Safety Interventions fall prevention program maintained;gait belt;nonskid shoes/slippers when out of bed  -RW fall prevention program maintained;nonskid shoes/slippers when out of bed  -RW     Recorded by [RW] Lilliana Carroll PTA [RW] Lilliana Carroll PTA     Bed Mobility, Assessment/Treatment    Bed Mobility, Assistive Device bed rails;head of bed elevated  -RW head of bed elevated;draw sheet  -RW     Bed Mobility, Roll Left, [place] moderate assist (50% patient effort);verbal cues required;nonverbal cues required (demo/gesture)  -RW maximum assist (25% patient effort);verbal cues required;nonverbal cues required (demo/gesture)  -RW     Bed Mobility, Roll Right, [place]  maximum assist (25% patient effort);verbal cues required;nonverbal cues required (demo/gesture)  -RW     Bed Mob, Supine to Sit, [place]  maximum assist (25% patient effort);2 person assist required;verbal cues required;nonverbal cues required (demo/gesture)  -RW     Bed Mob, Sit to Supine, [place]  maximum assist (25% patient effort);dependent (less than 25% patient effort);2 person assist required;verbal cues required;nonverbal cues required (demo/gesture)  -RW     Bed Mob, Sidelying to Sit, [place] moderate  assist (50% patient effort);verbal cues required;nonverbal cues required (demo/gesture)  -RW      Bed Mob, Sit to Sidelying, [place] maximum assist (25% patient effort);2 person assist required;verbal cues required;nonverbal cues required (demo/gesture)  -RW      Bed Mobility, Safety Issues cognitive deficits limit understanding;decreased use of arms for pushing/pulling  -RW decreased use of arms for pushing/pulling;decreased use of legs for bridging/pushing;impaired trunk control for bed mobility  -RW     Bed Mobility, Impairments muscle tone abnormal;strength decreased;impaired balance;coordination impaired;motor control impaired  -RW strength decreased;impaired balance;coordination impaired;postural control impaired;muscle tone abnormal  -RW     Bed Mobility, Comment  Assist to maintain sitting balance at EOB  -RW     Recorded by [RW] Lilliana Carroll PTA [RW] Lilliana Carroll PTA     Transfer Assessment/Treatment    Transfers, Bed-Chair [place] moderate assist (50% patient effort);2 person assist required;verbal cues required;nonverbal cues required (demo/gesture);hand held assist   bed to stretcher  -RW      Transfers, Chair-Bed [place] not tested  -RW      Transfers, Sit-Stand [place] moderate assist (50% patient effort);2 person assist required;verbal cues required;nonverbal cues required (demo/gesture);hand held assist  -RW not tested  -RW     Transfers, Stand-Sit [place] moderate assist (50% patient effort);2 person assist required;verbal cues required;nonverbal cues required (demo/gesture);hand held assist  -RW      Transfer, Safety Issues sequencing ability decreased;weight-shifting ability decreased;balance decreased during turns  -RW      Transfer, Impairments muscle tone abnormal;strength decreased;impaired balance;coordination impaired;motor control impaired  -RW      Transfer, Comment Pt stood x4 at EOB w/ HHA, but LUE supported due to pain. No instances of L knee buckling in  standing or during transfer. Pt able to pivot to L side and weight shift during transfer.  -RW Pt w/ increased fatigue and unable to maintain sitting balance at EOB. Further activity deferred  -RW     Recorded by [RW] Lilliana Carroll PTA [RW] Lilliana Carroll PTA     Gait Assessment/Treatment    Gait, [place] Level not tested;not appropriate to assess  -RW      Recorded by [RW] Lilliana Carroll PTA      Balance Skills Training    Sitting-Level of Assistance Contact guard  -RW Minimum assistance;Moderate assistance  -RW     Sitting-Balance Support Feet supported;Left upper extremity supported  -RW Feet supported;Left upper extremity supported  -RW     Sitting-Balance Activities Trunk control activities  -RW Right UE Weight Bearing;Trunk control activities;Forward lean  -RW     Sitting # of Minutes 3  -RW 6  -RW     Recorded by [RW] Lilliana Carroll PTA [RW] Lilliana Carroll PTA     Therapy Exercises    Bilateral Lower Extremities AROM:;5 reps;sitting;ankle pumps/circles;LAQ  -RW      Recorded by [RW] Lilliana Carroll PTA      Positioning and Restraints    Pre-Treatment Position in bed  -RW in bed  -RW     Post Treatment Position other  -RW bed  -RW     In Bed  fowlers;call light within reach;encouraged to call for assist;exit alarm on;notified nsg  -RW     Other Position with other staff   on stretcher w/ transport  -RW      Recorded by [RW] Lilliana Carroll PTA [RW] Lilliana Carroll PTA       User Key  (r) = Recorded By, (t) = Taken By, (c) = Cosigned By    Initials Name Effective Dates     Lilliana Carroll PTA 04/06/16 -                 IP PT Goals       01/06/18 1721 01/02/18 1359       Bed Mobility PT LTG    Bed Mobility PT LTG, Date Established  01/02/18  -AA     Bed Mobility PT LTG, Time to Achieve  1 wk  -AA     Bed Mobility PT LTG, Activity Type  all bed mobility  -AA     Bed Mobility PT LTG, [place] Level  minimum assist (75% patient effort)  -AA     Bed Mobility PT LTG, Outcome goal ongoing   -PC      Transfer Training PT LTG    Transfer Training PT LTG, Date Established  01/02/18  -AA     Transfer Training PT LTG, Time to Achieve 1 wk  -PC 1 wk  -AA     Transfer Training PT LTG, Activity Type sit to stand/stand to sit;bed to chair /chair to bed  -PC all transfers  -AA     Transfer Training PT LTG, [place] Level moderate assist (50% patient effort)  -PC contact guard assist  -AA     Transfer Training PT LTG, Assist Device  walker, rolling  -AA     Gait Training PT LTG    Gait Training Goal PT LTG, Date Established  01/02/18  -AA     Gait Training Goal PT LTG, Time to Achieve 1 wk  -PC 1 wk  -AA     Gait Training Goal PT LTG, [place] Level moderate assist (50% patient effort);2 person assist required  -PC contact guard assist  -AA     Gait Training Goal PT LTG, Assist Device  walker, rolling  -AA     Gait Training Goal PT LTG, Distance to Achieve 5 ft  -  -AA     Gait Training Goal PT LTG, Outcome goal revised  -PC      Gait Training Goal PT LTG, Reason Goal Not Met other (see comments)   new CVA  -PC        User Key  (r) = Recorded By, (t) = Taken By, (c) = Cosigned By    Initials Name Provider Type    PC Mellissa Mendez, PT Physical Therapist    KOSTA Holley PT Physical Therapist          Physical Therapy Education     Title: PT OT SLP Therapies (Active)     Topic: Physical Therapy (Done)     Point: Mobility training (Done)    Learning Progress Summary    Learner Readiness Method Response Comment Documented by Status   Patient Acceptance E,TB,D VU,NR  RW 01/09/18 0932 Done    Acceptance E,TB,D VU,NR  RW 01/09/18 0923 Done    Acceptance E,TB,D NR  RW 01/08/18 1334 Active    Acceptance E,D NR  PC 01/06/18 1721 Active    Acceptance E,D NR,NL  JM 01/04/18 1702 Active    Acceptance E VU,NR  AA 01/02/18 1359 Done               Point: Home exercise program (Done)    Learning Progress Summary    Learner Readiness Method Response Comment Documented by Status   Patient Acceptance E,TB,D  VU,NR   01/09/18 0932 Done    Acceptance E,TB,D VU,NR   01/09/18 0923 Done    Acceptance E,TB,D NR   01/08/18 1334 Active    Acceptance E,D NR   01/06/18 1721 Active    Acceptance E,D NR,NL   01/04/18 1702 Active    Acceptance E VU,NR   01/02/18 1359 Done               Point: Body mechanics (Done)    Learning Progress Summary    Learner Readiness Method Response Comment Documented by Status   Patient Acceptance E,TB,D VU,NR   01/09/18 0932 Done    Acceptance E,TB,D VU,NR   01/09/18 0923 Done    Acceptance E,TB,D NR   01/08/18 1334 Active    Acceptance E,D NR   01/06/18 1721 Active    Acceptance E,D NR,NL   01/04/18 1702 Active    Acceptance E VU,NR   01/02/18 1359 Done               Point: Precautions (Done)    Learning Progress Summary    Learner Readiness Method Response Comment Documented by Status   Patient Acceptance E,TB,D VU,NR   01/09/18 0932 Done    Acceptance E,TB,D VU,NR   01/09/18 0923 Done    Acceptance E,TB,D NR   01/08/18 1334 Active    Acceptance E,D NR   01/06/18 1721 Active    Acceptance E,D NR,[facility] 01/04/18 1702 Active    Acceptance E VU,NR   01/02/18 1359 Done                      User Key     Initials Effective Dates Name Provider Type Discipline     12/01/15 -  Mellissa Mendez, PT Physical Therapist PT     02/18/16 -  Amarilis Johnson, PTA Physical Therapy Assistant PT     04/06/16 -  Lilliana Carroll, PTA Physical Therapy Assistant PT     09/05/17 -  Paula Holley, PT Physical Therapist PT                    PT Recommendation and Plan  Anticipated Equipment Needs At Discharge: front wheeled walker  Anticipated Discharge Disposition: skilled nursing facility  Planned Therapy Interventions: bed mobility training, transfer training, strengthening, gait training  PT Frequency: daily  Plan of Care Review  Plan Of Care Reviewed With: patient  Outcome Summary/Follow up Plan: Pt still c/o R elbow pain and swelling. Able to stand at EOB and pivot from bed  to stretcher. Pt able to weight shift w/o L knee blocked during pivot.          Outcome Measures       01/09/18 0900 01/08/18 1300 01/06/18 1700    How much help from another person do you currently need...    Turning from your back to your side while in flat bed without using bedrails? 2  -RW 2  -RW 2  -PC    Moving from lying on back to sitting on the side of a flat bed without bedrails? 2  -RW 2  -RW 2  -PC    Moving to and from a bed to a chair (including a wheelchair)? 2  -RW 1  -RW 1  -PC    Standing up from a chair using your arms (e.g., wheelchair, bedside chair)? 2  -RW 1  -RW 1  -PC    Climbing 3-5 steps with a railing? 1  -RW 1  -RW 1  -PC    To walk in hospital room? 1  -RW 1  -RW 1  -PC    AM-PAC 6 Clicks Score 10  -RW 8  -RW 8  -PC    Functional Assessment    Outcome Measure Options AM-PAC 6 Clicks Basic Mobility (PT)  -RW AM-PAC 6 Clicks Basic Mobility (PT)  -RW       01/06/18 1151          How much help from another is currently needed...    Putting on and taking off regular lower body clothing? 1  -KP      Bathing (including washing, rinsing, and drying) 2  -KP      Toileting (which includes using toilet bed pan or urinal) 2  -KP      Putting on and taking off regular upper body clothing 2  -KP      Taking care of personal grooming (such as brushing teeth) 3  -KP      Eating meals 3  -KP      Score 13  -      Functional Assessment    Outcome Measure Options AM-PAC 6 Clicks Daily Activity (OT)  -        User Key  (r) = Recorded By, (t) = Taken By, (c) = Cosigned By    Initials Name Provider Type    NASIR Barkley, OTR Occupational Therapist    PC Mellissa Mendez, PT Physical Therapist    RW Lilliana Carroll, PTA Physical Therapy Assistant           Time Calculation:         PT Charges       01/09/18 0909          Time Calculation    Start Time 0904  -RW      Stop Time 0920  -RW      Time Calculation (min) 16 min  -RW      PT Received On 01/09/18  -RW      PT - Next Appointment 01/10/18   -RW        User Key  (r) = Recorded By, (t) = Taken By, (c) = Cosigned By    Initials Name Provider Type    RW Lilliana Carroll PTA Physical Therapy Assistant          Therapy Charges for Today     Code Description Service Date Service Provider Modifiers Qty    03587272252 HC PT THER PROC EA 15 MIN 1/8/2018 Lilliana Carroll, PTA GP 1    95156298562 HC PT THER SUPP EA 15 MIN 1/8/2018 Lilliana Carroll PTA GP 1    14268691039 HC PT THER PROC EA 15 MIN 1/9/2018 Lilliana Carroll PTA GP 1    84248263188 HC PT THER SUPP EA 15 MIN 1/9/2018 Lilliana Carroll, CRISTÓBAL GP 1          PT G-Codes  Outcome Measure Options: AM-PAC 6 Clicks Basic Mobility (PT)    Lilliana Carroll PTA  1/9/2018
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
## 2018-01-09 NOTE — PROGRESS NOTES
LOS: 8 days   Patient Care Team:  Provider Not In System as PCP - General    Chief Complaint: Follow-up pre-op clearance left carotid surgery, CVA    Interval History: No acute events.  No CP or SOA.      Vital Signs:  Temp:  [98.5 °F (36.9 °C)-99.2 °F (37.3 °C)] 98.5 °F (36.9 °C)  Heart Rate:  [] 82  Resp:  [16-24] 16  BP: (141-199)/(54-76) 192/73    Intake/Output Summary (Last 24 hours) at 01/09/18 0837  Last data filed at 01/08/18 1300   Gross per 24 hour   Intake               60 ml   Output                0 ml   Net               60 ml       Physical Exam:   General Appearance:    No acute distress, alert and oriented x4   Lungs:     Clear to auscultation bilaterally     Heart:    Regular rhythm and normal rate.  No murmurs, gallops, or       rubs.   Abdomen:     Soft, non-tender, non-distended.    Extremities:   Moves all extremities well.  No clubbing, cyanosis, or edema.     Results Review:      Results from last 7 days  Lab Units 01/09/18  0434   SODIUM mmol/L 141   POTASSIUM mmol/L 4.0   CHLORIDE mmol/L 105   CO2 mmol/L 23.6   BUN mg/dL 21   CREATININE mg/dL 1.25*   GLUCOSE mg/dL 195*   CALCIUM mg/dL 8.5*       Results from last 7 days  Lab Units 01/04/18  0354 01/03/18  0509   CK TOTAL U/L 415* 664*       Results from last 7 days  Lab Units 01/09/18  0434   WBC 10*3/mm3 13.79*   HEMOGLOBIN g/dL 8.1*   HEMATOCRIT % 26.0*   PLATELETS 10*3/mm3 274           Results from last 7 days  Lab Units 01/06/18  0437   CHOLESTEROL mg/dL 106           Results from last 7 days  Lab Units 01/06/18  0437   CHOLESTEROL mg/dL 106   TRIGLYCERIDES mg/dL 109   HDL CHOL mg/dL 16*       I reviewed the patient's new clinical results.        Assessment:  1. Multiple lacunar infarcts left cerebral hemisphere (embolic)  2. Severe stenosis left internal carotid artery (with previous stenting)  3. E. coli bacteremia secondary to UTI  4. Toxic metabolic encephalopathy  5. Hospital acquired pneumonia   6. Moderate right pleural  effusion  7. Hypertension   8. Fall with traumatic rhabdomyolysis  9. Ongoing tobacco use  10. EF > 70%  11. Diabetes     Plan:  -Plan for Lexiscan Cardiolite stress today  -Continue ASA, Plavix, Lipitor.  -Permissive hypertension given stroke.  BP goals per Neurology.  -Further recs on operative clearance pending the stress test today.    Thanks     Bret Kaye MD  01/09/18  8:37 AM

## 2018-01-09 NOTE — PLAN OF CARE
Problem: Patient Care Overview (Adult)  Goal: Plan of Care Review  Outcome: Ongoing (interventions implemented as appropriate)   01/09/18 0921   Coping/Psychosocial Response Interventions   Plan Of Care Reviewed With patient   Outcome Evaluation   Outcome Summary/Follow up Plan Pt still c/o R elbow pain and swelling. Able to stand at EOB and pivot from bed to stretcher. Pt able to weight shift w/o L knee blocked during pivot.

## 2018-01-09 NOTE — PLAN OF CARE
Problem: Patient Care Overview (Adult)  Goal: Plan of Care Review  Outcome: Ongoing (interventions implemented as appropriate)   01/09/18 0247   Coping/Psychosocial Response Interventions   Plan Of Care Reviewed With patient   Patient Care Overview   Progress no change   Outcome Evaluation   Outcome Summary/Follow up Plan Pt still very confused tonight, agitated at times. Cardene drip stopped shortly after midnight. Pt slept very little tonight once again.        Problem: Diabetes, Type 2 (Adult)  Goal: Signs and Symptoms of Listed Potential Problems Will be Absent or Manageable (Diabetes, Type 2)  Outcome: Ongoing (interventions implemented as appropriate)   01/09/18 0247   Diabetes, Type 2   Problems Assessed (Type 2 Diabetes) diabetic ketoacidosis (DKA)/hyperosmolar hyperglycemic state (HHS);situational response   Problems Present (Type 2 Diabetes) impaired glycemic control;diabetic ketoacidosis (DKA)/hyperosmolar hyperglycemic state (HHS);situational response       Problem: Pressure Ulcer Risk (Tank Scale) (Adult,Obstetrics,Pediatric)  Goal: Skin Integrity  Outcome: Ongoing (interventions implemented as appropriate)   01/09/18 0247   Pressure Ulcer Risk (Tank Scale) (Adult,Obstetrics,Pediatric)   Skin Integrity making progress toward outcome       Problem: Fall Risk (Adult)  Goal: Absence of Falls  Outcome: Ongoing (interventions implemented as appropriate)   01/09/18 0247   Fall Risk (Adult)   Absence of Falls making progress toward outcome       Problem: Infection, Risk/Actual (Adult)  Goal: Identify Related Risk Factors and Signs and Symptoms  Outcome: Ongoing (interventions implemented as appropriate)   01/09/18 0247   Infection, Risk/Actual   Infection, Risk/Actual: Related Risk Factors chronic illness/condition   Signs and Symptoms (Infection, Risk/Actual) blood glucose changes     Goal: Infection Prevention/Resolution  Outcome: Ongoing (interventions implemented as appropriate)   01/09/18 0247    Infection, Risk/Actual (Adult)   Infection Prevention/Resolution making progress toward outcome       Problem: Stroke (Ischemic) (Adult)  Goal: Signs and Symptoms of Listed Potential Problems Will be Absent or Manageable (Stroke)  Outcome: Ongoing (interventions implemented as appropriate)   01/09/18 9827   Stroke (Ischemic)   Problems Assessed (Stroke (Ischemic)/TIA) muscle tone abnormal;motor/sensory impairment   Problems Present (Stroke (Ischemic)/TIA) muscle tone abnormal;motor/sensory impairment;situational response;eating/swallowing impairment

## 2018-01-09 NOTE — PLAN OF CARE
Problem: Inpatient SLP  Goal: Cognitive-linguistic-Patient will improve Cognitive-linguistic skills to allow optimal participation in care  Outcome: Ongoing (interventions implemented as appropriate)   01/09/18 1424   Cognitive Linguistic- Optimal Participation in Care   Cognitive Linguistic Optimal Participation in Care- SLP, Date Established 01/09/18  (cog-communication eval completed; moderate deficits noted warranting Tx at current level of care and more extensive evaluation at next level of care )   Cognitive Linguistic Optimal Participation in Care- SLP, Outcome goal ongoing

## 2018-01-09 NOTE — PROGRESS NOTES
"DOS: 2018  NAME: Sakshi Garcia   : 1940  PCP: Provider Not In System  Chief Complaint   Patient presents with   • Fall     Stroke    Subjective: No events overnight.   Patient just back from completing stress testing.     Patient denies CP, SOA,  HA, double/blurred vision, dizziness, numbness or loss of sensation or any other new neurological complaints at this time.      Objective:  Wt Readings from Last 3 Encounters:   18 75.8 kg (167 lb)   18 71.2 kg (157 lb)   18 71.2 kg (157 lb)     Temp Readings from Last 3 Encounters:   18 98.5 °F (36.9 °C) (Oral)   17 98.2 °F (36.8 °C) (Oral)     BP Readings from Last 3 Encounters:   18 (!) 192/73   17 (!) 192/81     Pulse Readings from Last 3 Encounters:   18 82   17 76       Vital signs: BP (!) 192/73 (BP Location: Left arm, Patient Position: Lying)  Pulse 82  Temp 98.5 °F (36.9 °C) (Oral)   Resp 16  Ht 157.5 cm (62.01\")  Wt 75.8 kg (167 lb)  SpO2 98%  BMI 30.54 kg/m2      HEENT: Normocephalic, atraumatic   COR: RRR  Resp: Even and unlabored  Extremities: Equal pulses, non distal embolization    Neurological:   MS: AO to \"my apartment\". Unsure of year. [facility]. Stated age as 81-pt. Is 77. No neglect. Higher integrative function normal  CN: II-XII normal mild dysarthria noted and decreased right shoulder shrug   Motor: Normal strength and tone on the left. RUE strength at least 2/5 proximally, 1/5 distally, RLE at least 2-3/5 (unchanged)   Sensory: Intact to noxious stimuli in all extremities L>R  Coordination: Normal on left.   NIHSS 7 (unchanged)     Laboratory results:  Lab Results   Component Value Date    GLUCOSE 195 (H) 2018    CALCIUM 8.5 (L) 2018     2018    K 4.0 2018    CO2 23.6 2018     2018    BUN 21 2018    CREATININE 1.25 (H) 2018    EGFRIFNONA 41 (L) 2018    BCR 16.8 2018    ANIONGAP 12.4 2018     Lab " Results   Component Value Date    WBC 13.79 (H) 01/09/2018    HGB 8.1 (L) 01/09/2018    HCT 26.0 (L) 01/09/2018    MCV 97.7 01/09/2018     01/09/2018        Lab Results   Component Value Date    LDLCALC 68 01/06/2018     Lab Results   Component Value Date    HGBA1C 6.50 (H) 01/02/2018     Lab Results   Component Value Date    TSH 0.411 01/06/2018     No results found for: [id number]    Review and interpretation of imaging: Per Dr. Greer.     CT brain 1/4/18:  No acute stroke mass or hemorrhage, there is extensive confluent white matter disease in the periventricular and subcortex most prominent in the bifrontal regions, there is evidence of old lacunar strokes in the thalami as well as right anterior limb internal capsule, this is unchanged compared with the scan from 1/1/18      MRI brain, MRA h/n 1/5/18: IMPRESSION:  1. Multiple lacunar infarcts are appreciated involving the left cerebral  hemisphere suggesting a watershed type distribution with infarcts  involving the frontal, parietal and occipital lobes. There are  questionable areas of restricted diffusion involving the right frontal  lobe. If true this would suggest a proximal cardiac source. However, the  MRA of the neck did demonstrate signal loss involving the proximal  aspect of the left internal carotid artery where there is likely  moderate-to-severe if not severe stenosis using NASCET criteria. There  is signal loss at the origin of the left common carotid artery  suggesting a severe stenosis. There is severe stenosis involving the  right vertebral artery approximately 3 cm beyond the origin and likely  moderate if not moderate-to-severe stenosis involving the right internal  carotid artery. Further evaluation with a CT angiogram of the neck and  head is recommended. A cardiac echo could also be performed.  2. A right pleural effusion is present.      EKG 1/1/18: SR      Carotid US 1/6/18: Per Dr. Greer, PSV on right 211/37, mild to  mod stenosis; 557/216 on the left, severe stenosis     TTE 1/6/18: Interpretation Summary   · Left ventricular systolic function is hyperdynamic (EF > 70).  · calcification of the aortic valve  · Left ventricular wall thickness is consistent with mild concentric hypertrophy.  · Mild tricuspid valve regurgitation is present.  · Mild mitral valve regurgitation is present  · Estimated right ventricular systolic pressure from tricuspid regurgitation is moderately elevated (45-55 mmHg). Mild to moderate pulmonary hypertension is present.         Impression: This is a 78 yo female with history of DM, frequent falls, HTN , stroke and questionable atrial fibrillation who presented to the ED 01/01/2018 with complaint of intermittent lightheadedness.MR revealed multiple lacunar infarcts in the left cerebral hemisphere.Patient has severe ICA stenosis that will need revascularization asking cardiology for clearance.   Cardiology completed stress cardiolite today; if  negative, an endarectomy and/or endovascular interventions should be considered and vascular consulted.     Plan:  Consult cardiology for surgical clearance  Aspirin 325mg  Plavix 75mg  Lipitor 80 mg  Hydrate  Avoid hypotension, SBP goal 150-200  Neurochecks  Non-pharmacological DVT prophylaxis  EKG Tele  PT/OT/ST  Stroke Education  Goal LDL <70-recommend high dose statins-                       Serum glucose < 140

## 2018-01-09 NOTE — PLAN OF CARE
Problem: Patient Care Overview (Adult)  Goal: Plan of Care Review  Outcome: Ongoing (interventions implemented as appropriate)   01/08/18 1920   Coping/Psychosocial Response Interventions   Plan Of Care Reviewed With patient   Patient Care Overview   Progress no change   Outcome Evaluation   Outcome Summary/Follow up Plan francy bowser continues. video swallow today. Patient remains confused.        Problem: Pressure Ulcer Risk (Tank Scale) (Adult,Obstetrics,Pediatric)  Goal: Identify Related Risk Factors and Signs and Symptoms  Outcome: Ongoing (interventions implemented as appropriate)    Goal: Skin Integrity  Outcome: Ongoing (interventions implemented as appropriate)      Problem: Fall Risk (Adult)  Goal: Identify Related Risk Factors and Signs and Symptoms  Outcome: Ongoing (interventions implemented as appropriate)    Goal: Absence of Falls  Outcome: Ongoing (interventions implemented as appropriate)      Problem: Skin Integrity Impairment, Risk/Actual (Adult)  Goal: Identify Related Risk Factors and Signs and Symptoms  Outcome: Ongoing (interventions implemented as appropriate)    Goal: Skin Integrity/Wound Healing  Outcome: Ongoing (interventions implemented as appropriate)      Problem: Stroke (Ischemic) (Adult)  Goal: Signs and Symptoms of Listed Potential Problems Will be Absent or Manageable (Stroke)  Outcome: Ongoing (interventions implemented as appropriate)

## 2018-01-09 NOTE — PLAN OF CARE
Problem: Inpatient SLP  Goal: Dysphagia- Patient will safely consume diet as per recommendation with no signs/symptoms of aspiration  Outcome: Ongoing (interventions implemented as appropriate)   01/09/18 1410   Safely Consume Diet   Safely Consume Diet- SLP, Date Established 01/09/18   Safely Consume Diet- SLP, Time to Achieve by discharge   Safely Consume Diet- SLP, Outcome goal ongoing  (Pt seen for dys Tx. Unable to assess for diet tolerance b/c NPO 2' stress test today)

## 2018-01-09 NOTE — PROGRESS NOTES
The Cardiolite stress test showed a small apical infarct with a small to moderate amount of laura-infarct ischemia.  However, this is a small distribution, and is still consistent with a low risk stress test.  I do not feel that a cardiac catheterization is indicated in this instance.  I would recommend proceeding with surgery as planned.  Given her risk factors, she is low to moderate risk, placing her in a 2-5% category for complications.    Ivan Kaye M.D.

## 2018-01-09 NOTE — THERAPY TREATMENT NOTE
Acute Care - Speech Language Pathology   Swallow Treatment Note [facility]     Patient Name: Sakshi Garcia  : 1940  MRN: [id number]  Today's Date: 2018  Onset of Illness/Injury or Date of Surgery Date: 18            Admit Date: 2018    The patient was seen on this date for 1st dysphagia treatment. The patient was NPO d/t stress test today. The patient's mouth was dry, which agitated her. Following oral care and application of mouth moisterizer, the patient completed limited number of swallowing exercises due refusal to participate because of dry mouth. The patient completed the following exercises approximately 4 times each as follows:  (1) Tongue-hold (Masaka) swallow completed with 100% accuracy with cues  (2) Effortful swallow completed with 100% accuracy with cues    REC: (1) SLP to f/u for dysphagia treatment and readiness for diet upgrade. (2) Cont diet rec of mech soft (no mix) and nectar thickened liquids.      Visit Dx:      ICD-10-CM ICD-9-CM   1. Traumatic rhabdomyolysis, initial encounter T79.6XXA 958.6   2. Fall, initial encounter W19.XXXA E888.9   3. Renal insufficiency N28.9 593.9   4. Dizziness R42 780.4   5. Unsteadiness on feet R26.81 781.2     Patient Active Problem List   Diagnosis   • Traumatic rhabdomyolysis   • Generalized weakness   • Fall   • Acute cystitis without hematuria   • CKD (chronic kidney disease)   • DM (diabetes mellitus)   • HTN (hypertension)   • Altered mental status   • Sepsis   • Bacteremia   • DNR (do not resuscitate)             Adult Rehabilitation Note       18 1400 18 0900 18 1300    Rehab Assessment/Intervention    Discipline speech language pathologist  -RH physical therapy assistant  -RW physical therapy assistant  -RW    Document Type  therapy note (daily note)  -RW therapy note (daily note)  -RW    Subjective Information  agree to therapy;complains of;pain  -RW     Patient Effort, Rehab Treatment  good  -RW fair  -RW     Symptoms Noted During/After Treatment  increased pain  -RW     Precautions/Limitations  fall precautions;oxygen therapy device and L/min   2L O2  -RW fall precautions  -RW    Recorded by [RH] Sidney Azevedo, MS CCC-SLP [RW] Lilliana Carroll PTA [RW] Lilliana Carroll PTA    Pain Assessment    Pain Assessment  Gibson-Collins FACES  -RW Gibson-Baker FACES  -RW    Gibson-Collins FACES Pain Rating  8  -RW 6  -RW    Pain Type  Acute pain  -RW Acute pain  -RW    Pain Location  Elbow  -RW Elbow  -RW    Pain Orientation  Right  -RW Right  -RW    Pain Intervention(s)  Repositioned;Elevated;Rest  -RW Repositioned;Ambulation/increased activity;Elevated;Rest  -RW    Response to Interventions  tolerated at rest  -RW tolerated  -RW    Recorded by  [RW] Lilliana Carroll PTA [RW] Lilliana Carroll PTA    Cognitive Assessment/Intervention    Current Cognitive/Communication Assessment impaired  -RH impaired  -RW impaired  -RW    Orientation Status person;place  -RH oriented to;person  -RW oriented to;person  -RW    Follows Commands/Answers Questions 75% of the time;able to follow single-step instructions;needs increased time  -RH 75% of the time;needs cueing;needs repetition  -RW 50% of the time;needs cueing;needs repetition  -RW    Personal Safety  severe impairment;at risk behaviors demonstrated;decreased awareness, need for assist;decreased awareness, need for safety;decreased insight to deficits  -RW severe impairment;at risk behaviors demonstrated;decreased awareness, need for assist;decreased awareness, need for safety;decreased insight to deficits  -RW    Personal Safety Interventions  fall prevention program maintained;gait belt;nonskid shoes/slippers when out of bed  -RW fall prevention program maintained;nonskid shoes/slippers when out of bed  -RW    Short/Long Term Memory moderate impairment, short term memory  -RH      Recorded by [RH] Sidney Azevedo, MS CCC-SLP [RW] Lilliana Carroll PTA [RW] Lilliana Carroll PTA    Bed Mobility,  Assessment/Treatment    Bed Mobility, Assistive Device  bed rails;head of bed elevated  -RW head of bed elevated;draw sheet  -RW    Bed Mobility, Roll Left, Ritchie  moderate assist (50% patient effort);verbal cues required;nonverbal cues required (demo/gesture)  -RW maximum assist (25% patient effort);verbal cues required;nonverbal cues required (demo/gesture)  -RW    Bed Mobility, Roll Right, Ritchie   maximum assist (25% patient effort);verbal cues required;nonverbal cues required (demo/gesture)  -RW    Bed Mob, Supine to Sit, Ritchie   maximum assist (25% patient effort);2 person assist required;verbal cues required;nonverbal cues required (demo/gesture)  -RW    Bed Mob, Sit to Supine, Ritchie   maximum assist (25% patient effort);dependent (less than 25% patient effort);2 person assist required;verbal cues required;nonverbal cues required (demo/gesture)  -RW    Bed Mob, Sidelying to Sit, Ritchie  moderate assist (50% patient effort);verbal cues required;nonverbal cues required (demo/gesture)  -RW     Bed Mob, Sit to Sidelying, Ritchie  maximum assist (25% patient effort);2 person assist required;verbal cues required;nonverbal cues required (demo/gesture)  -RW     Bed Mobility, Safety Issues  cognitive deficits limit understanding;decreased use of arms for pushing/pulling  -RW decreased use of arms for pushing/pulling;decreased use of legs for bridging/pushing;impaired trunk control for bed mobility  -RW    Bed Mobility, Impairments  muscle tone abnormal;strength decreased;impaired balance;coordination impaired;motor control impaired  -RW strength decreased;impaired balance;coordination impaired;postural control impaired;muscle tone abnormal  -RW    Bed Mobility, Comment   Assist to maintain sitting balance at EOB  -RW    Recorded by  [RW] Lilliana Carroll PTA [RW] Lilliana Carroll PTA    Transfer Assessment/Treatment    Transfers, Bed-Chair Ritchie  moderate assist (50%  patient effort);2 person assist required;verbal cues required;nonverbal cues required (demo/gesture);hand held assist   bed to stretcher  -RW     Transfers, Chair-Bed Carrie  not tested  -RW     Transfers, Sit-Stand Carrie  moderate assist (50% patient effort);2 person assist required;verbal cues required;nonverbal cues required (demo/gesture);hand held assist  -RW not tested  -RW    Transfers, Stand-Sit Carrie  moderate assist (50% patient effort);2 person assist required;verbal cues required;nonverbal cues required (demo/gesture);hand held assist  -RW     Transfer, Safety Issues  sequencing ability decreased;weight-shifting ability decreased;balance decreased during turns  -RW     Transfer, Impairments  muscle tone abnormal;strength decreased;impaired balance;coordination impaired;motor control impaired  -RW     Transfer, Comment  Pt stood x4 at EOB w/ HHA, but LUE supported due to pain. No instances of L knee buckling in standing or during transfer. Pt able to pivot to L side and weight shift during transfer.  -RW Pt w/ increased fatigue and unable to maintain sitting balance at EOB. Further activity deferred  -RW    Recorded by  [RW] Lilliana Carroll PTA [RW] Lilliana Carroll PTA    Gait Assessment/Treatment    Gait, Carrie Level  not tested;not appropriate to assess  -RW     Recorded by  [RW] Lilliana Carroll PTA     Balance Skills Training    Sitting-Level of Assistance  Contact guard  -RW Minimum assistance;Moderate assistance  -RW    Sitting-Balance Support  Feet supported;Left upper extremity supported  -RW Feet supported;Left upper extremity supported  -RW    Sitting-Balance Activities  Trunk control activities  -RW Right UE Weight Bearing;Trunk control activities;Forward lean  -RW    Sitting # of Minutes  3  -RW 6  -RW    Recorded by  [RW] Lilliana Carroll PTA [RW] Lilliana Carroll PTA    Therapy Exercises    Bilateral Lower Extremities  AROM:;5 reps;sitting;ankle pumps/circles;LAQ   -RW     Recorded by  [RW] Lilliana Carroll PTA     Positioning and Restraints    Pre-Treatment Position  in bed  -RW in bed  -RW    Post Treatment Position  other  -RW bed  -RW    In Bed   fowlers;call light within reach;encouraged to call for assist;exit alarm on;notified nsg  -RW    Other Position  with other staff   on stretcher w/ transport  -RW     Recorded by  [RW] Lilliana Carroll PTA [RW] Lilliana Carroll PTA      User Key  (r) = Recorded By, (t) = Taken By, (c) = Cosigned By    Initials Name Effective Dates    RW Lilliana Carroll PTA 04/06/16 -      Sidney Azevedo, MS CCC-SLP 07/07/16 -                    SLP Goals       01/09/18 1410 01/08/18 1001 01/06/18 1437    Safely Consume Diet    Safely Consume Diet- SLP, Date Established 01/09/18  -  01/06/18  -HS    Safely Consume Diet- SLP, Time to Achieve by discharge  -  by discharge  -HS    Safely Consume Diet- SLP, Additional Goal   Bedside swallow evaluation completed. Recommend a mechanical soft diet with nectar thick liquids. Meds whole or crushed in puree. Ice chips or small sips of water 30 minutes after meals only after oral care and with supervision. Plan to proceed with VFSS early next week to further assess swallow function.  -HS    Safely Consume Diet- SLP, Outcome goal ongoing   Pt seen for dys Tx. Unable to assess for diet tolerance b/c   - goal ongoing  -       01/04/18 1547          Safely Consume Diet    Safely Consume Diet- SLP, Date Established 01/04/18  -      Safely Consume Diet- SLP, Time to Achieve by discharge  -        User Key  (r) = Recorded By, (t) = Taken By, (c) = Cosigned By    Initials Name Provider Type    BIB Buchanan, MS CCC-SLP Speech and Language Pathologist     Lisa Mcadams, MS CCC-SLP Speech and Language Pathologist     Sidney Azevedo, MS CCC-SLP Speech and Language Pathologist          EDUCATION  The patient has been educated in the following areas:   Dysphagia (Swallowing Impairment) Oral  Care/Hydration.    SLP Recommendation and Plan                                                   SLP Outcome Measures (last 72 hours)      SLP Outcome Measures       01/08/18 1200          SLP Outcome Measures    Outcome Measure Used? Adult NOMS  -      FCM Scores    FCM Chosen Swallowing  -      Swallowing FCM Score 4  -        User Key  (r) = Recorded By, (t) = Taken By, (c) = Cosigned By    Initials Name Effective Dates     Lisa Mcadams MS CCC-SLP 07/13/17 -              Time Calculation:         Time Calculation- SLP       01/09/18 1412          Time Calculation- SLP    SLP Received On 01/09/18  -        User Key  (r) = Recorded By, (t) = Taken By, (c) = Cosigned By    Initials Name Provider Type     Sidney Azevedo MS CCC-SLP Speech and Language Pathologist          Therapy Charges for Today     Code Description Service Date Service Provider Modifiers Qty    [id number] HC ST TREATMENT SWALLOW 1 1/9/2018 Sidney Azevedo MS CCC-SLP GN 1                 Sidney Azevedo MS CCC-SLP  1/9/2018

## 2018-01-09 NOTE — SIGNIFICANT NOTE
01/09/18 1029   Rehab Treatment   Discipline occupational therapist   Treatment Not Performed (RN confirms pt off floor to stress test)   Recommendation   OT - Next Appointment 01/10/18

## 2018-01-09 NOTE — PROGRESS NOTES
Vascular lab right upper extremity venous doppler complete, prelim new superficial thrombus right arm above elbow - Darcy, RN aware

## 2018-01-09 NOTE — PROGRESS NOTES
"  Name: Sakshi Garcia  ADMIT: 2018   Age/Sex: 77 y.o.female LOS:  LOS: 8 days    :    1940     ROOM: [facility]   MRN:    [id number]    PCP:    Provider Not In System     Subjective   Complaining of right arm pain and swelling. No other complaints. No sob.     Objective   Vital Signs  Temp:  [98.5 °F (36.9 °C)-99.2 °F (37.3 °C)] 98.5 °F (36.9 °C)  Heart Rate:  [] 82  Resp:  [16-24] 16  BP: (141-192)/(54-75) 192/73  Body mass index is 30.54 kg/(m^2).    Objective:  General Appearance:  Comfortable and well-appearing.    Vital signs: (most recent): Blood pressure (!) 192/73, pulse 82, temperature 98.5 °F (36.9 °C), temperature source Oral, resp. rate 16, height 157.5 cm (62.01\"), weight 75.8 kg (167 lb), SpO2 98 %.  Vital signs are normal.    HEENT: Normal HEENT exam.    Lungs:  Normal effort.  Breath sounds clear to auscultation.    Heart: Tachycardia.  Regular rhythm.    Abdomen: Abdomen is soft and non-distended.  Bowel sounds are normal.   There is no abdominal tenderness.     Extremities: There is no deformity or dependent edema.  (Right arm painful with flexion of elbow)  Neurological: Patient is alert and oriented to person, place and time.    Skin:  Warm and dry.  No rash.             Results Review:       I reviewed the patient's new clinical results.    Results from last 7 days  Lab Units 18  0434 18  0443 18  0635 18  0437 18  0858 18  0354 18  0509   WBC 10*3/mm3 13.79* 14.07* 14.64* 15.31* 14.72* 17.01* 18.66*   HEMOGLOBIN g/dL 8.1* 8.6* 8.5* 8.1* 8.8* 8.5* 9.2*   PLATELETS 10*3/mm3 274 254 237 215 177 156 173     Results from last 7 days  Lab Units 18  0434 18  0444 18  0635 18  0437 18  0858 18  0354 18  0509   SODIUM mmol/L 141 144 145 146* 142 137 136   POTASSIUM mmol/L 4.0 4.0 3.1* 3.1* 2.9* 3.4* 3.5   CHLORIDE mmol/L 105 108* 108* 111* 106 103 102   CO2 mmol/L 23.6 19.1* 22.8 22.0 22.7 20.2* 18.6*   BUN " mg/dL 21 20 22 29* 31* 39* 44*   CREATININE mg/dL 1.25* 1.29* 1.36* 1.46* 1.41* 1.39* 1.48*   GLUCOSE mg/dL 195* 176* 157* 149* 238* 189* 132*   Estimated Creatinine Clearance: 35.4 mL/min (by C-G formula based on Cr of 1.25).  Results from last 7 days  Lab Units 01/09/18  0434 01/08/18  0444 01/07/18  0635 01/06/18  0437 01/05/18  0858 01/04/18  0354 01/03/18  0509   CALCIUM mg/dL 8.5* 8.7 8.9 8.6 9.0 8.9 8.8       RADIOLOGY  1/9/2018  Pending      aspirin 325 mg Oral Daily   atorvastatin 80 mg Oral Daily   clopidogrel 300 mg Oral Once   clopidogrel 75 mg Oral Daily   glipiZIDE 2.5 mg Oral BID AC   insulin aspart 0-9 Units Subcutaneous 4x Daily With Meals & Nightly   ipratropium-albuterol 3 mL Nebulization Q4H - RT   linagliptin 5 mg Oral Daily   metoprolol succinate XL 25 mg Oral Q24H   piperacillin-tazobactam 3.375 g Intravenous Q8H   potassium chloride 20 mEq Oral TID       niCARdipine 5-15 mg/hr Last Rate: Stopped (01/09/18 0010)   NPO Diet Sips With Meds      Assessment/Plan   Assessment:   Principal Problem:    Acute cystitis without hematuria  Active Problems:    Traumatic rhabdomyolysis    Generalized weakness    Fall    CKD (chronic kidney disease)    DM (diabetes mellitus)    HTN (hypertension)    Altered mental status    Sepsis    Bacteremia    DNR (do not resuscitate)        Plan:   1. Ecoli UTI with bacteremia  - switched to levaquin to complete a 14 day course but now on Zosyn for likely aspiration pna. Day 8 overall of abx therapy  - repeat BCx NGTD  - WBC stable  - cont to monitor      2. Aspiration vs Hospital acquired PNA with Moderate R pleural effusion  - Cont zosyn, day 5. Vanc d/c'd 1/8 as MRSA screen negative  - BCx NGTD.   - procal mildly elevated. Coming down at last check  - SLP eval  - mod R pleural effusion. CT shows only mild-mod bilaterally. No indication for thoracentesis per Pulm  - will cont abx for now to complete a 7 day course      3. Encephalopathy 2/2 Acute CVA and above  infections  - CT head negative. Wonder if this will be her new baseline  - on ASA and plavix. MRI with multiple CVA c/w cardio-embolic source or atherosclerotic plaque. 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   - will need revascularization. Had stress test this am. If negative will be candidate for endovascular repair. Vascular surgery consult when stress results back  - TTE results noted  - cont as needed anti-psychotics  - permissive HTN for now. Cardene drip as needed to keep -200      4. Rhabdo  -resolved      5. DM2  - controlled on oral meds      6. CKD3  - Cr stable      7. Anemia  - stable      8 Hypernatremia  - d/c IVF    9. Right arm/elbow pain  - RUE venous duplex and right elbow xray      Disposition  TBD.      Chace Chester MD  [facility]  01/09/18  12:39 PM

## 2018-01-09 NOTE — PROGRESS NOTES
[facility] INPATIENT PROGRESS NOTE         [address]    1/9/2018      PATIENT IDENTIFICATION:   Name:  Sakshi Garcia      MRN:  [id number]     77 y.o.  female             LOS 8    Reason for visit: Follow-up pneumonia with shortness of breath and pleural effusion      SUBJECTIVE:    Complains of shortness of breath and nonproductive cough.  No nausea or vomiting.  Complains of weakness.    Objective   OBJECTIVE:    Vital Sign Min/Max for last 24 hours  Temp  Min: 98.5 °F (36.9 °C)  Max: 99.2 °F (37.3 °C)   BP  Min: 141/75  Max: 199/76   Pulse  Min: 78  Max: 104   Resp  Min: 20  Max: 24   SpO2  Min: 92 %  Max: 98 %   Flow (L/min)  Min: 2  Max: 2   Weight  Min: 75.8 kg (167 lb)  Max: 75.8 kg (167 lb)                         Body mass index is 30.54 kg/(m^2).    Intake/Output Summary (Last 24 hours) at 01/09/18 0722  Last data filed at 01/08/18 1300   Gross per 24 hour   Intake               60 ml   Output                0 ml   Net               60 ml         Exam:  GEN:  No distress, appears stated age  EYES:   PERRL, anicteric sclera  ENT:    External ears/nose normal, OP clear  NECK:  No adenopathy, midline trachea  LUNGS: Normal chest on inspection, palpation and Diminished bilaterally on auscultation  CV:  Normal S1S2, without murmur  ABD:  Non tender, non distended, no hepatosplenomegaly, +BS  EXT:  No edema, cyanosis or clubbing    Scheduled meds:      aspirin 325 mg Oral Daily   atorvastatin 80 mg Oral Daily   clopidogrel 300 mg Oral Once   clopidogrel 75 mg Oral Daily   glipiZIDE 2.5 mg Oral BID AC   insulin aspart 0-9 Units Subcutaneous 4x Daily With Meals & Nightly   ipratropium-albuterol 3 mL Nebulization Q4H - RT   linagliptin 5 mg Oral Daily   metoprolol succinate XL 25 mg Oral Q24H   piperacillin-tazobactam 3.375 g Intravenous Q8H   potassium chloride 20 mEq Oral TID     IV meds:                          niCARdipine 5-15 mg/hr Last Rate: Stopped (01/09/18 0010)   sodium chloride 50  mL/hr Last Rate: 50 mL/hr (01/08/18 1455)     Data Review:    Results from last 7 days  Lab Units 01/09/18  0434 01/08/18 0444 01/07/18  0635 01/06/18  0437 01/05/18  0858   SODIUM mmol/L 141 144 145 146* 142   POTASSIUM mmol/L 4.0 4.0 3.1* 3.1* 2.9*   CHLORIDE mmol/L 105 108* 108* 111* 106   CO2 mmol/L 23.6 19.1* 22.8 22.0 22.7   BUN mg/dL 21 20 22 29* 31*   CREATININE mg/dL 1.25* 1.29* 1.36* 1.46* 1.41*   GLUCOSE mg/dL 195* 176* 157* 149* 238*   CALCIUM mg/dL 8.5* 8.7 8.9 8.6 9.0         Estimated Creatinine Clearance: 35.4 mL/min (by C-G formula based on Cr of 1.25).    Results from last 7 days  Lab Units 01/09/18  0434 01/08/18 0443 01/07/18  0635 01/06/18  0437 01/05/18  0858   WBC 10*3/mm3 13.79* 14.07* 14.64* 15.31* 14.72*   HEMOGLOBIN g/dL 8.1* 8.6* 8.5* 8.1* 8.8*   PLATELETS 10*3/mm3 274 254 237 215 177                   Results from last 7 days  Lab Units 01/07/18  0635 01/05/18  0858   PROCALCITONIN ng/mL 0.33* 0.86*         2-D echo reviewed: EF hyperdynamic greater than 70%.  Moderate pulmonary hypertension    Microbiology reviewed      Chest x-ray viewed 1/7/18        CT chest viewed: Small bilateral effusions and atelectasis/pneumonia      )Assessment   ASSESSMENT:   PNA HAP  Atelectasis and bilateral small effusions  Moderate pulmonary hypertension  Ecoli bactermia 2/2UTI  TME  CVA  Rhabdo resolved  DMII  HTN uncontrolled    PLAN:  No need for thoracentesis.  Continue bronchodilators and encourage pulmonary toilet.  Continue antibiotics for bacterial pneumonia and narrow based on culture results.  DCd vancomycin.  Would consider DC iv fluids when eating.  Revascularization plan for near future noted.  PT/ST Hector Dailey MD  Pulmonary and Critical Care Medicine  [facility], [facility]  1/9/2018    7:22 AM

## 2018-01-10 LAB
ANION GAP SERPL CALCULATED.3IONS-SCNC: 10.3 MMOL/L
BACTERIA SPEC AEROBE CULT: NORMAL
BACTERIA SPEC AEROBE CULT: NORMAL
BASOPHILS # BLD AUTO: 0.06 10*3/MM3 (ref 0–0.2)
BASOPHILS NFR BLD AUTO: 0.4 % (ref 0–1.5)
BH CV XLRA MEAS LEFT CCA RATIO VEL: -113 CM/SEC
BH CV XLRA MEAS LEFT DIST CCA EDV: -10.2 CM/SEC
BH CV XLRA MEAS LEFT DIST CCA PSV: -113 CM/SEC
BH CV XLRA MEAS LEFT DIST ICA EDV: -13.8 CM/SEC
BH CV XLRA MEAS LEFT DIST ICA PSV: -70 CM/SEC
BH CV XLRA MEAS LEFT ICA RATIO VEL: -557 CM/SEC
BH CV XLRA MEAS LEFT ICA/CCA RATIO: 4.9
BH CV XLRA MEAS LEFT MID ICA EDV: 13 CM/SEC
BH CV XLRA MEAS LEFT MID ICA PSV: 53 CM/SEC
BH CV XLRA MEAS LEFT PROX CCA EDV: 15.7 CM/SEC
BH CV XLRA MEAS LEFT PROX CCA PSV: 115 CM/SEC
BH CV XLRA MEAS LEFT PROX ECA EDV: 18.7 CM/SEC
BH CV XLRA MEAS LEFT PROX ECA PSV: 359 CM/SEC
BH CV XLRA MEAS LEFT PROX ICA EDV: -216 CM/SEC
BH CV XLRA MEAS LEFT PROX ICA PSV: -557 CM/SEC
BH CV XLRA MEAS LEFT PROX SCLA PSV: 167 CM/SEC
BH CV XLRA MEAS LEFT VERTEBRAL A EDV: -41.2 CM/SEC
BH CV XLRA MEAS LEFT VERTEBRAL A PSV: -177 CM/SEC
BH CV XLRA MEAS RIGHT CCA RATIO VEL: -75 CM/SEC
BH CV XLRA MEAS RIGHT DIST CCA EDV: -14.7 CM/SEC
BH CV XLRA MEAS RIGHT DIST CCA PSV: -75 CM/SEC
BH CV XLRA MEAS RIGHT DIST ICA EDV: -14.7 CM/SEC
BH CV XLRA MEAS RIGHT DIST ICA PSV: -43.1 CM/SEC
BH CV XLRA MEAS RIGHT ICA RATIO VEL: -211 CM/SEC
BH CV XLRA MEAS RIGHT ICA/CCA RATIO: 2.8
BH CV XLRA MEAS RIGHT MID ICA EDV: -18.1 CM/SEC
BH CV XLRA MEAS RIGHT MID ICA PSV: -64.4 CM/SEC
BH CV XLRA MEAS RIGHT PROX CCA EDV: 8.8 CM/SEC
BH CV XLRA MEAS RIGHT PROX CCA PSV: 84.4 CM/SEC
BH CV XLRA MEAS RIGHT PROX ECA PSV: -282 CM/SEC
BH CV XLRA MEAS RIGHT PROX ICA EDV: -37.4 CM/SEC
BH CV XLRA MEAS RIGHT PROX ICA PSV: -211 CM/SEC
BH CV XLRA MEAS RIGHT PROX SCLA PSV: 138 CM/SEC
BH CV XLRA MEAS RIGHT VERTEBRAL A EDV: -22 CM/SEC
BH CV XLRA MEAS RIGHT VERTEBRAL A PSV: -115 CM/SEC
BUN BLD-MCNC: 18 MG/DL (ref 8–23)
BUN/CREAT SERPL: 15.7 (ref 7–25)
CALCIUM SPEC-SCNC: 8.7 MG/DL (ref 8.6–10.5)
CHLORIDE SERPL-SCNC: 107 MMOL/L (ref 98–107)
CO2 SERPL-SCNC: 27.7 MMOL/L (ref 22–29)
CREAT BLD-MCNC: 1.15 MG/DL (ref 0.57–1)
DEPRECATED RDW RBC AUTO: 53.1 FL (ref 37–54)
EOSINOPHIL # BLD AUTO: 0.36 10*3/MM3 (ref 0–0.7)
EOSINOPHIL NFR BLD AUTO: 2.6 % (ref 0.3–6.2)
ERYTHROCYTE [DISTWIDTH] IN BLOOD BY AUTOMATED COUNT: 14.7 % (ref 11.7–13)
GFR SERPL CREATININE-BSD FRML MDRD: 46 ML/MIN/1.73
GLUCOSE BLD-MCNC: 138 MG/DL (ref 65–99)
GLUCOSE BLDC GLUCOMTR-MCNC: 156 MG/DL (ref 70–130)
GLUCOSE BLDC GLUCOMTR-MCNC: 188 MG/DL (ref 70–130)
GLUCOSE BLDC GLUCOMTR-MCNC: 199 MG/DL (ref 70–130)
GLUCOSE BLDC GLUCOMTR-MCNC: 220 MG/DL (ref 70–130)
HCT VFR BLD AUTO: 27.9 % (ref 35.6–45.5)
HGB BLD-MCNC: 8.7 G/DL (ref 11.9–15.5)
IMM GRANULOCYTES # BLD: 0.29 10*3/MM3 (ref 0–0.03)
IMM GRANULOCYTES NFR BLD: 2.1 % (ref 0–0.5)
LEFT ARM BP: NORMAL MMHG
LYMPHOCYTES # BLD AUTO: 1.48 10*3/MM3 (ref 0.9–4.8)
LYMPHOCYTES NFR BLD AUTO: 10.7 % (ref 19.6–45.3)
MCH RBC QN AUTO: 30.9 PG (ref 26.9–32)
MCHC RBC AUTO-ENTMCNC: 31.2 G/DL (ref 32.4–36.3)
MCV RBC AUTO: 98.9 FL (ref 80.5–98.2)
MONOCYTES # BLD AUTO: 1.41 10*3/MM3 (ref 0.2–1.2)
MONOCYTES NFR BLD AUTO: 10.2 % (ref 5–12)
NEUTROPHILS # BLD AUTO: 10.28 10*3/MM3 (ref 1.9–8.1)
NEUTROPHILS NFR BLD AUTO: 74 % (ref 42.7–76)
PLATELET # BLD AUTO: 316 10*3/MM3 (ref 140–500)
PMV BLD AUTO: 9.6 FL (ref 6–12)
POTASSIUM BLD-SCNC: 4.3 MMOL/L (ref 3.5–5.2)
RBC # BLD AUTO: 2.82 10*6/MM3 (ref 3.9–5.2)
RIGHT ARM BP: NORMAL MMHG
SODIUM BLD-SCNC: 145 MMOL/L (ref 136–145)
WBC NRBC COR # BLD: 13.88 10*3/MM3 (ref 4.5–10.7)

## 2018-01-10 PROCEDURE — 82962 GLUCOSE BLOOD TEST: CPT

## 2018-01-10 PROCEDURE — 80048 BASIC METABOLIC PNL TOTAL CA: CPT | Performed by: INTERNAL MEDICINE

## 2018-01-10 PROCEDURE — 99232 SBSQ HOSP IP/OBS MODERATE 35: CPT | Performed by: INTERNAL MEDICINE

## 2018-01-10 PROCEDURE — 63710000001 INSULIN ASPART PER 5 UNITS: Performed by: INTERNAL MEDICINE

## 2018-01-10 PROCEDURE — 99232 SBSQ HOSP IP/OBS MODERATE 35: CPT | Performed by: NURSE PRACTITIONER

## 2018-01-10 PROCEDURE — 94799 UNLISTED PULMONARY SVC/PX: CPT

## 2018-01-10 PROCEDURE — 25010000002 ONDANSETRON PER 1 MG: Performed by: NURSE PRACTITIONER

## 2018-01-10 PROCEDURE — 97110 THERAPEUTIC EXERCISES: CPT

## 2018-01-10 PROCEDURE — 25010000002 PIPERACILLIN SOD-TAZOBACTAM PER 1 G: Performed by: INTERNAL MEDICINE

## 2018-01-10 PROCEDURE — 85025 COMPLETE CBC W/AUTO DIFF WBC: CPT | Performed by: INTERNAL MEDICINE

## 2018-01-10 RX ORDER — AMLODIPINE BESYLATE 5 MG/1
5 TABLET ORAL
Status: DISCONTINUED | OUTPATIENT
Start: 2018-01-10 | End: 2018-01-19

## 2018-01-10 RX ADMIN — POTASSIUM CHLORIDE 20 MEQ: 750 CAPSULE, EXTENDED RELEASE ORAL at 18:08

## 2018-01-10 RX ADMIN — METOPROLOL SUCCINATE 25 MG: 25 TABLET, FILM COATED, EXTENDED RELEASE ORAL at 10:33

## 2018-01-10 RX ADMIN — INSULIN ASPART 2 UNITS: 100 INJECTION, SOLUTION INTRAVENOUS; SUBCUTANEOUS at 21:43

## 2018-01-10 RX ADMIN — GLIPIZIDE 2.5 MG: 5 TABLET ORAL at 18:08

## 2018-01-10 RX ADMIN — GLIPIZIDE 2.5 MG: 5 TABLET ORAL at 10:32

## 2018-01-10 RX ADMIN — AMLODIPINE BESYLATE 5 MG: 5 TABLET ORAL at 14:23

## 2018-01-10 RX ADMIN — ATORVASTATIN CALCIUM 80 MG: 80 TABLET, FILM COATED ORAL at 10:32

## 2018-01-10 RX ADMIN — ASPIRIN 325 MG: 325 TABLET ORAL at 10:32

## 2018-01-10 RX ADMIN — INSULIN ASPART 4 UNITS: 100 INJECTION, SOLUTION INTRAVENOUS; SUBCUTANEOUS at 14:24

## 2018-01-10 RX ADMIN — IPRATROPIUM BROMIDE AND ALBUTEROL SULFATE 3 ML: .5; 3 SOLUTION RESPIRATORY (INHALATION) at 06:34

## 2018-01-10 RX ADMIN — LINAGLIPTIN 5 MG: 5 TABLET, FILM COATED ORAL at 10:32

## 2018-01-10 RX ADMIN — TAZOBACTAM SODIUM AND PIPERACILLIN SODIUM 3.38 G: 375; 3 INJECTION, SOLUTION INTRAVENOUS at 23:03

## 2018-01-10 RX ADMIN — TAZOBACTAM SODIUM AND PIPERACILLIN SODIUM 3.38 G: 375; 3 INJECTION, SOLUTION INTRAVENOUS at 16:16

## 2018-01-10 RX ADMIN — CLOPIDOGREL 75 MG: 75 TABLET, FILM COATED ORAL at 10:33

## 2018-01-10 RX ADMIN — INSULIN ASPART 2 UNITS: 100 INJECTION, SOLUTION INTRAVENOUS; SUBCUTANEOUS at 18:09

## 2018-01-10 RX ADMIN — IPRATROPIUM BROMIDE AND ALBUTEROL SULFATE 3 ML: .5; 3 SOLUTION RESPIRATORY (INHALATION) at 10:12

## 2018-01-10 RX ADMIN — POTASSIUM CHLORIDE 20 MEQ: 750 CAPSULE, EXTENDED RELEASE ORAL at 20:40

## 2018-01-10 RX ADMIN — POTASSIUM CHLORIDE 20 MEQ: 750 CAPSULE, EXTENDED RELEASE ORAL at 10:32

## 2018-01-10 RX ADMIN — IPRATROPIUM BROMIDE AND ALBUTEROL SULFATE 3 ML: .5; 3 SOLUTION RESPIRATORY (INHALATION) at 03:35

## 2018-01-10 RX ADMIN — TAZOBACTAM SODIUM AND PIPERACILLIN SODIUM 3.38 G: 375; 3 INJECTION, SOLUTION INTRAVENOUS at 10:33

## 2018-01-10 RX ADMIN — ONDANSETRON 4 MG: 2 INJECTION INTRAMUSCULAR; INTRAVENOUS at 00:30

## 2018-01-10 RX ADMIN — NICARDIPINE HYDROCHLORIDE 2.5 MG/HR: 2.5 INJECTION INTRAVENOUS at 02:57

## 2018-01-10 NOTE — PLAN OF CARE
"Problem: Patient Care Overview (Adult)  Goal: Plan of Care Review  Outcome: Ongoing (interventions implemented as appropriate)   01/10/18 9189   Coping/Psychosocial Response Interventions   Plan Of Care Reviewed With patient   Outcome Evaluation   Outcome Summary/Follow up Plan Initially pt motivated and wanting to get OOB. Less assist to get to EOB today. When attempting to stand and pivot to chair pt would not assist w/ transfer stating \"I don't have the will power today\". Tried to encourage pt to get up to chair w/ PT, but refused. Would recommend use of sling to maintain NWB for RUE.          "

## 2018-01-10 NOTE — PROGRESS NOTES
[facility] INPATIENT PROGRESS NOTE         [address]    1/10/2018      PATIENT IDENTIFICATION:   Name:  Sakshi Garcia      MRN:  [id number]     77 y.o.  female             LOS 9    Reason for visit: Follow-up pneumonia with shortness of breath and pleural effusion      SUBJECTIVE:    Complains of shortness of breath and nonproductive cough.  Has had some  nausea this morning.  Complains of weakness.    Objective   OBJECTIVE:    Vital Sign Min/Max for last 24 hours  Temp  Min: 98 °F (36.7 °C)  Max: 98.5 °F (36.9 °C)   BP  Min: 151/60  Max: 195/88   Pulse  Min: 73  Max: 91   Resp  Min: 16  Max: 20   SpO2  Min: 91 %  Max: 98 %   Flow (L/min)  Min: 1  Max: 2   Weight  Min: 70.9 kg (156 lb 4.8 oz)  Max: 70.9 kg (156 lb 4.8 oz)                         Body mass index is 28.58 kg/(m^2).    Intake/Output Summary (Last 24 hours) at 01/10/18 0721  Last data filed at 01/09/18 2334   Gross per 24 hour   Intake              110 ml   Output                0 ml   Net              110 ml         Exam:  GEN:  No distress, appears stated age  EYES:   PERRL, anicteric sclera  ENT:    External ears/nose normal, OP clear  NECK:  No adenopathy, midline trachea  LUNGS: Normal chest on inspection, palpation and Diminished bilaterally on auscultation  CV:  Normal S1S2, without murmur  ABD:  Non tender, non distended, no hepatosplenomegaly, +BS  EXT:  No edema, cyanosis or clubbing    Scheduled meds:      aspirin 325 mg Oral Daily   atorvastatin 80 mg Oral Daily   clopidogrel 300 mg Oral Once   clopidogrel 75 mg Oral Daily   glipiZIDE 2.5 mg Oral BID AC   insulin aspart 0-9 Units Subcutaneous 4x Daily With Meals & Nightly   ipratropium-albuterol 3 mL Nebulization Q4H - RT   linagliptin 5 mg Oral Daily   metoprolol succinate XL 25 mg Oral Q24H   piperacillin-tazobactam 3.375 g Intravenous Q8H   potassium chloride 20 mEq Oral TID     IV meds:                          niCARdipine 5-15 mg/hr Last Rate: 2.5 mg/hr (01/10/18  0257)     Data Review:    Results from last 7 days  Lab Units 01/09/18  0434 01/08/18  0444 01/07/18  0635 01/06/18  0437 01/05/18  0858   SODIUM mmol/L 141 144 145 146* 142   POTASSIUM mmol/L 4.0 4.0 3.1* 3.1* 2.9*   CHLORIDE mmol/L 105 108* 108* 111* 106   CO2 mmol/L 23.6 19.1* 22.8 22.0 22.7   BUN mg/dL 21 20 22 29* 31*   CREATININE mg/dL 1.25* 1.29* 1.36* 1.46* 1.41*   GLUCOSE mg/dL 195* 176* 157* 149* 238*   CALCIUM mg/dL 8.5* 8.7 8.9 8.6 9.0         Estimated Creatinine Clearance: 34.2 mL/min (by C-G formula based on Cr of 1.25).    Results from last 7 days  Lab Units 01/10/18  0629 01/09/18  0434 01/08/18  0443 01/07/18  0635 01/06/18  0437   WBC 10*3/mm3 13.88* 13.79* 14.07* 14.64* 15.31*   HEMOGLOBIN g/dL 8.7* 8.1* 8.6* 8.5* 8.1*   PLATELETS 10*3/mm3 316 274 254 237 215                   Results from last 7 days  Lab Units 01/07/18  0635 01/05/18  0858   PROCALCITONIN ng/mL 0.33* 0.86*         2-D echo reviewed: EF hyperdynamic greater than 70%.  Moderate pulmonary hypertension    Microbiology reviewed      Chest x-ray viewed 1/7/18        CT chest viewed: Small bilateral effusions and atelectasis/pneumonia      )Assessment   ASSESSMENT:   PNA HAP  Atelectasis and bilateral small effusions  Moderate pulmonary hypertension  Ecoli bactermia 2/2UTI  TME  CVA  Rhabdo resolved  DMII  Small apical infarct on stress test noted  HTN uncontrolled    PLAN:  No need for thoracentesis.  Continue bronchodilators and encourage pulmonary toilet.  Completing antibiotic for bacterial pneumonia   Nausea possibly associated with antibiotic.  Revascularization plan for near future noted.  PT/ST Hector Dailey MD  Pulmonary and Critical Care Medicine  [facility], [facility]  1/10/2018    7:21 AM

## 2018-01-10 NOTE — CONSULTS
Patient Name: Sakshi Garcia Account #: [id number]    MRN: [id number] Admission Date: 1/1/2018    History and Physical Exam  Service: Vascular Surgery Date of Evaluation: January 10, 2018        CHIEF COMPLAINT: left sided carotid stenosis with CVA    HPI: Sakshi Garcia is a 77 y.o. female admitted on 1/1/18 after falling and being unable to get up for a reported 13 hours.  She states that her legs were so weak that she was unable to get back into bed and had waited on until her children came and found her.  On 1/4/18 she had an acute mental status change with weakness of the right arm and an NIH stroke scale of 4 documented.  A neurologic workup was performed including a carotid duplex that showed a high-grade left carotid stenosis and mild to moderate right carotid stenosis, and an MRI, MRA of the brain that showed  multiple left acute infarcts in the MCA distribution with a possible single ischemic lesion in the right frontal lobe.    She was diagnosed with a Escherichia coli UTI on admission and was treated with Levaquin but is now on Zosyn for likely aspiration pneumonia.  Her white blood cell count was initially elevated at 21k but now has decreased to 13 over the last several days.  She is on day 9 of overall antibiotic therapy and has a thick cough but didn't produce any sputum during my talk with her.       PAST MEDICAL HISTORY:   Past Medical History:   Diagnosis Date   • Colon cancer    • Hypertension       PAST SURGICAL HISTORY:   Past Surgical History:   Procedure Laterality Date   • CHOLECYSTECTOMY     • COLON SURGERY     • COLONOSCOPY     • HERNIA REPAIR        FAMILY HISTORY: History reviewed. No pertinent family history.   SOCIAL HISTORY:   Social History   Substance Use Topics   • Smoking status: Heavy Tobacco Smoker   • Smokeless tobacco: None   • Alcohol use No      MEDICATIONS:   No current facility-administered medications on file prior to encounter.      Current Outpatient Prescriptions on File  Prior to Encounter   Medication Sig Dispense Refill   • amLODIPine (NORVASC) 5 MG tablet Take 5 mg by mouth Daily.     • glipiZIDE (GLUCOTROL) 5 MG ER tablet Take 5 mg by mouth Daily.     • lisinopril (PRINIVIL,ZESTRIL) 20 MG tablet Take 20 mg by mouth Daily.     • metoprolol succinate XL (TOPROL-XL) 25 MG 24 hr tablet Take 25 mg by mouth Daily.     • cephalexin (KEFLEX) 500 MG capsule Take 1 capsule by mouth 2 (Two) Times a Day. 20 capsule 0   • linagliptin (TRADJENTA) 5 MG tablet tablet Take 5 mg by mouth Daily.     • triamcinolone (KENALOG) 0.1 % cream Apply  topically 3 (Three) Times a Day. Apply sparingly. 45 g 0             ALLERGIES: Contrast dye and Sulfa antibiotics     COMPLETE REVIEW OF SYSTEMS:     Review of Systems   Constitutional: Positive for activity change and fatigue. Negative for chills, diaphoresis and fever.   HENT: Negative for nosebleeds and trouble swallowing.    Respiratory: Negative for cough, chest tightness, shortness of breath and stridor.    Cardiovascular: Negative for chest pain.   Gastrointestinal: Negative for abdominal pain, nausea and vomiting.   Genitourinary: Negative for flank pain and hematuria.   Musculoskeletal: Negative for back pain and neck pain.   Skin: Negative for color change.   Neurological: Positive for weakness. Negative for dizziness and headaches.   Psychiatric/Behavioral: Positive for confusion. Negative for hallucinations.         PHYSICAL EXAM:   Patient Vitals for the past 24 hrs:   BP Temp Temp src Pulse Resp SpO2 Weight   01/10/18 0700 176/71 98.4 °F (36.9 °C) Oral 81 18 96 % -   01/10/18 0634 - - - 77 18 97 % -   01/10/18 0408 - - - - - - 70.9 kg (156 lb 4.8 oz)   01/10/18 0335 - - - 73 20 95 % -   01/09/18 2300 155/65 98.5 °F (36.9 °C) Oral - 20 - -   01/09/18 1934 151/60 98 °F (36.7 °C) Oral 85 18 - -   01/09/18 1859 - - - 77 20 94 % -   01/09/18 1632 - 98.5 °F (36.9 °C) Oral - - 91 % -   01/09/18 1430 (!) 195/88 - - 91 16 93 % -   01/09/18 1208 (!)  189/75 - - 90 - - -        Physical Exam   Constitutional: She appears well-developed.   Elderly and chronically ill   HENT:   Head: Normocephalic and atraumatic.   Eyes: Pupils are equal, round, and reactive to light. No scleral icterus.   Neck: Normal range of motion. Neck supple. No tracheal deviation present.   Cardiovascular: Normal rate and regular rhythm.    Pulses:       Carotid pulses are 2+ on the right side, and 2+ on the left side.       Radial pulses are 2+ on the right side, and 2+ on the left side.        Femoral pulses are 1+ on the right side, and 2+ on the left side.  Brisk capillary refill bilaterally on the feet, no wounds on the feet, no rubor   Pulmonary/Chest: Effort normal. No respiratory distress. She has wheezes. She exhibits no tenderness.   Course, nonproductive cough   Abdominal: Soft. Bowel sounds are normal. There is no tenderness.   Musculoskeletal: She exhibits no edema.   Right upper extremity unable to resist against gravity.  Right lower extremity mildly weak.  Left upper and lower extremities with normal strength   Neurological: She is alert. No cranial nerve deficit.   Oriented to year, place, self, not the president   Skin: Skin is warm and dry.   Psychiatric:   Confused, but pleasant             LABS:      Recent Results (from the past 24 hour(s))   Stress Test With Myocardial Perfusion One Day    Collection Time: 01/09/18 10:54 AM   Result Value Ref Range    BH CV STRESS PROTOCOL 1 Pharmacologic     Stage 1 1     Duration Min Stage 1 0     Duration Sec Stage 1 15     Stress Dose Regadenoson Stage 1 0.4     Stress Comments Stage 1 15 sec bolus injection     Baseline HR 81 bpm    Baseline /72 mmHg    Peak HR 97 bpm    Percent Max Pred HR 68.31 %    Percent Target HR 80 %    Peak /72 mmHg    Recovery HR 92 bpm    Recovery /56 mmHg    Target HR (85%) 121 bpm    Max. Pred. HR (100%) 142 bpm    Nuc Stress EF 65 %   POC Glucose Once    Collection Time: 01/09/18  12:05 PM   Result Value Ref Range    Glucose 149 (H) 70 - 130 mg/dL   Duplex Venous Upper Extremity - Right CAR    Collection Time: 01/09/18  3:37 PM   Result Value Ref Range    Right Internal Jugular Augment Y     Right Internal Jugular Competent Y     Right Internal Jugular Compress C     Right Internal Jugular Phasic Y     Right Internal Jugular Spont Y     Left Internal Jugular Augment Y     Left Internal Jugular Competent Y     Left Internal Jugular Compress C     Left Internal Jugular Phasic Y     Left Internal Jugular Spont Y     Right Subclavian Augment Y     Right Subclavian Competent Y     Right Subclavian Compress C     Right Subclavian Phasic Y     Right Subclavian Spont Y     Left Subclavian Augment Y     Left Subclavian Competent Y     Left Subclavian Compress C     Left Subclavian Phasic Y     Left Subclavian Spont Y     Right Axillary Augment Y     Right Axillary Competent Y     Right Axillary Compress C     Right Axillary Phasic Y     Right Axillary Spont Y     Right Brachial Compress C     Right Radial Compress C     Right Ulnar Compress C     Right Basilic Upper Compress C     Right Basilic Forearm Compress C     Right Cephalic Upper Compress N     Right Cephalic Upper Thrombus A     Right Cephalic Upper Color 1     Right Cephalic Forearm Compress C    POC Glucose Once    Collection Time: 01/09/18  4:23 PM   Result Value Ref Range    Glucose 164 (H) 70 - 130 mg/dL   POC Glucose Once    Collection Time: 01/09/18  8:45 PM   Result Value Ref Range    Glucose 217 (H) 70 - 130 mg/dL   POC Glucose Once    Collection Time: 01/10/18  5:50 AM   Result Value Ref Range    Glucose 156 (H) 70 - 130 mg/dL   Basic Metabolic Panel    Collection Time: 01/10/18  6:29 AM   Result Value Ref Range    Glucose 138 (H) 65 - 99 mg/dL    BUN 18 8 - 23 mg/dL    Creatinine 1.15 (H) 0.57 - 1.00 mg/dL    Sodium 145 136 - 145 mmol/L    Potassium 4.3 3.5 - 5.2 mmol/L    Chloride 107 98 - 107 mmol/L    CO2 27.7 22.0 - 29.0 mmol/L     Calcium 8.7 8.6 - 10.5 mg/dL    eGFR Non African Amer 46 (L) >60 mL/min/1.73    BUN/Creatinine Ratio 15.7 7.0 - 25.0    Anion Gap 10.3 mmol/L   CBC Auto Differential    Collection Time: 01/10/18  6:29 AM   Result Value Ref Range    WBC 13.88 (H) 4.50 - 10.70 10*3/mm3    RBC 2.82 (L) 3.90 - 5.20 10*6/mm3    Hemoglobin 8.7 (L) 11.9 - 15.5 g/dL    Hematocrit 27.9 (L) 35.6 - 45.5 %    MCV 98.9 (H) 80.5 - 98.2 fL    MCH 30.9 26.9 - 32.0 pg    MCHC 31.2 (L) 32.4 - 36.3 g/dL    RDW 14.7 (H) 11.7 - 13.0 %    RDW-SD 53.1 37.0 - 54.0 fl    MPV 9.6 6.0 - 12.0 fL    Platelets 316 140 - 500 10*3/mm3    Neutrophil % 74.0 42.7 - 76.0 %    Lymphocyte % 10.7 (L) 19.6 - 45.3 %    Monocyte % 10.2 5.0 - 12.0 %    Eosinophil % 2.6 0.3 - 6.2 %    Basophil % 0.4 0.0 - 1.5 %    Immature Grans % 2.1 (H) 0.0 - 0.5 %    Neutrophils, Absolute 10.28 (H) 1.90 - 8.10 10*3/mm3    Lymphocytes, Absolute 1.48 0.90 - 4.80 10*3/mm3    Monocytes, Absolute 1.41 (H) 0.20 - 1.20 10*3/mm3    Eosinophils, Absolute 0.36 0.00 - 0.70 10*3/mm3    Basophils, Absolute 0.06 0.00 - 0.20 10*3/mm3    Immature Grans, Absolute 0.29 (H) 0.00 - 0.03 10*3/mm3   ]    The following radiologic or non-invasive studies including the images have been independently reviewed by me: MRI, MRA of the brain and neck.  Carotid artery duplex.     ASSESSMENT/PLAN: 77 y.o. female with symptomatic left carotid artery stenosis.  She has peaked systolic velocity of 577 over end diastolic velocity of greater than 200 cm/s, indicative of an 80-99% stenosis on the left with associated acute infarction watershed distribution.  She has had a stroke and has residual right upper extremity deficits.  Her right carotid artery has a 60-70% stenosis and there is a single 7 mm frontal lesion on the MRI.  I discussed this with Dr. Aquino.  Her cardiac echo is negative for an embolic source.  There was early mention of atrial fibrillation, however this has not been noted during her cardiology  "preoperative evaluation.  She is now on aspirin, Plavix, atorvastatin 80 mg daily, insulin.  We discussed that she is going to require repair of this stenosis, I recommended a left carotid endarterectomy.    At this point, she still remains quite ill, very debilitated requiring maximum assistance from physical therapy to get out of bed.  She has a terrible-sounding cough and is being treated for pneumonia.  Her CVA occurred when she was not on aspirin or Plavix, both of which she is now on.  I would favor aggressive rehabilitation, completion of her antibiotics for pneumonia, and plan for left carotid endarterectomy within the next week or so as long as she is making progress.  Of note- the left carotid stenosis has been referred to as \"in-stent stenosis\" but there is no MRI or ultrasond evidence of a left carotid stent and the patient denies a history of this procedure.     Problem Points:  4:  Patient has a new problem, with additional work-up planned  Total problem points:4 or more    Data Points:  1:  I have reviewed or order clinical lab test  2:  I have personally and independently review of image, tracing, or specimen  2:  I have reviewed and summation of old records and/or discussed the patients care with another health care provider  Total data points:4 or more    Risk Points:  High:  Abrupt change in neuorlogic exam    MDM requires 2/3 (Problem points, Data points and Risk)  MDM Prob point Data point Risk   SF 1 1 Minimal   Low 2 2 Low   Mod 3 3 Moderate   High 4 4 High     Code requires 3/3 (MDM, History and Exam)  Code MDM History Exam Time   58385 SF/Low Detailed Detailed 30   66965 Mod Comprehensive Comprehensive 50   73793 High Comprehensive Comprehensive 70     Detailed history:  4 elements HPI or status of 3 chronic problems; 2-9 system ROS  Comprehensive:  4 elements HPI or status of 3 chronic problems;  10 system ROS    Detailed Exam:  12 findings from any organ system  Comprehensive Exam:  2 " findings from each of 9 systems.     Ozzie Kelsey MD   01/10/18

## 2018-01-10 NOTE — CONSULTS
Inpatient Consult to Orthopedic Surgery  Consult performed by: JENNIFER MENESES JR  Consult ordered by: ISHAAN WOMACK          Patient Care Team:  Provider Not In System as PCP - General    Chief complaint: Right arm pain    Subjective     History of Present Illness     The patient is a 77-year-old RHD female with multiple medical comorbidities including diabetes admitted approximately 1 week ago in the setting of an acute stroke and rhabdomyolysis.  At some point during her initial hospitalization, she suffered an injury to the right elbow during transfer.  Radiographs showed no obvious fracture or dislocation but did show an effusion concerning for an occult radial head fracture.  Orthopedics was consulted for evaluation and management.    The patient denies any pain in the elbow prior to the injury during transfer.    Of note the patient also currently has hospital-acquired pneumonia and is on antibiotics.        Review of Systems   Review of Systems:  Constitutional: Negative  HENT: Negative  Eyes: Negative  Respiratory: Shortness of breath due to pneumonia  Cardiovascular: Negative; no current chest pain  Gastrointestinal: Negative  : Negative  Skin: Negative except as listed in HPI  Neurological: No new current weakness/deficits  Hematological: Negative  Muscoloskeletal: Per HPI    Past Medical History:   Diagnosis Date   • Colon cancer    • Hypertension    ,   Past Surgical History:   Procedure Laterality Date   • CHOLECYSTECTOMY     • COLON SURGERY     • COLONOSCOPY     • HERNIA REPAIR     , History reviewed. No pertinent family history.,   Social History   Substance Use Topics   • Smoking status: Heavy Tobacco Smoker   • Smokeless tobacco: None   • Alcohol use No   ,   Prescriptions Prior to Admission   Medication Sig Dispense Refill Last Dose   • amLODIPine (NORVASC) 5 MG tablet Take 5 mg by mouth Daily.   Past Week at Unknown time   • glipiZIDE (GLUCOTROL) 5 MG ER tablet Take 5 mg by mouth Daily.    1/1/2018 at Unknown time   • lisinopril (PRINIVIL,ZESTRIL) 20 MG tablet Take 20 mg by mouth Daily.   Past Week at Unknown time   • metoprolol succinate XL (TOPROL-XL) 25 MG 24 hr tablet Take 25 mg by mouth Daily.   Past Week at Unknown time   • cephalexin (KEFLEX) 500 MG capsule Take 1 capsule by mouth 2 (Two) Times a Day. 20 capsule 0    • linagliptin (TRADJENTA) 5 MG tablet tablet Take 5 mg by mouth Daily.   Unknown at Unknown time   • triamcinolone (KENALOG) 0.1 % cream Apply  topically 3 (Three) Times a Day. Apply sparingly. 45 g 0    , Scheduled Meds:    aspirin 325 mg Oral Daily   atorvastatin 80 mg Oral Daily   clopidogrel 300 mg Oral Once   clopidogrel 75 mg Oral Daily   glipiZIDE 2.5 mg Oral BID AC   insulin aspart 0-9 Units Subcutaneous 4x Daily With Meals & Nightly   ipratropium-albuterol 3 mL Nebulization Q4H - RT   linagliptin 5 mg Oral Daily   metoprolol succinate XL 25 mg Oral Q24H   piperacillin-tazobactam 3.375 g Intravenous Q8H   potassium chloride 20 mEq Oral TID   , Continuous Infusions:    niCARdipine 5-15 mg/hr Last Rate: 2.5 mg/hr (01/10/18 0257)   , PRN Meds:  •  acetaminophen **OR** acetaminophen  •  benzonatate  •  dextrose  •  dextrose  •  glucagon (human recombinant)  •  guaifenesin-dextromethorphan  •  haloperidol lactate  •  hydrALAZINE  •  ondansetron  •  ondansetron  •  QUEtiapine  •  sodium chloride  •  sodium chloride  •  sodium chloride and Allergies:  Contrast dye and Sulfa antibiotics    Objective      Vital Signs  Temp:  [98 °F (36.7 °C)-98.5 °F (36.9 °C)] 98.4 °F (36.9 °C)  Heart Rate:  [73-91] 81  Resp:  [16-20] 18  BP: (151-195)/(60-88) 176/71    Physical Exam  Physical Exam:  Vital signs reviewed.  Constitutional:  Appears frail.  HEENT:  Head: normocephalic, atraumatic  Eyes: EOMI, grossly normal.  No scleral icterus.  Neck: Normal range of motion.  Supple, no tracheal deviation.  Cardiovascular: Regular rate.  Pulmonary: Effort normal, symmetric chest expansion, no  labored breathing.  Abdominal: Soft, non distended  Neurological: Patient appears somewhat confused but does respond to questions appropriately.  Skin: Warm and dry  Psychiatric: Patient has unpleasant demeanor and uses profanity frequently  Musculoskeletal:  Examination of her right arm shows mild swelling around the elbow.  She tolerates passive range of motion from full extension to 130° of flexion without any significant pain.  She tolerates 50° of pronation and 50° of supination with only mild pain.  She does have tenderness to palpation directly over the radial head.  She is nontender over the olecranon or of the distal humerus.    Distally, she has active wrist extension and wrist flexion.  Sensation is intact to light touch in radial, median, ulnar distribution.  Fingers are warm and well perfused with brisk capillary refill.  Palpable radial pulse.        Results Review:   Radiographs of the right elbow are independently reviewed.  These do show a moderate effusion.  No obvious fracture identified.  Radiocapitellar joint well aligned on all views.  Evidence of what appears to be a chronic calcification potentially consistent with old radial collateral ligament injury.        Assessment/Plan     S52.124A    Closed nondisplaced fracture of right radial head      Principal Problem:    Acute cystitis without hematuria  Active Problems:    Traumatic rhabdomyolysis    Generalized weakness    Fall    CKD (chronic kidney disease)    DM (diabetes mellitus)    HTN (hypertension)    Altered mental status    Sepsis    Bacteremia    DNR (do not resuscitate)      Assessment & Plan    This is a 77-year-old female with multiple medical comorbidities including diabetes and recent stroke presenting with suspected occult radial head fracture following fall during transfer one week ago.  She does report her pain is improving.  No clinical sign of septic arthritis.    I do think this can be managed nonoperatively.    -  Recommend sling for comfort for right upper extremity  - PT: Nonweightbearing, right upper extremity  - DVT: On Plavix, POP/SCDs  - Patient can follow-up with me in 1 week for repeat radiographs the [facility].  Call [phone] to make appointment.    Thank you for this consultation.  Please call with questions.    Derek Shell Jr, MD  01/10/18  9:03 AM

## 2018-01-10 NOTE — PROGRESS NOTES
Continued Stay Note  [facility]     Patient Name: Sakshi Garcia  MRN: [id number]  Today's Date: 1/10/2018    Admit Date: 1/1/2018          Discharge Plan       01/10/18 0852    Case Management/Social Work Plan    Plan Pt approved at [facility] ( [address] [place] [address] [place] [id number]) pending precert.      Additional Comments Spoke with Nissa/ Kaylielogmadi who confirms she is still following.            Camilla Britton RN

## 2018-01-10 NOTE — PROGRESS NOTES
Name: Sakshi Garcia  ADMIT: 2018   Age/Sex: 77 y.o.female LOS:  LOS: 9 days    :    1940     ROOM: [facility]   MRN:    [id number]    PCP:    Provider Not In System     Subjective   Doing ok. No pain. Eating    Objective   Vital Signs  Temp:  [98 °F (36.7 °C)-98.5 °F (36.9 °C)] 98.4 °F (36.9 °C)  Heart Rate:  [73-91] 90  Resp:  [16-20] 18  BP: (151-195)/(60-88) 176/71  Body mass index is 28.58 kg/(m^2).    Objective  General Appearance:  Comfortable and well-appearing.      HEENT: Normal HEENT exam.    Lungs:  Normal effort.  Breath sounds clear to auscultation.    Heart: Tachycardia.  Regular rhythm.    Abdomen: Abdomen is soft and non-distended.  Bowel sounds are normal.   There is no abdominal tenderness.     Extremities: There is no deformity or dependent edema.  (Right arm painful with flexion of elbow)  Neurological: Patient is alert and oriented to person, place and time.    Skin:  Warm and dry.  No rash.   Results Review:       I reviewed the patient's new clinical results.    Results from last 7 days  Lab Units 01/10/18  0629 18  0434 18  0443 18  0635 18  0437 18  0858 18  0354   WBC 10*3/mm3 13.88* 13.79* 14.07* 14.64* 15.31* 14.72* 17.01*   HEMOGLOBIN g/dL 8.7* 8.1* 8.6* 8.5* 8.1* 8.8* 8.5*   PLATELETS 10*3/mm3 316 274 254 237 215 177 156     Results from last 7 days  Lab Units 01/10/18  0629 18  0434 18  0444 18  0635 18  0437 18  0858 18  0354   SODIUM mmol/L 145 141 144 145 146* 142 137   POTASSIUM mmol/L 4.3 4.0 4.0 3.1* 3.1* 2.9* 3.4*   CHLORIDE mmol/L 107 105 108* 108* 111* 106 103   CO2 mmol/L 27.7 23.6 19.1* 22.8 22.0 22.7 20.2*   BUN mg/dL 18 21 20 22 29* 31* 39*   CREATININE mg/dL 1.15* 1.25* 1.29* 1.36* 1.46* 1.41* 1.39*   GLUCOSE mg/dL 138* 195* 176* 157* 149* 238* 189*   Estimated Creatinine Clearance: 37.2 mL/min (by C-G formula based on Cr of 1.15).  Results from last 7 days  Lab Units 01/10/18  0632  01/09/18  0434 01/08/18  0444 01/07/18  0635 01/06/18  0437 01/05/18  0858 01/04/18  0354   CALCIUM mg/dL 8.7 8.5* 8.7 8.9 8.6 9.0 8.9       RADIOLOGY  1/10/2018  Pending      aspirin 325 mg Oral Daily   atorvastatin 80 mg Oral Daily   clopidogrel 300 mg Oral Once   clopidogrel 75 mg Oral Daily   glipiZIDE 2.5 mg Oral BID AC   insulin aspart 0-9 Units Subcutaneous 4x Daily With Meals & Nightly   ipratropium-albuterol 3 mL Nebulization Q4H - RT   linagliptin 5 mg Oral Daily   metoprolol succinate XL 25 mg Oral Q24H   piperacillin-tazobactam 3.375 g Intravenous Q8H   potassium chloride 20 mEq Oral TID       niCARdipine 5-15 mg/hr Last Rate: 2.5 mg/hr (01/10/18 0257)   Diet Dysphagia; IV - Mechanical Soft No Mixed Consistencies; Nectar / Syrup Thick      Assessment/Plan   Assessment:   Principal Problem:    Acute cystitis without hematuria  Active Problems:    Traumatic rhabdomyolysis    Generalized weakness    Fall    CKD (chronic kidney disease)    DM (diabetes mellitus)    HTN (hypertension)    Altered mental status    Sepsis    Bacteremia    DNR (do not resuscitate)        Plan:   1. Ecoli UTI with bacteremia  - switched to levaquin to complete a 14 day course but now on Zosyn for likely aspiration pna. Day 9 overall of abx therapy  - repeat BCx NGTD  - WBC stable  - cont to monitor      2. Aspiration vs Hospital acquired PNA with Moderate R pleural effusion  - Cont zosyn, day 6. Vanc d/c'd 1/8 as MRSA screen negative  - BCx NGTD.   - procal mildly elevated. Coming down at last check  - SLP eval  - mod R pleural effusion. CT shows only mild-mod bilaterally. No indication for thoracentesis per Pulm  - will cont abx for now to complete a 7 day course and then switch back to levaquin to complete the 14 day course for the bacteremia she had initially      3. Encephalopathy 2/2 Acute CVA and above infections  - CT head negative. Wonder if this will be her new baseline  - on ASA and plavix. MRI with multiple CVA c/w  cardio-embolic source or atherosclerotic plaque. Carotid u/s showed severe stenosis on left.   - will need revascularization. Stress test c/w low risk study and cleared by Cardiology. Vascular to bring back in about a week once all other medical issues are cleared up  - TTE results noted  - cont as needed anti-psychotics  - d/c cardene drip, start back amlodipine today      4. Rhabdo  -resolved      5. DM2  - controlled on oral meds      6. CKD3  - Cr stable      7. Anemia  - stable      8 Hypernatremia  - off IVF  - recheck in am     9. Possible right radial head fracture  - NWB per ortho  - needs sling      Disposition  TBD.      Chace Chester MD  [facility]  01/10/18  11:06 AM

## 2018-01-10 NOTE — THERAPY TREATMENT NOTE
Acute Care - Physical Therapy Treatment Note  [facility]     Patient Name: Sakshi Garcia  : 1940  MRN: [id number]  Today's Date: 1/10/2018  Onset of Illness/Injury or Date of Surgery Date: 18  Date of Referral to PT: 18  Referring Physician: Dr. Vora    Admit Date: 2018    Visit Dx:    ICD-10-CM ICD-9-CM   1. Traumatic rhabdomyolysis, initial encounter T79.6XXA 958.6   2. Fall, initial encounter W19.XXXA E888.9   3. Renal insufficiency N28.9 593.9   4. Dizziness R42 780.4   5. Unsteadiness on feet R26.81 781.2     Patient Active Problem List   Diagnosis   • Traumatic rhabdomyolysis   • Generalized weakness   • Fall   • Acute cystitis without hematuria   • CKD (chronic kidney disease)   • DM (diabetes mellitus)   • HTN (hypertension)   • Altered mental status   • Sepsis   • Bacteremia   • DNR (do not resuscitate)               Adult Rehabilitation Note       01/10/18 1500 18 1400 18 0900    Rehab Assessment/Intervention    Discipline physical therapy assistant  -RW speech language pathologist  - physical therapy assistant  -    Document Type therapy note (daily note)  -RW  therapy note (daily note)  -RW    Subjective Information agree to therapy  -RW  agree to therapy;complains of;pain  -RW    Patient Effort, Rehab Treatment fair  -RW  good  -RW    Symptoms Noted During/After Treatment fatigue  -RW  increased pain  -RW    Precautions/Limitations fall precautions;non-weight bearing status   NWB on RUE  -RW  fall precautions;oxygen therapy device and L/min   2L O2  -RW    Specific Treatment Considerations Pt RUE NWB due to possible radial head fx  -RW      Recorded by [RW] Lilliana Carroll PTA [] Sidney Azevedo, MS CCC-SLP [RW] Lilliana Carroll PTA    Pain Assessment    Pain Assessment Gibson-Collins FACES  -RW  Gibson-Collins FACES  -RW    Gibson-Collins FACES Pain Rating 4  -RW  8  -RW    Pain Type Acute pain  -RW  Acute pain  -RW    Pain Location Leg  -RW  Elbow  -RW    Pain  Orientation Left  -RW  Right  -RW    Pain Intervention(s) Repositioned;Rest  -RW  Repositioned;Elevated;Rest  -RW    Response to Interventions tolerated  -RW  tolerated at rest  -RW    Recorded by [RW] Lilliana Carroll PTA  [RW] Lilliana Carroll PTA    Cognitive Assessment/Intervention    Current Cognitive/Communication Assessment impaired  -RW impaired  -RH impaired  -RW    Orientation Status oriented to;person;place  -RW person;place  -RH oriented to;person  -RW    Follows Commands/Answers Questions 50% of the time;75% of the time;needs cueing;needs repetition  -RW 75% of the time;able to follow single-step instructions;needs increased time  -RH 75% of the time;needs cueing;needs repetition  -RW    Personal Safety severe impairment  -RW  severe impairment;at risk behaviors demonstrated;decreased awareness, need for assist;decreased awareness, need for safety;decreased insight to deficits  -RW    Personal Safety Interventions fall prevention program maintained;gait belt;nonskid shoes/slippers when out of bed  -RW  fall prevention program maintained;gait belt;nonskid shoes/slippers when out of bed  -RW    Short/Long Term Memory  moderate impairment, short term memory  -RH     Recorded by [RW] Lilliana Carroll PTA [RH] Sidney Azevedo MS CCC-SLP [RW] Lilliana Carroll PTA    Mobility Assessment/Training    Extremity Weight-Bearing Status right upper extremity  -RW      Right Upper Extremity Weight-Bearing non weight-bearing  -RW      Recorded by [RW] Lilliana Carroll PTA      Bed Mobility, Assessment/Treatment    Bed Mobility, Assistive Device head of bed elevated;draw sheet  -RW  bed rails;head of bed elevated  -RW    Bed Mobility, Roll Left, [place]   moderate assist (50% patient effort);verbal cues required;nonverbal cues required (demo/gesture)  -RW    Bed Mob, Supine to Sit, [place] moderate assist (50% patient effort);verbal cues required;nonverbal cues required (demo/gesture)  -RW      Bed Mob, Sit  to Supine, [place] maximum assist (25% patient effort);2 person assist required;verbal cues required;nonverbal cues required (demo/gesture)  -RW      Bed Mob, Sidelying to Sit, [place]   moderate assist (50% patient effort);verbal cues required;nonverbal cues required (demo/gesture)  -RW    Bed Mob, Sit to Sidelying, [place]   maximum assist (25% patient effort);2 person assist required;verbal cues required;nonverbal cues required (demo/gesture)  -RW    Bed Mobility, Safety Issues decreased use of arms for pushing/pulling;decreased use of legs for bridging/pushing  -RW  cognitive deficits limit understanding;decreased use of arms for pushing/pulling  -RW    Bed Mobility, Impairments strength decreased  -RW  muscle tone abnormal;strength decreased;impaired balance;coordination impaired;motor control impaired  -RW    Recorded by [RW] Lilliana Carroll PTA  [RW] Lilliana Carroll PTA    Transfer Assessment/Treatment    Transfers, Bed-Chair [place]   moderate assist (50% patient effort);2 person assist required;verbal cues required;nonverbal cues required (demo/gesture);hand held assist   bed to stretcher  -RW    Transfers, Chair-Bed [place]   not tested  -RW    Transfers, Sit-Stand [place] unable to perform  -RW  moderate assist (50% patient effort);2 person assist required;verbal cues required;nonverbal cues required (demo/gesture);hand held assist  -RW    Transfers, Stand-Sit [place]   moderate assist (50% patient effort);2 person assist required;verbal cues required;nonverbal cues required (demo/gesture);hand held assist  -RW    Transfer, Safety Issues   sequencing ability decreased;weight-shifting ability decreased;balance decreased during turns  -RW    Transfer, Impairments   muscle tone abnormal;strength decreased;impaired balance;coordination impaired;motor control impaired  -RW    Transfer, Comment Attempted standing at EOB x3, but pt not assisting and unable to clear EOB.   -RW   Pt stood x4 at EOB w/ HHA, but LUE supported due to pain. No instances of L knee buckling in standing or during transfer. Pt able to pivot to L side and weight shift during transfer.  -RW    Recorded by [RW] Lilliana Carroll PTA  [RW] Lilliana Carroll PTA    Gait Assessment/Treatment    Gait, [place] Level not tested;not appropriate to assess  -RW  not tested;not appropriate to assess  -RW    Recorded by [RW] Lilliana Carroll PTA  [RW] Lilliana Carroll PTA    Balance Skills Training    Sitting-Level of Assistance Contact guard  -RW  Contact guard  -RW    Sitting-Balance Support Feet supported;Left upper extremity supported  -RW  Feet supported;Left upper extremity supported  -RW    Sitting-Balance Activities Forward lean;Trunk control activities  -RW  Trunk control activities  -RW    Sitting # of Minutes 8  -RW  3  -RW    Recorded by [RW] Lilliana Carroll PTA  [RW] Lilliana Carroll PTA    Therapy Exercises    Bilateral Lower Extremities AROM:;5 reps;sitting;ankle pumps/circles;hip flexion;LAQ  -RW  AROM:;5 reps;sitting;ankle pumps/circles;LAQ  -RW    Recorded by [RW] Lilliana Carroll PTA  [RW] Lilliana Carroll PTA    Positioning and Restraints    Pre-Treatment Position in bed  -RW  in bed  -RW    Post Treatment Position bed  -RW  other  -RW    In Bed fowlers;call light within reach;encouraged to call for assist;exit alarm on;with family/caregiver;notified nsg  -RW      Other Position   with other staff   on stretcher w/ transport  -RW    Recorded by [RW] Lilliana Carroll PTA  [RW] Lilliana Carroll PTA      01/08/18 1300          Rehab Assessment/Intervention    Discipline physical therapy assistant  -RW      Document Type therapy note (daily note)  -RW      Patient Effort, Rehab Treatment fair  -RW      Precautions/Limitations fall precautions  -RW      Recorded by [RW] Lilliana Carroll PTA      Pain Assessment    Pain Assessment Gibson-Collins FACES  -RW      Gibson-Collins FACES Pain Rating 6  -RW      Pain Type  Acute pain  -RW      Pain Location Elbow  -RW      Pain Orientation Right  -RW      Pain Intervention(s) Repositioned;Ambulation/increased activity;Elevated;Rest  -RW      Response to Interventions tolerated  -RW      Recorded by [RW] Lilliana Carroll PTA      Cognitive Assessment/Intervention    Current Cognitive/Communication Assessment impaired  -RW      Orientation Status oriented to;person  -RW      Follows Commands/Answers Questions 50% of the time;needs cueing;needs repetition  -RW      Personal Safety severe impairment;at risk behaviors demonstrated;decreased awareness, need for assist;decreased awareness, need for safety;decreased insight to deficits  -RW      Personal Safety Interventions fall prevention program maintained;nonskid shoes/slippers when out of bed  -RW      Recorded by [RW] Lilliana Carroll PTA      Bed Mobility, Assessment/Treatment    Bed Mobility, Assistive Device head of bed elevated;draw sheet  -RW      Bed Mobility, Roll Left, [place] maximum assist (25% patient effort);verbal cues required;nonverbal cues required (demo/gesture)  -RW      Bed Mobility, Roll Right, [place] maximum assist (25% patient effort);verbal cues required;nonverbal cues required (demo/gesture)  -RW      Bed Mob, Supine to Sit, [place] maximum assist (25% patient effort);2 person assist required;verbal cues required;nonverbal cues required (demo/gesture)  -RW      Bed Mob, Sit to Supine, [place] maximum assist (25% patient effort);dependent (less than 25% patient effort);2 person assist required;verbal cues required;nonverbal cues required (demo/gesture)  -RW      Bed Mobility, Safety Issues decreased use of arms for pushing/pulling;decreased use of legs for bridging/pushing;impaired trunk control for bed mobility  -RW      Bed Mobility, Impairments strength decreased;impaired balance;coordination impaired;postural control impaired;muscle tone abnormal  -RW      Bed Mobility, Comment Assist to  maintain sitting balance at EOB  -RW      Recorded by [RW] Lilliana Carroll PTA      Transfer Assessment/Treatment    Transfers, Sit-Stand [place] not tested  -RW      Transfer, Comment Pt w/ increased fatigue and unable to maintain sitting balance at EOB. Further activity deferred  -RW      Recorded by [RW] Lilliana Carroll PTA      Balance Skills Training    Sitting-Level of Assistance Minimum assistance;Moderate assistance  -RW      Sitting-Balance Support Feet supported;Left upper extremity supported  -RW      Sitting-Balance Activities Right UE Weight Bearing;Trunk control activities;Forward lean  -RW      Sitting # of Minutes 6  -RW      Recorded by [RW] Lilliana Carroll PTA      Positioning and Restraints    Pre-Treatment Position in bed  -RW      Post Treatment Position bed  -RW      In Bed fowlers;call light within reach;encouraged to call for assist;exit alarm on;notified nsg  -RW      Recorded by [RW] Lilliana Carroll PTA        User Key  (r) = Recorded By, (t) = Taken By, (c) = Cosigned By    Initials Name Effective Dates    RW Lilliana Carroll PTA 04/06/16 -     RH Sidney Azevedo MS CCC-SLP 07/07/16 -                 IP PT Goals       01/06/18 1721 01/02/18 1359       Bed Mobility PT LTG    Bed Mobility PT LTG, Date Established  01/02/18  -AA     Bed Mobility PT LTG, Time to Achieve  1 wk  -AA     Bed Mobility PT LTG, Activity Type  all bed mobility  -AA     Bed Mobility PT LTG, [place] Level  minimum assist (75% patient effort)  -AA     Bed Mobility PT LTG, Outcome goal ongoing  -PC      Transfer Training PT LTG    Transfer Training PT LTG, Date Established  01/02/18  -AA     Transfer Training PT LTG, Time to Achieve 1 wk  -PC 1 wk  -AA     Transfer Training PT LTG, Activity Type sit to stand/stand to sit;bed to chair /chair to bed  -PC all transfers  -AA     Transfer Training PT LTG, [place] Level moderate assist (50% patient effort)  -PC contact guard assist  -AA     Transfer  Training PT LTG, Assist Device  walker, rolling  -AA     Gait Training PT LTG    Gait Training Goal PT LTG, Date Established  01/02/18  -AA     Gait Training Goal PT LTG, Time to Achieve 1 wk  -PC 1 wk  -AA     Gait Training Goal PT LTG, [place] Level moderate assist (50% patient effort);2 person assist required  -PC contact guard assist  -AA     Gait Training Goal PT LTG, Assist Device  walker, rolling  -AA     Gait Training Goal PT LTG, Distance to Achieve 5 ft  -  -AA     Gait Training Goal PT LTG, Outcome goal revised  -PC      Gait Training Goal PT LTG, Reason Goal Not Met other (see comments)   new CVA  -PC        User Key  (r) = Recorded By, (t) = Taken By, (c) = Cosigned By    Initials Name Provider Type    PC Mellissa Mendez, PT Physical Therapist    KOSTA Holley, PT Physical Therapist          Physical Therapy Education     Title: PT OT SLP Therapies (Active)     Topic: Physical Therapy (Active)     Point: Mobility training (Active)    Learning Progress Summary    Learner Readiness Method Response Comment Documented by Status   Patient Acceptance E,TB,D NR   01/10/18 1538 Active    Acceptance E,TB,D VU,NR   01/09/18 0932 Done    Acceptance E,TB,D VU,NR   01/09/18 0923 Done    Acceptance E,TB,D NR   01/08/18 1334 Active    Acceptance E,D NR   01/06/18 1721 Active    Acceptance E,D NR,NL   01/04/18 1702 Active    Acceptance E VU,NR  AA 01/02/18 1359 Done               Point: Home exercise program (Active)    Learning Progress Summary    Learner Readiness Method Response Comment Documented by Status   Patient Acceptance E,TB,D NR   01/10/18 1538 Active    Acceptance E,TB,D VU,NR   01/09/18 0932 Done    Acceptance E,TB,D VU,NR   01/09/18 0923 Done    Acceptance E,TB,D NR   01/08/18 1334 Active    Acceptance E,D NR   01/06/18 1721 Active    Acceptance E,D NR,NL   01/04/18 1702 Active    Acceptance E VU,NR  AA 01/02/18 1359 Done               Point: Body mechanics  "(Active)    Learning Progress Summary    Learner Readiness Method Response Comment Documented by Status   Patient Acceptance E,TB,D NR   01/10/18 1538 Active    Acceptance E,TB,D VU,NR   01/09/18 0932 Done    Acceptance E,TB,D VU,NR   01/09/18 0923 Done    Acceptance E,TB,D NR   01/08/18 1334 Active    Acceptance E,D NR   01/06/18 1721 Active    Acceptance E,D NR,NL   01/04/18 1702 Active    Acceptance E VU,NR   01/02/18 1359 Done               Point: Precautions (Active)    Learning Progress Summary    Learner Readiness Method Response Comment Documented by Status   Patient Acceptance E,TB,D NR   01/10/18 1538 Active    Acceptance E,TB,D VU,NR   01/09/18 0932 Done    Acceptance E,TB,D VU,NR   01/09/18 0923 Done    Acceptance E,TB,D NR   01/08/18 1334 Active    Acceptance E,D NR   01/06/18 1721 Active    Acceptance E,D NR,NL   01/04/18 1702 Active    Acceptance E VU,NR   01/02/18 1359 Done                      User Key     Initials Effective Dates Name Provider Type Discipline     12/01/15 -  Mellissa Mendez, PT Physical Therapist PT     02/18/16 -  Amarilis Johnson, PTA Physical Therapy Assistant PT     04/06/16 -  Lilliana Carroll, PTA Physical Therapy Assistant PT     09/05/17 -  Paula Holley, PT Physical Therapist PT                    PT Recommendation and Plan  Anticipated Equipment Needs At Discharge: front wheeled walker  Anticipated Discharge Disposition: skilled nursing facility  Planned Therapy Interventions: bed mobility training, transfer training, strengthening, gait training  PT Frequency: daily  Plan of Care Review  Plan Of Care Reviewed With: patient  Outcome Summary/Follow up Plan: Initially pt motivated and wanting to get OOB. Less assist to get to EOB today. When attempting to stand and pivot to chair pt would not assist w/ transfer stating \"I don't have the will power today\". Tried to encourage pt to get up to chair w/ PT, but refused. Would recommend use " of sling to maintain NWB for RUE.           Outcome Measures       01/10/18 1500 01/09/18 0900 01/08/18 1300    How much help from another person do you currently need...    Turning from your back to your side while in flat bed without using bedrails? 2  -RW 2  -RW 2  -RW    Moving from lying on back to sitting on the side of a flat bed without bedrails? 2  -RW 2  -RW 2  -RW    Moving to and from a bed to a chair (including a wheelchair)? 1  -RW 2  -RW 1  -RW    Standing up from a chair using your arms (e.g., wheelchair, bedside chair)? 2  -RW 2  -RW 1  -RW    Climbing 3-5 steps with a railing? 1  -RW 1  -RW 1  -RW    To walk in hospital room? 1  -RW 1  -RW 1  -RW    AM-PAC 6 Clicks Score 9  -RW 10  -RW 8  -RW    Functional Assessment    Outcome Measure Options AM-PAC 6 Clicks Basic Mobility (PT)  -RW AM-PAC 6 Clicks Basic Mobility (PT)  -RW AM-PAC 6 Clicks Basic Mobility (PT)  -RW      User Key  (r) = Recorded By, (t) = Taken By, (c) = Cosigned By    Initials Name Provider Type    RW Lilliana Carroll PTA Physical Therapy Assistant           Time Calculation:         PT Charges       01/10/18 1534          Time Calculation    Start Time 1515  -RW      Stop Time 1532  -RW      Time Calculation (min) 17 min  -RW      PT Received On 01/10/18  -RW      PT - Next Appointment 01/11/18  -RW        User Key  (r) = Recorded By, (t) = Taken By, (c) = Cosigned By    Initials Name Provider Type     Lilliana Carroll PTA Physical Therapy Assistant          Therapy Charges for Today     Code Description Service Date Service Provider Modifiers Qty    55336552605 HC PT THER PROC EA 15 MIN 1/9/2018 Lilliana Carroll PTA GP 1    84608063611 HC PT THER SUPP EA 15 MIN 1/9/2018 Lilliana Carroll PTA GP 1    11234349811 HC PT THER PROC EA 15 MIN 1/10/2018 Lilliana Carroll PTA GP 1    99761295848 HC PT THER SUPP EA 15 MIN 1/10/2018 Lilliana Carroll PTA GP 1          PT G-Codes  Outcome Measure Options: AM-PAC 6 Clicks Basic Mobility  (PT)    Lilliana Carroll, PTA  1/10/2018

## 2018-01-10 NOTE — PROGRESS NOTES
"Adult Nutrition  Assessment/PES    Patient Name:  Sakshi Garcia  YOB: 1940  MRN: [id number]  Admit Date:  1/1/2018    Assessment Date:  1/10/2018    Comments:  Follow up.  Patient s/p stress test yesterday.  100% x 1 meal at breakfast today.  Will continue to follow.          Reason for Assessment       01/10/18 1530    Reason for Assessment    Reason For Assessment/Visit follow up protocol                Anthropometrics       01/10/18 1530    Anthropometrics    Height 157.5 cm (62.01\")    Weight 70.9 kg (156 lb 4.9 oz)    RD Documented Current Weight  70.9 kg (156 lb 4.9 oz)    Anthropometrics (Special Considerations)    RD Calculated BMI (kg/m2) 28.58    Ideal Body Weight (IBW)    Ideal Body Weight (IBW), Female 50.85    % Ideal Body Weight 139.72    Body Mass Index (BMI)    BMI (kg/m2) 28.64    BMI Grade 25 - 29.9 - overweight            Labs/Tests/Procedures/Meds       01/10/18 1531    Labs/Tests/Procedures/Meds    Diagnostic Test/Procedure Review reviewed, pertinent    Labs/Tests Review Reviewed    Procedure Review Other (comment)   stress test yesterday    Medication Review Reviewed, pertinent    Significant Vitals reviewed, pertinent            Physical Findings       01/10/18 1531    Physical Findings/Assessment    Additional Documentation Physical Appearance (Group)    Physical Appearance    Overall Physical Appearance overweight    Skin --   B=12, intact              Nutrition Prescription Ordered       01/10/18 1531    Nutrition Prescription PO    Current PO Diet Dysphagia    Dysphagia Level 4  Mechanical soft no mixed consistencies    Fluid Consistency Nectar/syrup thick            Evaluation of Received Nutrient/Fluid Intake       01/10/18 1532    PO Evaluation    Number of Meals 1    % PO Intake 100            Problem/Interventions:                  Intervention Goal       01/10/18 1532    Intervention Goal    General Maintain nutrition    PO Maintain intake    Weight No significant " weight loss            Nutrition Intervention       01/10/18 1532    Nutrition Intervention    RD/Tech Action Follow Tx progress;Care plan reviewd              Education/Evaluation       01/10/18 1532    Education    Education Will Instruct as appropriate    Monitor/Evaluation    Monitor Per protocol;PO intake;Skin status;Symptoms        Electronically signed by:  Marine Holloway RD  01/10/18 3:33 PM

## 2018-01-10 NOTE — PROGRESS NOTES
"DOS: 1/10/2018  NAME: Sakshi Garcia   : 1940  PCP: Provider Not In System  Chief Complaint   Patient presents with   • Fall     CC: Stroke    Subjective: No new events overnight per RN. Patient denies headache or any new stroke/TIA symptoms. She does have a possible occult radial head fracture per primary. Granddaughter at bedside.     Interval History  History taken from: patient chart family RN    Objective:  Vital signs: /71 (BP Location: Left arm, Patient Position: Lying)  Pulse 90  Temp 98.4 °F (36.9 °C) (Oral)   Resp 18  Ht 157.5 cm (62.01\")  Wt 70.9 kg (156 lb 4.8 oz)  SpO2 95%  BMI 28.58 kg/m2      Physical Exam:  GENERAL: NAD  HEENT: Normocephalic, atraumatic   COR: RRR  Resp: Even and unlabored  Extremities: No signs of distal embolization.    Neurological:   MS: Alert.  Naming, repetition intact.No neglect. Higher integrative function normal  CN: II-XII normal except for mild dysarthria and decreased shoulder shrug on right.   Motor: Normal strength and tone on left. RUE strength at least 3/5 with decreased intrinsic hand strength and decreaed FFM. RLE at least 4/5.  Sensory: Intact  Coordination: Normal on left.     Results Review:     I reviewed the patient's new clinical results.    Current Medications:    amLODIPine 5 mg Oral Q24H   aspirin 325 mg Oral Daily   atorvastatin 80 mg Oral Daily   clopidogrel 300 mg Oral Once   clopidogrel 75 mg Oral Daily   glipiZIDE 2.5 mg Oral BID AC   insulin aspart 0-9 Units Subcutaneous 4x Daily With Meals & Nightly   ipratropium-albuterol 3 mL Nebulization Q4H - RT   linagliptin 5 mg Oral Daily   metoprolol succinate XL 25 mg Oral Q24H   piperacillin-tazobactam 3.375 g Intravenous Q8H   potassium chloride 20 mEq Oral TID          Medications Reviewed:    Laboratory results:  Lab Results   Component Value Date    GLUCOSE 138 (H) 01/10/2018    CALCIUM 8.7 01/10/2018     01/10/2018    K 4.3 01/10/2018    CO2 27.7 01/10/2018     01/10/2018 "    BUN 18 01/10/2018    CREATININE 1.15 (H) 01/10/2018    EGFRIFNONA 46 (L) 01/10/2018    BCR 15.7 01/10/2018    ANIONGAP 10.3 01/10/2018     Lab Results   Component Value Date    WBC 13.88 (H) 01/10/2018    HGB 8.7 (L) 01/10/2018    HCT 27.9 (L) 01/10/2018    MCV 98.9 (H) 01/10/2018     01/10/2018        Results from last 7 days  Lab Units 01/06/18  0437   CHOLESTEROL mg/dL 106     Lab Results   Component Value Date    INR 1.20 (H) 01/02/2018    PROTIME 14.8 (H) 01/02/2018     Lab Results   Component Value Date    TSH 0.411 01/06/2018     Lab Results   Component Value Date    LDLCALC 68 01/06/2018     Lab Results   Component Value Date    HGBA1C 6.50 (H) 01/02/2018     No components found for: B12    Review and interpretation of imaging:  XR elbow 1/9/18: A large joint effusion is present as seen in the lateral view. Although  I see no definite fracture, this raises the concern of an occult radial  head fracture and follow-up imaging in several days is recommended    Impression: Ms. Garcia is a 78 yo RHW female with HTN, colon CA, ?afib and stroke and who presented on 1/1/18 with frequent falls, confusion. A rapid response was called due to right sided weakness. Her imaging showed left hemispheric strokes with severe left ICA stenosis. Her carotid US confirms severe LICA stenosis that will need revascularization. Her TTE was essentially normal. Cardiology has cleared patient for surgery. Will consult vascular. I discussed with granddaughter and primary. Gradual normalization of BP, avoid hypotension and keep well-hydrated. PT/OT/ST, CCP for rehab placement,. Continue ASA, Plavix and Lipitor. She will need follow-up in stroke clinic in 3 months. Please call with questions or concerns.      Plan:  Consult vascular - done  Aspirin 325mg  Plavix 75mg  Lipitor 80 mg  Gradual normalization of BP, AVOID hypotension  Keep well-hydrated  Neurochecks  Non-pharmacological DVT prophylaxis  EKG Tele  PT/OT/ST  Stroke  Education  Goal LDL <70-recommend high dose statins-                       Serum glucose < 140    Call [phone] for stroke/TIA symptoms  F/U in stroke clinic in 3 months, call [phone] for an appointment    I have discussed the above with Dr. Greer, RN, patient and family.  Tiffany Bran, APRN  01/10/18  11:35 AM      Principal Problem:    Acute cystitis without hematuria  Active Problems:    Traumatic rhabdomyolysis    Generalized weakness    Fall    CKD (chronic kidney disease)    DM (diabetes mellitus)    HTN (hypertension)    Altered mental status    Sepsis    Bacteremia    DNR (do not resuscitate)

## 2018-01-10 NOTE — PLAN OF CARE
Problem: Patient Care Overview (Adult)  Goal: Plan of Care Review  Outcome: Ongoing (interventions implemented as appropriate)   01/10/18 0547   Coping/Psychosocial Response Interventions   Plan Of Care Reviewed With patient   Patient Care Overview   Progress progress toward functional goals as expected   Outcome Evaluation   Outcome Summary/Follow up Plan Alert to self with confusion. Remains 6 per NIHHS scale. Continues on 2.5mg per hour with bp remaining in parameters. Reported one instance of nausea during night with zofran given per prn order.     Goal: Discharge Needs Assessment  Outcome: Ongoing (interventions implemented as appropriate)      Problem: Diabetes, Type 2 (Adult)  Goal: Signs and Symptoms of Listed Potential Problems Will be Absent or Manageable (Diabetes, Type 2)  Outcome: Outcome(s) achieved Date Met: 01/10/18      Problem: Pressure Ulcer Risk (Tank Scale) (Adult,Obstetrics,Pediatric)  Goal: Identify Related Risk Factors and Signs and Symptoms  Outcome: Outcome(s) achieved Date Met: 01/10/18    Goal: Skin Integrity  Outcome: Ongoing (interventions implemented as appropriate)      Problem: Fall Risk (Adult)  Goal: Identify Related Risk Factors and Signs and Symptoms  Outcome: Outcome(s) achieved Date Met: 01/10/18    Goal: Absence of Falls  Outcome: Ongoing (interventions implemented as appropriate)      Problem: Skin Integrity Impairment, Risk/Actual (Adult)  Goal: Identify Related Risk Factors and Signs and Symptoms  Outcome: Outcome(s) achieved Date Met: 01/10/18    Goal: Skin Integrity/Wound Healing  Outcome: Ongoing (interventions implemented as appropriate)      Problem: Stroke (Ischemic) (Adult)  Goal: Signs and Symptoms of Listed Potential Problems Will be Absent or Manageable (Stroke)  Outcome: Outcome(s) achieved Date Met: 01/10/18

## 2018-01-10 NOTE — PROGRESS NOTES
LOS: 9 days   Patient Care Team:  Provider Not In System as PCP - General    Chief Complaint: Follow-up pre-op clearance left carotid surgery, CVA    Interval History: No acute events.  No CP or SOA.     Vital Signs:  Temp:  [98 °F (36.7 °C)-98.5 °F (36.9 °C)] 98.4 °F (36.9 °C)  Heart Rate:  [73-91] 81  Resp:  [16-20] 18  BP: (151-195)/(60-88) 176/71    Intake/Output Summary (Last 24 hours) at 01/10/18 0909  Last data filed at 01/09/18 2334   Gross per 24 hour   Intake              110 ml   Output                0 ml   Net              110 ml       Physical Exam:   General Appearance:    No acute distress, alert and oriented x4   Lungs:     Clear to auscultation bilaterally     Heart:    Regular rhythm and normal rate.  No murmurs, gallops, or       rubs.   Abdomen:     Soft, non-tender, non-distended.    Extremities:   Moves all extremities well.  No clubbing, cyanosis, or edema.     Results Review:      Results from last 7 days  Lab Units 01/10/18  0629   SODIUM mmol/L 145   POTASSIUM mmol/L 4.3   CHLORIDE mmol/L 107   CO2 mmol/L 27.7   BUN mg/dL 18   CREATININE mg/dL 1.15*   GLUCOSE mg/dL 138*   CALCIUM mg/dL 8.7       Results from last 7 days  Lab Units 01/04/18  0354   CK TOTAL U/L 415*       Results from last 7 days  Lab Units 01/10/18  0629   WBC 10*3/mm3 13.88*   HEMOGLOBIN g/dL 8.7*   HEMATOCRIT % 27.9*   PLATELETS 10*3/mm3 316           Results from last 7 days  Lab Units 01/06/18  0437   CHOLESTEROL mg/dL 106           Results from last 7 days  Lab Units 01/06/18  0437   CHOLESTEROL mg/dL 106   TRIGLYCERIDES mg/dL 109   HDL CHOL mg/dL 16*       I reviewed the patient's new clinical results.        Assessment:  1. Multiple lacunar infarcts left cerebral hemisphere (embolic)  2. Severe stenosis left internal carotid artery (with previous stenting)  3. E. coli bacteremia secondary to UTI  4. Toxic metabolic encephalopathy  5. Hospital acquired pneumonia   6. Moderate right pleural effusion  7. Hypertension    8. Fall with traumatic rhabdomyolysis  9. Ongoing tobacco use  10. EF > 70%  11. Diabetes   12. Cardiolite stress - small infarct anterior apex with mild to moderate laura-infarct ischemia (low risk test).       Plan:  -Again, Cardiolite stress test showed a small infarct anterior apex with mild to moderate laura-infarct ischemia.  This is still considered a low-risk stress test.  She does not have chest pain.  Cath is not indicated.  -Ok to proceed with left carotid surgery if needed.  Low to moderate risk (2-5% chance).  -Continue ASA, Plavix, Lipitor     Thanks     Bret Kaye MD  01/10/18  9:09 AM

## 2018-01-10 NOTE — PROGRESS NOTES
Continued Stay Note  [facility]     Patient Name: Sakshi Garcia  MRN: [id number]  Today's Date: 1/10/2018    Admit Date: 1/1/2018          Discharge Plan       01/10/18 1254    Case Management/Social Work Plan    Plan Referral to [facility] and [facility].  [facility] a [facility] in [place], [place] is also following.    Additional Comments Spoke with MD who states pt will likely be ready for DC on Friday.  She will return in about at week for surgery.  Spoke with pt's RN who asked about a referral to [facility].  CCP went to pt's room to discuss.  Pt's dtr, Rut and Bessy moreno were in room with pt.   I introduced myself and explained role of CCP, but no other info was given. Pt and her dtr did request to look for a facility closer to the hospital for the time between now and when she returns for surgery.  [facility] placed call to pt's dtr/ POA, Ernestina in [place] ([phone]).  She states that she and her brother, Forrest do not want pt's dtr, Rut or Bessy moreno to make any decisions for pt, and they don't want them in the room.  Once they leave, she requests that they not be allowed to return.  Pt's RN notified.  Confirmed pt is a privacy patient.  Ernestina is in agreement with referral to [facility] re[facility].  If not approved there, Ernestina is OK with CCP looking for a subacute choice for the time between DC and when she returns to the hospital for surgery. After surgery, Ernestina wants pt to go to [facility] or another facility closer to pt's son, Forrest in [place].  Referral called to [facility] re[facility] intake. [place] [facility] [facility] who will eval for [facility] as a backup..         Camilla Britton, WINSTON

## 2018-01-11 LAB
ANION GAP SERPL CALCULATED.3IONS-SCNC: 9.6 MMOL/L
B PERT DNA SPEC QL NAA+PROBE: NOT DETECTED
BASOPHILS # BLD AUTO: 0.05 10*3/MM3 (ref 0–0.2)
BASOPHILS NFR BLD AUTO: 0.3 % (ref 0–1.5)
BUN BLD-MCNC: 21 MG/DL (ref 8–23)
BUN/CREAT SERPL: 17.4 (ref 7–25)
C PNEUM DNA NPH QL NAA+NON-PROBE: NOT DETECTED
CALCIUM SPEC-SCNC: 8.6 MG/DL (ref 8.6–10.5)
CHLORIDE SERPL-SCNC: 105 MMOL/L (ref 98–107)
CO2 SERPL-SCNC: 26.4 MMOL/L (ref 22–29)
CREAT BLD-MCNC: 1.21 MG/DL (ref 0.57–1)
DEPRECATED RDW RBC AUTO: 53.9 FL (ref 37–54)
EOSINOPHIL # BLD AUTO: 0.35 10*3/MM3 (ref 0–0.7)
EOSINOPHIL NFR BLD AUTO: 2.1 % (ref 0.3–6.2)
ERYTHROCYTE [DISTWIDTH] IN BLOOD BY AUTOMATED COUNT: 14.7 % (ref 11.7–13)
FLUAV H1 2009 PAND RNA NPH QL NAA+PROBE: NOT DETECTED
FLUAV H1 HA GENE NPH QL NAA+PROBE: NOT DETECTED
FLUAV H3 RNA NPH QL NAA+PROBE: NOT DETECTED
FLUAV SUBTYP SPEC NAA+PROBE: NOT DETECTED
FLUBV RNA ISLT QL NAA+PROBE: NOT DETECTED
GFR SERPL CREATININE-BSD FRML MDRD: 43 ML/MIN/1.73
GLUCOSE BLD-MCNC: 160 MG/DL (ref 65–99)
GLUCOSE BLDC GLUCOMTR-MCNC: 114 MG/DL (ref 70–130)
GLUCOSE BLDC GLUCOMTR-MCNC: 144 MG/DL (ref 70–130)
GLUCOSE BLDC GLUCOMTR-MCNC: 161 MG/DL (ref 70–130)
GLUCOSE BLDC GLUCOMTR-MCNC: 187 MG/DL (ref 70–130)
HADV DNA SPEC NAA+PROBE: NOT DETECTED
HCOV 229E RNA SPEC QL NAA+PROBE: NOT DETECTED
HCOV HKU1 RNA SPEC QL NAA+PROBE: NOT DETECTED
HCOV NL63 RNA SPEC QL NAA+PROBE: NOT DETECTED
HCOV OC43 RNA SPEC QL NAA+PROBE: NOT DETECTED
HCT VFR BLD AUTO: 28 % (ref 35.6–45.5)
HGB BLD-MCNC: 8.5 G/DL (ref 11.9–15.5)
HMPV RNA NPH QL NAA+NON-PROBE: NOT DETECTED
HPIV1 RNA SPEC QL NAA+PROBE: NOT DETECTED
HPIV2 RNA SPEC QL NAA+PROBE: NOT DETECTED
HPIV3 RNA NPH QL NAA+PROBE: NOT DETECTED
HPIV4 P GENE NPH QL NAA+PROBE: NOT DETECTED
IMM GRANULOCYTES # BLD: 0.18 10*3/MM3 (ref 0–0.03)
IMM GRANULOCYTES NFR BLD: 1.1 % (ref 0–0.5)
LYMPHOCYTES # BLD AUTO: 2.16 10*3/MM3 (ref 0.9–4.8)
LYMPHOCYTES NFR BLD AUTO: 12.9 % (ref 19.6–45.3)
M PNEUMO IGG SER IA-ACNC: NOT DETECTED
MCH RBC QN AUTO: 30.5 PG (ref 26.9–32)
MCHC RBC AUTO-ENTMCNC: 30.4 G/DL (ref 32.4–36.3)
MCV RBC AUTO: 100.4 FL (ref 80.5–98.2)
MONOCYTES # BLD AUTO: 1.68 10*3/MM3 (ref 0.2–1.2)
MONOCYTES NFR BLD AUTO: 10 % (ref 5–12)
NEUTROPHILS # BLD AUTO: 12.32 10*3/MM3 (ref 1.9–8.1)
NEUTROPHILS NFR BLD AUTO: 73.6 % (ref 42.7–76)
PLATELET # BLD AUTO: 340 10*3/MM3 (ref 140–500)
PMV BLD AUTO: 9.8 FL (ref 6–12)
POTASSIUM BLD-SCNC: 4.9 MMOL/L (ref 3.5–5.2)
PROCALCITONIN SERPL-MCNC: 0.14 NG/ML (ref 0.1–0.25)
RBC # BLD AUTO: 2.79 10*6/MM3 (ref 3.9–5.2)
RHINOVIRUS RNA SPEC NAA+PROBE: NOT DETECTED
RSV RNA NPH QL NAA+NON-PROBE: NOT DETECTED
SODIUM BLD-SCNC: 141 MMOL/L (ref 136–145)
WBC NRBC COR # BLD: 16.74 10*3/MM3 (ref 4.5–10.7)

## 2018-01-11 PROCEDURE — 85025 COMPLETE CBC W/AUTO DIFF WBC: CPT | Performed by: SURGERY

## 2018-01-11 PROCEDURE — 94799 UNLISTED PULMONARY SVC/PX: CPT

## 2018-01-11 PROCEDURE — 97110 THERAPEUTIC EXERCISES: CPT

## 2018-01-11 PROCEDURE — 82962 GLUCOSE BLOOD TEST: CPT

## 2018-01-11 PROCEDURE — 87633 RESP VIRUS 12-25 TARGETS: CPT | Performed by: INTERNAL MEDICINE

## 2018-01-11 PROCEDURE — 87581 M.PNEUMON DNA AMP PROBE: CPT | Performed by: INTERNAL MEDICINE

## 2018-01-11 PROCEDURE — 87486 CHLMYD PNEUM DNA AMP PROBE: CPT | Performed by: INTERNAL MEDICINE

## 2018-01-11 PROCEDURE — 99231 SBSQ HOSP IP/OBS SF/LOW 25: CPT | Performed by: INTERNAL MEDICINE

## 2018-01-11 PROCEDURE — 25010000002 PIPERACILLIN SOD-TAZOBACTAM PER 1 G: Performed by: INTERNAL MEDICINE

## 2018-01-11 PROCEDURE — 84145 PROCALCITONIN (PCT): CPT | Performed by: INTERNAL MEDICINE

## 2018-01-11 PROCEDURE — 80048 BASIC METABOLIC PNL TOTAL CA: CPT | Performed by: INTERNAL MEDICINE

## 2018-01-11 PROCEDURE — 63710000001 INSULIN ASPART PER 5 UNITS: Performed by: INTERNAL MEDICINE

## 2018-01-11 PROCEDURE — 87798 DETECT AGENT NOS DNA AMP: CPT | Performed by: INTERNAL MEDICINE

## 2018-01-11 RX ORDER — LISINOPRIL 20 MG/1
20 TABLET ORAL
Status: DISCONTINUED | OUTPATIENT
Start: 2018-01-11 | End: 2018-01-22 | Stop reason: HOSPADM

## 2018-01-11 RX ADMIN — AMLODIPINE BESYLATE 5 MG: 5 TABLET ORAL at 08:08

## 2018-01-11 RX ADMIN — POTASSIUM CHLORIDE 20 MEQ: 750 CAPSULE, EXTENDED RELEASE ORAL at 17:53

## 2018-01-11 RX ADMIN — TAZOBACTAM SODIUM AND PIPERACILLIN SODIUM 3.38 G: 375; 3 INJECTION, SOLUTION INTRAVENOUS at 22:07

## 2018-01-11 RX ADMIN — IPRATROPIUM BROMIDE AND ALBUTEROL SULFATE 3 ML: .5; 3 SOLUTION RESPIRATORY (INHALATION) at 07:24

## 2018-01-11 RX ADMIN — LINAGLIPTIN 5 MG: 5 TABLET, FILM COATED ORAL at 08:09

## 2018-01-11 RX ADMIN — TAZOBACTAM SODIUM AND PIPERACILLIN SODIUM 3.38 G: 375; 3 INJECTION, SOLUTION INTRAVENOUS at 14:25

## 2018-01-11 RX ADMIN — METOPROLOL SUCCINATE 25 MG: 25 TABLET, FILM COATED, EXTENDED RELEASE ORAL at 08:08

## 2018-01-11 RX ADMIN — POTASSIUM CHLORIDE 20 MEQ: 750 CAPSULE, EXTENDED RELEASE ORAL at 08:08

## 2018-01-11 RX ADMIN — INSULIN ASPART 2 UNITS: 100 INJECTION, SOLUTION INTRAVENOUS; SUBCUTANEOUS at 22:06

## 2018-01-11 RX ADMIN — INSULIN ASPART 2 UNITS: 100 INJECTION, SOLUTION INTRAVENOUS; SUBCUTANEOUS at 17:53

## 2018-01-11 RX ADMIN — GLIPIZIDE 2.5 MG: 5 TABLET ORAL at 17:53

## 2018-01-11 RX ADMIN — TAZOBACTAM SODIUM AND PIPERACILLIN SODIUM 3.38 G: 375; 3 INJECTION, SOLUTION INTRAVENOUS at 08:08

## 2018-01-11 RX ADMIN — ATORVASTATIN CALCIUM 80 MG: 80 TABLET, FILM COATED ORAL at 08:09

## 2018-01-11 RX ADMIN — POTASSIUM CHLORIDE 20 MEQ: 750 CAPSULE, EXTENDED RELEASE ORAL at 22:06

## 2018-01-11 RX ADMIN — CLOPIDOGREL 75 MG: 75 TABLET, FILM COATED ORAL at 08:08

## 2018-01-11 RX ADMIN — LISINOPRIL 20 MG: 20 TABLET ORAL at 10:51

## 2018-01-11 RX ADMIN — IPRATROPIUM BROMIDE AND ALBUTEROL SULFATE 3 ML: .5; 3 SOLUTION RESPIRATORY (INHALATION) at 16:00

## 2018-01-11 RX ADMIN — IPRATROPIUM BROMIDE AND ALBUTEROL SULFATE 3 ML: .5; 3 SOLUTION RESPIRATORY (INHALATION) at 11:28

## 2018-01-11 RX ADMIN — IPRATROPIUM BROMIDE AND ALBUTEROL SULFATE 3 ML: .5; 3 SOLUTION RESPIRATORY (INHALATION) at 19:52

## 2018-01-11 RX ADMIN — GLIPIZIDE 2.5 MG: 5 TABLET ORAL at 08:08

## 2018-01-11 RX ADMIN — ASPIRIN 325 MG: 325 TABLET ORAL at 08:08

## 2018-01-11 NOTE — SIGNIFICANT NOTE
01/11/18 1405   Rehab Treatment   Discipline occupational therapist   Treatment Not Performed other (see comments)  (Pt preparing for transfer to different unit. Will f/u when pt gets there.)   Recommendation   OT - Next Appointment 01/11/18

## 2018-01-11 NOTE — PROGRESS NOTES
[facility] INPATIENT PROGRESS NOTE         [address]    1/11/2018      PATIENT IDENTIFICATION:   Name:  Sakshi Garcia      MRN:  [id number]     77 y.o.  female             LOS 10    Reason for visit: Follow-up pneumonia with shortness of breath and pleural effusion      SUBJECTIVE:    Less soa and cough today.  Complains of weakness.    Objective   OBJECTIVE:    Vital Sign Min/Max for last 24 hours  Temp  Min: 97.6 °F (36.4 °C)  Max: 98 °F (36.7 °C)   BP  Min: 163/73  Max: 174/66   Pulse  Min: 74  Max: 90   Resp  Min: 20  Max: 20   SpO2  Min: 95 %  Max: 96 %   Flow (L/min)  Min: 2  Max: 2   Weight  Min: 70.9 kg (156 lb 4.9 oz)  Max: 72.3 kg (159 lb 8 oz)                         Body mass index is 29.17 kg/(m^2).    Intake/Output Summary (Last 24 hours) at 01/11/18 0772  Last data filed at 01/10/18 2302   Gross per 24 hour   Intake              900 ml   Output                0 ml   Net              900 ml         Exam:  GEN:  No distress, appears stated age  EYES:   PERRL, anicteric sclera  ENT:    External ears/nose normal, OP clear  NECK:  No adenopathy, midline trachea  LUNGS: Normal chest on inspection, palpation and Diminished bilaterally on auscultation  CV:  Normal S1S2, without murmur  ABD:  Non tender, non distended, no hepatosplenomegaly, +BS  EXT:  No edema, cyanosis or clubbing    Scheduled meds:      amLODIPine 5 mg Oral Q24H   aspirin 325 mg Oral Daily   atorvastatin 80 mg Oral Daily   clopidogrel 300 mg Oral Once   clopidogrel 75 mg Oral Daily   glipiZIDE 2.5 mg Oral BID AC   insulin aspart 0-9 Units Subcutaneous 4x Daily With Meals & Nightly   ipratropium-albuterol 3 mL Nebulization Q4H - RT   linagliptin 5 mg Oral Daily   metoprolol succinate XL 25 mg Oral Q24H   piperacillin-tazobactam 3.375 g Intravenous Q8H   potassium chloride 20 mEq Oral TID     IV meds:                           Data Review:    Results from last 7 days  Lab Units 01/11/18  0455 01/10/18  0629 01/09/18  0434  01/08/18  0444 01/07/18  0635   SODIUM mmol/L 141 145 141 144 145   POTASSIUM mmol/L 4.9 4.3 4.0 4.0 3.1*   CHLORIDE mmol/L 105 107 105 108* 108*   CO2 mmol/L 26.4 27.7 23.6 19.1* 22.8   BUN mg/dL 21 18 21 20 22   CREATININE mg/dL 1.21* 1.15* 1.25* 1.29* 1.36*   GLUCOSE mg/dL 160* 138* 195* 176* 157*   CALCIUM mg/dL 8.6 8.7 8.5* 8.7 8.9         Estimated Creatinine Clearance: 35.7 mL/min (by C-G formula based on Cr of 1.21).    Results from last 7 days  Lab Units 01/10/18  0629 01/09/18  0434 01/08/18  0443 01/07/18  0635 01/06/18  0437   WBC 10*3/mm3 13.88* 13.79* 14.07* 14.64* 15.31*   HEMOGLOBIN g/dL 8.7* 8.1* 8.6* 8.5* 8.1*   PLATELETS 10*3/mm3 316 274 254 237 215                   Results from last 7 days  Lab Units 01/07/18  0635 01/05/18  0858   PROCALCITONIN ng/mL 0.33* 0.86*         2-D echo reviewed: EF hyperdynamic greater than 70%.  Moderate pulmonary hypertension    Microbiology reviewed      Chest x-ray viewed 1/7/18        Doppler reviewed  Interpretation Summary      · Acute right upper extremity superficial thrombophlebitis noted in the cephalic (upper arm).  · All other right sided vessels appear normal.       CT chest viewed: Small bilateral effusions and atelectasis/pneumonia      )Assessment   ASSESSMENT:   PNA HAP  Atelectasis and bilateral small effusions  Moderate pulmonary hypertension  Ecoli bactermia 2/2UTI  TME  CVA  Rhabdo resolved  DMII  Small apical infarct on stress test noted  HTN uncontrolled    PLAN:  Continue bronchodilators and encourage pulmonary toilet.  Completed antibiotic for bacterial pneumonia   Revascularization plan for near future noted.  Okay from pulmonary standpoint.  PT/ST Hector Dailey MD  Pulmonary and Critical Care Medicine  [facility], [facility]  1/11/2018    7:27 AM

## 2018-01-11 NOTE — PROGRESS NOTES
Name: Sakshi Garcia ADMIT: 2018   : 1940  PCP: Provider Not In System    MRN: [id number] LOS: 10 days   AGE/SEX: 77 y.o. female  ROOM: [facility]/     Active Hospital Problems (** Indicates Principal Problem)    Diagnosis Date Noted   • **Acute cystitis without hematuria [N30.00] 2018   • DNR (do not resuscitate) [Z66] 2018   • Sepsis [A41.9] 2018   • Bacteremia [R78.81] 2018   • Traumatic rhabdomyolysis [T79.6XXA] 2018   • Generalized weakness [R53.1] 2018   • Fall [W19.XXXA] 2018   • CKD (chronic kidney disease) [N18.9] 2018   • DM (diabetes mellitus) [E11.9] 2018   • HTN (hypertension) [I10] 2018   • Altered mental status [R41.82] 2018      Resolved Hospital Problems    Diagnosis Date Noted Date Resolved   No resolved problems to display.     Subjective     77 y.o. female with a left CVA and RUE weakness.    No events overnight, complains of feeling cold    Review of Systems   Constitutional: Positive for fatigue. Negative for chills, diaphoresis and fever.   HENT: Negative for nosebleeds and trouble swallowing.    Respiratory: Positive for cough. Negative for chest tightness, shortness of breath and stridor.    Cardiovascular: Negative for chest pain.   Gastrointestinal: Negative for abdominal pain, nausea and vomiting.   Genitourinary: Negative for flank pain and hematuria.   Musculoskeletal: Negative for back pain and neck pain.   Skin: Negative for color change.   Neurological: Positive for weakness. Negative for dizziness and headaches.   Psychiatric/Behavioral: Negative for hallucinations.       Objective     Vital Signs  Temp:  [97.6 °F (36.4 °C)-98 °F (36.7 °C)] 98 °F (36.7 °C)  Heart Rate:  [74-90] 74  Resp:  [20] 20  BP: (163-174)/(66-73) 163/73    Physical Exam   Constitutional: She is oriented to person, place, and time. She appears well-developed and well-nourished.   HENT:   Head: Normocephalic and atraumatic.   Neck: Normal range  of motion. No tracheal deviation present.   Cardiovascular: Normal rate and regular rhythm.    Pulmonary/Chest: Effort normal. No respiratory distress.   Coarse breath sounds  On NC 02 at 2L   Abdominal: Soft. There is no tenderness.   Musculoskeletal: Normal range of motion. She exhibits no deformity.   Neurological: She is alert and oriented to person, place, and time.   RUE weak, minimal   RLE mildly weak but able to resist gravity  LLE and LUE strong   Skin: Skin is warm and dry.   Psychiatric: She has a normal mood and affect. Her behavior is normal.     Palpable femoral pulses bilaterally.    Results Review:       I reviewed the patient's new clinical results.    Results from last 7 days  Lab Units 01/10/18  0629 01/09/18  0434 01/08/18  0443 01/07/18  0635 01/06/18  0437 01/05/18  0858   WBC 10*3/mm3 13.88* 13.79* 14.07* 14.64* 15.31* 14.72*   HEMOGLOBIN g/dL 8.7* 8.1* 8.6* 8.5* 8.1* 8.8*   PLATELETS 10*3/mm3 316 274 254 237 215 177     Results from last 7 days  Lab Units 01/11/18  0455 01/10/18  0629 01/09/18  0434 01/08/18 0444 01/07/18  0635 01/06/18  0437 01/05/18  0858   SODIUM mmol/L 141 145 141 144 145 146* 142   POTASSIUM mmol/L 4.9 4.3 4.0 4.0 3.1* 3.1* 2.9*   CHLORIDE mmol/L 105 107 105 108* 108* 111* 106   CO2 mmol/L 26.4 27.7 23.6 19.1* 22.8 22.0 22.7   BUN mg/dL 21 18 21 20 22 29* 31*   CREATININE mg/dL 1.21* 1.15* 1.25* 1.29* 1.36* 1.46* 1.41*   GLUCOSE mg/dL 160* 138* 195* 176* 157* 149* 238*   Estimated Creatinine Clearance: 35.7 mL/min (by C-G formula based on Cr of 1.21).  Results from last 7 days  Lab Units 01/11/18  0455 01/10/18  0629 01/09/18  0434 01/08/18  0444 01/07/18  0635 01/06/18  0437 01/05/18  0858   CALCIUM mg/dL 8.6 8.7 8.5* 8.7 8.9 8.6 9.0       Assessment/Plan           Active Hospital Problems (** Indicates Principal Problem)    Diagnosis Date Noted   • **Acute cystitis without hematuria [N30.00] 01/01/2018   • DNR (do not resuscitate) [Z66] 01/03/2018   • Sepsis  [A41.9] 01/02/2018   • Bacteremia [R78.81] 01/02/2018   • Traumatic rhabdomyolysis [T79.6XXA] 01/01/2018   • Generalized weakness [R53.1] 01/01/2018   • Fall [W19.XXXA] 01/01/2018   • CKD (chronic kidney disease) [N18.9] 01/01/2018   • DM (diabetes mellitus) [E11.9] 01/01/2018   • HTN (hypertension) [I10] 01/01/2018   • Altered mental status [R41.82] 01/01/2018      Resolved Hospital Problems    Diagnosis Date Noted Date Resolved   No resolved problems to display.        Assessment & Plan  77 y.o. female with symptomatic 80-99% left internal carotid artery stenosis, right asymptomatic 60-70% stenosis.  Her neurologic defect is stable.  Continue aspirin, Plavix, Lipitor.  Will require carotid endarterectomy when her pulmonary status improves.  Still quite weak, requiring full assistance from physical therapy.  Check CBC today to evaluate for resolving leukocytosis.  Remains on Zosyn for pneumonia.        Ozzie Kelsey MD  01/11/18   7:07 AM  Office Number [phone]

## 2018-01-11 NOTE — PLAN OF CARE
Problem: Patient Care Overview (Adult)  Goal: Plan of Care Review  Outcome: Ongoing (interventions implemented as appropriate)   01/11/18 7321   Coping/Psychosocial Response Interventions   Plan Of Care Reviewed With patient   Outcome Evaluation   Outcome Summary/Follow up Plan Pt. made 3 attempts to stand with modA x2 but was unable to maintain and was inappropriate for transfer from bed to bedside commode due to weakness. Pt. admits that some of difficulty with mobility may be from lack of interest.

## 2018-01-11 NOTE — PLAN OF CARE
Problem: Pressure Ulcer Risk (Tank Scale) (Adult,Obstetrics,Pediatric)  Goal: Skin Integrity  Outcome: Ongoing (interventions implemented as appropriate)   01/11/18 1402   Pressure Ulcer Risk (Tank Scale) (Adult,Obstetrics,Pediatric)   Skin Integrity making progress toward outcome       Problem: Infection, Risk/Actual (Adult)  Goal: Infection Prevention/Resolution  Outcome: Ongoing (interventions implemented as appropriate)   01/11/18 1402   Infection, Risk/Actual (Adult)   Infection Prevention/Resolution making progress toward outcome

## 2018-01-11 NOTE — NURSING NOTE
Continue to follow, therapies noted. Patient Mod A to come to a sit in bed, unable to come to a stand at EOB with multiple attempts, NWCASSIA MCHUGH. Discussed with Dr. Castillo, patient more appropriate for a subacute level rehab.    Savanah Rust RN  Rehab Admissions Nurse  [phone]

## 2018-01-11 NOTE — THERAPY TREATMENT NOTE
Acute Care - Physical Therapy Treatment Note  [facility]     Patient Name: Sakshi Garcia  : 1940  MRN: [id number]  Today's Date: 2018  Onset of Illness/Injury or Date of Surgery Date: 18  Date of Referral to PT: 18  Referring Physician: Dr. Vora    Admit Date: 2018    Visit Dx:    ICD-10-CM ICD-9-CM   1. Traumatic rhabdomyolysis, initial encounter T79.6XXA 958.6   2. Fall, initial encounter W19.XXXA E888.9   3. Renal insufficiency N28.9 593.9   4. Dizziness R42 780.4   5. Unsteadiness on feet R26.81 781.2     Patient Active Problem List   Diagnosis   • Traumatic rhabdomyolysis   • Generalized weakness   • Fall   • Acute cystitis without hematuria   • CKD (chronic kidney disease)   • DM (diabetes mellitus)   • HTN (hypertension)   • Altered mental status   • Sepsis   • Bacteremia   • DNR (do not resuscitate)               Adult Rehabilitation Note       18 1358 18 0122 01/10/18 1500    Rehab Assessment/Intervention    Discipline (P)  physical therapy assistant   student  -  physical therapy assistant  -RW    Document Type (P)  therapy note (daily note)  -  therapy note (daily note)  -RW    Subjective Information (P)  agree to therapy;no complaints  -  agree to therapy  -RW    Patient Effort, Rehab Treatment (P)  fair  -SH  fair  -RW    Symptoms Noted During/After Treatment   fatigue  -RW    Precautions/Limitations (P)  fall precautions;non-weight bearing status   NWB on RUE  -  fall precautions;non-weight bearing status   NWB on RUE  -RW    Specific Treatment Considerations   Pt RUE NWB due to possible radial head fx  -RW    Recorded by [SH] Lisa Peralta, PT Student  [RW] Lilliana Carroll, CRISTÓBAL    Pain Assessment    Pain Assessment (P)  No/denies pain  -SH  Gibson-Baker FACES  -RW    Gibson-Collins FACES Pain Rating   4  -RW    Pain Type   Acute pain  -RW    Pain Location   Leg  -RW    Pain Orientation   Left  -RW    Pain Intervention(s)   Repositioned;Rest  -RW     Response to Interventions   tolerated  -RW    Recorded by [SH] Lisa Peralta, PT Student  [RW] Lilliana Carroll PTA    Cognitive Assessment/Intervention    Current Cognitive/Communication Assessment   impaired  -RW    Orientation Status   oriented to;person;place  -RW    Follows Commands/Answers Questions   50% of the time;75% of the time;needs cueing;needs repetition  -RW    Personal Safety   severe impairment  -RW    Personal Safety Interventions   fall prevention program maintained;gait belt;nonskid shoes/slippers when out of bed  -RW    Recorded by   [RW] Lilliana Carroll PTA    Mobility Assessment/Training    Extremity Weight-Bearing Status   right upper extremity  -RW    Right Upper Extremity Weight-Bearing   non weight-bearing  -RW    Recorded by   [RW] Lilliana Carroll PTA    Bed Mobility, Assessment/Treatment    Bed Mobility, Assistive Device (P)  bed rails;head of bed elevated;draw sheet  -  head of bed elevated;draw sheet  -RW    Bed Mob, Supine to Sit, [place] (P)  moderate assist (50% patient effort)  -  moderate assist (50% patient effort);verbal cues required;nonverbal cues required (demo/gesture)  -RW    Bed Mob, Sit to Supine, [place] (P)  moderate assist (50% patient effort)  -  maximum assist (25% patient effort);2 person assist required;verbal cues required;nonverbal cues required (demo/gesture)  -RW    Bed Mobility, Safety Issues (P)  decreased use of arms for pushing/pulling  -  decreased use of arms for pushing/pulling;decreased use of legs for bridging/pushing  -    Bed Mobility, Impairments (P)  strength decreased  -  strength decreased  -RW    Recorded by [] Lisa Peralta, PT Student  [RW] Lilliana Carroll PTA    Transfer Assessment/Treatment    Transfers, Sit-Stand [place] (P)  maximum assist (25% patient effort);2 person assist required  -  unable to perform  -RW    Transfers, Stand-Sit [place] (P)  maximum assist (25% patient effort);2 person assist  required  -      Toilet Transfer, [place] (P)  unable to perform  -SH      Transfer, Impairments (P)  strength decreased  -SH      Transfer, Comment (P)  Attempted standing at EOB x3; pt. unable to come to full stand  -SH  Attempted standing at EOB x3, but pt not assisting and unable to clear EOB.   -RW    Recorded by [] Lisa Peralta, PT Student  [RW] Lilliana Carroll PTA    Gait Assessment/Treatment    Gait, [place] Level (P)  not tested;not appropriate to assess  -SH  not tested;not appropriate to assess  -RW    Recorded by [] Lisa Peralta, PT Student  [RW] Lilliana Carroll PTA    Balance Skills Training    Sitting-Level of Assistance (P)  Contact guard  -SH (P)  --  - Contact guard  -RW    Sitting-Balance Support (P)  Feet supported;Left upper extremity supported  -SH (P)  --  - Feet supported;Left upper extremity supported  -RW    Sitting-Balance Activities (P)  Forward lean  -SH (P)  --  - Forward lean;Trunk control activities  -RW    Sitting # of Minutes (P)  15  -SH (P)  --  -SH 8  -RW    Recorded by [] Lisa Peralta, PT Student [] Lisa Peralta PT Student [RW] Lilliana Carroll PTA    Therapy Exercises    Bilateral Lower Extremities   AROM:;5 reps;sitting;ankle pumps/circles;hip flexion;LAQ  -RW    Recorded by   [RW] Lilliana Carroll PTA    Positioning and Restraints    Pre-Treatment Position (P)  in bed  -SH  in bed  -RW    Post Treatment Position (P)  bed  -SH  bed  -RW    In Bed (P)  fowlers;call light within reach;encouraged to call for assist;exit alarm on;notified nsg  -  fowlers;call light within reach;encouraged to call for assist;exit alarm on;with family/caregiver;notified nsg  -RW    Recorded by [] Lisa Peralta PT Student  [RW] Lilliana Carroll PTA      01/09/18 1400 01/09/18 0900       Rehab Assessment/Intervention    Discipline speech language pathologist  -RH physical therapy assistant  -RW     Document Type  therapy note (daily note)  -RW     Subjective Information   agree to therapy;complains of;pain  -RW     Patient Effort, Rehab Treatment  good  -RW     Symptoms Noted During/After Treatment  increased pain  -RW     Precautions/Limitations  fall precautions;oxygen therapy device and L/min   2L O2  -RW     Recorded by [RH] Sidney Azevedo, MS CCC-SLP [RW] Lilliana Carroll PTA     Pain Assessment    Pain Assessment  Gibson-Collins FACES  -RW     Gibson-Collins FACES Pain Rating  8  -RW     Pain Type  Acute pain  -RW     Pain Location  [place]  -RW     Pain Orientation  Right  -RW     Pain Intervention(s)  Repositioned;Elevated;Rest  -RW     Response to Interventions  tolerated at rest  -RW     Recorded by  [RW] Lilliana Carroll PTA     Cognitive Assessment/Intervention    Current Cognitive/Communication Assessment impaired  -RH impaired  -RW     Orientation Status person;place  -RH oriented to;person  -RW     Follows Commands/Answers Questions 75% of the time;able to follow single-step instructions;needs increased time  -RH 75% of the time;needs cueing;needs repetition  -RW     Personal Safety  severe impairment;at risk behaviors demonstrated;decreased awareness, need for assist;decreased awareness, need for safety;decreased insight to deficits  -RW     Personal Safety Interventions  fall prevention program maintained;gait belt;nonskid shoes/slippers when out of bed  -RW     Short/Long Term Memory moderate impairment, short term memory  -RH      Recorded by [RH] Sidney Azevedo, MS CCC-SLP [RW] Lilliana Carroll PTA     Bed Mobility, Assessment/Treatment    Bed Mobility, Assistive Device  bed rails;head of bed elevated  -RW     Bed Mobility, Roll Left, [place]  moderate assist (50% patient effort);verbal cues required;nonverbal cues required (demo/gesture)  -RW     Bed Mob, Sidelying to Sit, [place]  moderate assist (50% patient effort);verbal cues required;nonverbal cues required (demo/gesture)  -RW     Bed Mob, Sit to Sidelying, [place]  maximum assist (25% patient  effort);2 person assist required;verbal cues required;nonverbal cues required (demo/gesture)  -RW     Bed Mobility, Safety Issues  cognitive deficits limit understanding;decreased use of arms for pushing/pulling  -RW     Bed Mobility, Impairments  muscle tone abnormal;strength decreased;impaired balance;coordination impaired;motor control impaired  -RW     Recorded by  [RW] Lilliana Carroll PTA     Transfer Assessment/Treatment    Transfers, Bed-Chair [place]  moderate assist (50% patient effort);2 person assist required;verbal cues required;nonverbal cues required (demo/gesture);hand held assist   bed to stretcher  -RW     Transfers, Chair-Bed [place]  not tested  -RW     Transfers, Sit-Stand [place]  moderate assist (50% patient effort);2 person assist required;verbal cues required;nonverbal cues required (demo/gesture);hand held assist  -RW     Transfers, Stand-Sit [place]  moderate assist (50% patient effort);2 person assist required;verbal cues required;nonverbal cues required (demo/gesture);hand held assist  -RW     Transfer, Safety Issues  sequencing ability decreased;weight-shifting ability decreased;balance decreased during turns  -RW     Transfer, Impairments  muscle tone abnormal;strength decreased;impaired balance;coordination impaired;motor control impaired  -RW     Transfer, Comment  Pt stood x4 at EOB w/ HHA, but LUE supported due to pain. No instances of L knee buckling in standing or during transfer. Pt able to pivot to L side and weight shift during transfer.  -RW     Recorded by  [RW] Lilliana Carroll PTA     Gait Assessment/Treatment    Gait, [place] Level  not tested;not appropriate to assess  -RW     Recorded by  [RW] Lilliana Carroll PTA     Balance Skills Training    Sitting-Level of Assistance  Contact guard  -RW     Sitting-Balance Support  Feet supported;Left upper extremity supported  -RW     Sitting-Balance Activities  Trunk control activities  -RW     Sitting #  of Minutes  3  -RW     Recorded by  [RW] Lilliana Carroll PTA     Therapy Exercises    Bilateral Lower Extremities  AROM:;5 reps;sitting;ankle pumps/circles;LAQ  -RW     Recorded by  [RW] Lilliana Carroll PTA     Positioning and Restraints    Pre-Treatment Position  in bed  -RW     Post Treatment Position  other  -RW     Other Position  with other staff   on stretcher w/ transport  -RW     Recorded by  [RW] Lilliana Carroll PTA       User Key  (r) = Recorded By, (t) = Taken By, (c) = Cosigned By    Initials Name Effective Dates    RW Lilliana Carroll, PTA 04/06/16 -     RH Sidney Azevedo, MS CCC-SLP 07/07/16 -     SH Lisa Peralta, PT Student 01/08/18 -                 IP PT Goals       01/06/18 1721 01/02/18 1359       Bed Mobility PT LTG    Bed Mobility PT LTG, Date Established  01/02/18  -AA     Bed Mobility PT LTG, Time to Achieve  1 wk  -AA     Bed Mobility PT LTG, Activity Type  all bed mobility  -AA     Bed Mobility PT LTG, [place] Level  minimum assist (75% patient effort)  -AA     Bed Mobility PT LTG, Outcome goal ongoing  -PC      Transfer Training PT LTG    Transfer Training PT LTG, Date Established  01/02/18  -AA     Transfer Training PT LTG, Time to Achieve 1 wk  -PC 1 wk  -AA     Transfer Training PT LTG, Activity Type sit to stand/stand to sit;bed to chair /chair to bed  -PC all transfers  -AA     Transfer Training PT LTG, [place] Level moderate assist (50% patient effort)  -PC contact guard assist  -AA     Transfer Training PT LTG, Assist Device  walker, rolling  -AA     Gait Training PT LTG    Gait Training Goal PT LTG, Date Established  01/02/18  -AA     Gait Training Goal PT LTG, Time to Achieve 1 wk  -PC 1 wk  -AA     Gait Training Goal PT LTG, [place] Level moderate assist (50% patient effort);2 person assist required  -PC contact guard assist  -AA     Gait Training Goal PT LTG, Assist Device  walker, rolling  -AA     Gait Training Goal PT LTG, Distance to Achieve 5 ft  -   -AA     Gait Training Goal PT LTG, Outcome goal revised  -PC      Gait Training Goal PT LTG, Reason Goal Not Met other (see comments)   new CVA  -PC        User Key  (r) = Recorded By, (t) = Taken By, (c) = Cosigned By    Initials Name Provider Type    BASSAM Mendez, PT Physical Therapist    KOSTA Holley, PT Physical Therapist          Physical Therapy Education     Title: PT OT SLP Therapies (Active)     Topic: Physical Therapy (Active)     Point: Mobility training (Active)    Learning Progress Summary    Learner Readiness Method Response Comment Documented by Status   Patient Acceptance E,TB,D NR   01/11/18 1423 Active    Acceptance E,TB,D NR   01/10/18 1538 Active    Acceptance E,TB,D VU,NR   01/09/18 0932 Done    Acceptance E,TB,D VU,NR   01/09/18 0923 Done    Acceptance E,TB,D NR   01/08/18 1334 Active    Acceptance E,D NR   01/06/18 1721 Active    Acceptance E,D NR,NL   01/04/18 1702 Active    Acceptance E VU,NR   01/02/18 1359 Done               Point: Home exercise program (Active)    Learning Progress Summary    Learner Readiness Method Response Comment Documented by Status   Patient Acceptance E,TB,D NR   01/10/18 1538 Active    Acceptance E,TB,D VU,NR   01/09/18 0932 Done    Acceptance E,TB,D VU,NR   01/09/18 0923 Done    Acceptance E,TB,D NR   01/08/18 1334 Active    Acceptance E,D NR   01/06/18 1721 Active    Acceptance E,D NR,NL   01/04/18 1702 Active    Acceptance E VU,NR   01/02/18 1359 Done               Point: Body mechanics (Active)    Learning Progress Summary    Learner Readiness Method Response Comment Documented by Status   Patient Acceptance E,TB,D NR   01/11/18 1423 Active    Acceptance E,TB,D NR   01/10/18 1538 Active    Acceptance E,TB,D VU,NR   01/09/18 0932 Done    Acceptance E,TB,D VU,NR   01/09/18 0923 Done    Acceptance E,TB,D NR   01/08/18 1334 Active    Acceptance E,D NR   01/06/18 1721 Active    Acceptance E,D NR,NL  JM  01/04/18 1702 Active    Acceptance E VU,NR  AA 01/02/18 1359 Done               Point: Precautions (Active)    Learning Progress Summary    Learner Readiness Method Response Comment Documented by Status   Patient Acceptance E,TB,D NR   01/11/18 1423 Active    Acceptance E,TB,D NR   01/10/18 1538 Active    Acceptance E,TB,D VU,NR   01/09/18 0932 Done    Acceptance E,TB,D VU,NR   01/09/18 0923 Done    Acceptance E,TB,D NR   01/08/18 1334 Active    Acceptance E,D NR   01/06/18 1721 Active    Acceptance E,D NR,NL   01/04/18 1702 Active    Acceptance E VU,NR   01/02/18 1359 Done                      User Key     Initials Effective Dates Name Provider Type Discipline     12/01/15 -  Mellissa Mendez, PT Physical Therapist PT     02/18/16 -  Amarilis Johnson, PTA Physical Therapy Assistant PT     04/06/16 -  Lilliana Carroll, PTA Physical Therapy Assistant PT     09/05/17 -  Paula Holley, PT Physical Therapist PT     01/08/18 -  Lisa Peralta, PT Student PT Student PT                    PT Recommendation and Plan  Anticipated Equipment Needs At Discharge: front wheeled walker  Anticipated Discharge Disposition: skilled nursing facility  Planned Therapy Interventions: bed mobility training, transfer training, strengthening, gait training  PT Frequency: daily  Plan of Care Review  Plan Of Care Reviewed With: (P) patient  Outcome Summary/Follow up Plan: (P) Pt. made 3 attempts to stand with modA x2 but was unable to maintain and was inappropriate for transfer from bed to bedside commode due to weakness. Pt. admits that some of difficulty with mobility may be from lack of interest.            Outcome Measures       01/11/18 1400 01/10/18 1500 01/09/18 0900    How much help from another person do you currently need...    Turning from your back to your side while in flat bed without using bedrails? (P)  2  -SH 2  -RW 2  -RW    Moving from lying on back to sitting on the side of a flat bed without  bedrails? (P)  2  -SH 2  -RW 2  -RW    Moving to and from a bed to a chair (including a wheelchair)? (P)  1  -SH 1  -RW 2  -RW    Standing up from a chair using your arms (e.g., wheelchair, bedside chair)? (P)  2  -SH 2  -RW 2  -RW    Climbing 3-5 steps with a railing? (P)  1  -SH 1  -RW 1  -RW    To walk in hospital room? (P)  1  -SH 1  -RW 1  -RW    AM-PAC 6 Clicks Score (P)  9  -RW (r) SH (t) 9  -RW 10  -RW    Functional Assessment    Outcome Measure Options (P)  AM-PAC 6 Clicks Basic Mobility (PT)  -SH AM-PAC 6 Clicks Basic Mobility (PT)  -RW AM-PAC 6 Clicks Basic Mobility (PT)  -RW      User Key  (r) = Recorded By, (t) = Taken By, (c) = Cosigned By    Initials Name Provider Type     Lilliana Carroll, PTA Physical Therapy Assistant     Lisa Peralta, PT Student PT Student           Time Calculation:         PT Charges       01/11/18 1421          Time Calculation    Start Time (P)  1358  -      Stop Time (P)  1419  -      Time Calculation (min) (P)  21 min  -      PT Received On (P)  01/11/18  -      PT - Next Appointment (P)  01/12/18  -        User Key  (r) = Recorded By, (t) = Taken By, (c) = Cosigned By    Initials Name Provider Type     Lisa Peralta PT Student PT Student          Therapy Charges for Today     Code Description Service Date Service Provider Modifiers Qty    48525704167 HC PT THER SUPP EA 15 MIN 1/11/2018 Lisa Peralta, PT Student GP 1    22811883778 HC PT THER PROC EA 15 MIN 1/11/2018 Lisa Peralta, PT Student GP 1          PT G-Codes  Outcome Measure Options: (P) AM-PAC 6 Clicks Basic Mobility (PT)    Lisa Peralta PT Student  1/11/2018

## 2018-01-11 NOTE — PROGRESS NOTES
Name: Sakshi Garcia  ADMIT: 2018   Age/Sex: 77 y.o.female LOS:  LOS: 10 days    :    1940     ROOM: Former[facility] named [facility]   MRN:    [id number]    PCP:    Provider Not In System     Subjective   Complains of some cough. No sob.     Objective   Vital Signs  Temp:  [97.6 °F (36.4 °C)-98.3 °F (36.8 °C)] 98.3 °F (36.8 °C)  Heart Rate:  [71-90] 80  Resp:  [20] 20  BP: (163-178)/(66-75) 178/75  Body mass index is 29.17 kg/(m^2).    Objective  General Appearance:  Comfortable and well-appearing.      HEENT: Normal HEENT exam.    Lungs:  Normal effort.  Breath sounds clear to auscultation.    Heart: Tachycardia.  Regular rhythm.    Abdomen: Abdomen is soft and non-distended.  Bowel sounds are normal.   There is no abdominal tenderness.     Extremities: There is no deformity or dependent edema.  (Right arm painful with flexion of elbow)  Neurological: Patient is alert and oriented to person, place and time.    Skin:  Warm and dry.  No rash.     Results Review:       I reviewed the patient's new clinical results.    Results from last 7 days  Lab Units 18  0822 01/10/18  0629 18  0434 18  0443 18  0635 18  0437 18  0858   WBC 10*3/mm3 16.74* 13.88* 13.79* 14.07* 14.64* 15.31* 14.72*   HEMOGLOBIN g/dL 8.5* 8.7* 8.1* 8.6* 8.5* 8.1* 8.8*   PLATELETS 10*3/mm3 340 316 274 254 237 215 177       Results from last 7 days  Lab Units 18  0455 01/10/18  0629 18  0434 18  0444 18  0635 18  0437 18  0858   SODIUM mmol/L 141 145 141 144 145 146* 142   POTASSIUM mmol/L 4.9 4.3 4.0 4.0 3.1* 3.1* 2.9*   CHLORIDE mmol/L 105 107 105 108* 108* 111* 106   CO2 mmol/L 26.4 27.7 23.6 19.1* 22.8 22.0 22.7   BUN mg/dL 21 18 21 20 22 29* 31*   CREATININE mg/dL 1.21* 1.15* 1.25* 1.29* 1.36* 1.46* 1.41*   GLUCOSE mg/dL 160* 138* 195* 176* 157* 149* 238*   Estimated Creatinine Clearance: 35.7 mL/min (by C-G formula based on Cr of 1.21).    Results from last 7 days  Lab Units 18  7479  01/10/18  0629 01/09/18  0434 01/08/18  0444 01/07/18  0635 01/06/18  0437 01/05/18  0858   CALCIUM mg/dL 8.6 8.7 8.5* 8.7 8.9 8.6 9.0       RADIOLOGY  1/11/2018  Pending      amLODIPine 5 mg Oral Q24H   aspirin 325 mg Oral Daily   atorvastatin 80 mg Oral Daily   clopidogrel 300 mg Oral Once   clopidogrel 75 mg Oral Daily   glipiZIDE 2.5 mg Oral BID AC   insulin aspart 0-9 Units Subcutaneous 4x Daily With Meals & Nightly   ipratropium-albuterol 3 mL Nebulization Q4H - RT   linagliptin 5 mg Oral Daily   lisinopril 20 mg Oral Q24H   metoprolol succinate XL 25 mg Oral Q24H   piperacillin-tazobactam 3.375 g Intravenous Q8H   potassium chloride 20 mEq Oral TID      Diet Dysphagia; IV - Mechanical Soft No Mixed Consistencies; Nectar / Syrup Thick      Assessment/Plan   Assessment:   Principal Problem:    Acute cystitis without hematuria  Active Problems:    Traumatic rhabdomyolysis    Generalized weakness    Fall    CKD (chronic kidney disease)    DM (diabetes mellitus)    HTN (hypertension)    Altered mental status    Sepsis    Bacteremia    DNR (do not resuscitate)        Plan:   1. Ecoli UTI with bacteremia  - needs 14 day course for the bacteremia. On Zosyn for likely aspiration pna which is covering for above. Day 10 overall of abx therapy  - repeat BCx NGTD  - WBC up slightly today  - cont to monitor  - can switch back to levaquin at discharge if has not completed the 14 day course      2. Aspiration vs Hospital acquired PNA with Moderate R pleural effusion  - On zosyn, day 7. Vanc d/c'd 1/8 as MRSA screen negative  - BCx NGTD.   - SLP eval noted  - mod R pleural effusion. CT shows only mild-mod bilaterally. No indication for thoracentesis per Pulm  - will cont zosyn for now as she is still coughing and is satting 90% on RA. Check procalcitonin. Check RVP with her WBC going up      3. Encephalopathy 2/2 Acute CVA and above infections  - on ASA and plavix. MRI with multiple CVA c/w cardio-embolic source or  atherosclerotic plaque. Carotid u/s showed severe stenosis on left.   - will need revascularization. Stress test c/w low risk study and cleared by Cardiology. Vascular to bring back in about a week once all other medical issues are cleared up  - TTE results noted  - cont as needed anti-psychotics      4. Rhabdo  -resolved      5. DM2  - controlled on oral meds      6. CKD3  - Cr stable      7. Anemia  - stable      8 Hypernatremia  - resolved and stable off IVF      9. Possible right radial head fracture  - NWB per ortho  - needs sling     10. HTN  - off cardene drip now  - amlodipine added back yesterday. Add back lisinopril today. Titrate up as needed and avoid hypotension due to recent CVA      Disposition  Maybe tomorrow if resp status ok and WBC coming down      Chace Chester MD  [facility]  01/11/18  9:56 AM

## 2018-01-11 NOTE — PLAN OF CARE
Problem: Patient Care Overview (Adult)  Goal: Plan of Care Review  Outcome: Ongoing (interventions implemented as appropriate)   01/11/18 5195   Coping/Psychosocial Response Interventions   Plan Of Care Reviewed With patient   Patient Care Overview   Progress improving   Outcome Evaluation   Outcome Summary/Follow up Plan VSS, alert with confusion, no falls, denies pain. Arm remains in sling. NIH 8. WBC increased to 16.74. RVP completed - negative results. Procal normal. Lisinopril added for blood pressure control. Frequent turning/repositioning. WCM patient.      Goal: Adult Individualization and Mutuality  Outcome: Ongoing (interventions implemented as appropriate)    Goal: Discharge Needs Assessment  Outcome: Ongoing (interventions implemented as appropriate)      Problem: Pressure Ulcer Risk (Tank Scale) (Adult,Obstetrics,Pediatric)  Goal: Skin Integrity  Outcome: Ongoing (interventions implemented as appropriate)      Problem: Fall Risk (Adult)  Goal: Absence of Falls  Outcome: Outcome(s) achieved Date Met: 01/11/18      Problem: Infection, Risk/Actual (Adult)  Goal: Infection Prevention/Resolution  Outcome: Ongoing (interventions implemented as appropriate)

## 2018-01-11 NOTE — NURSING NOTE
Pt was transferred to # [phone] with POA's request. Pt's privacy status was compromised when daughter (Valerie) found the patient. This is approximately the 4th time this has happened. With that being said, pt's POA requested there be no visitors for the rest of pt's stay. This will communicated via report and documented in pt's chart.

## 2018-01-11 NOTE — PROGRESS NOTES
Continued Stay Note  [facility]     Patient Name: Sakshi Garcia  MRN: [id number]  Today's Date: 1/11/2018    Admit Date: 1/1/2018          Discharge Plan       01/11/18 1525    Case Management/Social Work Plan    Additional Comments Notified by Savanah/ [facility] that they feel pt is more appropriate for subacute rehab at this time.  Spoke with Nissa/ [facility] who states all [facility] facilities are full, but bed is still available at [place] .       Camilla Britton RN

## 2018-01-11 NOTE — PLAN OF CARE
Problem: Patient Care Overview (Adult)  Goal: Plan of Care Review  Outcome: Ongoing (interventions implemented as appropriate)   01/11/18 0452   Coping/Psychosocial Response Interventions   Plan Of Care Reviewed With patient   Patient Care Overview   Progress progress toward functional goals as expected   Outcome Evaluation   Outcome Summary/Follow up Plan A&Ox2 with confusion. Scored 8 on NIHHS. Continues with rhonci bilateral with new antibiotics ordered. Slept well throughout night.     Goal: Discharge Needs Assessment  Outcome: Ongoing (interventions implemented as appropriate)      Problem: Pressure Ulcer Risk (Tank Scale) (Adult,Obstetrics,Pediatric)  Goal: Skin Integrity  Outcome: Ongoing (interventions implemented as appropriate)      Problem: Fall Risk (Adult)  Goal: Absence of Falls  Outcome: Ongoing (interventions implemented as appropriate)      Problem: Skin Integrity Impairment, Risk/Actual (Adult)  Goal: Skin Integrity/Wound Healing  Outcome: Ongoing (interventions implemented as appropriate)      Problem: Infection, Risk/Actual (Adult)  Goal: Identify Related Risk Factors and Signs and Symptoms  Outcome: Outcome(s) achieved Date Met: 01/11/18    Goal: Infection Prevention/Resolution  Outcome: Ongoing (interventions implemented as appropriate)

## 2018-01-11 NOTE — PROGRESS NOTES
LOS: 10 days   Patient Care Team:  Provider Not In System as PCP - General     Chief Complaint: Follow-up pre-op clearance left carotid surgery, CVA    Interval History: No acute events.  No CP or SOA.    Vital Signs:  Temp:  [97.6 °F (36.4 °C)-98.3 °F (36.8 °C)] 98.3 °F (36.8 °C)  Heart Rate:  [71-80] 80  Resp:  [20] 20  BP: (163-178)/(66-75) 178/75    Intake/Output Summary (Last 24 hours) at 01/11/18 1041  Last data filed at 01/11/18 0937   Gross per 24 hour   Intake              770 ml   Output                0 ml   Net              770 ml       Physical Exam:   General Appearance:    No acute distress, alert and oriented x4   Lungs:     Clear to auscultation bilaterally     Heart:    Regular rhythm and normal rate.  No murmurs, gallops, or       rubs.   Abdomen:     Soft, non-tender, non-distended.    Extremities:   Moves all extremities well.  No clubbing, cyanosis, or edema.     Results Review:      Results from last 7 days  Lab Units 01/11/18  0455   SODIUM mmol/L 141   POTASSIUM mmol/L 4.9   CHLORIDE mmol/L 105   CO2 mmol/L 26.4   BUN mg/dL 21   CREATININE mg/dL 1.21*   GLUCOSE mg/dL 160*   CALCIUM mg/dL 8.6           Results from last 7 days  Lab Units 01/11/18  0822   WBC 10*3/mm3 16.74*   HEMOGLOBIN g/dL 8.5*   HEMATOCRIT % 28.0*   PLATELETS 10*3/mm3 340           Results from last 7 days  Lab Units 01/06/18  0437   CHOLESTEROL mg/dL 106           Results from last 7 days  Lab Units 01/06/18  0437   CHOLESTEROL mg/dL 106   TRIGLYCERIDES mg/dL 109   HDL CHOL mg/dL 16*       I reviewed the patient's new clinical results.        Assessment:  1. Multiple lacunar infarcts left cerebral hemisphere (embolic)  2. Severe stenosis left internal carotid artery (with previous stenting)  3. E. coli bacteremia secondary to UTI  4. Toxic metabolic encephalopathy  5. Hospital acquired pneumonia   6. Moderate right pleural effusion  7. Hypertension   8. Fall with traumatic rhabdomyolysis  9. Ongoing tobacco use  10. EF >  70%  11. Diabetes   12. Cardiolite stress - small infarct anterior apex with mild to moderate laura-infarct ischemia (low risk test).    Plan:  -Low-risk stress test - small infarct in anterior apex with mild to moderate laura-infarct ischemia- no cath indicated prior to surgery.   -No chest pain.   -Ok to proceed with left carotid surgery.  This is evidently being done after her pulmonary status is better.    Will sign-off for now.  Please call if needed - thanks.     Bret Kaye MD  01/11/18  10:41 AM

## 2018-01-11 NOTE — PROGRESS NOTES
Continued Stay Note  [facility]     Patient Name: Sakshi Garcia  MRN: [id number]  Today's Date: 1/11/2018    Admit Date: 1/1/2018          Discharge Plan       01/11/18 1523    Case Management/Social Work Plan    Additional Comments Recieved call from pt's dtr Ernestina who expressed concern that pt's granddtr was in pt's room today.  She again states that she does not want pt's dtr (Rut), pt's granddtr (Bessy) or pt's grandson in her room.  CCP spoke with Camilla TORREZ/ Nurse Manager of / and Roxie CHRISTINE/ Director of Pt Management Services.  Followup as approprite with family is occurring.              Discharge Codes     None        Expected Discharge Date and Time     Expected Discharge Date Expected Discharge Time    Jan 4, 2018             Camilla Britton RN

## 2018-01-12 ENCOUNTER — APPOINTMENT (OUTPATIENT)
Dept: GENERAL RADIOLOGY | Facility: HOSPITAL | Age: 78
End: 2018-01-12

## 2018-01-12 PROBLEM — M62.82 NON-TRAUMATIC RHABDOMYOLYSIS: Status: ACTIVE | Noted: 2018-01-01

## 2018-01-12 PROBLEM — Z72.0 TOBACCO ABUSE: Status: ACTIVE | Noted: 2018-01-12

## 2018-01-12 PROBLEM — S52.124A CLOSED NONDISPLACED FRACTURE OF HEAD OF RIGHT RADIUS: Status: ACTIVE | Noted: 2018-01-12

## 2018-01-12 PROBLEM — R13.12 OROPHARYNGEAL DYSPHAGIA: Status: ACTIVE | Noted: 2018-01-12

## 2018-01-12 PROBLEM — B96.20 E-COLI UTI: Status: ACTIVE | Noted: 2018-01-12

## 2018-01-12 PROBLEM — J90 BILATERAL PLEURAL EFFUSION: Status: ACTIVE | Noted: 2018-01-12

## 2018-01-12 PROBLEM — I63.9 ACUTE CVA (CEREBROVASCULAR ACCIDENT) (HCC): Status: ACTIVE | Noted: 2018-01-12

## 2018-01-12 PROBLEM — G93.41 METABOLIC ENCEPHALOPATHY: Status: ACTIVE | Noted: 2018-01-12

## 2018-01-12 PROBLEM — N18.30 STAGE 3 CHRONIC KIDNEY DISEASE (HCC): Status: ACTIVE | Noted: 2018-01-01

## 2018-01-12 PROBLEM — J69.0 ASPIRATION PNEUMONIA (HCC): Status: ACTIVE | Noted: 2018-01-12

## 2018-01-12 PROBLEM — N39.0 E-COLI UTI: Status: ACTIVE | Noted: 2018-01-12

## 2018-01-12 LAB
BASOPHILS # BLD AUTO: 0.05 10*3/MM3 (ref 0–0.2)
BASOPHILS NFR BLD AUTO: 0.4 % (ref 0–1.5)
DEPRECATED RDW RBC AUTO: 52.3 FL (ref 37–54)
EOSINOPHIL # BLD AUTO: 0.29 10*3/MM3 (ref 0–0.7)
EOSINOPHIL NFR BLD AUTO: 2 % (ref 0.3–6.2)
ERYTHROCYTE [DISTWIDTH] IN BLOOD BY AUTOMATED COUNT: 14.5 % (ref 11.7–13)
GLUCOSE BLDC GLUCOMTR-MCNC: 100 MG/DL (ref 70–130)
GLUCOSE BLDC GLUCOMTR-MCNC: 171 MG/DL (ref 70–130)
GLUCOSE BLDC GLUCOMTR-MCNC: 193 MG/DL (ref 70–130)
GLUCOSE BLDC GLUCOMTR-MCNC: 239 MG/DL (ref 70–130)
HCT VFR BLD AUTO: 26.9 % (ref 35.6–45.5)
HGB BLD-MCNC: 8.2 G/DL (ref 11.9–15.5)
IMM GRANULOCYTES # BLD: 0.13 10*3/MM3 (ref 0–0.03)
IMM GRANULOCYTES NFR BLD: 0.9 % (ref 0–0.5)
LYMPHOCYTES # BLD AUTO: 1.64 10*3/MM3 (ref 0.9–4.8)
LYMPHOCYTES NFR BLD AUTO: 11.6 % (ref 19.6–45.3)
MCH RBC QN AUTO: 30.3 PG (ref 26.9–32)
MCHC RBC AUTO-ENTMCNC: 30.5 G/DL (ref 32.4–36.3)
MCV RBC AUTO: 99.3 FL (ref 80.5–98.2)
MONOCYTES # BLD AUTO: 1.47 10*3/MM3 (ref 0.2–1.2)
MONOCYTES NFR BLD AUTO: 10.4 % (ref 5–12)
NEUTROPHILS # BLD AUTO: 10.58 10*3/MM3 (ref 1.9–8.1)
NEUTROPHILS NFR BLD AUTO: 74.7 % (ref 42.7–76)
PLATELET # BLD AUTO: 343 10*3/MM3 (ref 140–500)
PMV BLD AUTO: 9.6 FL (ref 6–12)
RBC # BLD AUTO: 2.71 10*6/MM3 (ref 3.9–5.2)
WBC NRBC COR # BLD: 14.16 10*3/MM3 (ref 4.5–10.7)

## 2018-01-12 PROCEDURE — 94799 UNLISTED PULMONARY SVC/PX: CPT

## 2018-01-12 PROCEDURE — 82962 GLUCOSE BLOOD TEST: CPT

## 2018-01-12 PROCEDURE — 25010000002 PIPERACILLIN SOD-TAZOBACTAM PER 1 G: Performed by: INTERNAL MEDICINE

## 2018-01-12 PROCEDURE — 71045 X-RAY EXAM CHEST 1 VIEW: CPT

## 2018-01-12 PROCEDURE — 97112 NEUROMUSCULAR REEDUCATION: CPT

## 2018-01-12 PROCEDURE — 97110 THERAPEUTIC EXERCISES: CPT

## 2018-01-12 PROCEDURE — 85025 COMPLETE CBC W/AUTO DIFF WBC: CPT | Performed by: SURGERY

## 2018-01-12 PROCEDURE — 63710000001 INSULIN ASPART PER 5 UNITS: Performed by: INTERNAL MEDICINE

## 2018-01-12 RX ORDER — ATORVASTATIN CALCIUM 80 MG/1
80 TABLET, FILM COATED ORAL NIGHTLY
Status: DISCONTINUED | OUTPATIENT
Start: 2018-01-12 | End: 2018-01-22 | Stop reason: HOSPADM

## 2018-01-12 RX ADMIN — INSULIN ASPART 2 UNITS: 100 INJECTION, SOLUTION INTRAVENOUS; SUBCUTANEOUS at 12:13

## 2018-01-12 RX ADMIN — IPRATROPIUM BROMIDE AND ALBUTEROL SULFATE 3 ML: .5; 3 SOLUTION RESPIRATORY (INHALATION) at 20:13

## 2018-01-12 RX ADMIN — LINAGLIPTIN 5 MG: 5 TABLET, FILM COATED ORAL at 09:08

## 2018-01-12 RX ADMIN — ATORVASTATIN CALCIUM 80 MG: 80 TABLET, FILM COATED ORAL at 23:15

## 2018-01-12 RX ADMIN — TAZOBACTAM SODIUM AND PIPERACILLIN SODIUM 3.38 G: 375; 3 INJECTION, SOLUTION INTRAVENOUS at 09:08

## 2018-01-12 RX ADMIN — METOPROLOL SUCCINATE 25 MG: 25 TABLET, FILM COATED, EXTENDED RELEASE ORAL at 09:09

## 2018-01-12 RX ADMIN — AMLODIPINE BESYLATE 5 MG: 5 TABLET ORAL at 09:09

## 2018-01-12 RX ADMIN — IPRATROPIUM BROMIDE AND ALBUTEROL SULFATE 3 ML: .5; 3 SOLUTION RESPIRATORY (INHALATION) at 01:29

## 2018-01-12 RX ADMIN — GLIPIZIDE 2.5 MG: 5 TABLET ORAL at 09:08

## 2018-01-12 RX ADMIN — IPRATROPIUM BROMIDE AND ALBUTEROL SULFATE 3 ML: .5; 3 SOLUTION RESPIRATORY (INHALATION) at 17:31

## 2018-01-12 RX ADMIN — IPRATROPIUM BROMIDE AND ALBUTEROL SULFATE 3 ML: .5; 3 SOLUTION RESPIRATORY (INHALATION) at 12:38

## 2018-01-12 RX ADMIN — GLIPIZIDE 2.5 MG: 5 TABLET ORAL at 17:17

## 2018-01-12 RX ADMIN — POTASSIUM CHLORIDE 20 MEQ: 750 CAPSULE, EXTENDED RELEASE ORAL at 09:08

## 2018-01-12 RX ADMIN — POTASSIUM CHLORIDE 20 MEQ: 750 CAPSULE, EXTENDED RELEASE ORAL at 17:17

## 2018-01-12 RX ADMIN — LISINOPRIL 20 MG: 20 TABLET ORAL at 09:08

## 2018-01-12 RX ADMIN — ASPIRIN 325 MG: 325 TABLET ORAL at 09:08

## 2018-01-12 RX ADMIN — POTASSIUM CHLORIDE 20 MEQ: 750 CAPSULE, EXTENDED RELEASE ORAL at 23:14

## 2018-01-12 RX ADMIN — CLOPIDOGREL 75 MG: 75 TABLET, FILM COATED ORAL at 09:08

## 2018-01-12 RX ADMIN — TAZOBACTAM SODIUM AND PIPERACILLIN SODIUM 3.38 G: 375; 3 INJECTION, SOLUTION INTRAVENOUS at 17:17

## 2018-01-12 RX ADMIN — INSULIN ASPART 2 UNITS: 100 INJECTION, SOLUTION INTRAVENOUS; SUBCUTANEOUS at 17:17

## 2018-01-12 RX ADMIN — INSULIN ASPART 4 UNITS: 100 INJECTION, SOLUTION INTRAVENOUS; SUBCUTANEOUS at 23:15

## 2018-01-12 NOTE — PROGRESS NOTES
Continued Stay Note  [facility]     Patient Name: Sakshi Garcia  MRN: [id number]  Today's Date: 1/12/2018    Admit Date: 1/1/2018          Discharge Plan       01/12/18 0942    Case Management/Social Work Plan    Plan [facility] in [facility] skilled rehab when medically ready    Patient/Family In Agreement With Plan yes    Additional Comments CCP called pts daughter Ernestina HAWTHORNE ([phone]) via outbound call and introduced self. Pts daughter states surgery is monday and no anticipated dc over the weekend. CCP explained will follow up on monday for bed availablity at [facility]. CCP did discuss ambulance vs private transportation and pts daughter verbalized understanding and will decide at time of dc how pt will transport. CCP updated Nissa with [facility] and she will continue to follow.  Packet in CCP office.               Discharge Codes     None        Expected Discharge Date and Time     Expected Discharge Date Expected Discharge Time    Jan 4, 2018             Bela Baker RN

## 2018-01-12 NOTE — PROGRESS NOTES
Name: Sakshi Garcia ADMIT: 2018   : 1940  PCP: Provider Not In System    MRN: [id number] LOS: 11 days   AGE/SEX: 77 y.o. female  ROOM: [facility]/     Subjective   Subjective  Doesn't feel well but nothing specific.     Objective   Vital Signs  Temp:  [98.2 °F (36.8 °C)-98.6 °F (37 °C)] 98.2 °F (36.8 °C)  Heart Rate:  [69-83] 83  Resp:  [18-22] 18  BP: (163-178)/(69-73) 178/71  SpO2:  [97 %-98 %] 98 %  on  Flow (L/min):  [2] 2;   O2 Device: nasal cannula  Body mass index is 28.34 kg/(m^2).    Physical Exam   Constitutional: She is oriented to person, place, and time. No distress.   Cardiovascular: Normal rate and regular rhythm.    No murmur heard.  Pulmonary/Chest: Effort normal. She has decreased breath sounds.   Abdominal: Soft. Bowel sounds are normal. She exhibits no distension. There is no tenderness.   Musculoskeletal: She exhibits no edema.   RUE in sling   Neurological: She is alert and oriented to person, place, and time.   Skin: Skin is warm and dry. She is not diaphoretic.         Results Review:       I reviewed the patient's new clinical results.    Results from last 7 days  Lab Units 18  0541 18  0822 01/10/18  0629 18  0434 18  0443 18  0635 18  0437   WBC 10*3/mm3 14.16* 16.74* 13.88* 13.79* 14.07* 14.64* 15.31*   HEMOGLOBIN g/dL 8.2* 8.5* 8.7* 8.1* 8.6* 8.5* 8.1*   PLATELETS 10*3/mm3 343 340 316 274 254 237 215     Results from last 7 days  Lab Units 18  0455 01/10/18  0629 18  0434 18  0444 18  0635 18  0437   SODIUM mmol/L 141 145 141 144 145 146*   POTASSIUM mmol/L 4.9 4.3 4.0 4.0 3.1* 3.1*   CHLORIDE mmol/L 105 107 105 108* 108* 111*   CO2 mmol/L 26.4 27.7 23.6 19.1* 22.8 22.0   BUN mg/dL 21 18 21 20 22 29*   CREATININE mg/dL 1.21* 1.15* 1.25* 1.29* 1.36* 1.46*   GLUCOSE mg/dL 160* 138* 195* 176* 157* 149*   Estimated Creatinine Clearance: 35.2 mL/min (by C-G formula based on Cr of 1.21).  Results from last 7 days  Lab  Units 01/11/18  0455 01/10/18  0629 01/09/18  0434 01/08/18  0444 01/07/18  0635 01/06/18  0437   CALCIUM mg/dL 8.6 8.7 8.5* 8.7 8.9 8.6       Results from last 7 days  Lab Units 01/11/18  0455 01/07/18  0635   PROCALCITONIN ng/mL 0.14 0.33*       Results from last 7 days  Lab Units 01/06/18  0437   CHOLESTEROL mg/dL 106   TRIGLYCERIDES mg/dL 109   HDL CHOL mg/dL 16*   LDL CHOL mg/dL 68     Glucose   Date/Time Value Ref Range Status   01/12/2018 1058 171 (H) 70 - 130 mg/dL Final   01/12/2018 0608 100 70 - 130 mg/dL Final   01/11/2018 2136 161 (H) 70 - 130 mg/dL Final   01/11/2018 1629 187 (H) 70 - 130 mg/dL Final   01/11/2018 1106 114 70 - 130 mg/dL Final   01/11/2018 0612 144 (H) 70 - 130 mg/dL Final   01/10/2018 2119 188 (H) 70 - 130 mg/dL Final   01/10/2018 1606 199 (H) 70 - 130 mg/dL Final           amLODIPine 5 mg Oral Q24H   aspirin 325 mg Oral Daily   atorvastatin 80 mg Oral Nightly   clopidogrel 75 mg Oral Daily   glipiZIDE 2.5 mg Oral BID AC   insulin aspart 0-9 Units Subcutaneous 4x Daily With Meals & Nightly   ipratropium-albuterol 3 mL Nebulization Q4H - RT   linagliptin 5 mg Oral Daily   lisinopril 20 mg Oral Q24H   metoprolol succinate XL 25 mg Oral Q24H   piperacillin-tazobactam 3.375 g Intravenous Q8H   potassium chloride 20 mEq Oral TID      Diet Dysphagia; IV - Mechanical Soft No Mixed Consistencies; Nectar / Syrup Thick      Assessment/Plan   Assessment:     Active Hospital Problems (** Indicates Principal Problem)    Diagnosis Date Noted   • **E-coli UTI w/ bacteremia [N39.0, B96.20] 01/12/2018   • Aspiration pneumonia [J69.0] 01/12/2018   • 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 [I63.9] 01/12/2018   • Metabolic encephalopathy present on admit [G93.41] 01/12/2018   • Tobacco abuse [Z72.0] 01/12/2018   • Bilateral pleural effusions [J90] 01/12/2018   • Oropharyngeal dysphagia [R13.12] 01/12/2018   • DNR (do not resuscitate) [Z66] 01/03/2018   • Sepsis [A41.9] 01/02/2018   •  Non-traumatic rhabdomyolysis [M62.82] 01/01/2018   • Generalized weakness [R53.1] 01/01/2018   • Fall [W19.XXXA] 01/01/2018   • Stage 3 chronic kidney disease [N18.3] 01/01/2018   • Type 2 diabetes mellitus [E11.9] 01/01/2018   • HTN (hypertension) [I10] 01/01/2018      Resolved Hospital Problems    Diagnosis Date Noted Date Resolved   No resolved problems to display.       1. Ecoli UTI with bacteremia  - needs 14 day course for the bacteremia. On Zosyn for likely aspiration pna which is covering for above. Day 10 overall of abx therapy  - repeat BCx NGTD  - WBC up slightly today. PCT down.  - cont to monitor  - can switch back to levaquin at discharge if has not completed the 14 day course      2. Aspiration PNA with Moderate R pleural effusion  - On zosyn.   - BCx NGTD. MRSA screen negative  - SLP eval noted  - mod R pleural effusion. CT shows only mild-mod bilaterally. No indication for thoracentesis per Pulm      3. Acute CVA   - on ASA and Plavix. MRI with multiple CVA c/w cardio-embolic source or atherosclerotic plaque. Carotid u/s showed severe stenosis on left.   - will need revascularization. Stress test c/w low risk study and cleared by Cardiology. Vascular planning surgery on Monday and have stated needs to remain in hospital until then.  - cont as needed anti-psychotics  - monitor BP given stroke.       4. Right radial head fracture  - NWB per ortho  - needs sling   - follow-up with Dr. Shell in 1 week for repeat radiographs. Will probably have see next week here prior to discharge.       Disposition  · [facility] in [facility] skilled rehab when medically ready.      Rob Carvajal MD  [facility]  01/12/18  11:35 AM

## 2018-01-12 NOTE — PLAN OF CARE
"Problem: Patient Care Overview (Adult)  Goal: Plan of Care Review  Outcome: Ongoing (interventions implemented as appropriate)   01/12/18 7008   Coping/Psychosocial Response Interventions   Plan Of Care Reviewed With patient   Patient Care Overview   Progress improving   Outcome Evaluation   Outcome Summary/Follow up Plan Pt. VSS, no falls, slept majority of this shift. no C/o except about thickened water. TT dtr \"liliane\" who is planning on coming from [place] this weekend. Dr. Ortega plans to do carotid repair on monday.      Goal: Adult Individualization and Mutuality  Outcome: Ongoing (interventions implemented as appropriate)    Goal: Discharge Needs Assessment  Outcome: Ongoing (interventions implemented as appropriate)      Problem: Pressure Ulcer Risk (Tank Scale) (Adult,Obstetrics,Pediatric)  Goal: Skin Integrity  Outcome: Ongoing (interventions implemented as appropriate)      Problem: Skin Integrity Impairment, Risk/Actual (Adult)  Goal: Skin Integrity/Wound Healing  Outcome: Ongoing (interventions implemented as appropriate)      Problem: Infection, Risk/Actual (Adult)  Goal: Infection Prevention/Resolution  Outcome: Ongoing (interventions implemented as appropriate)        "

## 2018-01-12 NOTE — PROGRESS NOTES
[facility] INPATIENT PROGRESS NOTE         [address]    1/12/2018      PATIENT IDENTIFICATION:   Name:  Sakshi Garcia      MRN:  [id number]     77 y.o.  female             LOS 11    Reason for visit: Follow-up pneumonia with shortness of breath and pleural effusion      SUBJECTIVE:    Less soa and cough today.  Has clear sputum when produced.  Complains of weakness still.     Objective   OBJECTIVE:    Vital Sign Min/Max for last 24 hours  Temp  Min: 98.2 °F (36.8 °C)  Max: 98.6 °F (37 °C)   BP  Min: 163/69  Max: 178/71   Pulse  Min: 69  Max: 83   Resp  Min: 18  Max: 22   SpO2  Min: 97 %  Max: 98 %   Flow (L/min)  Min: 2  Max: 2   Weight  Min: 70.3 kg (155 lb)  Max: 70.3 kg (155 lb)                         Body mass index is 28.34 kg/(m^2).    Intake/Output Summary (Last 24 hours) at 01/12/18 0883  Last data filed at 01/11/18 2207   Gross per 24 hour   Intake              220 ml   Output                0 ml   Net              220 ml         Exam:  GEN:  No distress, appears stated age  EYES:   PERRL, anicteric sclera  ENT:    External ears/nose normal, OP clear  NECK:  No adenopathy, midline trachea  LUNGS: Normal chest on inspection, palpation and Diminished bilaterally on auscultation  CV:  Normal S1S2, without murmur  ABD:  Non tender, non distended, no hepatosplenomegaly, +BS  EXT:  No cyanosis or clubbing    Scheduled meds:      amLODIPine 5 mg Oral Q24H   aspirin 325 mg Oral Daily   atorvastatin 80 mg Oral Nightly   clopidogrel 75 mg Oral Daily   glipiZIDE 2.5 mg Oral BID AC   insulin aspart 0-9 Units Subcutaneous 4x Daily With Meals & Nightly   ipratropium-albuterol 3 mL Nebulization Q4H - RT   linagliptin 5 mg Oral Daily   lisinopril 20 mg Oral Q24H   metoprolol succinate XL 25 mg Oral Q24H   piperacillin-tazobactam 3.375 g Intravenous Q8H   potassium chloride 20 mEq Oral TID     IV meds:                           Data Review:    Results from last 7 days  Lab Units 01/11/18  6467  01/10/18  0629 01/09/18  0434 01/08/18  0444 01/07/18  0635   SODIUM mmol/L 141 145 141 144 145   POTASSIUM mmol/L 4.9 4.3 4.0 4.0 3.1*   CHLORIDE mmol/L 105 107 105 108* 108*   CO2 mmol/L 26.4 27.7 23.6 19.1* 22.8   BUN mg/dL 21 18 21 20 22   CREATININE mg/dL 1.21* 1.15* 1.25* 1.29* 1.36*   GLUCOSE mg/dL 160* 138* 195* 176* 157*   CALCIUM mg/dL 8.6 8.7 8.5* 8.7 8.9         Estimated Creatinine Clearance: 35.2 mL/min (by C-G formula based on Cr of 1.21).    Results from last 7 days  Lab Units 01/12/18  0541 01/11/18  0822 01/10/18  0629 01/09/18  0434 01/08/18  0443   WBC 10*3/mm3 14.16* 16.74* 13.88* 13.79* 14.07*   HEMOGLOBIN g/dL 8.2* 8.5* 8.7* 8.1* 8.6*   PLATELETS 10*3/mm3 343 340 316 274 254                   Results from last 7 days  Lab Units 01/11/18  0455 01/07/18  0635 01/05/18  0858   PROCALCITONIN ng/mL 0.14 0.33* 0.86*         2-D echo reviewed: EF hyperdynamic greater than 70%.  Moderate pulmonary hypertension    Microbiology reviewed      Chest x-ray viewed 1/7/18        Doppler reviewed  Interpretation Summary      · Acute right upper extremity superficial thrombophlebitis noted in the cephalic (upper arm).  · All other right sided vessels appear normal.       CT chest viewed: Small bilateral effusions and atelectasis/pneumonia      )Assessment   ASSESSMENT:   PNA HAP  Atelectasis and bilateral small effusions  Moderate pulmonary hypertension  Ecoli bactermia 2/2UTI  TME  CVA  Rhabdo resolved  DMII  Small apical infarct on stress test noted  HTN uncontrolled    PLAN:  Encourage pulmonary toilet.  Completed antibiotic for bacterial pneumonia.  On abx still for UTI with bacteremia.  Revascularization plan for near future noted.  Okay from pulmonary standpoint.  PT/ST Hector Dailey MD  Pulmonary and Critical Care Medicine  [facility], [facility]  1/12/2018    8:57 AM

## 2018-01-12 NOTE — PLAN OF CARE
Problem: Patient Care Overview (Adult)  Goal: Plan of Care Review  Outcome: Ongoing (interventions implemented as appropriate)   01/12/18 1340   Coping/Psychosocial Response Interventions   Plan Of Care Reviewed With patient   Outcome Evaluation   Outcome Summary/Follow up Plan Pt with 2 attempts at sit<>stand with mod-max A x2. Squat pivot to bedside chair from bed with max Ax2. Ed nsg staff to have assist x2-3 for return to bed. Will continue to follow.

## 2018-01-12 NOTE — THERAPY TREATMENT NOTE
Acute Care - Physical Therapy Treatment Note  [facility]     Patient Name: Sakshi Garcia  : 1940  MRN: [id number]  Today's Date: 2018  Onset of Illness/Injury or Date of Surgery Date: 18  Date of Referral to PT: 18  Referring Physician: Dr. Vora    Admit Date: 2018    Visit Dx:    ICD-10-CM ICD-9-CM   1. Traumatic rhabdomyolysis, initial encounter T79.6XXA 958.6   2. Fall, initial encounter W19.XXXA E888.9   3. Renal insufficiency N28.9 593.9   4. Dizziness R42 780.4   5. Unsteadiness on feet R26.81 781.2     Patient Active Problem List   Diagnosis   • Non-traumatic rhabdomyolysis   • Generalized weakness   • Fall   • Acute cystitis without hematuria   • Stage 3 chronic kidney disease   • Type 2 diabetes mellitus   • HTN (hypertension)   • Altered mental status   • Sepsis   • Bacteremia   • DNR (do not resuscitate)   • Aspiration pneumonia   • 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   • E-coli UTI w/ bacteremia   • Metabolic encephalopathy present on admit   • Tobacco abuse   • Bilateral pleural effusions   • Oropharyngeal dysphagia   • Closed nondisplaced fracture of head of right radius               Adult Rehabilitation Note       18 1409 18 1300 18 1358    Rehab Assessment/Intervention    Discipline physical therapist  -CH occupational therapist  -HC physical therapy assistant   student  -PC,SH,PC2    Document Type therapy note (daily note)  -CH therapy note (daily note)  -HC therapy note (daily note)  -PC,SH,PC2    Subjective Information agree to therapy;complains of;fatigue  - agree to therapy;no complaints  -HC agree to therapy;no complaints  -PC,SH,PC2    Patient Effort, Rehab Treatment adequate  -CH adequate  -HC fair  -PC,SH,PC2    Symptoms Noted During/After Treatment fatigue  -CH fatigue  -HC     Precautions/Limitations fall precautions   R UE in sling  -CH fall precautions;other (see comments)   NWM on RUE  - fall  precautions;non-weight bearing status   NWB on RUE  -PC,SH,PC2    Recorded by [CH] Tiffany Lilly, PT [HC] Adelaida Balladr, OTR [PC,SH,PC2] Mellissa Mendez, PT (r) Lisa Peralta, PT Student (t) Mellissa Mendez, PT (c)    Pain Assessment    Pain Assessment No/denies pain  -CH No/denies pain  -HC No/denies pain  -PC,SH,PC2    Recorded by [CH] Tiffany Lilly, PT [HC] Adelaida Ballard, OTR [PC,SH,PC2] Mellissa Mendez, PT (r) Lisa Peralta, PT Student (t) Mellissa Mendez, PT (c)    Vision Assessment/Intervention    Visual Impairment  WFL  -HC     Recorded by  [HC] ISELA Maria     Cognitive Assessment/Intervention    Current Cognitive/Communication Assessment impaired  - impaired  -HC     Orientation Status oriented to;person;place  - oriented to;person;place  -     Follows Commands/Answers Questions 75% of the time;able to follow single-step instructions;needs cueing;needs increased time;needs repetition  - 75% of the time;able to follow single-step instructions;needs cueing;needs increased time;needs repetition  -     Personal Safety moderate impairment;decreased awareness, need for assist;decreased awareness, need for safety;decreased insight to deficits  - moderate impairment;decreased awareness, need for assist;decreased awareness, need for safety;decreased insight to deficits;at risk behaviors demonstrated  -     Personal Safety Interventions fall prevention program maintained;gait belt;nonskid shoes/slippers when out of bed  - fall prevention program maintained;gait belt;nonskid shoes/slippers when out of bed  -     Recorded by [CH] Tiffany Lilly, PT [HC] Adelaida Ballard, BENEDICTR     Bed Mobility, Assessment/Treatment    Bed Mobility, Assistive Device  bed rails;head of bed elevated;draw sheet  -HC bed rails;head of bed elevated;draw sheet  -PC,SH,PC2    Bed Mob, Supine to Sit, [place] not tested   sitting in chair  - moderate assist (50% patient effort);verbal cues required;set up required   -HC moderate assist (50% patient effort)  -PC,SH,PC2    Bed Mob, Sit to Supine, [place] verbal cues required;nonverbal cues required (demo/gesture);moderate assist (50% patient effort);2 person assist required  -CH not tested  -HC moderate assist (50% patient effort)  -PC,SH,PC2    Bed Mobility, Safety Issues   decreased use of arms for pushing/pulling  -PC,SH,PC2    Bed Mobility, Impairments   strength decreased  -PC,SH,PC2    Recorded by [CH] Tiffany Lilly, PT [HC] Adelaida Ballard OTR [PC,SH,PC2] Mellissa Mendez, PT (r) Lisa Peralta, PT Student (t) Mellissa Mendez, PT (c)    Transfer Assessment/Treatment    Transfers, Bed-Chair [place]  maximum assist (25% patient effort);2 person assist required;verbal cues required;set up required  -HC     Transfers, Chair-Bed [place] verbal cues required;nonverbal cues required (demo/gesture);maximum assist (25% patient effort);2 person assist required;hand held assist  -CH      Transfers, Sit-Stand [place] verbal cues required;nonverbal cues required (demo/gesture);moderate assist (50% patient effort);2 person assist required;hand held assist  -CH maximum assist (25% patient effort);2 person assist required;verbal cues required;set up required  -HC maximum assist (25% patient effort);2 person assist required  -PC,SH,PC2    Transfers, Stand-Sit [place] verbal cues required;nonverbal cues required (demo/gesture);moderate assist (50% patient effort);2 person assist required;hand held assist  -CH maximum assist (25% patient effort);2 person assist required;verbal cues required;set up required  -HC maximum assist (25% patient effort);2 person assist required  -PC,SH,PC2    Toilet Transfer, [place]   unable to perform  -PC,SH,PC2    Transfer, Impairments   strength decreased  -PC,SH,PC2    Transfer, Comment pt able to stand and take a few shuffling steps sri ti bed  -CH squat pivot to chair  -HC Attempted standing at EOB x3; pt. unable to come to  full stand  -PC,SH,PC2    Recorded by [CH] Tiffany Lilly PT [HC] Adelaida Ballard, OTR [PC,SH,PC2] Mellissa Mendez, PT (r) Lisa Peralta PT Student (t) Mellissa Mendez, PT (c)    Gait Assessment/Treatment    Gait, [place] Level not tested  -CH  not tested;not appropriate to assess  -PC,SH,PC2    Recorded by [CH] Tiffany Lilly PT  [PC,SH,PC2] Mellissa Mendez, PT (r) Lisa Peralta PT Student (t) Mellissa Mendez, PT (c)    Balance Skills Training    Sitting-Level of Assistance  Contact guard  -HC Contact guard  -PC,SH,PC2    Sitting-Balance Support  Feet supported;Left upper extremity supported   1 significant LOB posteriorly while EOB  -HC Feet supported;Left upper extremity supported  -PC,SH,PC2    Sitting-Balance Activities  Forward lean  -HC Forward lean  -PC,SH,PC2    Sitting # of Minutes   15  -PC,SH,PC2    Recorded by  [HC] Adelaida Ballard OTR [PC,SH,PC2] Mellissa Mendez, PT (r) Lisa Peralta PT Student (t) Mellissa Mendez, PT (c)    Therapy Exercises    Bilateral Lower Extremities --   pt declined LE exercises  -CH      Recorded by [CH] Tiffany Lilly PT      Positioning and Restraints    Pre-Treatment Position sitting in chair/recliner  -CH in bed  -HC in bed  -PC,SH,PC2    Post Treatment Position bed  -CH chair  -HC bed  -PC,SH,PC2    In Bed supine;call light within reach;encouraged to call for assist;exit alarm on  -  fowlers;call light within reach;encouraged to call for assist;exit alarm on;notified nsg  -PC,SH,PC2    In Chair  reclined;call light within reach;encouraged to call for assist;legs elevated  -HC     Recorded by [CH] Tiffany Lilly PT [HC] Adelaida Ballard OTR [PC,SH,PC2] Mellissa Mendez, PT (r) Lisa Peralta PT Student (t) Mellissa Mendez, PT (c)      01/11/18 0122 01/10/18 1500       Rehab Assessment/Intervention    Discipline  physical therapy assistant  -RW     Document Type  therapy note (daily note)  -RW     Subjective Information  agree to therapy  -RW     Patient Effort,  Rehab Treatment  fair  -RW     Symptoms Noted During/After Treatment  fatigue  -RW     Precautions/Limitations  fall precautions;non-weight bearing status   NWB on RUE  -RW     Specific Treatment Considerations  Pt RUE NWB due to possible radial head fx  -RW     Recorded by  [RW] Lilliana Carroll PTA     Pain Assessment    Pain Assessment  [place] FACES  -RW     [place] FACES Pain Rating  4  -RW     Pain Type  Acute pain  -RW     Pain Location  Leg  -RW     Pain Orientation  Left  -RW     Pain Intervention(s)  Repositioned;Rest  -RW     Response to Interventions  tolerated  -RW     Recorded by  [RW] Lilliana Carroll PTA     Cognitive Assessment/Intervention    Current Cognitive/Communication Assessment  impaired  -RW     Orientation Status  oriented to;person;place  -RW     Follows Commands/Answers Questions  50% of the time;75% of the time;needs cueing;needs repetition  -RW     Personal Safety  severe impairment  -RW     Personal Safety Interventions  fall prevention program maintained;gait belt;nonskid shoes/slippers when out of bed  -RW     Recorded by  [RW] Lilliana Carroll PTA     Mobility Assessment/Training    Extremity Weight-Bearing Status  right upper extremity  -RW     Right Upper Extremity Weight-Bearing  non weight-bearing  -RW     Recorded by  [RW] Lilliana Carroll PTA     Bed Mobility, Assessment/Treatment    Bed Mobility, Assistive Device  head of bed elevated;draw sheet  -RW     Bed Mob, Supine to Sit, [place]  moderate assist (50% patient effort);verbal cues required;nonverbal cues required (demo/gesture)  -RW     Bed Mob, Sit to Supine, [place]  maximum assist (25% patient effort);2 person assist required;verbal cues required;nonverbal cues required (demo/gesture)  -RW     Bed Mobility, Safety Issues  decreased use of arms for pushing/pulling;decreased use of legs for bridging/pushing  -RW     Bed Mobility, Impairments  strength decreased  -RW     Recorded by  [RW] Lilliana Carroll,  PTA     Transfer Assessment/Treatment    Transfers, Sit-Stand [place]  unable to perform  -RW     Transfer, Comment  Attempted standing at EOB x3, but pt not assisting and unable to clear EOB.   -RW     Recorded by  [RW] Lilliana Carroll PTA     Gait Assessment/Treatment    Gait, [place] Level  not tested;not appropriate to assess  -RW     Recorded by  [RW] Lilliana Carroll PTA     Balance Skills Training    Sitting-Level of Assistance --  -PC,SH,PC2 Contact guard  -RW     Sitting-Balance Support --  -PC,SH,PC2 Feet supported;Left upper extremity supported  -RW     Sitting-Balance Activities --  -PC,SH,PC2 Forward lean;Trunk control activities  -RW     Sitting # of Minutes --  -PC,SH,PC2 8  -RW     Recorded by [PC,SH,PC2] Mellissa Mendez, PT (r) Lisa Peralta, PT Student (t) Mellissa Mendez, PT (c) [RW] Lilliana Carroll PTA     Therapy Exercises    Bilateral Lower Extremities  AROM:;5 reps;sitting;ankle pumps/circles;hip flexion;LAQ  -RW     Recorded by  [RW] Lilliana Carroll PTA     Positioning and Restraints    Pre-Treatment Position  in bed  -RW     Post Treatment Position  bed  -RW     In Bed  fowlers;call light within reach;encouraged to call for assist;exit alarm on;with family/caregiver;notified nsg  -RW     Recorded by  [RW] Lilliana Carroll PTA       User Key  (r) = Recorded By, (t) = Taken By, (c) = Cosigned By    Initials Name Effective Dates     Tiffany Lilly, PT 12/01/15 -     BASSAM Mendez, PT 12/01/15 -     RW Lilliana Carroll, CRISTÓBAL 04/06/16 -      Adelaida Ballard, OTR 08/17/17 -     SH Lisa Peralta, PT Student 01/08/18 -                 IP PT Goals       01/12/18 1418 01/06/18 1721 01/02/18 1359    Bed Mobility PT LTG    Bed Mobility PT LTG, Date Established   01/02/18  -AA    Bed Mobility PT LTG, Time to Achieve 1 wk  -CH  1 wk  -AA    Bed Mobility PT LTG, Activity Type all bed mobility  -CH  all bed mobility  -AA    Bed Mobility PT LTG, [place] Level minimum assist (75% patient  effort)  -CH  minimum assist (75% patient effort)  -AA    Bed Mobility PT LTG, Outcome goal ongoing  -CH goal ongoing  -PC     Bed Mobility PT LTG, Reason Goal Not Met progress slower than expected  -CH      Transfer Training PT LTG    Transfer Training PT LTG, Date Established   01/02/18  -AA    Transfer Training PT LTG, Time to Achieve 1 wk  -CH 1 wk  -PC 1 wk  -AA    Transfer Training PT LTG, Activity Type all transfers  -CH sit to stand/stand to sit;bed to chair /chair to bed  -PC all transfers  -AA    Transfer Training PT LTG, [place] Level minimum assist (75% patient effort);2 person assist required  -CH moderate assist (50% patient effort)  -PC contact guard assist  -AA    Transfer Training PT LTG, Assist Device   walker, rolling  -AA    Transfer Training PT LTG, Outcome goal revised  -CH      Gait Training PT LTG    Gait Training Goal PT LTG, Date Established   01/02/18  -AA    Gait Training Goal PT LTG, Time to Achieve 1 wk  -CH 1 wk  -PC 1 wk  -AA    Gait Training Goal PT LTG, [place] Level minimum assist (75% patient effort);2 person assist required  -CH moderate assist (50% patient effort);2 person assist required  -PC contact guard assist  -AA    Gait Training Goal PT LTG, Assist Device   walker, rolling  -AA    Gait Training Goal PT LTG, Distance to Achieve 5  -CH 5 ft  -  -AA    Gait Training Goal PT LTG, Outcome goal revised  -CH goal revised  -PC     Gait Training Goal PT LTG, Reason Goal Not Met  other (see comments)   new CVA  -PC       User Key  (r) = Recorded By, (t) = Taken By, (c) = Cosigned By    Initials Name Provider Type    CAM Lilly, PT Physical Therapist    PC Mellissa Mendez, PT Physical Therapist    KOSTA Holley, PT Physical Therapist          Physical Therapy Education     Title: PT OT SLP Therapies (Active)     Topic: Physical Therapy (Done)     Point: Mobility training (Done)    Learning Progress Summary    Learner Readiness Method Response Comment  Documented by Status   Patient Acceptance E,TB,D VU,NR   01/12/18 1418 Done    Acceptance E,TB,D NR   01/11/18 1423 Active    Acceptance E,TB,D NR   01/10/18 1538 Active    Acceptance E,TB,D VU,NR   01/09/18 0932 Done    Acceptance E,TB,D VU,NR   01/09/18 0923 Done    Acceptance E,TB,D NR   01/08/18 1334 Active    Acceptance E,D NR   01/06/18 1721 Active    Acceptance E,D NR,NL   01/04/18 1702 Active    Acceptance E VU,NR  AA 01/02/18 1359 Done               Point: Home exercise program (Done)    Learning Progress Summary    Learner Readiness Method Response Comment Documented by Status   Patient Acceptance E,TB,D VU,NR   01/12/18 1418 Done    Acceptance E,TB,D NR   01/10/18 1538 Active    Acceptance E,TB,D VU,NR   01/09/18 0932 Done    Acceptance E,TB,D VU,NR   01/09/18 0923 Done    Acceptance E,TB,D NR   01/08/18 1334 Active    Acceptance E,D NR   01/06/18 1721 Active    Acceptance E,D NR,NL   01/04/18 1702 Active    Acceptance E VU,NR  AA 01/02/18 1359 Done               Point: Body mechanics (Done)    Learning Progress Summary    Learner Readiness Method Response Comment Documented by Status   Patient Acceptance E,TB,D VU,NR   01/12/18 1418 Done    Acceptance E,TB,D NR   01/11/18 1423 Active    Acceptance E,TB,D NR   01/10/18 1538 Active    Acceptance E,TB,D VU,NR   01/09/18 0932 Done    Acceptance E,TB,D VU,NR   01/09/18 0923 Done    Acceptance E,TB,D NR   01/08/18 1334 Active    Acceptance E,D NR   01/06/18 1721 Active    Acceptance E,D NR,NL   01/04/18 1702 Active    Acceptance E VU,NR   01/02/18 1359 Done               Point: Precautions (Done)    Learning Progress Summary    Learner Readiness Method Response Comment Documented by Status   Patient Acceptance E,TB,D VU,NR   01/12/18 1418 Done    Acceptance E,TB,D NR   01/11/18 1423 Active    Acceptance E,TB,D NR  RW 01/10/18 1538 Active    Acceptance E,TB,D VU,NR   01/09/18 0932 Done    Acceptance E,TB,D  VU,NR   01/09/18 0923 Done    Acceptance E,TB,D NR   01/08/18 1334 Active    Acceptance E,D NR   01/06/18 1721 Active    Acceptance E,D NR,NL   01/04/18 1702 Active    Acceptance E VU,NR  AA 01/02/18 1359 Done                      User Key     Initials Effective Dates Name Provider Type Discipline     12/01/15 -  Tiffany Lilly, PT Physical Therapist PT     12/01/15 -  Mellissa Mendez, PT Physical Therapist PT     02/18/16 -  Amarilis Johnson, PTA Physical Therapy Assistant PT     04/06/16 -  Lilliana Carroll, PTA Physical Therapy Assistant PT     09/05/17 -  Paula Holley, PT Physical Therapist PT     01/08/18 -  Lisa Peralta, PT Student PT Student PT                    PT Recommendation and Plan  Anticipated Equipment Needs At Discharge: front wheeled walker  Anticipated Discharge Disposition: skilled nursing facility  Planned Therapy Interventions: bed mobility training, transfer training, strengthening, gait training  PT Frequency: daily  Plan of Care Review  Plan Of Care Reviewed With: patient  Outcome Summary/Follow up Plan: Pt demonstrates some improved activity tolerance and functional strength as she was able to take a few shuffling steps chair to bed. Pt continues to require a lot of assistance due to weakness and impaired balance.          Outcome Measures       01/12/18 1400 01/12/18 1300 01/11/18 1400    How much help from another person do you currently need...    Turning from your back to your side while in flat bed without using bedrails? 2  -CH  2  -PC (r) SH (t) PC (c)    Moving from lying on back to sitting on the side of a flat bed without bedrails? 2  -CH  2  -PC (r) SH (t) PC (c)    Moving to and from a bed to a chair (including a wheelchair)? 2  -CH  1  -PC (r) SH (t) PC (c)    Standing up from a chair using your arms (e.g., wheelchair, bedside chair)? 2  -CH  2  -PC (r) SH (t) PC (c)    Climbing 3-5 steps with a railing? 1  -CH  1  -PC (r) SH (t) PC (c)    To walk in  hospital room? 1  -CH  1  -PC (r) SH (t) PC (c)    AM-PAC 6 Clicks Score 10  -CH  9  -PC (r) SH (t)    How much help from another is currently needed...    Putting on and taking off regular lower body clothing?  1  -HC     Bathing (including washing, rinsing, and drying)  2  -HC     Toileting (which includes using toilet bed pan or urinal)  2  -HC     Putting on and taking off regular upper body clothing  2  -HC     Taking care of personal grooming (such as brushing teeth)  3  -HC     Eating meals  3  -HC     Score  13  -HC     Functional Assessment    Outcome Measure Options AM-PAC 6 Clicks Basic Mobility (PT)  -CH AM-PAC 6 Clicks Daily Activity (OT)  -HC AM-PAC 6 Clicks Basic Mobility (PT)  -PC (r) SH (t) PC (c)      01/10/18 1500          How much help from another person do you currently need...    Turning from your back to your side while in flat bed without using bedrails? 2  -RW      Moving from lying on back to sitting on the side of a flat bed without bedrails? 2  -RW      Moving to and from a bed to a chair (including a wheelchair)? 1  -RW      Standing up from a chair using your arms (e.g., wheelchair, bedside chair)? 2  -RW      Climbing 3-5 steps with a railing? 1  -RW      To walk in hospital room? 1  -RW      AM-PAC 6 Clicks Score 9  -RW      Functional Assessment    Outcome Measure Options AM-PAC 6 Clicks Basic Mobility (PT)  -RW        User Key  (r) = Recorded By, (t) = Taken By, (c) = Cosigned By    Initials Name Provider Type     Tiffany Lilly, PT Physical Therapist    PC Mellissa Mendez, PT Physical Therapist    RW Lilliana Carroll, PTA Physical Therapy Assistant    HC Adelaida Ballard, OTR Occupational Therapist    SH Lisa Peralta, PT Student PT Student           Time Calculation:         PT Charges       01/12/18 1422          Time Calculation    Start Time 1358  -      Stop Time 1408  -      Time Calculation (min) 10 min  -      PT Received On 01/12/18  -      PT - Next Appointment  01/13/18  -      PT Goal Re-Cert Due Date 01/19/18  -        User Key  (r) = Recorded By, (t) = Taken By, (c) = Cosigned By    Initials Name Provider Type     Tiffany Lilly PT Physical Therapist          Therapy Charges for Today     Code Description Service Date Service Provider Modifiers Qty    31694282197 HC PT THER PROC EA 15 MIN 1/12/2018 Tiffany Lilly, PT GP 1    38091004520 HC PT THER SUPP EA 15 MIN 1/12/2018 Tiffany Lilly, PT GP 1          PT G-Codes  Outcome Measure Options: AM-PAC 6 Clicks Basic Mobility (PT)    Tiffany Lilly, PT  1/12/2018

## 2018-01-12 NOTE — PROGRESS NOTES
Name: Sakshi Garcia ADMIT: 2018   : 1940  PCP: Provider Not In System    MRN: [id number] LOS: 11 days   AGE/SEX: 77 y.o. female  ROOM: 5/     Active Hospital Problems (** Indicates Principal Problem)    Diagnosis Date Noted   • **Acute cystitis without hematuria [N30.00] 2018   • DNR (do not resuscitate) [Z66] 2018   • Sepsis [A41.9] 2018   • Bacteremia [R78.81] 2018   • Traumatic rhabdomyolysis [T79.6XXA] 2018   • Generalized weakness [R53.1] 2018   • Fall [W19.XXXA] 2018   • CKD (chronic kidney disease) [N18.9] 2018   • DM (diabetes mellitus) [E11.9] 2018   • HTN (hypertension) [I10] 2018   • Altered mental status [R41.82] 2018      Resolved Hospital Problems    Diagnosis Date Noted Date Resolved   No resolved problems to display.     Subjective     77 y.o. female with a left CVA and RUE weakness.    No events overnight, complains of feeling cold. Her right arm is getting stronger and today she is able to lift it off the bed for me.     Review of Systems   Constitutional: Positive for fatigue. Negative for chills, diaphoresis and fever.   HENT: Negative for nosebleeds and trouble swallowing.    Respiratory: Positive for cough. Negative for chest tightness, shortness of breath and stridor.    Cardiovascular: Negative for chest pain.   Gastrointestinal: Negative for abdominal pain, nausea and vomiting.   Genitourinary: Negative for flank pain and hematuria.   Musculoskeletal: Negative for back pain and neck pain.   Skin: Negative for color change.   Neurological: Positive for weakness. Negative for dizziness and headaches.   Psychiatric/Behavioral: Negative for hallucinations.       Objective     Vital Signs  Temp:  [98.2 °F (36.8 °C)-98.6 °F (37 °C)] 98.2 °F (36.8 °C)  Heart Rate:  [69-83] 83  Resp:  [18-22] 18  BP: (163-178)/(69-73) 178/71    Physical Exam   Constitutional: She is oriented to person, place, and time. She appears  well-developed and well-nourished.   HENT:   Head: Normocephalic and atraumatic.   Neck: Normal range of motion. No tracheal deviation present.   Cardiovascular: Normal rate and regular rhythm.    Pulmonary/Chest: Effort normal. No respiratory distress.   Coarse breath sounds  On NC 02 at 2L   Abdominal: Soft. There is no tenderness.   Musculoskeletal: Normal range of motion. She exhibits no deformity.   Neurological: She is alert and oriented to person, place, and time.   RUE weak, minimal   RLE weak but improved.   LLE and LUE strong   Skin: Skin is warm and dry.   Psychiatric: She has a normal mood and affect. Her behavior is normal.     Palpable femoral pulses bilaterally.    Results Review:       I reviewed the patient's new clinical results.    Results from last 7 days  Lab Units 01/12/18  0541 01/11/18  0822 01/10/18  0629 01/09/18  0434 01/08/18  0443 01/07/18  0635 01/06/18  0437   WBC 10*3/mm3 14.16* 16.74* 13.88* 13.79* 14.07* 14.64* 15.31*   HEMOGLOBIN g/dL 8.2* 8.5* 8.7* 8.1* 8.6* 8.5* 8.1*   PLATELETS 10*3/mm3 343 340 316 274 254 237 215       Results from last 7 days  Lab Units 01/11/18  0455 01/10/18  0629 01/09/18  0434 01/08/18  0444 01/07/18  0635 01/06/18  0437 01/05/18  0858   SODIUM mmol/L 141 145 141 144 145 146* 142   POTASSIUM mmol/L 4.9 4.3 4.0 4.0 3.1* 3.1* 2.9*   CHLORIDE mmol/L 105 107 105 108* 108* 111* 106   CO2 mmol/L 26.4 27.7 23.6 19.1* 22.8 22.0 22.7   BUN mg/dL 21 18 21 20 22 29* 31*   CREATININE mg/dL 1.21* 1.15* 1.25* 1.29* 1.36* 1.46* 1.41*   GLUCOSE mg/dL 160* 138* 195* 176* 157* 149* 238*   Estimated Creatinine Clearance: 35.2 mL/min (by C-G formula based on Cr of 1.21).    Results from last 7 days  Lab Units 01/11/18  0455 01/10/18  0629 01/09/18  0434 01/08/18  0444 01/07/18  0635 01/06/18  0437 01/05/18  0858   CALCIUM mg/dL 8.6 8.7 8.5* 8.7 8.9 8.6 9.0       Assessment/Plan           Active Hospital Problems (** Indicates Principal Problem)    Diagnosis Date Noted   •  **Acute cystitis without hematuria [N30.00] 01/01/2018   • DNR (do not resuscitate) [Z66] 01/03/2018   • Sepsis [A41.9] 01/02/2018   • Bacteremia [R78.81] 01/02/2018   • Traumatic rhabdomyolysis [T79.6XXA] 01/01/2018   • Generalized weakness [R53.1] 01/01/2018   • Fall [W19.XXXA] 01/01/2018   • CKD (chronic kidney disease) [N18.9] 01/01/2018   • DM (diabetes mellitus) [E11.9] 01/01/2018   • HTN (hypertension) [I10] 01/01/2018   • Altered mental status [R41.82] 01/01/2018      Resolved Hospital Problems    Diagnosis Date Noted Date Resolved   No resolved problems to display.        Assessment & Plan  77 y.o. female with symptomatic 80-99% left internal carotid artery stenosis, right asymptomatic 60-70% stenosis.  Her neurologic defect is improved.  Continue aspirin, Plavix, Lipitor.  Will require carotid endarterectomy when her pulmonary status improves.  There is risk in delaying due to her recent CVA with possibility of recurrent event. She has a difficult situation due to the recent bacteremia from her UTI and pneumonia. This appears to be treated from her 11 days of antibiotics so far and her last blood cultures are negative from 1/5.  However, she does still have some mild leukocytosis. I have spoken with Dr. Dailey who cleared her from a pulmonary standpoint for general anesthesia.    I'll check vein mapping of the legs today. Tentative plan is for a carotid endarterectomy on Monday with vein patch to limit risk of infection as long as she continues to look as good as she does today.   Continue aspirin and Plavix.     Still quite weak, requiring full assistance from physical therapy. Plan is for rehab, needs to stay in the hospital until surgery.      Ozzie Kelsey MD  01/12/18   8:03 AM  Office Number [phone]

## 2018-01-12 NOTE — PLAN OF CARE
Problem: Patient Care Overview (Adult)  Goal: Plan of Care Review  Outcome: Ongoing (interventions implemented as appropriate)   01/12/18 1418   Coping/Psychosocial Response Interventions   Plan Of Care Reviewed With patient   Outcome Evaluation   Outcome Summary/Follow up Plan Pt demonstrates some improved activity tolerance and functional strength as she was able to take a few shuffling steps chair to bed. Pt continues to require a lot of assistance due to weakness and impaired balance.       Problem: Inpatient Physical Therapy  Goal: Bed Mobility Goal LTG- PT  Outcome: Ongoing (interventions implemented as appropriate)   01/12/18 1418   Bed Mobility PT LTG   Bed Mobility PT LTG, Time to Achieve 1 wk   Bed Mobility PT LTG, Activity Type all bed mobility   Bed Mobility PT LTG, [place] minimum assist (75% patient effort)   Bed Mobility PT LTG, Outcome goal ongoing   Bed Mobility PT LTG, Reason Goal Not Met progress slower than expected     Goal: Transfer Training Goal 1 LTG- PT  Outcome: Ongoing (interventions implemented as appropriate)   01/12/18 1418   Transfer Training PT LTG   Transfer Training PT LTG, Time to Achieve 1 wk   Transfer Training PT LTG, Activity Type all transfers   Transfer Training PT LTG, [place] minimum assist (75% patient effort);2 person assist required   Transfer Training PT LTG, Outcome goal revised     Goal: Gait Training Goal LTG- PT  Outcome: Ongoing (interventions implemented as appropriate)   01/12/18 1418   Gait Training PT LTG   Gait Training Goal PT LTG, Time to Achieve 1 wk   Gait Training Goal PT LTG, [place] minimum assist (75% patient effort);2 person assist required   Gait Training Goal PT LTG, Distance to Achieve 5   Gait Training Goal PT LTG, Outcome goal revised

## 2018-01-12 NOTE — THERAPY TREATMENT NOTE
Acute Care - Occupational Therapy Initial Evaluation  [facility]     Patient Name: Sakshi Garcia  : 1940  MRN: [id number]  Today's Date: 2018  Onset of Illness/Injury or Date of Surgery Date: 18     Referring Physician: Dr. Vora    Admit Date: 2018       ICD-10-CM ICD-9-CM   1. Traumatic rhabdomyolysis, initial encounter T79.6XXA 958.6   2. Fall, initial encounter W19.XXXA E888.9   3. Renal insufficiency N28.9 593.9   4. Dizziness R42 780.4   5. Unsteadiness on feet R26.81 781.2     Patient Active Problem List   Diagnosis   • Non-traumatic rhabdomyolysis   • Generalized weakness   • Fall   • Acute cystitis without hematuria   • Stage 3 chronic kidney disease   • Type 2 diabetes mellitus   • HTN (hypertension)   • Altered mental status   • Sepsis   • Bacteremia   • DNR (do not resuscitate)   • Aspiration pneumonia   • 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   • E-coli UTI w/ bacteremia   • Metabolic encephalopathy present on admit   • Tobacco abuse   • Bilateral pleural effusions   • Oropharyngeal dysphagia   • Closed nondisplaced fracture of head of right radius     Past Medical History:   Diagnosis Date   • Colon cancer    • Hypertension      Past Surgical History:   Procedure Laterality Date   • CHOLECYSTECTOMY     • COLON SURGERY     • COLONOSCOPY     • HERNIA REPAIR            OT ASSESSMENT FLOWSHEET (last 72 hours)      OT Evaluation       18 1300 18 0042 18 2212 18 1358 18 1243    Rehab Evaluation    Document Type therapy note (daily note)  -HC   therapy note (daily note)  -PC (r) SH (t) PC (c)     Subjective Information agree to therapy;no complaints  -HC   agree to therapy;no complaints  -PC (r) SH (t) PC (c)     Patient Effort, Rehab Treatment adequate  -HC   fair  -PC (r) SH (t) PC (c)     Symptoms Noted During/After Treatment fatigue  -HC        General Information    Precautions/Limitations fall precautions;other (see comments)    NWM on RUE  -HC   fall precautions;non-weight bearing status   NWB on RUE  -PC (r) SH (t) PC (c)     Pain Assessment    Pain Assessment No/denies pain  -   No/denies pain  -PC (r) SH (t) PC (c)     Vision Assessment/Intervention    Visual Impairment WFL  -        Cognitive Assessment/Intervention    Current Cognitive/Communication Assessment impaired  -        Orientation Status oriented to;person;place  -        Follows Commands/Answers Questions 75% of the time;able to follow single-step instructions;needs cueing;needs increased time;needs repetition  -        Personal Safety moderate impairment;decreased awareness, need for assist;decreased awareness, need for safety;decreased insight to deficits;at risk behaviors demonstrated  -        Personal Safety Interventions fall prevention program maintained;gait belt;nonskid shoes/slippers when out of bed  -        Muscle Tone Assessment    Muscle Tone Assessment  RLE  -EN RLE  -EN  RLE  -SG    Right-Side Extremities Muscle Tone Assessment  moderately decreased tone  -EN moderately decreased tone  -EN  severely decreased tone  -SG    RUE Muscle Tone Assessment     severely decreased tone  -SG    RLE Muscle Tone Assessment     moderately decreased tone  -SG    Bed Mobility, Assessment/Treatment    Bed Mobility, Assistive Device bed rails;head of bed elevated;draw sheet  -   bed rails;head of bed elevated;draw sheet  -PC (r) SH (t) PC (c)     Bed Mob, Supine to Sit, [place] moderate assist (50% patient effort);verbal cues required;set up required  -   moderate assist (50% patient effort)  -PC (r) SH (t) PC (c)     Bed Mob, Sit to Supine, [place] not tested  -   moderate assist (50% patient effort)  -PC (r) SH (t) PC (c)     Bed Mobility, Safety Issues    decreased use of arms for pushing/pulling  -PC (r) SH (t) PC (c)     Bed Mobility, Impairments    strength decreased  -PC (r) SH (t) PC (c)     Transfer Assessment/Treatment    Transfers,  Bed-Chair [place] maximum assist (25% patient effort);2 person assist required;verbal cues required;set up required  -HC        Transfers, Sit-Stand [place] maximum assist (25% patient effort);2 person assist required;verbal cues required;set up required  -HC   maximum assist (25% patient effort);2 person assist required  -PC (r) SH (t) PC (c)     Transfers, Stand-Sit [place] maximum assist (25% patient effort);2 person assist required;verbal cues required;set up required  -HC   maximum assist (25% patient effort);2 person assist required  -PC (r) SH (t) PC (c)     Toilet Transfer, [place]    unable to perform  -PC (r) SH (t) PC (c)     Transfer, Impairments    strength decreased  -PC (r) SH (t) PC (c)     Transfer, Comment squat pivot to chair  -HC   Attempted standing at EOB x3; pt. unable to come to full stand  -PC (r) SH (t) PC (c)     Balance Skills Training    Sitting-Level of Assistance Contact guard  -HC   Contact guard  -PC (r) SH (t) PC (c)     Sitting-Balance Support Feet supported;Left upper extremity supported   1 significant LOB posteriorly while EOB  -HC   Feet supported;Left upper extremity supported  -PC (r) SH (t) PC (c)     Sitting-Balance Activities Forward lean  -HC   Forward lean  -PC (r) SH (t) PC (c)     Sitting # of Minutes    15  -PC (r) SH (t) PC (c)     Positioning and Restraints    Pre-Treatment Position in bed  -HC   in bed  -PC (r) SH (t) PC (c)     Post Treatment Position chair  -HC   bed  -PC (r) SH (t) PC (c)     In Bed    fowlers;call light within reach;encouraged to call for assist;exit alarm on;notified nsg  -PC (r) SH (t) PC (c)     In Chair reclined;call light within reach;encouraged to call for assist;legs elevated  -HC          01/11/18 0817 01/11/18 0122 01/10/18 2000 01/10/18 1500 01/10/18 0148    Rehab Evaluation    Document Type    therapy note (daily note)  -RW     Subjective Information    agree to therapy  -RW     Patient Effort, Rehab Treatment     fair  -RW     Symptoms Noted During/After Treatment    fatigue  -RW     General Information    Precautions/Limitations    fall precautions;non-weight bearing status   NWB on RUE  -RW     Pain Assessment    Pain Assessment    Gibson-Collins FACES  -RW     Gibson-Collins FACES Pain Rating    4  -RW     Pain Type    Acute pain  -RW     Pain Location    Leg  -RW     Pain Orientation    Left  -RW     Pain Intervention(s)    Repositioned;Rest  -RW     Response to Interventions    tolerated  -RW     Cognitive Assessment/Intervention    Current Cognitive/Communication Assessment    impaired  -RW     Orientation Status    oriented to;person;place  -RW     Follows Commands/Answers Questions    50% of the time;75% of the time;needs cueing;needs repetition  -RW     Personal Safety    severe impairment  -RW     Personal Safety Interventions    fall prevention program maintained;gait belt;nonskid shoes/slippers when out of bed  -RW     Muscle Tone Assessment    Muscle Tone Assessment RLE  -SG RLE  -KP RLE  -KP  RLE  -KP    Right-Side Extremities Muscle Tone Assessment severely decreased tone  -SG severely decreased tone  -KP severely decreased tone  -KP  severely decreased tone  -KP    RUE Muscle Tone Assessment severely decreased tone  -SG severely decreased tone  -KP severely decreased tone  -KP  flaccid  -KP    RLE Muscle Tone Assessment moderately decreased tone  -SG moderately decreased tone  -KP moderately decreased tone  -KP  moderately decreased tone  -KP    Mobility Assessment/Training    Extremity Weight-Bearing Status    right upper extremity  -RW     Right Upper Extremity Weight-Bearing    non weight-bearing  -RW     Bed Mobility, Assessment/Treatment    Bed Mobility, Assistive Device    head of bed elevated;draw sheet  -RW     Bed Mob, Supine to Sit, [place]    moderate assist (50% patient effort);verbal cues required;nonverbal cues required (demo/gesture)  -RW     Bed Mob, Sit to Supine, [place]    maximum assist  (25% patient effort);2 person assist required;verbal cues required;nonverbal cues required (demo/gesture)  -RW     Bed Mobility, Safety Issues    decreased use of arms for pushing/pulling;decreased use of legs for bridging/pushing  -RW     Bed Mobility, Impairments    strength decreased  -RW     Transfer Assessment/Treatment    Transfers, Sit-Stand [place]    unable to perform  -RW     Transfer, Comment    Attempted standing at EOB x3, but pt not assisting and unable to clear EOB.   -RW     Balance Skills Training    Sitting-Level of Assistance  --  -PC (r) SH (t) PC (c)  Contact guard  -RW     Sitting-Balance Support  --  -PC (r) SH (t) PC (c)  Feet supported;Left upper extremity supported  -RW     Sitting-Balance Activities  --  -PC (r) SH (t) PC (c)  Forward lean;Trunk control activities  -RW     Sitting # of Minutes  --  -PC (r) SH (t) PC (c)  8  -RW     Therapy Exercises    Bilateral Lower Extremities    AROM:;5 reps;sitting;ankle pumps/circles;hip flexion;LAQ  -RW     Sensory Assessment/Intervention    Light Touch  RUE  -KP RUE  -KP  RUE  -KP    RUE Light Touch  WNL  -KP WNL  -KP  WNL  -KP    Positioning and Restraints    Pre-Treatment Position    in bed  -RW     Post Treatment Position    bed  -RW     In Bed    fowlers;call light within reach;encouraged to call for assist;exit alarm on;with family/caregiver;notified nsg  -RW       01/09/18 2000 01/09/18 1424 01/09/18 1420 01/09/18 1400       Rehab Evaluation    Document Type   evaluation  -RH therapy note (daily note)  -     Subjective Information   --   pt easily frustrated and agitated during evaluation  -RH complains of   dry mouth  -RH     Patient Effort, Rehab Treatment   fair  -RH fair  -RH     Cognitive Assessment/Intervention    Current Cognitive/Communication Assessment   impaired  -RH impaired  -RH     Orientation Status   oriented to;person;place  -RH person;place  -RH     Follows Commands/Answers Questions   75% of the time;needs  cueing;able to follow single-step instructions  -RH 75% of the time;able to follow single-step instructions;needs increased time  -RH     Short/Long Term Memory   moderate impairment, short term memory  -RH moderate impairment, short term memory  -RH     Muscle Tone Assessment    Muscle Tone Assessment RLE  -KP        Right-Side Extremities Muscle Tone Assessment severely decreased tone  -KP        RUE Muscle Tone Assessment flaccid  -KP flaccid  -RWA       RLE Muscle Tone Assessment moderately decreased tone  -KP        Sensory Assessment/Intervention    Light Touch RUE  -KP RUE  -RWA       RUE Light Touch WNL  -KP WNL  -RWA         User Key  (r) = Recorded By, (t) = Taken By, (c) = Cosigned By    Initials Name Effective Dates    PC Mellissa Mendez, PT 12/01/15 -     RWA Darcy Younger, RN 08/09/16 -     EN Marleny Jerome, RN 07/19/17 -     RW Lilliana Carroll, PTA 04/06/16 -     RH Sidney Azevedo, MS [facility]-SL[facility] 07/07/16 -     SG Julee Plaza, RN 08/22/16 -     HC Adelaida Ballard, OTR 08/17/17 -     KP Sierra Rodríguez RN 10/30/17 -     SH Lisa Peralta, PT Student 01/08/18 -            Occupational Therapy Education     Title: PT OT SLP Therapies (Active)     Topic: Occupational Therapy (Active)     Point: ADL training (Done)    Description: Instruct learner(s) on proper safety adaptation and remediation techniques during self care or transfers.   Instruct in proper use of assistive devices.    Learning Progress Summary    Learner Readiness Method Response Comment Documented by Status   Patient Acceptance MIKE DASH,NR ed pt on role of OT. benefit of therapy. POC w. OT. ed on chyna- technique using L UE for bathing and dressing. pt very weak and had to complete bathing in bed  01/06/18 1148 Done               Point: Precautions (Done)    Description: Instruct learner(s) on prescribed precautions during self-care and functional transfers.    Learning Progress Summary    Learner Readiness Method Response Comment  Documented by Status   Patient Acceptance MIKE DASH,NR ed pt on role of OT. benefit of therapy. POC wSocrates OT. ed on chyna- technique using L UE for bathing and dressing. pt very weak and had to complete bathing in bed  01/06/18 1148 Done               Point: Body mechanics (Done)    Description: Instruct learner(s) on proper positioning and spine alignment during self-care, functional mobility activities and/or exercises.    Learning Progress Summary    Learner Readiness Method Response Comment Documented by Status   Patient Acceptance MIKE DASH,NR ed pt on role of OT. benefit of therapy. POC wSocrates OT. ed on chyna- technique using L UE for bathing and dressing. pt very weak and had to complete bathing in bed  01/06/18 1148 Done                      User Key     Initials Effective Dates Name Provider Type Discipline     04/13/15 -  Yaima Barkley, OTR Occupational Therapist OT                  OT Recommendation and Plan  Anticipated Equipment Needs At Discharge: tub bench  Anticipated Discharge Disposition: skilled nursing facility, inpatient rehabilitation facility  Planned Therapy Interventions: ADL retraining, activity intolerance, home exercise program, transfer training, strengthening, neuromuscular re-education  Therapy Frequency: 3-5 times/wk  Plan of Care Review  Plan Of Care Reviewed With: patient  Outcome Summary/Follow up Plan: Pt with 2 attempts at sit<>stand with mod-max A x2. Squat pivot to bedside chair from bed with max Ax2. Ed nsg staff to have assist x2-3 for return to bed. Will continue to follow.          OT Goals       01/06/18 1149          Patient Education OT LTG    Patient Education OT LTG, Date Established 01/06/18  -      Patient Education OT LTG, Time to Achieve 1 wk  -      Patient Education OT LTG, Education Type 1 hand/chyna technique;HEP  -      Patient Education OT LTG, Education Understanding independent;demonstrates adequately;verbalizes understanding  -      Isolated Movement  OT LTG    Isolated Movement OT LTG, Date Established 01/06/18  -      Isolated Movement OT LTG, Time to Achieve 1 wk  -KP      Isolated Movement OT LTG, Body Area right scapula;right shoulder;right elbow;right forearm;right wrist;right fingers  -      Isolated Movement OT LTG, Level facilitate movement;1/2;1/4  -      ADL OT LTG    ADL OT LTG, Date Established 01/06/18  -      ADL OT LTG, Time to Achieve 1 wk  -KP      ADL OT LTG, Activity Type ADL skills  -KP      ADL OT LTG, [place] Level setup;assistive device;mod assist  -KP        User Key  (r) = Recorded By, (t) = Taken By, (c) = Cosigned By    Initials Name Provider Type     ISELA Araujo Occupational Therapist                Outcome Measures       01/12/18 1300 01/11/18 1400 01/10/18 1500    How much help from another person do you currently need...    Turning from your back to your side while in flat bed without using bedrails?  2  -PC (r) SH (t) PC (c) 2  -RW    Moving from lying on back to sitting on the side of a flat bed without bedrails?  2  -PC (r) SH (t) PC (c) 2  -RW    Moving to and from a bed to a chair (including a wheelchair)?  1  -PC (r) SH (t) PC (c) 1  -RW    Standing up from a chair using your arms (e.g., wheelchair, bedside chair)?  2  -PC (r) SH (t) PC (c) 2  -RW    Climbing 3-5 steps with a railing?  1  -PC (r) SH (t) PC (c) 1  -RW    To walk in hospital room?  1  -PC (r) SH (t) PC (c) 1  -RW    AM-PAC 6 Clicks Score  9  -PC (r) SH (t) 9  -RW    How much help from another is currently needed...    Putting on and taking off regular lower body clothing? 1  -HC      Bathing (including washing, rinsing, and drying) 2  -HC      Toileting (which includes using toilet bed pan or urinal) 2  -HC      Putting on and taking off regular upper body clothing 2  -HC      Taking care of personal grooming (such as brushing teeth) 3  -HC      Eating meals 3  -HC      Score 13  -HC      Functional Assessment    Outcome Measure  Options AM-PAC 6 Clicks Daily Activity (OT)  - AM-PAC 6 Clicks Basic Mobility (PT)  -PC (r)  (t) PC (c) AM-PAC 6 Clicks Basic Mobility (PT)  -RW      User Key  (r) = Recorded By, (t) = Taken By, (c) = Cosigned By    Initials Name Provider Type    PC Mellissa Mendez, PT Physical Therapist    RW Lilliana Carroll, PTA Physical Therapy Assistant     Adelaida Ballard, OTR Occupational Therapist     Lisa Peralta, PT Student PT Student          Time Calculation:   OT Start Time: 1310  OT Stop Time: 1335  OT Time Calculation (min): 25 min    Therapy Charges for Today     Code Description Service Date Service Provider Modifiers Qty    [id number]  OT NEUROMUSC RE EDUCATION EA 15 MIN 1/12/2018 ISELA Maria GO 2               ISELA Maria  1/12/2018

## 2018-01-13 LAB
GLUCOSE BLDC GLUCOMTR-MCNC: 138 MG/DL (ref 70–130)
GLUCOSE BLDC GLUCOMTR-MCNC: 153 MG/DL (ref 70–130)
GLUCOSE BLDC GLUCOMTR-MCNC: 202 MG/DL (ref 70–130)
GLUCOSE BLDC GLUCOMTR-MCNC: 228 MG/DL (ref 70–130)

## 2018-01-13 PROCEDURE — 25010000002 PIPERACILLIN SOD-TAZOBACTAM PER 1 G: Performed by: INTERNAL MEDICINE

## 2018-01-13 PROCEDURE — 63710000001 INSULIN ASPART PER 5 UNITS: Performed by: INTERNAL MEDICINE

## 2018-01-13 PROCEDURE — 94799 UNLISTED PULMONARY SVC/PX: CPT

## 2018-01-13 PROCEDURE — 97110 THERAPEUTIC EXERCISES: CPT

## 2018-01-13 PROCEDURE — 82962 GLUCOSE BLOOD TEST: CPT

## 2018-01-13 RX ADMIN — POTASSIUM CHLORIDE 20 MEQ: 750 CAPSULE, EXTENDED RELEASE ORAL at 16:32

## 2018-01-13 RX ADMIN — TAZOBACTAM SODIUM AND PIPERACILLIN SODIUM 3.38 G: 375; 3 INJECTION, SOLUTION INTRAVENOUS at 18:31

## 2018-01-13 RX ADMIN — IPRATROPIUM BROMIDE AND ALBUTEROL SULFATE 3 ML: .5; 3 SOLUTION RESPIRATORY (INHALATION) at 19:06

## 2018-01-13 RX ADMIN — INSULIN ASPART 4 UNITS: 100 INJECTION, SOLUTION INTRAVENOUS; SUBCUTANEOUS at 18:32

## 2018-01-13 RX ADMIN — POTASSIUM CHLORIDE 20 MEQ: 750 CAPSULE, EXTENDED RELEASE ORAL at 22:03

## 2018-01-13 RX ADMIN — IPRATROPIUM BROMIDE AND ALBUTEROL SULFATE 3 ML: .5; 3 SOLUTION RESPIRATORY (INHALATION) at 12:31

## 2018-01-13 RX ADMIN — METOPROLOL SUCCINATE 25 MG: 25 TABLET, FILM COATED, EXTENDED RELEASE ORAL at 09:39

## 2018-01-13 RX ADMIN — TAZOBACTAM SODIUM AND PIPERACILLIN SODIUM 3.38 G: 375; 3 INJECTION, SOLUTION INTRAVENOUS at 09:46

## 2018-01-13 RX ADMIN — INSULIN ASPART 4 UNITS: 100 INJECTION, SOLUTION INTRAVENOUS; SUBCUTANEOUS at 12:04

## 2018-01-13 RX ADMIN — POTASSIUM CHLORIDE 20 MEQ: 750 CAPSULE, EXTENDED RELEASE ORAL at 09:39

## 2018-01-13 RX ADMIN — CLOPIDOGREL 75 MG: 75 TABLET, FILM COATED ORAL at 09:39

## 2018-01-13 RX ADMIN — IPRATROPIUM BROMIDE AND ALBUTEROL SULFATE 3 ML: .5; 3 SOLUTION RESPIRATORY (INHALATION) at 16:14

## 2018-01-13 RX ADMIN — LINAGLIPTIN 5 MG: 5 TABLET, FILM COATED ORAL at 09:39

## 2018-01-13 RX ADMIN — LISINOPRIL 20 MG: 20 TABLET ORAL at 09:39

## 2018-01-13 RX ADMIN — GLIPIZIDE 2.5 MG: 5 TABLET ORAL at 09:38

## 2018-01-13 RX ADMIN — INSULIN ASPART 3 UNITS: 100 INJECTION, SOLUTION INTRAVENOUS; SUBCUTANEOUS at 22:04

## 2018-01-13 RX ADMIN — GLIPIZIDE 2.5 MG: 5 TABLET ORAL at 18:31

## 2018-01-13 RX ADMIN — ASPIRIN 325 MG: 325 TABLET ORAL at 09:39

## 2018-01-13 RX ADMIN — ATORVASTATIN CALCIUM 80 MG: 80 TABLET, FILM COATED ORAL at 22:03

## 2018-01-13 RX ADMIN — TAZOBACTAM SODIUM AND PIPERACILLIN SODIUM 3.38 G: 375; 3 INJECTION, SOLUTION INTRAVENOUS at 01:28

## 2018-01-13 RX ADMIN — IPRATROPIUM BROMIDE AND ALBUTEROL SULFATE 3 ML: .5; 3 SOLUTION RESPIRATORY (INHALATION) at 08:18

## 2018-01-13 RX ADMIN — AMLODIPINE BESYLATE 5 MG: 5 TABLET ORAL at 09:39

## 2018-01-13 NOTE — PROGRESS NOTES
Name: Sakshi Garcia ADMIT: 2018   : 1940  PCP: Provider Not In System    MRN: [id number] LOS: 12 days   AGE/SEX: 77 y.o. female  ROOM: [facility]     Subjective   Subjective  Doesn't feel well but nothing specific. Feels weak in general.    Objective   Vital Signs  Temp:  [98.3 °F (36.8 °C)-99.5 °F (37.5 °C)] 98.6 °F (37 °C)  Heart Rate:  [68-88] 85  Resp:  [18-26] 20  BP: (147-174)/() 147/66  SpO2:  [90 %-96 %] 95 %  on   ;   O2 Device: room air  Body mass index is 28.09 kg/(m^2).    Physical Exam   Constitutional: She is oriented to person, place, and time. No distress.   Cardiovascular: Normal rate and regular rhythm.    No murmur heard.  Pulmonary/Chest: Effort normal. She has decreased breath sounds.   Abdominal: Soft. Bowel sounds are normal. She exhibits no distension. There is no tenderness.   Musculoskeletal: She exhibits no edema.   RUE in sling   Neurological: She is alert and oriented to person, place, and time.   Skin: Skin is warm and dry. She is not diaphoretic.         Results Review:       I reviewed the patient's new clinical results.    Results from last 7 days  Lab Units 18  0541 18  0822 01/10/18  0629 18  0434 18  0443 18  0635   WBC 10*3/mm3 14.16* 16.74* 13.88* 13.79* 14.07* 14.64*   HEMOGLOBIN g/dL 8.2* 8.5* 8.7* 8.1* 8.6* 8.5*   PLATELETS 10*3/mm3 343 340 316 274 254 237       Results from last 7 days  Lab Units 18  0455 01/10/18  0629 18  0434 18  0444 18  0635   SODIUM mmol/L 141 145 141 144 145   POTASSIUM mmol/L 4.9 4.3 4.0 4.0 3.1*   CHLORIDE mmol/L 105 107 105 108* 108*   CO2 mmol/L 26.4 27.7 23.6 19.1* 22.8   BUN mg/dL 21 18 21 20 22   CREATININE mg/dL 1.21* 1.15* 1.25* 1.29* 1.36*   GLUCOSE mg/dL 160* 138* 195* 176* 157*   Estimated Creatinine Clearance: 35 mL/min (by C-G formula based on Cr of 1.21).    Results from last 7 days  Lab Units 18  0455 01/10/18  0629 18  0434 18  0444 18  0635    CALCIUM mg/dL 8.6 8.7 8.5* 8.7 8.9       Results from last 7 days  Lab Units 01/11/18  0455 01/07/18  0635   PROCALCITONIN ng/mL 0.14 0.33*         Glucose   Date/Time Value Ref Range Status   01/13/2018 1113 228 (H) 70 - 130 mg/dL Final   01/13/2018 0633 138 (H) 70 - 130 mg/dL Final   01/12/2018 2106 239 (H) 70 - 130 mg/dL Final   01/12/2018 1600 193 (H) 70 - 130 mg/dL Final   01/12/2018 1058 171 (H) 70 - 130 mg/dL Final   01/12/2018 0608 100 70 - 130 mg/dL Final   01/11/2018 2136 161 (H) 70 - 130 mg/dL Final   01/11/2018 1629 187 (H) 70 - 130 mg/dL Final           amLODIPine 5 mg Oral Q24H   aspirin 325 mg Oral Daily   atorvastatin 80 mg Oral Nightly   clopidogrel 75 mg Oral Daily   glipiZIDE 2.5 mg Oral BID AC   insulin aspart 0-9 Units Subcutaneous 4x Daily With Meals & Nightly   ipratropium-albuterol 3 mL Nebulization Q4H - RT   linagliptin 5 mg Oral Daily   lisinopril 20 mg Oral Q24H   metoprolol succinate XL 25 mg Oral Q24H   piperacillin-tazobactam 3.375 g Intravenous Q8H   potassium chloride 20 mEq Oral TID      Diet Dysphagia; IV - Mechanical Soft No Mixed Consistencies; Nectar / Syrup Thick      Assessment/Plan   Assessment:     Active Hospital Problems (** Indicates Principal Problem)    Diagnosis Date Noted   • **E-coli UTI w/ bacteremia [N39.0, B96.20] 01/12/2018   • Aspiration pneumonia [J69.0] 01/12/2018   • 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 [I63.9] 01/12/2018   • Metabolic encephalopathy present on admit [G93.41] 01/12/2018   • Tobacco abuse [Z72.0] 01/12/2018   • Bilateral pleural effusions [J90] 01/12/2018   • Oropharyngeal dysphagia [R13.12] 01/12/2018   • Closed nondisplaced fracture of head of right radius [S52.124A] 01/12/2018   • DNR (do not resuscitate) [Z66] 01/03/2018   • Sepsis [A41.9] 01/02/2018   • Non-traumatic rhabdomyolysis [M62.82] 01/01/2018   • Generalized weakness [R53.1] 01/01/2018   • Fall [W19.XXXA] 01/01/2018   • Stage 3 chronic kidney disease  [N18.3] 01/01/2018   • Type 2 diabetes mellitus [E11.9] 01/01/2018   • HTN (hypertension) [I10] 01/01/2018      Resolved Hospital Problems    Diagnosis Date Noted Date Resolved   No resolved problems to display.       1. Ecoli UTI with bacteremia  - needs 14 day course for the bacteremia. On Zosyn for likely aspiration pna which is covering for above. Day 11 overall of abx therapy  - repeat BCx no growth  - WBC better. cont to monitor  - can switch back to levaquin at discharge if has not completed the 14 day course      2. Aspiration PNA with Moderate R pleural effusion  - On zosyn. No further abx needed for this per pulm.  - BCx NGTD. MRSA screen negative  - SLP eval noted  - mod R pleural effusion. CT shows only mild-mod bilaterally. No indication for thoracentesis per Pulm      3. Acute CVA   - on ASA and Plavix. MRI with multiple CVA c/w cardio-embolic source or atherosclerotic plaque. Carotid u/s showed severe stenosis on left.   - will need revascularization. Stress test c/w low risk study and cleared by Cardiology. Vascular planning surgery on Monday and have stated needs to remain in hospital until then.  - cont as needed anti-psychotics  - monitor BP given stroke.       4. Right radial head fracture  - NWB per ortho  - needs sling   - follow-up with Dr. Shell in 1 week for repeat radiographs. Will probably have see next week here prior to discharge.       Disposition  · [facility] medically ready.      Rob Carvajal MD  [facility]  01/13/18  2:28 PM

## 2018-01-13 NOTE — PROGRESS NOTES
[facility]     Mar/chart reviewed  F/u pneumonia  Some cough    Vital Sign Min/Max for last 24 hours  Temp  Min: 99 °F (37.2 °C)  Max: 99.5 °F (37.5 °C)   BP  Min: 172/78  Max: 174/64   Pulse  Min: 68  Max: 80   Resp  Min: 20  Max: 26   SpO2  Min: 90 %  Max: 97 %   Flow (L/min)  Min: 2  Max: 2   Weight  Min: 69.7 kg (153 lb 9.6 oz)  Max: 69.7 kg (153 lb 9.6 oz)     Appears ill, axox3,   perrl, eomi, no icterus,  mmm, no jvd, trachea midline, neck supple,  chest decreased bilaterally, no crackles, no wheezes,   rrr,   soft, nt, nd +bs,  no c/c/e    Labs:  Wbc 14  hgb 8.2  plts 343  CXR: reviewed    ASSESSMENT:                      PNA HAP  Atelectasis and bilateral small effusions  Moderate pulmonary hypertension  Ecoli bactermia 2/2UTI  TME  CVA  Rhabdo resolved  DMII  Small apical infarct on stress test noted  HTN uncontrolled     PLAN:  Encourage pulmonary toilet.  Completed antibiotic for bacterial pneumonia.  On abx still for UTI with bacteremia.  Revascularization plan for near future noted.  Okay from pulmonary standpoint.  PT/ST  Off oxygen  She need to get oob more

## 2018-01-13 NOTE — PLAN OF CARE
Problem: Patient Care Overview (Adult)  Goal: Plan of Care Review  Outcome: Ongoing (interventions implemented as appropriate)   01/13/18 1116   Coping/Psychosocial Response Interventions   Plan Of Care Reviewed With patient   Patient Care Overview   Progress progress toward functional goals is gradual   Outcome Evaluation   Outcome Summary/Follow up Plan With encouragement, patient agreeable to PT this AM. Pt still wtih confusion, but able to sit EOB with mod A x 2. Pt then able to transfer to chair with mod/max A x 2. Pt with difficulty taking steps, but eventually able to shuffle feet to assist with transfer to chair. Will continue to progress as tolerated.

## 2018-01-13 NOTE — PLAN OF CARE
Problem: Patient Care Overview (Adult)  Goal: Plan of Care Review  Outcome: Ongoing (interventions implemented as appropriate)   01/13/18 0511   Coping/Psychosocial Response Interventions   Plan Of Care Reviewed With patient   Patient Care Overview   Progress improving   Outcome Evaluation   Outcome Summary/Follow up Plan Pt was confused but coopertive, Nihss done, vss,no distress noticed, I will continue to monitor.     Goal: Adult Individualization and Mutuality  Outcome: Ongoing (interventions implemented as appropriate)    Goal: Discharge Needs Assessment  Outcome: Ongoing (interventions implemented as appropriate)      Problem: Pressure Ulcer Risk (Tank Scale) (Adult,Obstetrics,Pediatric)  Goal: Skin Integrity  Outcome: Ongoing (interventions implemented as appropriate)      Problem: Skin Integrity Impairment, Risk/Actual (Adult)  Goal: Skin Integrity/Wound Healing  Outcome: Ongoing (interventions implemented as appropriate)      Problem: Infection, Risk/Actual (Adult)  Goal: Infection Prevention/Resolution  Outcome: Ongoing (interventions implemented as appropriate)

## 2018-01-13 NOTE — PLAN OF CARE
Problem: Patient Care Overview (Adult)  Goal: Plan of Care Review  Outcome: Ongoing (interventions implemented as appropriate)   01/13/18 0153   Coping/Psychosocial Response Interventions   Plan Of Care Reviewed With patient   Patient Care Overview   Progress improving   Outcome Evaluation   Outcome Summary/Follow up Plan Patient alert and cooperative today. She still feels weak. Still has a cough. Incontinent at times. Sat up in the chair for about an hour. VSS. Continue to monitor.     Goal: Adult Individualization and Mutuality  Outcome: Ongoing (interventions implemented as appropriate)    Goal: Discharge Needs Assessment  Outcome: Ongoing (interventions implemented as appropriate)      Problem: Pressure Ulcer Risk (Tank Scale) (Adult,Obstetrics,Pediatric)  Goal: Skin Integrity  Outcome: Ongoing (interventions implemented as appropriate)      Problem: Skin Integrity Impairment, Risk/Actual (Adult)  Goal: Skin Integrity/Wound Healing  Outcome: Ongoing (interventions implemented as appropriate)

## 2018-01-13 NOTE — THERAPY TREATMENT NOTE
Acute Care - Physical Therapy Treatment Note  [facility]     Patient Name: Sakshi Garcia  : 1940  MRN: [id number]  Today's Date: 2018  Onset of Illness/Injury or Date of Surgery Date: 18  Date of Referral to PT: 18  Referring Physician: Dr. Vora    Admit Date: 2018    Visit Dx:    ICD-10-CM ICD-9-CM   1. Traumatic rhabdomyolysis, initial encounter T79.6XXA 958.6   2. Fall, initial encounter W19.XXXA E888.9   3. Renal insufficiency N28.9 593.9   4. Dizziness R42 780.4   5. Unsteadiness on feet R26.81 781.2     Patient Active Problem List   Diagnosis   • Non-traumatic rhabdomyolysis   • Generalized weakness   • Fall   • Acute cystitis without hematuria   • Stage 3 chronic kidney disease   • Type 2 diabetes mellitus   • HTN (hypertension)   • Altered mental status   • Sepsis   • Bacteremia   • DNR (do not resuscitate)   • Aspiration pneumonia   • 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   • E-coli UTI w/ bacteremia   • Metabolic encephalopathy present on admit   • Tobacco abuse   • Bilateral pleural effusions   • Oropharyngeal dysphagia   • Closed nondisplaced fracture of head of right radius               Adult Rehabilitation Note       18 1056 18 1409 18 1300    Rehab Assessment/Intervention    Discipline physical therapist  -EJ physical therapist  -CH occupational therapist  -HC    Document Type therapy note (daily note)  -EJ therapy note (daily note)  -CH therapy note (daily note)  -HC    Subjective Information agree to therapy  -EJ agree to therapy;complains of;fatigue  -CH agree to therapy;no complaints  -HC    Patient Effort, Rehab Treatment adequate  -EJ adequate  -CH adequate  -HC    Symptoms Noted During/After Treatment fatigue  -EJ fatigue  -CH fatigue  -HC    Precautions/Limitations fall precautions   R UE sling  -EJ fall precautions   R UE in sling  -CH fall precautions;other (see comments)   NWM on RUE  -HC    Recorded by [HAYDEE] Anastasiya BROWN  Rena, PT [CH] Tiffany Lilly, PT [HC] Adelaida Ballard, OTR    Pain Assessment    Pain Assessment No/denies pain  - No/denies pain  - No/denies pain  -HC    Recorded by [EJ] Anastasiya Chase, PT [CH] Tiffany Lilly, PT [HC] Adelaida Ballard, BENEDICTR    Vision Assessment/Intervention    Visual Impairment   WFL  -HC    Recorded by   [HC] ISELA Maria    Cognitive Assessment/Intervention    Current Cognitive/Communication Assessment impaired  -EJ impaired  -CH impaired  -HC    Orientation Status  oriented to;person;place  - oriented to;person;place  -    Follows Commands/Answers Questions  75% of the time;able to follow single-step instructions;needs cueing;needs increased time;needs repetition  - 75% of the time;able to follow single-step instructions;needs cueing;needs increased time;needs repetition  -    Personal Safety  moderate impairment;decreased awareness, need for assist;decreased awareness, need for safety;decreased insight to deficits  - moderate impairment;decreased awareness, need for assist;decreased awareness, need for safety;decreased insight to deficits;at risk behaviors demonstrated  -    Personal Safety Interventions  fall prevention program maintained;gait belt;nonskid shoes/slippers when out of bed  - fall prevention program maintained;gait belt;nonskid shoes/slippers when out of bed  -    Recorded by [EJ] Anastasiya Chase, PT [CH] Tiffany Lilly, PT [HC] Adelaida Ballard OTR    Bed Mobility, Assessment/Treatment    Bed Mobility, Assistive Device   bed rails;head of bed elevated;draw sheet  -    Bed Mob, Supine to Sit, [place] verbal cues required;nonverbal cues required (demo/gesture);moderate assist (50% patient effort);2 person assist required  - not tested   sitting in chair  - moderate assist (50% patient effort);verbal cues required;set up required  -    Bed Mob, Sit to Supine, [place] not tested  - verbal cues required;nonverbal cues required  (demo/gesture);moderate assist (50% patient effort);2 person assist required  -CH not tested  -HC    Recorded by [EJ] Anastasiya Chase, PT [CH] Tiffany Lilly PT [HC] ISELA Maria    Transfer Assessment/Treatment    Transfers, Bed-Chair [place] verbal cues required;nonverbal cues required (demo/gesture);hand held assist;moderate assist (50% patient effort);maximum assist (25% patient effort);2 person assist required  -EJ  maximum assist (25% patient effort);2 person assist required;verbal cues required;set up required  -HC    Transfers, Chair-Bed [place]  verbal cues required;nonverbal cues required (demo/gesture);maximum assist (25% patient effort);2 person assist required;hand held assist  -     Transfers, Sit-Stand [place] verbal cues required;nonverbal cues required (demo/gesture);hand held assist;moderate assist (50% patient effort);maximum assist (25% patient effort);2 person assist required  -EJ verbal cues required;nonverbal cues required (demo/gesture);moderate assist (50% patient effort);2 person assist required;hand held assist  - maximum assist (25% patient effort);2 person assist required;verbal cues required;set up required  -HC    Transfers, Stand-Sit [place] verbal cues required;nonverbal cues required (demo/gesture);moderate assist (50% patient effort);2 person assist required  -EJ verbal cues required;nonverbal cues required (demo/gesture);moderate assist (50% patient effort);2 person assist required;hand held assist  - maximum assist (25% patient effort);2 person assist required;verbal cues required;set up required  -HC    Transfer, Safety Issues step length decreased;weight-shifting ability decreased;balance decreased during turns  -EJ      Transfer, Impairments strength decreased;impaired balance  -EJ      Transfer, Comment several small shuffling steps to chair  -EJ pt able to stand and take a few shuffling steps sri  bed  -CH squat pivot to chair  -HC     Recorded by [EJ] Anastasiya Chase, PT [CH] Tiffany Lilly, PT [HC] Adealida Ballard, BENEDICTR    Gait Assessment/Treatment    Gait, [place] Level not tested  -EJ not tested  -CH     Recorded by [EJ] Anastasiya Chase, PT [CH] Tiffany Lilly, PT     Balance Skills Training    Sitting-Level of Assistance   Contact guard  -HC    Sitting-Balance Support   Feet supported;Left upper extremity supported   1 significant LOB posteriorly while EOB  -HC    Sitting-Balance Activities   Forward lean  -HC    Recorded by   [HC] Adelaida Ballard, OTR    Therapy Exercises    Bilateral Lower Extremities  --   pt declined LE exercises  -CH     Recorded by  [CH] Tiffany Lilly PT     Positioning and Restraints    Pre-Treatment Position in bed  -EJ sitting in chair/recliner  -CH in bed  -HC    Post Treatment Position chair  -EJ bed  -CH chair  -HC    In Bed  supine;call light within reach;encouraged to call for assist;exit alarm on  -CH     In Chair notified nsg;reclined;call light within reach;encouraged to call for assist;exit alarm on;with nsg  -EJ  reclined;call light within reach;encouraged to call for assist;legs elevated  -HC    Recorded by [EJ] Anastasiya Chase, PT [CH] Tiffany Lilly, THU [HC] Adelaida Ballard, OTR      01/11/18 1358 01/11/18 0122 01/10/18 1500    Rehab Assessment/Intervention    Discipline physical therapy assistant   student  -PC,SH,PC2  physical therapy assistant  -RW    Document Type therapy note (daily note)  -PC,SH,PC2  therapy note (daily note)  -RW    Subjective Information agree to therapy;no complaints  -PC,SH,PC2  agree to therapy  -RW    Patient Effort, Rehab Treatment fair  -PC,SH,PC2  fair  -RW    Symptoms Noted During/After Treatment   fatigue  -RW    Precautions/Limitations fall precautions;non-weight bearing status   NWB on RUE  -PC,SH,PC2  fall precautions;non-weight bearing status   NWB on RUE  -RW    Specific Treatment Considerations   Pt RUE NWB due to possible radial head fx  -RW     Recorded by [PC,SH,PC2] Mellissa Mendez, PT (r) Lisa Peralta, PT Student (t) Mellissa Mendez, PT (c)  [RW] Lilliana Carroll PTA    Pain Assessment    Pain Assessment No/denies pain  -PC,SH,PC2  Gibson-Baker FACES  -RW    Gibson-Collins FACES Pain Rating   4  -RW    Pain Type   Acute pain  -RW    Pain Location   Leg  -RW    Pain Orientation   Left  -RW    Pain Intervention(s)   Repositioned;Rest  -RW    Response to Interventions   tolerated  -RW    Recorded by [PC,SH,PC2] Mellissa Mendez, PT (r) Lisa Peralta, PT Student (t) Mellissa Mendez, PT (c)  [RW] Lilliana Carroll PTA    Cognitive Assessment/Intervention    Current Cognitive/Communication Assessment   impaired  -RW    Orientation Status   oriented to;person;place  -RW    Follows Commands/Answers Questions   50% of the time;75% of the time;needs cueing;needs repetition  -RW    Personal Safety   severe impairment  -RW    Personal Safety Interventions   fall prevention program maintained;gait belt;nonskid shoes/slippers when out of bed  -RW    Recorded by   [RW] Lilliana Carroll PTA    Mobility Assessment/Training    Extremity Weight-Bearing Status   right upper extremity  -RW    Right Upper Extremity Weight-Bearing   non weight-bearing  -RW    Recorded by   [RW] Lilliana Carroll PTA    Bed Mobility, Assessment/Treatment    Bed Mobility, Assistive Device bed rails;head of bed elevated;draw sheet  -PC,SH,PC2  head of bed elevated;draw sheet  -RW    Bed Mob, Supine to Sit, [place] moderate assist (50% patient effort)  -PC,SH,PC2  moderate assist (50% patient effort);verbal cues required;nonverbal cues required (demo/gesture)  -RW    Bed Mob, Sit to Supine, [place] moderate assist (50% patient effort)  -PC,SH,PC2  maximum assist (25% patient effort);2 person assist required;verbal cues required;nonverbal cues required (demo/gesture)  -RW    Bed Mobility, Safety Issues decreased use of arms for pushing/pulling  -PC,SH,PC2  decreased use of arms for  pushing/pulling;decreased use of legs for bridging/pushing  -RW    Bed Mobility, Impairments strength decreased  -PC,SH,PC2  strength decreased  -RW    Recorded by [PC,SH,PC2] Mellissa Mendez, PT (r) Lisa Peralta PT Student (t) Mellissa Mendez, PT (c)  [RW] Lilliana Carroll, PTA    Transfer Assessment/Treatment    Transfers, Sit-Stand [place] maximum assist (25% patient effort);2 person assist required  -PC,SH,PC2  unable to perform  -RW    Transfers, Stand-Sit [place] maximum assist (25% patient effort);2 person assist required  -PC,SH,PC2      Toilet Transfer, [place] unable to perform  -PC,SH,PC2      Transfer, Impairments strength decreased  -PC,SH,PC2      Transfer, Comment Attempted standing at EOB x3; pt. unable to come to full stand  -PC,SH,PC2  Attempted standing at EOB x3, but pt not assisting and unable to clear EOB.   -RW    Recorded by [PC,SH,PC2] Mellissa Mendez, PT (r) Lisa Peralta, PT Student (t) Mellissa Mendez, PT (c)  [RW] Lilliana Carroll, PTA    Gait Assessment/Treatment    Gait, [place] Level not tested;not appropriate to assess  -PC,SH,PC2  not tested;not appropriate to assess  -RW    Recorded by [PC,SH,PC2] Mellissa Mendez, PT (r) Lisa Peralta, PT Student (t) Mellissa Mendez, PT (c)  [RW] Lilliana Carroll, PTA    Balance Skills Training    Sitting-Level of Assistance Contact guard  -PC,SH,PC2 --  -PC,SH,PC2 Contact guard  -RW    Sitting-Balance Support Feet supported;Left upper extremity supported  -PC,SH,PC2 --  -PC,SH,PC2 Feet supported;Left upper extremity supported  -RW    Sitting-Balance Activities Forward lean  -PC,SH,PC2 --  -PC,SH,PC2 Forward lean;Trunk control activities  -RW    Sitting # of Minutes 15  -PC,SH,PC2 --  -PC,SH,PC2 8  -RW    Recorded by [PC,SH,PC2] Mellissa Mendez, PT (r) Lisa Peralta, PT Student (t) Mellissa Mendez, PT (c) [PC,SH,PC2] Mellissa Mendez PT (r) Lisa Peralta, PT Student (t) Mellissa Mendez, PT (c) [RW] Lilliana Carroll, PTA    Therapy Exercises     Bilateral Lower Extremities   AROM:;5 reps;sitting;ankle pumps/circles;hip flexion;LAQ  -RW    Recorded by   [RW] Lilliana Carroll PTA    Positioning and Restraints    Pre-Treatment Position in bed  -PC,SH,PC2  in bed  -RW    Post Treatment Position bed  -PC,SH,PC2  bed  -RW    In Bed fowlers;call light within reach;encouraged to call for assist;exit alarm on;notified nsg  -PC,SH,PC2  fowlers;call light within reach;encouraged to call for assist;exit alarm on;with family/caregiver;notified nsg  -RW    Recorded by [PC,SH,PC2] Mellissa Mendez, PT (r) Lisa Peralta, PT Student (t) Mellissa Mendez, PT (c)  [RW] Lilliana Carroll PTA      User Key  (r) = Recorded By, (t) = Taken By, (c) = Cosigned By    Initials Name Effective Dates     Tiffany Lilly, PT 12/01/15 -     PC Mellissa Mendez, PT 12/01/15 -     EJ Anastasiya Chase, PT 04/21/17 -     RW Lilliana Carroll, PTA 04/06/16 -     HC Adelaida Ballard, OTR 08/17/17 -      Lisa Peralta, PT Student 01/08/18 -                 IP PT Goals       01/12/18 1418 01/06/18 1721 01/02/18 1359    Bed Mobility PT LTG    Bed Mobility PT LTG, Date Established   01/02/18  -AA    Bed Mobility PT LTG, Time to Achieve 1 wk  -CH  1 wk  -AA    Bed Mobility PT LTG, Activity Type all bed mobility  -CH  all bed mobility  -AA    Bed Mobility PT LTG, [place] Level minimum assist (75% patient effort)  -CH  minimum assist (75% patient effort)  -AA    Bed Mobility PT LTG, Outcome goal ongoing  -CH goal ongoing  -PC     Bed Mobility PT LTG, Reason Goal Not Met progress slower than expected  -      Transfer Training PT LTG    Transfer Training PT LTG, Date Established   01/02/18  -AA    Transfer Training PT LTG, Time to Achieve 1 wk  -CH 1 wk  -PC 1 wk  -AA    Transfer Training PT LTG, Activity Type all transfers  -CH sit to stand/stand to sit;bed to chair /chair to bed  -PC all transfers  -AA    Transfer Training PT LTG, [place] Level minimum assist (75% patient effort);2 person assist  required  -CH moderate assist (50% patient effort)  -PC contact guard assist  -AA    Transfer Training PT LTG, Assist Device   walker, rolling  -AA    Transfer Training PT LTG, Outcome goal revised  -CH      Gait Training PT LTG    Gait Training Goal PT LTG, Date Established   01/02/18  -AA    Gait Training Goal PT LTG, Time to Achieve 1 wk  -CH 1 wk  -PC 1 wk  -AA    Gait Training Goal PT LTG, [place] Level minimum assist (75% patient effort);2 person assist required  -CH moderate assist (50% patient effort);2 person assist required  -PC contact guard assist  -AA    Gait Training Goal PT LTG, Assist Device   walker, rolling  -AA    Gait Training Goal PT LTG, Distance to Achieve 5  -CH 5 ft  -  -AA    Gait Training Goal PT LTG, Outcome goal revised  -CH goal revised  -PC     Gait Training Goal PT LTG, Reason Goal Not Met  other (see comments)   new CVA  -PC       User Key  (r) = Recorded By, (t) = Taken By, (c) = Cosigned By    Initials Name Provider Type     Tiffany Lilly, PT Physical Therapist    BASSAM Mendez, PT Physical Therapist    KOSTA Holley, PT Physical Therapist          Physical Therapy Education     Title: PT OT SLP Therapies (Active)     Topic: Physical Therapy (Active)     Point: Mobility training (Active)    Learning Progress Summary    Learner Readiness Method Response Comment Documented by Status   Patient Acceptance E,D NR  EJ 01/13/18 1116 Active    Acceptance E,TB,D VU,NR  CH 01/12/18 1418 Done    Acceptance E,TB,D NR  SH 01/11/18 1423 Active    Acceptance E,TB,D NR  RW 01/10/18 1538 Active    Acceptance E,TB,D VU,NR  RW 01/09/18 0932 Done    Acceptance E,TB,D VU,NR  RW 01/09/18 0923 Done    Acceptance E,TB,D NR  RW 01/08/18 1334 Active    Acceptance E,D NR  PC 01/06/18 1721 Active    Acceptance E,D NR,NL  JM 01/04/18 1702 Active    Acceptance E VU,NR  AA 01/02/18 1359 Done               Point: Home exercise program (Done)    Learning Progress Summary    Learner  Readiness Method Response Comment Documented by Status   Patient Acceptance E,TB,D VU,NR   01/12/18 1418 Done    Acceptance E,TB,D NR   01/10/18 1538 Active    Acceptance E,TB,D VU,NR   01/09/18 0932 Done    Acceptance E,TB,D VU,NR   01/09/18 0923 Done    Acceptance E,TB,D NR   01/08/18 1334 Active    Acceptance E,D NR   01/06/18 1721 Active    Acceptance E,D NR,NL   01/04/18 1702 Active    Acceptance E VU,NR   01/02/18 1359 Done               Point: Body mechanics (Done)    Learning Progress Summary    Learner Readiness Method Response Comment Documented by Status   Patient Acceptance E,TB,D VU,NR   01/12/18 1418 Done    Acceptance E,TB,D NR   01/11/18 1423 Active    Acceptance E,TB,D NR   01/10/18 1538 Active    Acceptance E,TB,D VU,[facility] 01/09/18 0932 Done    Acceptance E,TB,D VU,NR   01/09/18 0923 Done    Acceptance E,TB,D NR   01/08/18 1334 Active    Acceptance E,D NR   01/06/18 1721 Active    Acceptance E,D NR,NL   01/04/18 1702 Active    Acceptance E VU,NR   01/02/18 1359 Done               Point: Precautions (Done)    Learning Progress Summary    Learner Readiness Method Response Comment Documented by Status   Patient Acceptance E,TB,D VU,NR   01/12/18 1418 Done    Acceptance E,TB,D NR   01/11/18 1423 Active    Acceptance E,TB,D NR   01/10/18 1538 Active    Acceptance E,TB,D VU,NR   01/09/18 0932 Done    Acceptance E,TB,D VU,NR   01/09/18 0923 Done    Acceptance E,TB,D NR   01/08/18 1334 Active    Acceptance E,D NR   01/06/18 1721 Active    Acceptance E,D NR,NL   01/04/18 1702 Active    Acceptance E VU,NR   01/02/18 1359 Done                      User Key     Initials Effective Dates Name Provider Type Discipline     12/01/15 -  Tiffany Lilly, PT Physical Therapist PT     12/01/15 -  Mellissa Mendez, PT Physical Therapist PT    JM 02/18/16 -  Amarilis Johnson, PTA Physical Therapy Assistant PT    EJ 04/21/17 -  Anastasiya Chase, PT Physical Therapist  PT    RW 04/06/16 -  Lilliana Carroll, PTA Physical Therapy Assistant PT    AA 09/05/17 -  Paula Holley, PT Physical Therapist PT    SH 01/08/18 -  Lisa Peralta, PT Student PT Student PT                    PT Recommendation and Plan  Anticipated Equipment Needs At Discharge: front wheeled walker  Anticipated Discharge Disposition: skilled nursing facility  Planned Therapy Interventions: bed mobility training, transfer training, strengthening, gait training  PT Frequency: daily  Plan of Care Review  Plan Of Care Reviewed With: patient  Progress: progress toward functional goals is gradual  Outcome Summary/Follow up Plan: With encouragement, patient agreeable to PT this AM. Pt still wtih confusion, but able to sit EOB with mod A x 2.  Pt then able to transfer to chair with mod/max A x 2.  Pt with difficulty taking steps, but eventually able to shuffle feet to assist with transfer to chair.  Will continue to progress as tolerated.          Outcome Measures       01/13/18 1100 01/12/18 1400 01/12/18 1300    How much help from another person do you currently need...    Turning from your back to your side while in flat bed without using bedrails? 2  -EJ 2  -CH     Moving from lying on back to sitting on the side of a flat bed without bedrails? 2  -EJ 2  -CH     Moving to and from a bed to a chair (including a wheelchair)? 2  -EJ 2  -CH     Standing up from a chair using your arms (e.g., wheelchair, bedside chair)? 2  -EJ 2  -CH     Climbing 3-5 steps with a railing? 1  -EJ 1  -CH     To walk in hospital room? 1  -EJ 1  -CH     AM-PAC 6 Clicks Score 10  -EJ 10  -CH     How much help from another is currently needed...    Putting on and taking off regular lower body clothing?   1  -HC    Bathing (including washing, rinsing, and drying)   2  -HC    Toileting (which includes using toilet bed pan or urinal)   2  -HC    Putting on and taking off regular upper body clothing   2  -HC    Taking care of personal grooming (such  as brushing teeth)   3  -HC    Eating meals   3  -HC    Score   13  -HC    Functional Assessment    Outcome Measure Options AM-PAC 6 Clicks Basic Mobility (PT)  -EJ AM-PAC 6 Clicks Basic Mobility (PT)  -CH AM-PAC 6 Clicks Daily Activity (OT)  -HC      01/11/18 1400 01/10/18 1500       How much help from another person do you currently need...    Turning from your back to your side while in flat bed without using bedrails? 2  -PC (r) SH (t) PC (c) 2  -RW     Moving from lying on back to sitting on the side of a flat bed without bedrails? 2  -PC (r) SH (t) PC (c) 2  -RW     Moving to and from a bed to a chair (including a wheelchair)? 1  -PC (r) SH (t) PC (c) 1  -RW     Standing up from a chair using your arms (e.g., wheelchair, bedside chair)? 2  -PC (r) SH (t) PC (c) 2  -RW     Climbing 3-5 steps with a railing? 1  -PC (r) SH (t) PC (c) 1  -RW     To walk in hospital room? 1  -PC (r) SH (t) PC (c) 1  -RW     AM-PAC 6 Clicks Score 9  -PC (r) SH (t) 9  -RW     Functional Assessment    Outcome Measure Options AM-PAC 6 Clicks Basic Mobility (PT)  -PC (r) SH (t) PC (c) AM-PAC 6 Clicks Basic Mobility (PT)  -RW       User Key  (r) = Recorded By, (t) = Taken By, (c) = Cosigned By    Initials Name Provider Type     Tiffany Lilly, PT Physical Therapist    PC Mellissa Mendez, PT Physical Therapist    EJ Anastasiya Chase, PT Physical Therapist    RW Lilliana Carroll, PTA Physical Therapy Assistant    HC Adelaida Ballard, OTR Occupational Therapist    SH Lisa Peralta, PT Student PT Student           Time Calculation:         PT Charges       01/13/18 1118          Time Calculation    Start Time 1056  -EJ      Stop Time 1109  -EJ      Time Calculation (min) 13 min  -EJ      PT Received On 01/13/18  -EJ      PT - Next Appointment 01/15/18  -EJ        User Key  (r) = Recorded By, (t) = Taken By, (c) = Cosigned By    Initials Name Provider Type    HAYDEE Chase, PT Physical Therapist          Therapy Charges for Today      Code Description Service Date Service Provider Modifiers Qty    36508403574 HC PT THER PROC EA 15 MIN 1/13/2018 Anastasiya Chase, PT GP 1    51927003755 HC PT THER SUPP EA 15 MIN 1/13/2018 Anastasiya Chase, PT GP 1          PT G-Codes  Outcome Measure Options: AM-PAC 6 Clicks Basic Mobility (PT)    Anastasiya Chase, PT  1/13/2018

## 2018-01-14 LAB
ABO GROUP BLD: NORMAL
ANION GAP SERPL CALCULATED.3IONS-SCNC: 13.5 MMOL/L
BASOPHILS # BLD AUTO: 0.07 10*3/MM3 (ref 0–0.2)
BASOPHILS NFR BLD AUTO: 0.6 % (ref 0–1.5)
BLD GP AB SCN SERPL QL: NEGATIVE
BUN BLD-MCNC: 25 MG/DL (ref 8–23)
BUN/CREAT SERPL: 17.1 (ref 7–25)
CALCIUM SPEC-SCNC: 9.3 MG/DL (ref 8.6–10.5)
CHLORIDE SERPL-SCNC: 100 MMOL/L (ref 98–107)
CO2 SERPL-SCNC: 26.5 MMOL/L (ref 22–29)
CREAT BLD-MCNC: 1.46 MG/DL (ref 0.57–1)
DEPRECATED RDW RBC AUTO: 50.9 FL (ref 37–54)
EOSINOPHIL # BLD AUTO: 0.24 10*3/MM3 (ref 0–0.7)
EOSINOPHIL NFR BLD AUTO: 2 % (ref 0.3–6.2)
ERYTHROCYTE [DISTWIDTH] IN BLOOD BY AUTOMATED COUNT: 14.3 % (ref 11.7–13)
GFR SERPL CREATININE-BSD FRML MDRD: 35 ML/MIN/1.73
GLUCOSE BLD-MCNC: 193 MG/DL (ref 65–99)
GLUCOSE BLDC GLUCOMTR-MCNC: 132 MG/DL (ref 70–130)
GLUCOSE BLDC GLUCOMTR-MCNC: 170 MG/DL (ref 70–130)
GLUCOSE BLDC GLUCOMTR-MCNC: 174 MG/DL (ref 70–130)
GLUCOSE BLDC GLUCOMTR-MCNC: 195 MG/DL (ref 70–130)
HCT VFR BLD AUTO: 23.6 % (ref 35.6–45.5)
HGB BLD-MCNC: 7.6 G/DL (ref 11.9–15.5)
IMM GRANULOCYTES # BLD: 0.1 10*3/MM3 (ref 0–0.03)
IMM GRANULOCYTES NFR BLD: 0.8 % (ref 0–0.5)
LYMPHOCYTES # BLD AUTO: 1.7 10*3/MM3 (ref 0.9–4.8)
LYMPHOCYTES NFR BLD AUTO: 13.8 % (ref 19.6–45.3)
MCH RBC QN AUTO: 31.1 PG (ref 26.9–32)
MCHC RBC AUTO-ENTMCNC: 32.2 G/DL (ref 32.4–36.3)
MCV RBC AUTO: 96.7 FL (ref 80.5–98.2)
MONOCYTES # BLD AUTO: 1.49 10*3/MM3 (ref 0.2–1.2)
MONOCYTES NFR BLD AUTO: 12.1 % (ref 5–12)
NEUTROPHILS # BLD AUTO: 8.7 10*3/MM3 (ref 1.9–8.1)
NEUTROPHILS NFR BLD AUTO: 70.7 % (ref 42.7–76)
PLATELET # BLD AUTO: 384 10*3/MM3 (ref 140–500)
PMV BLD AUTO: 9.7 FL (ref 6–12)
POTASSIUM BLD-SCNC: 4.5 MMOL/L (ref 3.5–5.2)
RBC # BLD AUTO: 2.44 10*6/MM3 (ref 3.9–5.2)
RH BLD: POSITIVE
SODIUM BLD-SCNC: 140 MMOL/L (ref 136–145)
WBC NRBC COR # BLD: 12.3 10*3/MM3 (ref 4.5–10.7)

## 2018-01-14 PROCEDURE — 94799 UNLISTED PULMONARY SVC/PX: CPT

## 2018-01-14 PROCEDURE — 86920 COMPATIBILITY TEST SPIN: CPT

## 2018-01-14 PROCEDURE — 86901 BLOOD TYPING SEROLOGIC RH(D): CPT | Performed by: SURGERY

## 2018-01-14 PROCEDURE — 80048 BASIC METABOLIC PNL TOTAL CA: CPT | Performed by: SURGERY

## 2018-01-14 PROCEDURE — 82962 GLUCOSE BLOOD TEST: CPT

## 2018-01-14 PROCEDURE — 85025 COMPLETE CBC W/AUTO DIFF WBC: CPT | Performed by: SURGERY

## 2018-01-14 PROCEDURE — 86900 BLOOD TYPING SEROLOGIC ABO: CPT

## 2018-01-14 PROCEDURE — 86900 BLOOD TYPING SEROLOGIC ABO: CPT | Performed by: SURGERY

## 2018-01-14 PROCEDURE — 86923 COMPATIBILITY TEST ELECTRIC: CPT

## 2018-01-14 PROCEDURE — 86850 RBC ANTIBODY SCREEN: CPT | Performed by: SURGERY

## 2018-01-14 PROCEDURE — 63710000001 INSULIN ASPART PER 5 UNITS: Performed by: INTERNAL MEDICINE

## 2018-01-14 PROCEDURE — 25010000002 PIPERACILLIN SOD-TAZOBACTAM PER 1 G: Performed by: INTERNAL MEDICINE

## 2018-01-14 PROCEDURE — 86901 BLOOD TYPING SEROLOGIC RH(D): CPT

## 2018-01-14 RX ADMIN — ATORVASTATIN CALCIUM 80 MG: 80 TABLET, FILM COATED ORAL at 21:17

## 2018-01-14 RX ADMIN — IPRATROPIUM BROMIDE AND ALBUTEROL SULFATE 3 ML: .5; 3 SOLUTION RESPIRATORY (INHALATION) at 21:09

## 2018-01-14 RX ADMIN — GLIPIZIDE 2.5 MG: 5 TABLET ORAL at 17:50

## 2018-01-14 RX ADMIN — POTASSIUM CHLORIDE 20 MEQ: 750 CAPSULE, EXTENDED RELEASE ORAL at 09:00

## 2018-01-14 RX ADMIN — TAZOBACTAM SODIUM AND PIPERACILLIN SODIUM 3.38 G: 375; 3 INJECTION, SOLUTION INTRAVENOUS at 09:00

## 2018-01-14 RX ADMIN — TAZOBACTAM SODIUM AND PIPERACILLIN SODIUM 3.38 G: 375; 3 INJECTION, SOLUTION INTRAVENOUS at 02:08

## 2018-01-14 RX ADMIN — LINAGLIPTIN 5 MG: 5 TABLET, FILM COATED ORAL at 09:00

## 2018-01-14 RX ADMIN — IPRATROPIUM BROMIDE AND ALBUTEROL SULFATE 3 ML: .5; 3 SOLUTION RESPIRATORY (INHALATION) at 09:05

## 2018-01-14 RX ADMIN — IPRATROPIUM BROMIDE AND ALBUTEROL SULFATE 3 ML: .5; 3 SOLUTION RESPIRATORY (INHALATION) at 09:04

## 2018-01-14 RX ADMIN — IPRATROPIUM BROMIDE AND ALBUTEROL SULFATE 3 ML: .5; 3 SOLUTION RESPIRATORY (INHALATION) at 15:58

## 2018-01-14 RX ADMIN — METOPROLOL SUCCINATE 25 MG: 25 TABLET, FILM COATED, EXTENDED RELEASE ORAL at 09:00

## 2018-01-14 RX ADMIN — IPRATROPIUM BROMIDE AND ALBUTEROL SULFATE 3 ML: .5; 3 SOLUTION RESPIRATORY (INHALATION) at 12:35

## 2018-01-14 RX ADMIN — GLIPIZIDE 2.5 MG: 5 TABLET ORAL at 09:00

## 2018-01-14 RX ADMIN — ASPIRIN 325 MG: 325 TABLET ORAL at 09:00

## 2018-01-14 RX ADMIN — AMLODIPINE BESYLATE 5 MG: 5 TABLET ORAL at 09:00

## 2018-01-14 RX ADMIN — LISINOPRIL 20 MG: 20 TABLET ORAL at 09:00

## 2018-01-14 RX ADMIN — TAZOBACTAM SODIUM AND PIPERACILLIN SODIUM 3.38 G: 375; 3 INJECTION, SOLUTION INTRAVENOUS at 17:50

## 2018-01-14 RX ADMIN — INSULIN ASPART 2 UNITS: 100 INJECTION, SOLUTION INTRAVENOUS; SUBCUTANEOUS at 17:50

## 2018-01-14 RX ADMIN — INSULIN ASPART 2 UNITS: 100 INJECTION, SOLUTION INTRAVENOUS; SUBCUTANEOUS at 21:17

## 2018-01-14 RX ADMIN — POTASSIUM CHLORIDE 20 MEQ: 750 CAPSULE, EXTENDED RELEASE ORAL at 17:50

## 2018-01-14 RX ADMIN — POTASSIUM CHLORIDE 20 MEQ: 750 CAPSULE, EXTENDED RELEASE ORAL at 21:17

## 2018-01-14 RX ADMIN — INSULIN ASPART 2 UNITS: 100 INJECTION, SOLUTION INTRAVENOUS; SUBCUTANEOUS at 12:20

## 2018-01-14 RX ADMIN — CLOPIDOGREL 75 MG: 75 TABLET, FILM COATED ORAL at 09:00

## 2018-01-14 NOTE — PLAN OF CARE
Problem: Patient Care Overview (Adult)  Goal: Plan of Care Review  Outcome: Ongoing (interventions implemented as appropriate)   01/14/18 0503   Coping/Psychosocial Response Interventions   Plan Of Care Reviewed With patient   Patient Care Overview   Progress progress toward functional goals is gradual   Outcome Evaluation   Outcome Summary/Follow up Plan Improving, up to bsc with x 2 assist, vss,Nihss was 4,no distress noticed, I will continue to monitor.     Goal: Adult Individualization and Mutuality  Outcome: Ongoing (interventions implemented as appropriate)    Goal: Discharge Needs Assessment  Outcome: Ongoing (interventions implemented as appropriate)      Problem: Infection, Risk/Actual (Adult)  Goal: Infection Prevention/Resolution  Outcome: Ongoing (interventions implemented as appropriate)

## 2018-01-14 NOTE — PLAN OF CARE
Problem: Patient Care Overview (Adult)  Goal: Plan of Care Review  Outcome: Ongoing (interventions implemented as appropriate)   01/14/18 1700   Coping/Psychosocial Response Interventions   Plan Of Care Reviewed With patient   Patient Care Overview   Progress no change   Outcome Evaluation   Outcome Summary/Follow up Plan Patient has not felt as well today. Feeling weak and doesn't want to move around as much as yesterday. Plan is for carotied surgery with Dr. Kelsey tomorrow. Babs (POA) is here tonight to discuss the surgery with Dr. Kelsey on the phone and sign consent.      Goal: Adult Individualization and Mutuality  Outcome: Ongoing (interventions implemented as appropriate)    Goal: Discharge Needs Assessment  Outcome: Ongoing (interventions implemented as appropriate)      Problem: Pressure Ulcer Risk (Tank Scale) (Adult,Obstetrics,Pediatric)  Goal: Skin Integrity  Outcome: Ongoing (interventions implemented as appropriate)      Problem: Skin Integrity Impairment, Risk/Actual (Adult)  Goal: Skin Integrity/Wound Healing  Outcome: Ongoing (interventions implemented as appropriate)      Problem: Infection, Risk/Actual (Adult)  Goal: Infection Prevention/Resolution  Outcome: Ongoing (interventions implemented as appropriate)

## 2018-01-14 NOTE — PROGRESS NOTES
Name: Sakshi Garcia ADMIT: 2018   : 1940  PCP: Provider Not In System    MRN: [id number] LOS: 13 days   AGE/SEX: 77 y.o. female  ROOM: [facility]     Subjective   Subjective  Doesn't feel well but nothing specific. Feels weak in general.    Objective   Vital Signs  Temp:  [98.4 °F (36.9 °C)-99.4 °F (37.4 °C)] 99.4 °F (37.4 °C)  Heart Rate:  [66-85] 77  Resp:  [18-20] 20  BP: (147-185)/(61-72) 165/66  SpO2:  [90 %-96 %] 93 %  on   ;   O2 Device: room air  Body mass index is 27.89 kg/(m^2).    Physical Exam   Constitutional: She is oriented to person, place, and time. No distress.   Cardiovascular: Normal rate and regular rhythm.    No murmur heard.  Pulmonary/Chest: Effort normal. She has decreased breath sounds.   Abdominal: Soft. Bowel sounds are normal. She exhibits no distension. There is no tenderness.   Musculoskeletal: She exhibits no edema.   RUE in sling   Neurological: She is alert and oriented to person, place, and time.   Skin: Skin is warm and dry. She is not diaphoretic.         Results Review:       I reviewed the patient's new clinical results.    Results from last 7 days  Lab Units 18  0541 18  0822 01/10/18  0629 18  0434 18  0443   WBC 10*3/mm3 14.16* 16.74* 13.88* 13.79* 14.07*   HEMOGLOBIN g/dL 8.2* 8.5* 8.7* 8.1* 8.6*   PLATELETS 10*3/mm3 343 340 316 274 254       Results from last 7 days  Lab Units 18  0455 01/10/18  0629 18  0434 18  0444   SODIUM mmol/L 141 145 141 144   POTASSIUM mmol/L 4.9 4.3 4.0 4.0   CHLORIDE mmol/L 105 107 105 108*   CO2 mmol/L 26.4 27.7 23.6 19.1*   BUN mg/dL 21 18 21 20   CREATININE mg/dL 1.21* 1.15* 1.25* 1.29*   GLUCOSE mg/dL 160* 138* 195* 176*   Estimated Creatinine Clearance: 34.9 mL/min (by C-G formula based on Cr of 1.21).    Results from last 7 days  Lab Units 18  0455 01/10/18  0629 18  0434 18  0444   CALCIUM mg/dL 8.6 8.7 8.5* 8.7       Results from last 7 days  Lab Units 18  0455    PROCALCITONIN ng/mL 0.14         Glucose   Date/Time Value Ref Range Status   01/14/2018 0605 132 (H) 70 - 130 mg/dL Final   01/13/2018 2056 153 (H) 70 - 130 mg/dL Final   01/13/2018 1651 202 (H) 70 - 130 mg/dL Final   01/13/2018 1113 228 (H) 70 - 130 mg/dL Final   01/13/2018 0633 138 (H) 70 - 130 mg/dL Final   01/12/2018 2106 239 (H) 70 - 130 mg/dL Final   01/12/2018 1600 193 (H) 70 - 130 mg/dL Final   01/12/2018 1058 171 (H) 70 - 130 mg/dL Final           amLODIPine 5 mg Oral Q24H   aspirin 325 mg Oral Daily   atorvastatin 80 mg Oral Nightly   clopidogrel 75 mg Oral Daily   glipiZIDE 2.5 mg Oral BID AC   insulin aspart 0-9 Units Subcutaneous 4x Daily With Meals & Nightly   ipratropium-albuterol 3 mL Nebulization Q4H - RT   linagliptin 5 mg Oral Daily   lisinopril 20 mg Oral Q24H   metoprolol succinate XL 25 mg Oral Q24H   piperacillin-tazobactam 3.375 g Intravenous Q8H   potassium chloride 20 mEq Oral TID      Diet Dysphagia; IV - Mechanical Soft No Mixed Consistencies; Nectar / Syrup Thick      Assessment/Plan   Assessment:     Active Hospital Problems (** Indicates Principal Problem)    Diagnosis Date Noted   • **E-coli UTI w/ bacteremia [N39.0, B96.20] 01/12/2018   • Aspiration pneumonia [J69.0] 01/12/2018   • 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 [I63.9] 01/12/2018   • Metabolic encephalopathy present on admit [G93.41] 01/12/2018   • Tobacco abuse [Z72.0] 01/12/2018   • Bilateral pleural effusions [J90] 01/12/2018   • Oropharyngeal dysphagia [R13.12] 01/12/2018   • Closed nondisplaced fracture of head of right radius [S52.124A] 01/12/2018   • DNR (do not resuscitate) [Z66] 01/03/2018   • Sepsis [A41.9] 01/02/2018   • Non-traumatic rhabdomyolysis [M62.82] 01/01/2018   • Generalized weakness [R53.1] 01/01/2018   • Fall [W19.XXXA] 01/01/2018   • Stage 3 chronic kidney disease [N18.3] 01/01/2018   • Type 2 diabetes mellitus [E11.9] 01/01/2018   • HTN (hypertension) [I10] 01/01/2018       Resolved Hospital Problems    Diagnosis Date Noted Date Resolved   No resolved problems to display.       1. Ecoli UTI with bacteremia  - needs 14 day course for the bacteremia. On Zosyn for likely aspiration pna which is covering for above. Day 12 overall of abx therapy  - repeat BCx no growth  - WBC better. cont to monitor  - can switch back to levaquin at discharge if has not completed the 14 day course      2. Aspiration PNA with Moderate R pleural effusion  - On zosyn. No further abx needed for this per pulm.  - BCx NGTD. MRSA screen negative  - SLP eval noted  - mod R pleural effusion. CT shows only mild-mod bilaterally. No indication for thoracentesis per Pulm      3. Acute CVA   - on ASA and Plavix. MRI with multiple CVA c/w cardio-embolic source or atherosclerotic plaque. Carotid u/s showed severe stenosis on left.   - will need revascularization. Stress test c/w low risk study and cleared by Cardiology. Vascular planning surgery on Monday and have stated needs to remain in hospital until then.  - cont as needed anti-psychotics  - monitor BP given stroke.       4. Right radial head fracture  - NWB per ortho  - needs sling   - follow-up with Dr. Shell in 1 week for repeat radiographs. Will probably have see next week here prior to discharge.       Disposition  · [facility] in [facility] skilled rehab when medically ready.      Rob Carvajal MD  [facility]  01/14/18  9:46 AM

## 2018-01-15 ENCOUNTER — ANESTHESIA EVENT (OUTPATIENT)
Dept: PERIOP | Facility: HOSPITAL | Age: 78
End: 2018-01-15

## 2018-01-15 ENCOUNTER — APPOINTMENT (OUTPATIENT)
Dept: CARDIOLOGY | Facility: HOSPITAL | Age: 78
End: 2018-01-15
Attending: SURGERY

## 2018-01-15 ENCOUNTER — ANESTHESIA (OUTPATIENT)
Dept: PERIOP | Facility: HOSPITAL | Age: 78
End: 2018-01-15

## 2018-01-15 LAB
BH CV XLRA MEAS LEFT DIST CCA EDV: 27.4 CM/SEC
BH CV XLRA MEAS LEFT DIST CCA PSV: 155 CM/SEC
BH CV XLRA MEAS LEFT DIST ICA EDV: 62.5 CM/SEC
BH CV XLRA MEAS LEFT DIST ICA PSV: 170 CM/SEC
BH CV XLRA MEAS LEFT PROX ECA PSV: 92.6 CM/SEC
BH CV XLRA MEAS LEFT PROX ICA EDV: 62.5 CM/SEC
BH CV XLRA MEAS LEFT PROX ICA PSV: 167 CM/SEC
GLUCOSE BLDC GLUCOMTR-MCNC: 127 MG/DL (ref 70–130)
GLUCOSE BLDC GLUCOMTR-MCNC: 132 MG/DL (ref 70–130)
HCT VFR BLD AUTO: 26 % (ref 35.6–45.5)
HGB BLD-MCNC: 8.1 G/DL (ref 11.9–15.5)

## 2018-01-15 PROCEDURE — 93010 ELECTROCARDIOGRAM REPORT: CPT | Performed by: INTERNAL MEDICINE

## 2018-01-15 PROCEDURE — 85014 HEMATOCRIT: CPT | Performed by: SURGERY

## 2018-01-15 PROCEDURE — 94799 UNLISTED PULMONARY SVC/PX: CPT

## 2018-01-15 PROCEDURE — 85347 COAGULATION TIME ACTIVATED: CPT

## 2018-01-15 PROCEDURE — C1768 GRAFT, VASCULAR: HCPCS | Performed by: SURGERY

## 2018-01-15 PROCEDURE — 25010000002 CEFAZOLIN PER 500 MG: Performed by: SURGERY

## 2018-01-15 PROCEDURE — 36430 TRANSFUSION BLD/BLD COMPNT: CPT

## 2018-01-15 PROCEDURE — 25010000002 MIDAZOLAM PER 1 MG: Performed by: ANESTHESIOLOGY

## 2018-01-15 PROCEDURE — 25010000002 FENTANYL CITRATE (PF) 100 MCG/2ML SOLUTION: Performed by: ANESTHESIOLOGY

## 2018-01-15 PROCEDURE — 06BQ0ZZ EXCISION OF LEFT SAPHENOUS VEIN, OPEN APPROACH: ICD-10-PCS | Performed by: SURGERY

## 2018-01-15 PROCEDURE — 25010000002 PIPERACILLIN SOD-TAZOBACTAM PER 1 G: Performed by: INTERNAL MEDICINE

## 2018-01-15 PROCEDURE — 85018 HEMOGLOBIN: CPT | Performed by: SURGERY

## 2018-01-15 PROCEDURE — 03CN0ZZ EXTIRPATION OF MATTER FROM LEFT EXTERNAL CAROTID ARTERY, OPEN APPROACH: ICD-10-PCS | Performed by: SURGERY

## 2018-01-15 PROCEDURE — 93005 ELECTROCARDIOGRAM TRACING: CPT | Performed by: SURGERY

## 2018-01-15 PROCEDURE — 25010000002 HEPARIN (PORCINE) PER 1000 UNITS: Performed by: SURGERY

## 2018-01-15 PROCEDURE — 25010000002 PROTAMINE SULFATE PER 10 MG: Performed by: NURSE ANESTHETIST, CERTIFIED REGISTERED

## 2018-01-15 PROCEDURE — 03UL07Z SUPPLEMENT LEFT INTERNAL CAROTID ARTERY WITH AUTOLOGOUS TISSUE SUBSTITUTE, OPEN APPROACH: ICD-10-PCS | Performed by: SURGERY

## 2018-01-15 PROCEDURE — 25010000002 ONDANSETRON PER 1 MG: Performed by: ANESTHESIOLOGY

## 2018-01-15 PROCEDURE — 03CL0ZZ EXTIRPATION OF MATTER FROM LEFT INTERNAL CAROTID ARTERY, OPEN APPROACH: ICD-10-PCS | Performed by: SURGERY

## 2018-01-15 PROCEDURE — 82962 GLUCOSE BLOOD TEST: CPT

## 2018-01-15 PROCEDURE — 86900 BLOOD TYPING SEROLOGIC ABO: CPT

## 2018-01-15 PROCEDURE — 03CJ0ZZ EXTIRPATION OF MATTER FROM LEFT COMMON CAROTID ARTERY, OPEN APPROACH: ICD-10-PCS | Performed by: SURGERY

## 2018-01-15 PROCEDURE — 25010000002 PHENYLEPHRINE PER 1 ML: Performed by: NURSE ANESTHETIST, CERTIFIED REGISTERED

## 2018-01-15 PROCEDURE — 25010000002 ONDANSETRON PER 1 MG: Performed by: NURSE ANESTHETIST, CERTIFIED REGISTERED

## 2018-01-15 PROCEDURE — 25010000002 FENTANYL CITRATE (PF) 100 MCG/2ML SOLUTION: Performed by: NURSE ANESTHETIST, CERTIFIED REGISTERED

## 2018-01-15 PROCEDURE — 93882 EXTRACRANIAL UNI/LTD STUDY: CPT

## 2018-01-15 PROCEDURE — P9016 RBC LEUKOCYTES REDUCED: HCPCS

## 2018-01-15 PROCEDURE — 25010000003 CEFAZOLIN PER 500 MG: Performed by: SURGERY

## 2018-01-15 PROCEDURE — 25010000002 PROPOFOL 10 MG/ML EMULSION: Performed by: NURSE ANESTHETIST, CERTIFIED REGISTERED

## 2018-01-15 PROCEDURE — 25010000002 HYDROMORPHONE PER 4 MG: Performed by: SURGERY

## 2018-01-15 PROCEDURE — 25010000002 HEPARIN (PORCINE) PER 1000 UNITS: Performed by: NURSE ANESTHETIST, CERTIFIED REGISTERED

## 2018-01-15 DEVICE — VASCU-GUARD PERIPHERAL VASCULAR PATCH IS PREPARED FROM BOVINE PERICARDIUM WHICH IS CROSS-LINKED WITH GLUTARALDEHYDE. THE PERICARDIUM IS PROCURED FROM CATTLE ORIGINATING IN THE UNITED STATES. VASCU-GUARD PERIPHERAL VASCULAR PATCH IS CHEMICALLY STERILIZED USING ETHANOL AND PROPYLENE OXIDE. VASCU-GUARD PERIPHERAL VASCULAR PATCH HAS BEEN TREATED WITH 1 MOLAR SODIUM HYDROXIDE FOR A MINIMUM OF 60 MINUTES AT 20 - 25°C.  VASCU-GUARD PERIPHERAL VASCULAR PATCH IS PACKAGED IN A CONTAINER FILLED WITH STERILE, NON-PYROGENIC WATER CONTAINING PROPYLENE OXIDE. THE CONTENTS OF THE UNOPENED, UNDAMAGED CONTAINER ARE STERILE.
Type: IMPLANTABLE DEVICE | Site: CAROTID | Status: FUNCTIONAL
Brand: VASCU-GUARD PERIPHERAL VASCULAR PATCH

## 2018-01-15 RX ORDER — NALOXONE HCL 0.4 MG/ML
0.4 VIAL (ML) INJECTION
Status: DISCONTINUED | OUTPATIENT
Start: 2018-01-15 | End: 2018-01-17

## 2018-01-15 RX ORDER — FLUMAZENIL 0.1 MG/ML
0.2 INJECTION INTRAVENOUS AS NEEDED
Status: DISCONTINUED | OUTPATIENT
Start: 2018-01-15 | End: 2018-01-15 | Stop reason: HOSPADM

## 2018-01-15 RX ORDER — FAMOTIDINE 10 MG/ML
20 INJECTION, SOLUTION INTRAVENOUS ONCE
Status: COMPLETED | OUTPATIENT
Start: 2018-01-15 | End: 2018-01-15

## 2018-01-15 RX ORDER — HYDROCODONE BITARTRATE AND ACETAMINOPHEN 5; 325 MG/1; MG/1
1 TABLET ORAL EVERY 4 HOURS PRN
Status: DISCONTINUED | OUTPATIENT
Start: 2018-01-15 | End: 2018-01-22 | Stop reason: HOSPADM

## 2018-01-15 RX ORDER — EPHEDRINE SULFATE 50 MG/ML
5 INJECTION, SOLUTION INTRAVENOUS ONCE AS NEEDED
Status: DISCONTINUED | OUTPATIENT
Start: 2018-01-15 | End: 2018-01-15 | Stop reason: HOSPADM

## 2018-01-15 RX ORDER — FENTANYL CITRATE 50 UG/ML
50 INJECTION, SOLUTION INTRAMUSCULAR; INTRAVENOUS
Status: DISCONTINUED | OUTPATIENT
Start: 2018-01-15 | End: 2018-01-15 | Stop reason: HOSPADM

## 2018-01-15 RX ORDER — ONDANSETRON 4 MG/1
4 TABLET, FILM COATED ORAL EVERY 6 HOURS PRN
Status: DISCONTINUED | OUTPATIENT
Start: 2018-01-15 | End: 2018-01-17

## 2018-01-15 RX ORDER — ONDANSETRON 2 MG/ML
INJECTION INTRAMUSCULAR; INTRAVENOUS AS NEEDED
Status: DISCONTINUED | OUTPATIENT
Start: 2018-01-15 | End: 2018-01-15 | Stop reason: SURG

## 2018-01-15 RX ORDER — SODIUM CHLORIDE 0.9 % (FLUSH) 0.9 %
1-10 SYRINGE (ML) INJECTION AS NEEDED
Status: DISCONTINUED | OUTPATIENT
Start: 2018-01-15 | End: 2018-01-15 | Stop reason: HOSPADM

## 2018-01-15 RX ORDER — PROTAMINE SULFATE 10 MG/ML
INJECTION, SOLUTION INTRAVENOUS AS NEEDED
Status: DISCONTINUED | OUTPATIENT
Start: 2018-01-15 | End: 2018-01-15 | Stop reason: SURG

## 2018-01-15 RX ORDER — PROMETHAZINE HYDROCHLORIDE 25 MG/1
25 TABLET ORAL ONCE AS NEEDED
Status: DISCONTINUED | OUTPATIENT
Start: 2018-01-15 | End: 2018-01-15 | Stop reason: HOSPADM

## 2018-01-15 RX ORDER — DIPHENHYDRAMINE HYDROCHLORIDE 50 MG/ML
12.5 INJECTION INTRAMUSCULAR; INTRAVENOUS
Status: DISCONTINUED | OUTPATIENT
Start: 2018-01-15 | End: 2018-01-15 | Stop reason: HOSPADM

## 2018-01-15 RX ORDER — OXYCODONE AND ACETAMINOPHEN 7.5; 325 MG/1; MG/1
1 TABLET ORAL ONCE AS NEEDED
Status: DISCONTINUED | OUTPATIENT
Start: 2018-01-15 | End: 2018-01-15 | Stop reason: HOSPADM

## 2018-01-15 RX ORDER — PROMETHAZINE HYDROCHLORIDE 25 MG/1
25 SUPPOSITORY RECTAL ONCE AS NEEDED
Status: DISCONTINUED | OUTPATIENT
Start: 2018-01-15 | End: 2018-01-15 | Stop reason: HOSPADM

## 2018-01-15 RX ORDER — ONDANSETRON 2 MG/ML
4 INJECTION INTRAMUSCULAR; INTRAVENOUS ONCE AS NEEDED
Status: COMPLETED | OUTPATIENT
Start: 2018-01-15 | End: 2018-01-15

## 2018-01-15 RX ORDER — LIDOCAINE HYDROCHLORIDE 10 MG/ML
0.5 INJECTION, SOLUTION EPIDURAL; INFILTRATION; INTRACAUDAL; PERINEURAL ONCE AS NEEDED
Status: DISCONTINUED | OUTPATIENT
Start: 2018-01-15 | End: 2018-01-15 | Stop reason: HOSPADM

## 2018-01-15 RX ORDER — HEPARIN SODIUM 1000 [USP'U]/ML
INJECTION, SOLUTION INTRAVENOUS; SUBCUTANEOUS AS NEEDED
Status: DISCONTINUED | OUTPATIENT
Start: 2018-01-15 | End: 2018-01-15 | Stop reason: SURG

## 2018-01-15 RX ORDER — PROMETHAZINE HYDROCHLORIDE 25 MG/1
12.5 TABLET ORAL ONCE AS NEEDED
Status: DISCONTINUED | OUTPATIENT
Start: 2018-01-15 | End: 2018-01-15 | Stop reason: HOSPADM

## 2018-01-15 RX ORDER — NITROGLYCERIN 5 MG/ML
INJECTION, SOLUTION INTRAVENOUS AS NEEDED
Status: DISCONTINUED | OUTPATIENT
Start: 2018-01-15 | End: 2018-01-15 | Stop reason: SURG

## 2018-01-15 RX ORDER — ACETAMINOPHEN 325 MG/1
650 TABLET ORAL EVERY 6 HOURS PRN
Status: DISCONTINUED | OUTPATIENT
Start: 2018-01-15 | End: 2018-01-17

## 2018-01-15 RX ORDER — HYDRALAZINE HYDROCHLORIDE 20 MG/ML
5 INJECTION INTRAMUSCULAR; INTRAVENOUS
Status: DISCONTINUED | OUTPATIENT
Start: 2018-01-15 | End: 2018-01-15 | Stop reason: HOSPADM

## 2018-01-15 RX ORDER — NALOXONE HCL 0.4 MG/ML
0.2 VIAL (ML) INJECTION AS NEEDED
Status: DISCONTINUED | OUTPATIENT
Start: 2018-01-15 | End: 2018-01-15 | Stop reason: HOSPADM

## 2018-01-15 RX ORDER — LIDOCAINE HYDROCHLORIDE 10 MG/ML
INJECTION, SOLUTION EPIDURAL; INFILTRATION; INTRACAUDAL; PERINEURAL AS NEEDED
Status: DISCONTINUED | OUTPATIENT
Start: 2018-01-15 | End: 2018-01-15 | Stop reason: HOSPADM

## 2018-01-15 RX ORDER — LABETALOL HYDROCHLORIDE 5 MG/ML
5 INJECTION, SOLUTION INTRAVENOUS
Status: DISCONTINUED | OUTPATIENT
Start: 2018-01-15 | End: 2018-01-15 | Stop reason: HOSPADM

## 2018-01-15 RX ORDER — ONDANSETRON 2 MG/ML
4 INJECTION INTRAMUSCULAR; INTRAVENOUS EVERY 6 HOURS PRN
Status: DISCONTINUED | OUTPATIENT
Start: 2018-01-15 | End: 2018-01-17

## 2018-01-15 RX ORDER — ROCURONIUM BROMIDE 10 MG/ML
INJECTION, SOLUTION INTRAVENOUS AS NEEDED
Status: DISCONTINUED | OUTPATIENT
Start: 2018-01-15 | End: 2018-01-15 | Stop reason: SURG

## 2018-01-15 RX ORDER — NITROGLYCERIN 0.4 MG/1
0.4 TABLET SUBLINGUAL
Status: DISCONTINUED | OUTPATIENT
Start: 2018-01-15 | End: 2018-01-22 | Stop reason: HOSPADM

## 2018-01-15 RX ORDER — SODIUM CHLORIDE, SODIUM LACTATE, POTASSIUM CHLORIDE, CALCIUM CHLORIDE 600; 310; 30; 20 MG/100ML; MG/100ML; MG/100ML; MG/100ML
9 INJECTION, SOLUTION INTRAVENOUS CONTINUOUS
Status: DISCONTINUED | OUTPATIENT
Start: 2018-01-15 | End: 2018-01-16

## 2018-01-15 RX ORDER — PROPOFOL 10 MG/ML
VIAL (ML) INTRAVENOUS AS NEEDED
Status: DISCONTINUED | OUTPATIENT
Start: 2018-01-15 | End: 2018-01-15 | Stop reason: SURG

## 2018-01-15 RX ORDER — PROMETHAZINE HYDROCHLORIDE 25 MG/ML
12.5 INJECTION, SOLUTION INTRAMUSCULAR; INTRAVENOUS ONCE AS NEEDED
Status: DISCONTINUED | OUTPATIENT
Start: 2018-01-15 | End: 2018-01-15 | Stop reason: HOSPADM

## 2018-01-15 RX ORDER — SODIUM CHLORIDE 9 MG/ML
50 INJECTION, SOLUTION INTRAVENOUS CONTINUOUS
Status: DISCONTINUED | OUTPATIENT
Start: 2018-01-15 | End: 2018-01-18

## 2018-01-15 RX ORDER — FENTANYL CITRATE 50 UG/ML
INJECTION, SOLUTION INTRAMUSCULAR; INTRAVENOUS AS NEEDED
Status: DISCONTINUED | OUTPATIENT
Start: 2018-01-15 | End: 2018-01-15 | Stop reason: SURG

## 2018-01-15 RX ORDER — MIDAZOLAM HYDROCHLORIDE 1 MG/ML
2 INJECTION INTRAMUSCULAR; INTRAVENOUS
Status: DISCONTINUED | OUTPATIENT
Start: 2018-01-15 | End: 2018-01-15 | Stop reason: HOSPADM

## 2018-01-15 RX ORDER — ONDANSETRON 4 MG/1
4 TABLET, ORALLY DISINTEGRATING ORAL EVERY 6 HOURS PRN
Status: DISCONTINUED | OUTPATIENT
Start: 2018-01-15 | End: 2018-01-17

## 2018-01-15 RX ORDER — HYDROCODONE BITARTRATE AND ACETAMINOPHEN 7.5; 325 MG/1; MG/1
1 TABLET ORAL ONCE AS NEEDED
Status: DISCONTINUED | OUTPATIENT
Start: 2018-01-15 | End: 2018-01-15 | Stop reason: HOSPADM

## 2018-01-15 RX ORDER — MIDAZOLAM HYDROCHLORIDE 1 MG/ML
1 INJECTION INTRAMUSCULAR; INTRAVENOUS
Status: DISCONTINUED | OUTPATIENT
Start: 2018-01-15 | End: 2018-01-15 | Stop reason: HOSPADM

## 2018-01-15 RX ORDER — HYDROMORPHONE HCL 110MG/55ML
0.5 PATIENT CONTROLLED ANALGESIA SYRINGE INTRAVENOUS
Status: DISCONTINUED | OUTPATIENT
Start: 2018-01-15 | End: 2018-01-15 | Stop reason: HOSPADM

## 2018-01-15 RX ORDER — HEPARIN SODIUM 5000 [USP'U]/ML
5000 INJECTION, SOLUTION INTRAVENOUS; SUBCUTANEOUS EVERY 12 HOURS SCHEDULED
Status: DISCONTINUED | OUTPATIENT
Start: 2018-01-16 | End: 2018-01-22 | Stop reason: HOSPADM

## 2018-01-15 RX ORDER — ACETAMINOPHEN 325 MG/1
650 TABLET ORAL EVERY 4 HOURS PRN
Status: DISCONTINUED | OUTPATIENT
Start: 2018-01-15 | End: 2018-01-22 | Stop reason: HOSPADM

## 2018-01-15 RX ORDER — SODIUM CHLORIDE 9 MG/ML
INJECTION, SOLUTION INTRAVENOUS CONTINUOUS PRN
Status: DISCONTINUED | OUTPATIENT
Start: 2018-01-15 | End: 2018-01-15 | Stop reason: SURG

## 2018-01-15 RX ORDER — BUPIVACAINE HYDROCHLORIDE 2.5 MG/ML
INJECTION, SOLUTION INFILTRATION; PERINEURAL AS NEEDED
Status: DISCONTINUED | OUTPATIENT
Start: 2018-01-15 | End: 2018-01-15 | Stop reason: HOSPADM

## 2018-01-15 RX ADMIN — TAZOBACTAM SODIUM AND PIPERACILLIN SODIUM 3.38 G: 375; 3 INJECTION, SOLUTION INTRAVENOUS at 00:34

## 2018-01-15 RX ADMIN — ROCURONIUM BROMIDE 10 MG: 10 INJECTION INTRAVENOUS at 11:28

## 2018-01-15 RX ADMIN — CEFAZOLIN SODIUM 2 G: 1 INJECTION, POWDER, FOR SOLUTION INTRAMUSCULAR; INTRAVENOUS at 09:44

## 2018-01-15 RX ADMIN — PROPOFOL 50 MG: 10 INJECTION, EMULSION INTRAVENOUS at 09:55

## 2018-01-15 RX ADMIN — FAMOTIDINE 20 MG: 10 INJECTION, SOLUTION INTRAVENOUS at 09:29

## 2018-01-15 RX ADMIN — NITROGLYCERIN 50 MCG: 5 INJECTION, SOLUTION INTRAVENOUS at 14:53

## 2018-01-15 RX ADMIN — PHENYLEPHRINE HYDROCHLORIDE 100 MCG: 10 INJECTION INTRAVENOUS at 10:01

## 2018-01-15 RX ADMIN — SODIUM CHLORIDE, POTASSIUM CHLORIDE, SODIUM LACTATE AND CALCIUM CHLORIDE: 600; 310; 30; 20 INJECTION, SOLUTION INTRAVENOUS at 14:01

## 2018-01-15 RX ADMIN — SODIUM CHLORIDE: 9 INJECTION, SOLUTION INTRAVENOUS at 10:45

## 2018-01-15 RX ADMIN — SODIUM CHLORIDE, POTASSIUM CHLORIDE, SODIUM LACTATE AND CALCIUM CHLORIDE 9 ML/HR: 600; 310; 30; 20 INJECTION, SOLUTION INTRAVENOUS at 09:30

## 2018-01-15 RX ADMIN — SUGAMMADEX 250 MG: 100 INJECTION, SOLUTION INTRAVENOUS at 14:30

## 2018-01-15 RX ADMIN — PROTAMINE SULFATE 40 MG: 10 INJECTION, SOLUTION INTRAVENOUS at 13:50

## 2018-01-15 RX ADMIN — Medication 5 MG/HR: at 15:31

## 2018-01-15 RX ADMIN — ONDANSETRON 4 MG: 2 INJECTION INTRAMUSCULAR; INTRAVENOUS at 15:35

## 2018-01-15 RX ADMIN — CEFAZOLIN SODIUM 1 G: 1 INJECTION, POWDER, FOR SOLUTION INTRAMUSCULAR; INTRAVENOUS at 13:40

## 2018-01-15 RX ADMIN — ROCURONIUM BROMIDE 10 MG: 10 INJECTION INTRAVENOUS at 13:46

## 2018-01-15 RX ADMIN — SODIUM CHLORIDE 75 ML/HR: 9 INJECTION, SOLUTION INTRAVENOUS at 19:04

## 2018-01-15 RX ADMIN — CEFAZOLIN SODIUM 2 G: 10 INJECTION, POWDER, FOR SOLUTION INTRAVENOUS at 22:16

## 2018-01-15 RX ADMIN — IPRATROPIUM BROMIDE AND ALBUTEROL SULFATE 3 ML: .5; 3 SOLUTION RESPIRATORY (INHALATION) at 21:52

## 2018-01-15 RX ADMIN — PROTAMINE SULFATE 20 MG: 10 INJECTION, SOLUTION INTRAVENOUS at 14:02

## 2018-01-15 RX ADMIN — FENTANYL CITRATE 25 MCG: 50 INJECTION INTRAMUSCULAR; INTRAVENOUS at 09:55

## 2018-01-15 RX ADMIN — NITROGLYCERIN 50 MCG: 5 INJECTION, SOLUTION INTRAVENOUS at 11:14

## 2018-01-15 RX ADMIN — PHENYLEPHRINE HYDROCHLORIDE 100 MCG: 10 INJECTION INTRAVENOUS at 10:04

## 2018-01-15 RX ADMIN — ONDANSETRON 4 MG: 2 INJECTION INTRAMUSCULAR; INTRAVENOUS at 14:04

## 2018-01-15 RX ADMIN — ROCURONIUM BROMIDE 50 MG: 10 INJECTION INTRAVENOUS at 09:55

## 2018-01-15 RX ADMIN — CLEVIPIDINE 2 MG/HR: 0.5 EMULSION INTRAVENOUS at 22:46

## 2018-01-15 RX ADMIN — ROCURONIUM BROMIDE 10 MG: 10 INJECTION INTRAVENOUS at 12:20

## 2018-01-15 RX ADMIN — FENTANYL CITRATE 25 MCG: 50 INJECTION, SOLUTION INTRAMUSCULAR; INTRAVENOUS at 09:30

## 2018-01-15 RX ADMIN — Medication 1 MG: at 09:29

## 2018-01-15 RX ADMIN — HEPARIN SODIUM 2500 UNITS: 1000 INJECTION, SOLUTION INTRAVENOUS; SUBCUTANEOUS at 11:25

## 2018-01-15 RX ADMIN — HEPARIN SODIUM 2500 UNITS: 1000 INJECTION, SOLUTION INTRAVENOUS; SUBCUTANEOUS at 11:04

## 2018-01-15 RX ADMIN — AMLODIPINE BESYLATE 5 MG: 5 TABLET ORAL at 08:16

## 2018-01-15 RX ADMIN — NICARDIPINE HYDROCHLORIDE 5 MG/HR: 2.5 INJECTION INTRAVENOUS at 15:31

## 2018-01-15 RX ADMIN — HYDROMORPHONE HYDROCHLORIDE 0.5 MG: 1 INJECTION, SOLUTION INTRAMUSCULAR; INTRAVENOUS; SUBCUTANEOUS at 18:35

## 2018-01-15 RX ADMIN — NITROGLYCERIN 50 MCG: 5 INJECTION, SOLUTION INTRAVENOUS at 11:12

## 2018-01-15 RX ADMIN — PHENYLEPHRINE HYDROCHLORIDE 100 MCG: 10 INJECTION INTRAVENOUS at 09:57

## 2018-01-15 RX ADMIN — METOPROLOL SUCCINATE 25 MG: 25 TABLET, FILM COATED, EXTENDED RELEASE ORAL at 08:16

## 2018-01-15 RX ADMIN — NITROGLYCERIN 50 MCG: 5 INJECTION, SOLUTION INTRAVENOUS at 14:43

## 2018-01-15 RX ADMIN — PHENYLEPHRINE HYDROCHLORIDE 0.74 MCG/KG/MIN: 10 INJECTION, SOLUTION INTRAMUSCULAR; INTRAVENOUS; SUBCUTANEOUS at 10:03

## 2018-01-15 RX ADMIN — HEPARIN SODIUM 2500 UNITS: 1000 INJECTION, SOLUTION INTRAVENOUS; SUBCUTANEOUS at 12:48

## 2018-01-15 RX ADMIN — HEPARIN SODIUM 7500 UNITS: 1000 INJECTION, SOLUTION INTRAVENOUS; SUBCUTANEOUS at 10:46

## 2018-01-15 RX ADMIN — HEPARIN SODIUM 5000 UNITS: 1000 INJECTION, SOLUTION INTRAVENOUS; SUBCUTANEOUS at 11:50

## 2018-01-15 RX ADMIN — LISINOPRIL 20 MG: 20 TABLET ORAL at 08:16

## 2018-01-15 RX ADMIN — FENTANYL CITRATE 75 MCG: 50 INJECTION INTRAMUSCULAR; INTRAVENOUS at 10:20

## 2018-01-15 RX ADMIN — NITROGLYCERIN 50 MCG: 5 INJECTION, SOLUTION INTRAVENOUS at 14:38

## 2018-01-15 NOTE — ANESTHESIA PROCEDURE NOTES
Airway  Urgency: elective    Airway not difficult    General Information and Staff    Patient location during procedure: OR  Anesthesiologist: JOHN FRY  CRNA: ANTHONY MEADOWS    Indications and Patient Condition  Indications for airway management: airway protection    Preoxygenated: yes  MILS not maintained throughout  Mask difficulty assessment: 1 - vent by mask    Final Airway Details  Final airway type: endotracheal airway      Successful airway: ETT  Cuffed: yes   Successful intubation technique: direct laryngoscopy  Facilitating devices/methods: intubating stylet  Endotracheal tube insertion site: oral  Blade: Ryan  Blade size: #3  ETT size: 7.0 mm  Cormack-Lehane Classification: grade I - full view of glottis  Placement verified by: chest auscultation   Cuff volume (mL): 8  Measured from: lips  ETT to lips (cm): 21  Number of attempts at approach: 1    Additional Comments  PreO2 100% face mask, IV induction, easy mask, DVL x1, cords noted, tube through, cuff up, EBBSH, +etCO2, = chest movement, tube secured in place, atraumatic, teeth and lips intact as preop.

## 2018-01-15 NOTE — ANESTHESIA POSTPROCEDURE EVALUATION
"Patient: Sakshi Garcia    Procedure Summary     Date Anesthesia Start Anesthesia Stop Room / Location    01/15/18 0944 1513  [facility] / [facility]       Procedure Diagnosis Surgeon Provider    LEFT CAROTID ENDARTERECTOMY WITH SAPHENOUS VEIN HARVEST (Left Neck) No diagnosis on file. MD Javan Bzaan MD          Anesthesia Type: general  Last vitals  BP   124/52 (01/15/18 1620)   Temp   36.3 °C (97.3 °F) (01/15/18 1505)   Pulse   82 (01/15/18 1620)   Resp   16 (01/15/18 1620)     SpO2   97 % (01/15/18 1620)     Post Anesthesia Care and Evaluation    Patient location during evaluation: PACU  Patient participation: complete - patient participated  Level of consciousness: awake and alert  Pain management: adequate  Airway patency: patent  Anesthetic complications: No anesthetic complications    Cardiovascular status: acceptable  Respiratory status: acceptable  Hydration status: acceptable    Comments: /52  Pulse 82  Temp 36.3 °C (97.3 °F) (Oral)   Resp 16  Ht 157.5 cm (62.01\")  Wt 68 kg (150 lb)  SpO2 97%  BMI 27.43 kg/m2      "

## 2018-01-15 NOTE — PLAN OF CARE
Problem: Patient Care Overview (Adult)  Goal: Plan of Care Review  Outcome: Ongoing (interventions implemented as appropriate)   01/15/18 0421   Coping/Psychosocial Response Interventions   Plan Of Care Reviewed With patient;daughter   Patient Care Overview   Progress unable to show any progress toward functional goals   Outcome Evaluation   Outcome Summary/Follow up Plan Pt awaiting L carotid endarterectomy. Daughter @ bedside. Pt confused talking about falling into [place] burial pit (which did not happen per daughter).      Goal: Adult Individualization and Mutuality  Outcome: Ongoing (interventions implemented as appropriate)    Goal: Discharge Needs Assessment  Outcome: Ongoing (interventions implemented as appropriate)      Problem: Pressure Ulcer Risk (Tank Scale) (Adult,Obstetrics,Pediatric)  Goal: Skin Integrity  Outcome: Ongoing (interventions implemented as appropriate)

## 2018-01-15 NOTE — OP NOTE
01/15/18  Ozzie Kelsey MD      Preoperative Diagnosis:Symptomatic left Carotid artery Stenosis    Postoperative Diagnosis: same as above    Procedure Performed: left Carotid Endarterectomy with intraoperative duplex and patch angioplasty with harvest of left great saphenous vein    Surgeon: Ozzie Kelsey MD    Assistant: Michelle Horowitz MD and Ozzie Kelsey MD, Lottie Lang    Anesthesia: GETA    Estimated Blood Loss: 200ml     Specimen: None    Complications: none    Dispo: PACU    Indication for procedure: 77 y.o. female with 80 - 99% percent Symptomatic stenosis of the left internal carotid artery diagnosed on ultrasound MRA, here for an carotid endarterectomy.     Description of procedure: The patient was taken to the operating room and placed in the supine position.  Gen. anesthesia was induced.  Preoperative antibiotics were given.  All the pressure points were padded.  The left side of the neck was prepped and draped in the usual sterile fashion.  A longitudinal incision was made on the anterior border of the sternocleidomastoid muscle and Bovie electrocautery was used to dissect through the subcutaneous tissues until the anterior border of the sternocleidomastoid was identified.  The anterior border of this muscle was dissected until the jugular vein was identified.  The anterior border of the jugular vein was skeletonized and the facial vein was found and divided.  A 3-0 suture ligature was used on the jugular side.  This vein was then retracted laterally.  Next the carotid sheath was entered and the common, internal, external, and superior thyroid arteries were dissected.  The omohyoid was found at the inferior border of the incision.  After circumferential control of the arteries was obtained the patient was heparinized with 7,500 units of intravenous heparin.  ACTs were monitored during the case to maintain double the baseline value.  This required the administration of an  additional 5000 units of heparin.  After the heparin had been allowed to circulate, the internal, external, and common carotid arteries were clamped in that order.  A clip was placed on the superior thyroid artery.  The common carotid artery was opened longitudinally with an 11 blade scalpel and the arteriotomy was extended using Bynum scissors into the internal carotid artery.  A shunt was then placed, distal end first, and flow was confirmed using Doppler.  A longitudinal incision was made on the medial side of the left leg at the ankle.  The saphenous vein was identified and dissected out for about 10 cm.  I stopped proximally at a branch point at which point the saphenous vein became too small to use as a patch.  It was divided with 3-0 silk ties, flushed with heparinized saline, opened longitudinally to function as a patch.  An endarterectomy was performed in the standard fashion using a [place] elevator and DeBakey forceps.  A satisfactory endpoint was obtained at the distal internal carotid artery. No tacking stitches were used to secure the distal endpoint of the internal carotid.  An eversion endarterectomy was performed of the external carotid artery.  The plaque at the proximal end of the endarterectomy site was trimmed sharply.  This area was flushed multiple times with heparinized saline and inspected closely until all small debris had been removed.  The common carotid artery had severe stenosis palpable to the proximal extent of the incision even after extending the incision inferiorly.  The plaque was debulked, and tacked to the wall using interrupted 6-0 Prolene's.     A patch angioplasty was then performed using the vein patch sewn in place using a running 6-0 Prolene suture.  The repair site was flushed by loosening and then reapplying the clamps on the internal, external, and common carotid arteries in that order.  The patch was then completed, flow was restored taking care to protect the internal  carotid circulation for several beats area did a duplex ultrasound was then performed intraoperatively which demonstrated normal velocities in the common, internal, and external carotid arteries.    This revealed a defect at the distal end.  Clamps were reapplied and the patch was opened longitudinally.  The I thought this was pointed to be a flap but it was a kink on the backside of the artery.  I reclosed the patch with 6-0 Prolene.  We manipulated the patch and carotid artery to try to resolve the kink, but it persisted.  The artery was then dissected higher, the clamps were reapplied after additional heparin had been given, the previous patch was opened and a longer patch angioplasty was performed with 8 mm x 8 cm bovine pericardial patch.  There was a a redundant area in the posterior wall that was seen and this was imbricated using running 6-0 on Prolene on the backside of the artery.  The patch was completed, flow was restored in the standard fashion.  There was still some kinking of the artery but this did not appear to be flow limiting.  The duplex images were saved.    The heparin was then reversed with 60 mg of intravenous protamine.  Surgicel and FloSeal were used for hemostasis.  30 mL of 0.5% percent Marcaine were into injected around the incision while the protamine circulated.  After hemostasis was obtained meticulously and the entire incision had been irrigated several times, it was closed using 2-0 Vicryl to approximate the sternocleidomastoid muscle with some middle cervical fascia.  The platysma was then closed in a running fashion using 3-0 Vicryl.  The skin was closed with a 4-0 Vicryl in the subcuticular layer.  Dermabond was used to close the wound.        Ozzie Kelsey MD  01/15/18

## 2018-01-15 NOTE — PROGRESS NOTES
LOS: 13 days   Patient Care Team:  Provider Not In System as PCP - General    Subjective     Doing okay only complaint is that she has this cough that she can't get anything up she just keeps rattling    Review of Systems:          Objective     Vital Signs  Vital Sign Min/Max for last 24 hours  Temp  Min: 98.4 °F (36.9 °C)  Max: 100.2 °F (37.9 °C)   BP  Min: 142/57  Max: 185/72   Pulse  Min: 77  Max: 86   Resp  Min: 18  Max: 20   SpO2  Min: 91 %  Max: 95 %   No Data Recorded   Weight  Min: 69.2 kg (152 lb 8 oz)  Max: 69.2 kg (152 lb 8 oz)        Ventilator/Non-Invasive Ventilation Settings     None                       Body mass index is 27.89 kg/(m^2).  I/O last 3 completed shifts:  In: 840 [P.O.:840]  Out: -            Physical Exam:  General Appearance: Well-developed elderly white female resting in bed seems a little confused at times  Eyes: Conjunctiva clear and anicteric  ENT: Mucous membranes are moist no erythema or exudates she does have a wet voice  Neck: No adenopathy trachea midline  Lungs: Are nonlabored symmetric expansion any wheezes rales rhonchi no dullness   Cardiac: Give her rate and rhythm no murmur  Abdomen: Soft no palpable organomegaly  : Not examined  Musc/Skel: Lower extremity edema  Skin: No Jaundice no petechiae  Neuro: Little confused  Extremities/P Vascular: Palpable radial dorsalis pedis pulses  MSE: Hard to assess       Labs:    Results from last 7 days  Lab Units 01/14/18  1731 01/11/18  0455 01/10/18  0629 01/09/18  0434 01/08/18  0444   GLUCOSE mg/dL 193* 160* 138* 195* 176*   SODIUM mmol/L 140 141 145 141 144   POTASSIUM mmol/L 4.5 4.9 4.3 4.0 4.0   CO2 mmol/L 26.5 26.4 27.7 23.6 19.1*   CHLORIDE mmol/L 100 105 107 105 108*   ANION GAP mmol/L 13.5 9.6 10.3 12.4 16.9   CREATININE mg/dL 1.46* 1.21* 1.15* 1.25* 1.29*   BUN mg/dL 25* 21 18 21 20   BUN / CREAT RATIO  17.1 17.4 15.7 16.8 15.5   CALCIUM mg/dL 9.3 8.6 8.7 8.5* 8.7   EGFR IF NONAFRICN AM mL/min/1.73 35* 43* 46*  41* 40*     Estimated Creatinine Clearance: 28.9 mL/min (by C-G formula based on Cr of 1.46).        Results from last 7 days  Lab Units 01/14/18  1731 01/12/18  0541 01/11/18  0822 01/10/18  0629 01/09/18  0434 01/08/18  0443   WBC 10*3/mm3 12.30* 14.16* 16.74* 13.88* 13.79* 14.07*   RBC 10*6/mm3 2.44* 2.71* 2.79* 2.82* 2.66* 2.80*   HEMOGLOBIN g/dL 7.6* 8.2* 8.5* 8.7* 8.1* 8.6*   HEMATOCRIT % 23.6* 26.9* 28.0* 27.9* 26.0* 27.3*   MCV fL 96.7 99.3* 100.4* 98.9* 97.7 97.5   MCH pg 31.1 30.3 30.5 30.9 30.5 30.7   MCHC g/dL 32.2* 30.5* 30.4* 31.2* 31.2* 31.5*   RDW % 14.3* 14.5* 14.7* 14.7* 14.8* 14.5*   RDW-SD fl 50.9 52.3 53.9 53.1 52.0 51.6   MPV fL 9.7 9.6 9.8 9.6 9.8 9.7   PLATELETS 10*3/mm3 384 343 340 316 274 254   NEUTROPHIL % % 70.7 74.7 73.6 74.0 73.4 75.4   LYMPHOCYTE % % 13.8* 11.6* 12.9* 10.7* 10.2* 9.2*   MONOCYTES % % 12.1* 10.4 10.0 10.2 11.2 10.0   EOSINOPHIL % % 2.0 2.0 2.1 2.6 1.4 1.5   BASOPHIL % % 0.6 0.4 0.3 0.4 0.2 0.3   IMM GRAN % % 0.8* 0.9* 1.1* 2.1* 3.6* 3.6*   NEUTROS ABS 10*3/mm3 8.70* 10.58* 12.32* 10.28* 10.12* 10.61*   LYMPHS ABS 10*3/mm3 1.70 1.64 2.16 1.48 1.41 1.30   MONOS ABS 10*3/mm3 1.49* 1.47* 1.68* 1.41* 1.55* 1.40*   EOS ABS 10*3/mm3 0.24 0.29 0.35 0.36 0.19 0.21   BASOS ABS 10*3/mm3 0.07 0.05 0.05 0.06 0.03 0.04   IMMATURE GRANS (ABS) 10*3/mm3 0.10* 0.13* 0.18* 0.29* 0.49* 0.51*                       Results from last 7 days  Lab Units 01/11/18  0455   PROCALCITONIN ng/mL 0.14         Microbiology Results (last 10 days)     Procedure Component Value - Date/Time    Respiratory Panel, PCR - Swab, Nasopharynx [[id number]]  (Normal) Collected:  01/11/18 1050    Lab Status:  Final result Specimen:  Swab from Nasopharynx Updated:  01/11/18 1310     ADENOVIRUS, PCR Not Detected     Coronavirus 229E Not Detected     Coronavirus HKU1 Not Detected     Coronavirus NL63 Not Detected     Coronavirus OC43 Not Detected     Human Metapneumovirus Not Detected     Human Rhinovirus/Enterovirus  Not Detected     Influenza B PCR Not Detected     Parainfluenza Virus 1 Not Detected     Parainfluenza Virus 2 Not Detected     Parainfluenza Virus 3 Not Detected     Parainfluenza Virus 4 Not Detected     Bordetella pertussis pcr Not Detected     Influenza A H1N1 2009 PCR Not Detected     Chlamydophila pneumoniae PCR Not Detected     Mycoplasma pneumo by PCR Not Detected     Influenza A PCR Not Detected     Influenza A H3 Not Detected     Influenza A H1 Not Detected     RSV, PCR Not Detected    Blood Culture - Blood, [[id number]]  (Normal) Collected:  01/05/18 1317    Lab Status:  Final result Specimen:  Blood from Arm, Right Updated:  01/10/18 1331     Blood Culture No growth at 5 days    Blood Culture - Blood, [[id number]]  (Normal) Collected:  01/05/18 1244    Lab Status:  Final result Specimen:  Blood from Arm, Left Updated:  01/10/18 1316     Blood Culture No growth at 5 days                amLODIPine 5 mg Oral Q24H   aspirin 325 mg Oral Daily   atorvastatin 80 mg Oral Nightly   clopidogrel 75 mg Oral Daily   glipiZIDE 2.5 mg Oral BID AC   insulin aspart 0-9 Units Subcutaneous 4x Daily With Meals & Nightly   ipratropium-albuterol 3 mL Nebulization Q4H - RT   linagliptin 5 mg Oral Daily   lisinopril 20 mg Oral Q24H   metoprolol succinate XL 25 mg Oral Q24H   piperacillin-tazobactam 3.375 g Intravenous Q8H   potassium chloride 20 mEq Oral TID          Diagnostics:  Xr Chest 2 View    Result Date: 1/1/2018  XR CHEST 2 VW-  HISTORY: Female who is 77 years-old,  weakness  TECHNIQUE: Frontal and lateral views of the chest  COMPARISON: None available  FINDINGS: Heart size is borderline. Pulmonary vasculature is unremarkable. Aorta is tortuous. No focal pulmonary consolidation, pleural effusion, or pneumothorax. Arterial calcifications are present. Degenerative changes are seen at the shoulders. No acute osseous process.      No focal pulmonary consolidation. Borderline heart size. Tortuous aorta. Follow-up as  clinically indicated.  This report was finalized on 1/1/2018 4:58 PM by Dr. Carlos Manuel Mary MD.      Xr Elbow 2 View Right    Result Date: 1/9/2018  TWO VIEW PORTABLE RIGHT ELBOW  HISTORY: Trauma. Pain.  A large joint effusion is present as seen in the lateral view. Although I see no definite fracture, this raises the concern of an occult radial head fracture and follow-up imaging in several days is recommended.  This report was finalized on 1/9/2018 3:37 PM by Dr. Andrzej Mcclendon MD.      Ct Head Without Contrast    Result Date: 1/4/2018  CT HEAD WO CONTRAST-  CLINICAL HISTORY: Right-sided weakness. No onset confusion. Possible acute CVA  TECHNIQUE: Transverse 3 mm thick images were acquired from the base of the skull to the vertex without IV contrast.  Radiation dose reduction techniques were utilized, including automated exposure control and exposure modulation based on body size.  COMPARISON: CT head dated 1/1/2018.  FINDINGS: There is mild diffuse cortical atrophy. There is fairly confluent abnormal diminished attenuation throughout the white matter of both cerebral hemispheres that is compatible with sequela of extensive small vessel chronic ischemic change. A tiny old lacunar infarct is noted in the right thalamus. No acute infarct or hemorrhage is identified. There is no mass effect. The paranasal sinuses appear well aerated      Evidence of extensive small vessel chronic ischemic change as described. No acute intracranial abnormality is identified.  These findings were communicated to the stroke neurologist on 1/4/2018 at 6:15 PM.  This report was finalized on 1/4/2018 6:19 PM by Dr. Nico Tello MD.      Ct Head Without Contrast    Result Date: 1/1/2018  CT OF THE HEAD WITHOUT CONTRAST  HISTORY: 77-year-old female with a history of frequent falls and altered mental status.  TECHNIQUE: Contiguous axial images were obtained through the head without IV contrast.  COMPARISON: None.  FINDINGS: There is a  severe hypodense appearance seen within the bilateral paraventricular and subcortical white matter of the cerebral hemispheres. No territorial edema is appreciated. There is moderate diffuse atrophy and proportionate ventriculomegaly. The osseous structures of the skull have a normal appearance.      There is no evidence for an acute intracranial abnormality. Findings consistent with severe chronic small vessel ischemic change of the cerebral white matter are noted.  Radiation dose reduction techniques were utilized, including automated exposure control and exposure modulation based on body size.  This report was finalized on 2018 7:41 PM by Dr. Philip Soares MD.      Ct Chest Without Contrast    Result Date: 2018  CT SCAN OF THE CHEST WITHOUT CONTRAST  HISTORY: Shortness of breath. Pleural effusions. Possible pneumonia.  The CT scan was performed through the chest without contrast and demonstrates the followin. There are small-to-moderate bilateral pleural effusions layering posteriorly, right larger than left. These are associated with some localized compressive atelectasis, particularly at the right base posteriorly and I cannot completely exclude a small component of pneumonia. The lungs are otherwise clear except for calcified granulomas in both upper lobes. 2. There is no mediastinal or hilar or axillary adenopathy. There is no pericardial effusion. The CT images through the upper liver, spleen, and both adrenal glands are unremarkable.  Radiation dose reduction techniques were utilized, including automated exposure control and exposure modulation based on body size.  This report was finalized on 2018 2:18 PM by Dr. Andrzej Mcclendon MD.      Mri Angiogram Head Without Contrast    Result Date: 2018  MRI EXAMINATION OF THE BRAIN WITHOUT CONTRAST, MRA OF THE HEAD WITHOUT CONTRAST AND MRA OF THE NECK WITHOUT CONTRAST  HISTORY: Stroke.  COMPARISON: CT head 2018. No prior MRI examination of  the brain is available for comparison.  MRI EXAMINATION OF THE BRAIN WITHOUT CONTRAST: The diffusion sequence demonstrates an area of increased signal intensity involving the cortex of the right middle frontal gyrus anteriorly and superiorly measuring 8 mm in size suspicious for small acute infarct. A 1 mm area of subtle increased signal intensity is appreciated involving the corona radiata on the right on the diffusion sequence as well. There are multiple cortical and subcortical areas of restricted diffusion involving the left frontal parietal and occipital lobes suggesting a watershed distribution infarct. The largest area of infarction involves the left occipital lobe posteriorly measuring 18 mm in size. The next largest area of infarction involves the subcortical white matter of the left frontal lobe where an area of infarction measuring approximately 13 mm in size is present.  There is moderate atrophy. There is extensive small vessel ischemic disease. Small areas of signal loss are noted on the susceptibility weighted imaging involving the posterior occipital infarct suggesting small foci of blood products. There is no evidence of positive mass effect, hydrocephalus or of midline shift. There is extensive small vessel ischemic disease.  MRA OF THE HEAD WITHOUT CONTRAST: There is signal present within the distal aspect of the vertebral arteries bilaterally. The vertebral arteries are codominant. The basilar artery and the left posterior cerebral artery appear unremarkable. There is signal present within the distal aspects of the internal carotid arteries bilaterally. There is a small focal protuberance involving the cavernous ICA laterally which may be secondary to a small aneurysm or infundibulum. This does not project into the subarachnoid space. The left A1 segment is mildly hypoplastic. The anterior communicating artery is complex suggesting a fenestrated right A1/A2 junction. No convincing aneurysm is  identified. The study is hampered by patient motion but the proximal aspects of the anterior and middle cerebral arteries appeared unremarkable.  MRA OF THE NECK WITHOUT CONTRAST: There is a suggestion of a right pleural effusion. There is signal loss appreciated involving the left common carotid artery at its origin. The sensitivity of the study is reduced as intravenous contrast was not utilized. I could not exclude a severe stenosis at the origin of the left common carotid artery. There is irregularity involving the carotid bifurcations bilaterally with a focal area of signal loss appreciated involving the left internal carotid artery approximately 1.4 cm beyond its origin. I could not exclude a severe stenosis at this location, greater than 80% using NASCET criteria. There is a focal stenosis appreciated involving the external carotid artery on the right and signal loss appreciated involving the proximal aspect of the internal carotid artery on the right. There is a focal eccentric stenosis involving the proximal aspect of the right vertebral artery approximately 3 cm beyond its origin resulting in a severe (greater than 90% stenosis). There is mild irregularity but with no evidence of focal high-grade stenosis of the left vertebral artery.      1. Multiple lacunar infarcts are appreciated involving the left cerebral hemisphere suggesting a watershed type distribution with infarcts involving the frontal, parietal and occipital lobes. There are questionable areas of restricted diffusion involving the right frontal lobe. If true this would suggest a proximal cardiac source. However, the MRA of the neck did demonstrate signal loss involving the proximal aspect of the left internal carotid artery where there is likely moderate-to-severe if not severe stenosis using NASCET criteria. There is signal loss at the origin of the left common carotid artery suggesting a severe stenosis. There is severe stenosis involving  the right vertebral artery approximately 3 cm beyond the origin and likely moderate if not moderate-to-severe stenosis involving the right internal carotid artery. Further evaluation with a CT angiogram of the neck and head is recommended. A cardiac echo could also be performed. 2. A right pleural effusion is present.  The above information was called to and discussed with Dr. Greer on 01/05/2018 at 2108 hours.  This report was finalized on 1/7/2018 7:45 PM by Dr. Dada Lewis MD.      Mri Angiogram Neck Without Contrast    Result Date: 1/7/2018  MRI EXAMINATION OF THE BRAIN WITHOUT CONTRAST, MRA OF THE HEAD WITHOUT CONTRAST AND MRA OF THE NECK WITHOUT CONTRAST  HISTORY: Stroke.  COMPARISON: CT head 01/04/2018. No prior MRI examination of the brain is available for comparison.  MRI EXAMINATION OF THE BRAIN WITHOUT CONTRAST: The diffusion sequence demonstrates an area of increased signal intensity involving the cortex of the right middle frontal gyrus anteriorly and superiorly measuring 8 mm in size suspicious for small acute infarct. A 1 mm area of subtle increased signal intensity is appreciated involving the corona radiata on the right on the diffusion sequence as well. There are multiple cortical and subcortical areas of restricted diffusion involving the left frontal parietal and occipital lobes suggesting a watershed distribution infarct. The largest area of infarction involves the left occipital lobe posteriorly measuring 18 mm in size. The next largest area of infarction involves the subcortical white matter of the left frontal lobe where an area of infarction measuring approximately 13 mm in size is present.  There is moderate atrophy. There is extensive small vessel ischemic disease. Small areas of signal loss are noted on the susceptibility weighted imaging involving the posterior occipital infarct suggesting small foci of blood products. There is no evidence of positive mass effect, hydrocephalus  or of midline shift. There is extensive small vessel ischemic disease.  MRA OF THE HEAD WITHOUT CONTRAST: There is signal present within the distal aspect of the vertebral arteries bilaterally. The vertebral arteries are codominant. The basilar artery and the left posterior cerebral artery appear unremarkable. There is signal present within the distal aspects of the internal carotid arteries bilaterally. There is a small focal protuberance involving the cavernous ICA laterally which may be secondary to a small aneurysm or infundibulum. This does not project into the subarachnoid space. The left A1 segment is mildly hypoplastic. The anterior communicating artery is complex suggesting a fenestrated right A1/A2 junction. No convincing aneurysm is identified. The study is hampered by patient motion but the proximal aspects of the anterior and middle cerebral arteries appeared unremarkable.  MRA OF THE NECK WITHOUT CONTRAST: There is a suggestion of a right pleural effusion. There is signal loss appreciated involving the left common carotid artery at its origin. The sensitivity of the study is reduced as intravenous contrast was not utilized. I could not exclude a severe stenosis at the origin of the left common carotid artery. There is irregularity involving the carotid bifurcations bilaterally with a focal area of signal loss appreciated involving the left internal carotid artery approximately 1.4 cm beyond its origin. I could not exclude a severe stenosis at this location, greater than 80% using NASCET criteria. There is a focal stenosis appreciated involving the external carotid artery on the right and signal loss appreciated involving the proximal aspect of the internal carotid artery on the right. There is a focal eccentric stenosis involving the proximal aspect of the right vertebral artery approximately 3 cm beyond its origin resulting in a severe (greater than 90% stenosis). There is mild irregularity but with  no evidence of focal high-grade stenosis of the left vertebral artery.      1. Multiple lacunar infarcts are appreciated involving the left cerebral hemisphere suggesting a watershed type distribution with infarcts involving the frontal, parietal and occipital lobes. There are questionable areas of restricted diffusion involving the right frontal lobe. If true this would suggest a proximal cardiac source. However, the MRA of the neck did demonstrate signal loss involving the proximal aspect of the left internal carotid artery where there is likely moderate-to-severe if not severe stenosis using NASCET criteria. There is signal loss at the origin of the left common carotid artery suggesting a severe stenosis. There is severe stenosis involving the right vertebral artery approximately 3 cm beyond the origin and likely moderate if not moderate-to-severe stenosis involving the right internal carotid artery. Further evaluation with a CT angiogram of the neck and head is recommended. A cardiac echo could also be performed. 2. A right pleural effusion is present.  The above information was called to and discussed with Dr. Greer on 01/05/2018 at 2108 hours.  This report was finalized on 1/7/2018 7:45 PM by Dr. Dada Lewis MD.      Mri Brain Without Contrast    Result Date: 1/7/2018  MRI EXAMINATION OF THE BRAIN WITHOUT CONTRAST, MRA OF THE HEAD WITHOUT CONTRAST AND MRA OF THE NECK WITHOUT CONTRAST  HISTORY: Stroke.  COMPARISON: CT head 01/04/2018. No prior MRI examination of the brain is available for comparison.  MRI EXAMINATION OF THE BRAIN WITHOUT CONTRAST: The diffusion sequence demonstrates an area of increased signal intensity involving the cortex of the right middle frontal gyrus anteriorly and superiorly measuring 8 mm in size suspicious for small acute infarct. A 1 mm area of subtle increased signal intensity is appreciated involving the corona radiata on the right on the diffusion sequence as well. There  are multiple cortical and subcortical areas of restricted diffusion involving the left frontal parietal and occipital lobes suggesting a watershed distribution infarct. The largest area of infarction involves the left occipital lobe posteriorly measuring 18 mm in size. The next largest area of infarction involves the subcortical white matter of the left frontal lobe where an area of infarction measuring approximately 13 mm in size is present.  There is moderate atrophy. There is extensive small vessel ischemic disease. Small areas of signal loss are noted on the susceptibility weighted imaging involving the posterior occipital infarct suggesting small foci of blood products. There is no evidence of positive mass effect, hydrocephalus or of midline shift. There is extensive small vessel ischemic disease.  MRA OF THE HEAD WITHOUT CONTRAST: There is signal present within the distal aspect of the vertebral arteries bilaterally. The vertebral arteries are codominant. The basilar artery and the left posterior cerebral artery appear unremarkable. There is signal present within the distal aspects of the internal carotid arteries bilaterally. There is a small focal protuberance involving the cavernous ICA laterally which may be secondary to a small aneurysm or infundibulum. This does not project into the subarachnoid space. The left A1 segment is mildly hypoplastic. The anterior communicating artery is complex suggesting a fenestrated right A1/A2 junction. No convincing aneurysm is identified. The study is hampered by patient motion but the proximal aspects of the anterior and middle cerebral arteries appeared unremarkable.  MRA OF THE NECK WITHOUT CONTRAST: There is a suggestion of a right pleural effusion. There is signal loss appreciated involving the left common carotid artery at its origin. The sensitivity of the study is reduced as intravenous contrast was not utilized. I could not exclude a severe stenosis at the  origin of the left common carotid artery. There is irregularity involving the carotid bifurcations bilaterally with a focal area of signal loss appreciated involving the left internal carotid artery approximately 1.4 cm beyond its origin. I could not exclude a severe stenosis at this location, greater than 80% using NASCET criteria. There is a focal stenosis appreciated involving the external carotid artery on the right and signal loss appreciated involving the proximal aspect of the internal carotid artery on the right. There is a focal eccentric stenosis involving the proximal aspect of the right vertebral artery approximately 3 cm beyond its origin resulting in a severe (greater than 90% stenosis). There is mild irregularity but with no evidence of focal high-grade stenosis of the left vertebral artery.      1. Multiple lacunar infarcts are appreciated involving the left cerebral hemisphere suggesting a watershed type distribution with infarcts involving the frontal, parietal and occipital lobes. There are questionable areas of restricted diffusion involving the right frontal lobe. If true this would suggest a proximal cardiac source. However, the MRA of the neck did demonstrate signal loss involving the proximal aspect of the left internal carotid artery where there is likely moderate-to-severe if not severe stenosis using NASCET criteria. There is signal loss at the origin of the left common carotid artery suggesting a severe stenosis. There is severe stenosis involving the right vertebral artery approximately 3 cm beyond the origin and likely moderate if not moderate-to-severe stenosis involving the right internal carotid artery. Further evaluation with a CT angiogram of the neck and head is recommended. A cardiac echo could also be performed. 2. A right pleural effusion is present.  The above information was called to and discussed with Dr. Greer on 01/05/2018 at 2108 hours.  This report was finalized on  1/7/2018 7:45 PM by Dr. Dada Lewis MD.      Xr Chest 1 View    Result Date: 1/12/2018  CLINICAL HISTORY: 77-year-old female with pneumonia and pleural effusions as well as history of colon cancer.  PORTABLE AP SEMIERECT CHEST DATED 01/12/2018 AT 0541 HOURS  FINDINGS: When compared to the AP and lateral erect chest radiographs of 01/07/2018 and CT chest multiplanar sections and AP  image of 01/08/2018, some haziness and strandy opacity at the lower left and right chest is compatible with some probable residual of the atelectasis or infiltrates and adjacent pleural fluid demonstrated at the lower right and left lung and pleural space on the prior exams. There is only trace blunting of the costophrenic angles accompanied by some modest thickening of lateral pleural stripes at the costophrenic angles. Cardiac silhouette is upper normal caliber. Aortic uncoiling and calcification are again demonstrated. Degenerative changes in the spine and shoulders are again demonstrated. No pneumothorax or acute change in bony thorax is seen. Oxygen cannula tubing and monitoring lead wires are present.  CONCLUSION: Persistent hazy to patchy opacity in the lower pleural spaces and lower lung zones compatible with previously demonstrated pleural effusions and adjacent airspace opacities.  This report was finalized on 1/12/2018 8:18 AM by Dr. Bandar Solomon MD.      Xr Chest 1 View    Result Date: 1/4/2018  XR CHEST 1 VW-  HISTORY: Female who is 77 years-old,  short of breath, cough  TECHNIQUE: Frontal view of the chest  COMPARISON: 01/01/2018  FINDINGS: Heart size is normal. Aorta is calcified. Old granulomatous disease is seen.. Opacity has developed at the right base suggesting atelectasis/infiltrate and pleural effusion, follow-up recommended No pneumothorax. No acute osseous process.      Opacity has developed at the right base suggesting atelectasis/infiltrate and pleural effusion, follow-up recommended.   This report was  finalized on 1/4/2018 12:15 PM by Dr. Carlos Manuel Mary MD.      Xr Chest Pa & Lateral    Result Date: 1/7/2018  2 VIEWS CHEST  HISTORY:  Pneumonia, follow-up.  COMPARISON: Chest x-ray 01/04/2018.  FINDINGS: The heart is within normal limits in size. There is bibasilar atelectasis/infiltrate more prominent on the right. The lateral projection demonstrates a moderate pleural effusion on the right. Given differences in technique, this appears similar to minimally more prominent as compared to the examination of 01/04/2018. The diaphragm is flattened and AP diameter increased consistent with COPD.  This report was finalized on 1/7/2018 8:04 PM by Dr. Dada Lewis MD.      Fl Video Swallow    Result Date: 1/9/2018  BARIUM SWALLOW WITH VIDEO  CLINICAL HISTORY:   Female who is 77 years-old, with dysphagia.  TECHNIQUE: The swallowing mechanism was evaluated with real-time fluoroscopic imaging, captured on digital disk and performed in conjunction with speech pathologist (please see report).  FINDINGS: Pt presents with mild oropharyngeal dysphagia. No penetration/aspiration with nectar, adequate swallow initiation. Spillage to pyriforms intermittently with thins. Pt with mild pharyngeal residue post swallow, pt did not clear without cues. Pt coughing consistently after trials of thins when fluoro not on. Spillage to valleculae with puree without penetration/aspiration. Mild diffuse pharyngeal residue post swallow. Spillage to pyriforms with mechanical soft without observed penetration. Mild BOT residue post swallow with regular.      Mild risk of aspiration.  FLUOROSCOPY TIME: 4 minutes 27 seconds, 3 images  This report was finalized on 1/9/2018 3:46 PM by Dr. Panfilo Hood MD.      Results for orders placed during the hospital encounter of 01/01/18   Adult transthoracic echo complete    Narrative · Left ventricular systolic function is hyperdynamic (EF > 70).  · calcification of the aortic valve  · Left ventricular wall  thickness is consistent with mild concentric   hypertrophy.  · Mild tricuspid valve regurgitation is present.  · Mild mitral valve regurgitation is present  · Estimated right ventricular systolic pressure from tricuspid   regurgitation is moderately elevated (45-55 mmHg). Mild to moderate   pulmonary hypertension is present.              Active Hospital Problems (** Indicates Principal Problem)    Diagnosis Date Noted   • **E-coli UTI w/ bacteremia [N39.0, B96.20] 01/12/2018   • Aspiration pneumonia [J69.0] 01/12/2018   • 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 [I63.9] 01/12/2018   • Metabolic encephalopathy present on admit [G93.41] 01/12/2018   • Tobacco abuse [Z72.0] 01/12/2018   • Bilateral pleural effusions [J90] 01/12/2018   • Oropharyngeal dysphagia [R13.12] 01/12/2018   • Closed nondisplaced fracture of head of right radius [S52.124A] 01/12/2018   • DNR (do not resuscitate) [Z66] 01/03/2018   • Sepsis [A41.9] 01/02/2018   • Non-traumatic rhabdomyolysis [M62.82] 01/01/2018   • Generalized weakness [R53.1] 01/01/2018   • Fall [W19.XXXA] 01/01/2018   • Stage 3 chronic kidney disease [N18.3] 01/01/2018   • Type 2 diabetes mellitus [E11.9] 01/01/2018   • HTN (hypertension) [I10] 01/01/2018      Resolved Hospital Problems    Diagnosis Date Noted Date Resolved   No resolved problems to display.         Assessment/Plan     1. Pneumonia Completed antibiotic course  2. Bilateral pleural effusions right greater than left moderate to small in size  3. Acute CVA  4. Severe carotid stenosis left plan for revascularization noted  5. Right radial head fracture  6. Escherichia coli UTI and bacteremia  7. Diabetes mellitus type 2  8. Toxic metabolic encephalopathy  9. Oral pharyngeal dysphagia mild she has sort of a wet voice clears her throat a lot and has set throat clearing cough he is to be cautious with food    Plan for disposition:    Sai Griffin MD  01/14/18  8:32 PM    Time:

## 2018-01-15 NOTE — ANESTHESIA PREPROCEDURE EVALUATION
Anesthesia Evaluation     Patient summary reviewed and Nursing notes reviewed          Airway   Mallampati: II  no difficulty expected  Dental    (+) upper dentures    Pulmonary    (+) a smoker Current, COPD,   Cardiovascular - negative cardio ROS    ECG reviewed  Rhythm: regular  Rate: normal        Neuro/Psych- negative ROS  GI/Hepatic/Renal/Endo    (+)  renal disease ESRD, diabetes mellitus type 2,     Musculoskeletal (-) negative ROS    Abdominal    Substance History - negative use     OB/GYN negative ob/gyn ROS         Other                                                Anesthesia Plan    ASA 4     general   (Multiple major medical problems increasing laura operative risk  right elbow fracture  COPD  Art line  )  intravenous induction   Anesthetic plan and risks discussed with patient.

## 2018-01-15 NOTE — SIGNIFICANT NOTE
01/15/18 0820   Rehab Treatment   Discipline physical therapist   Treatment Not Performed other (see comments)  (Pt leaving floor with transport; going for surgery today. Will follow up tomorrow. )   Recommendation   PT - Next Appointment 01/16/18

## 2018-01-15 NOTE — SIGNIFICANT NOTE
01/15/18 0924   Rehab Treatment   Discipline occupational therapist   Treatment Not Performed patient unavailable for treatment  (off floor all day today for L carotid endarectomy. Per maurilio Freeman pt will be going to another floor after surgery. pt was on 5N. 0830)   Recommendation   OT - Next Appointment 01/16/18

## 2018-01-15 NOTE — ANESTHESIA PROCEDURE NOTES
Arterial Line    Patient location during procedure: holding area  Start time: 1/15/2018 9:15 AM  Stop Time:1/15/2018 9:22 AM       Line placed for hemodynamic monitoring, ABGs/Labs/ISTAT and MD/Surgeon request.  Performed By   Anesthesiologist: ISHAAN GASPAR  Preanesthetic Checklist  Completed: patient identified, surgical consent, pre-op evaluation, timeout performed, IV checked, risks and benefits discussed and monitors and equipment checked  Arterial Line Prep   Sterile Tech: cap, gloves, gown, mask and sterile barriers  Prep: ChloraPrep  Patient monitoring: blood pressure monitoring, continuous pulse oximetry and EKG  Arterial Line Procedure   Laterality:left  Location:  radial artery  Catheter size: 20 G   Guidance: ultrasound guided  PROCEDURE NOTE/ULTRASOUND INTERPRETATION.  Using ultrasound guidance the potential vascular sites for insertion of the catheter were visualized to determine the patency of the vessel to be used for vascular access.  After selecting the appropriate site for insertion, the needle was visualized under ultrasound being inserted into the radial artery, followed by ultrasound confirmation of wire and catheter placement. There were no abnormalities seen on ultrasound; an image was taken; and the patient tolerated the procedure with no complications.   Number of attempts: 1  Successful placement: yes          Post Assessment   Dressing Type: occlusive dressing applied, secured with tape and wrist guard applied.   Complications no  Circ/Move/Sens Assessment: normal.   Patient Tolerance: patient tolerated the procedure well with no apparent complications

## 2018-01-16 LAB
ABO + RH BLD: NORMAL
ABO + RH BLD: NORMAL
ACT BLD: 131 SECONDS (ref 82–152)
ACT BLD: 136 SECONDS (ref 82–152)
ACT BLD: 208 SECONDS (ref 82–152)
ACT BLD: 213 SECONDS (ref 82–152)
ACT BLD: 219 SECONDS (ref 82–152)
ACT BLD: 219 SECONDS (ref 82–152)
ACT BLD: 263 SECONDS (ref 82–152)
ANION GAP SERPL CALCULATED.3IONS-SCNC: 11.8 MMOL/L
BASOPHILS # BLD AUTO: 0.05 10*3/MM3 (ref 0–0.2)
BASOPHILS NFR BLD AUTO: 0.4 % (ref 0–1.5)
BH BB BLOOD EXPIRATION DATE: NORMAL
BH BB BLOOD EXPIRATION DATE: NORMAL
BH BB BLOOD TYPE BARCODE: 5100
BH BB BLOOD TYPE BARCODE: 5100
BH BB DISPENSE STATUS: NORMAL
BH BB DISPENSE STATUS: NORMAL
BH BB PRODUCT CODE: NORMAL
BH BB PRODUCT CODE: NORMAL
BH BB UNIT NUMBER: NORMAL
BH BB UNIT NUMBER: NORMAL
BUN BLD-MCNC: 27 MG/DL (ref 8–23)
BUN/CREAT SERPL: 17.4 (ref 7–25)
CALCIUM SPEC-SCNC: 8.4 MG/DL (ref 8.6–10.5)
CHLORIDE SERPL-SCNC: 105 MMOL/L (ref 98–107)
CO2 SERPL-SCNC: 23.2 MMOL/L (ref 22–29)
CREAT BLD-MCNC: 1.55 MG/DL (ref 0.57–1)
CROSSMATCH INTERPRETATION: NORMAL
CROSSMATCH INTERPRETATION: NORMAL
DEPRECATED RDW RBC AUTO: 56.5 FL (ref 37–54)
EOSINOPHIL # BLD AUTO: 0.27 10*3/MM3 (ref 0–0.7)
EOSINOPHIL NFR BLD AUTO: 2.1 % (ref 0.3–6.2)
ERYTHROCYTE [DISTWIDTH] IN BLOOD BY AUTOMATED COUNT: 16.2 % (ref 11.7–13)
GFR SERPL CREATININE-BSD FRML MDRD: 32 ML/MIN/1.73
GLUCOSE BLD-MCNC: 129 MG/DL (ref 65–99)
GLUCOSE BLDC GLUCOMTR-MCNC: 140 MG/DL (ref 70–130)
GLUCOSE BLDC GLUCOMTR-MCNC: 232 MG/DL (ref 70–130)
GLUCOSE BLDC GLUCOMTR-MCNC: 235 MG/DL (ref 70–130)
HCT VFR BLD AUTO: 23.6 % (ref 35.6–45.5)
HGB BLD-MCNC: 7.3 G/DL (ref 11.9–15.5)
IMM GRANULOCYTES # BLD: 0.12 10*3/MM3 (ref 0–0.03)
IMM GRANULOCYTES NFR BLD: 0.9 % (ref 0–0.5)
LYMPHOCYTES # BLD AUTO: 1.88 10*3/MM3 (ref 0.9–4.8)
LYMPHOCYTES NFR BLD AUTO: 14.5 % (ref 19.6–45.3)
MCH RBC QN AUTO: 29.6 PG (ref 26.9–32)
MCHC RBC AUTO-ENTMCNC: 30.9 G/DL (ref 32.4–36.3)
MCV RBC AUTO: 95.5 FL (ref 80.5–98.2)
MONOCYTES # BLD AUTO: 1.38 10*3/MM3 (ref 0.2–1.2)
MONOCYTES NFR BLD AUTO: 10.7 % (ref 5–12)
NEUTROPHILS # BLD AUTO: 9.25 10*3/MM3 (ref 1.9–8.1)
NEUTROPHILS NFR BLD AUTO: 71.4 % (ref 42.7–76)
NRBC BLD MANUAL-RTO: 0 /100 WBC (ref 0–0)
PLATELET # BLD AUTO: 297 10*3/MM3 (ref 140–500)
PMV BLD AUTO: 9.9 FL (ref 6–12)
POTASSIUM BLD-SCNC: 4.4 MMOL/L (ref 3.5–5.2)
RBC # BLD AUTO: 2.47 10*6/MM3 (ref 3.9–5.2)
SODIUM BLD-SCNC: 140 MMOL/L (ref 136–145)
UNIT  ABO: NORMAL
UNIT  ABO: NORMAL
UNIT  RH: NORMAL
UNIT  RH: NORMAL
WBC NRBC COR # BLD: 12.95 10*3/MM3 (ref 4.5–10.7)

## 2018-01-16 PROCEDURE — 25010000002 CEFAZOLIN PER 500 MG: Performed by: SURGERY

## 2018-01-16 PROCEDURE — 25010000002 HEPARIN (PORCINE) PER 1000 UNITS: Performed by: SURGERY

## 2018-01-16 PROCEDURE — 94799 UNLISTED PULMONARY SVC/PX: CPT

## 2018-01-16 PROCEDURE — 92610 EVALUATE SWALLOWING FUNCTION: CPT

## 2018-01-16 PROCEDURE — 86900 BLOOD TYPING SEROLOGIC ABO: CPT

## 2018-01-16 PROCEDURE — 97164 PT RE-EVAL EST PLAN CARE: CPT

## 2018-01-16 PROCEDURE — 25010000002 PIPERACILLIN SOD-TAZOBACTAM PER 1 G: Performed by: INTERNAL MEDICINE

## 2018-01-16 PROCEDURE — 63710000001 INSULIN ASPART PER 5 UNITS: Performed by: INTERNAL MEDICINE

## 2018-01-16 PROCEDURE — 86901 BLOOD TYPING SEROLOGIC RH(D): CPT

## 2018-01-16 PROCEDURE — 80048 BASIC METABOLIC PNL TOTAL CA: CPT | Performed by: SURGERY

## 2018-01-16 PROCEDURE — 36430 TRANSFUSION BLD/BLD COMPNT: CPT

## 2018-01-16 PROCEDURE — 97110 THERAPEUTIC EXERCISES: CPT

## 2018-01-16 PROCEDURE — P9016 RBC LEUKOCYTES REDUCED: HCPCS

## 2018-01-16 PROCEDURE — 85025 COMPLETE CBC W/AUTO DIFF WBC: CPT | Performed by: SURGERY

## 2018-01-16 PROCEDURE — 82962 GLUCOSE BLOOD TEST: CPT

## 2018-01-16 RX ORDER — METOPROLOL SUCCINATE 50 MG/1
50 TABLET, EXTENDED RELEASE ORAL
Status: DISCONTINUED | OUTPATIENT
Start: 2018-01-16 | End: 2018-01-16

## 2018-01-16 RX ADMIN — CLEVIPIDINE 6 MG/HR: 0.5 EMULSION INTRAVENOUS at 09:11

## 2018-01-16 RX ADMIN — INSULIN ASPART 4 UNITS: 100 INJECTION, SOLUTION INTRAVENOUS; SUBCUTANEOUS at 17:19

## 2018-01-16 RX ADMIN — TAZOBACTAM SODIUM AND PIPERACILLIN SODIUM 3.38 G: 375; 3 INJECTION, SOLUTION INTRAVENOUS at 09:11

## 2018-01-16 RX ADMIN — HEPARIN SODIUM 5000 UNITS: 5000 INJECTION, SOLUTION INTRAVENOUS; SUBCUTANEOUS at 20:56

## 2018-01-16 RX ADMIN — METOPROLOL TARTRATE 5 MG: 5 INJECTION, SOLUTION INTRAVENOUS at 23:14

## 2018-01-16 RX ADMIN — ATORVASTATIN CALCIUM 80 MG: 80 TABLET, FILM COATED ORAL at 20:56

## 2018-01-16 RX ADMIN — TAZOBACTAM SODIUM AND PIPERACILLIN SODIUM 3.38 G: 375; 3 INJECTION, SOLUTION INTRAVENOUS at 02:37

## 2018-01-16 RX ADMIN — ASPIRIN 325 MG: 325 TABLET ORAL at 16:02

## 2018-01-16 RX ADMIN — CLEVIPIDINE 6 MG/HR: 0.5 EMULSION INTRAVENOUS at 18:33

## 2018-01-16 RX ADMIN — AMLODIPINE BESYLATE 5 MG: 5 TABLET ORAL at 16:02

## 2018-01-16 RX ADMIN — SODIUM CHLORIDE 50 ML/HR: 9 INJECTION, SOLUTION INTRAVENOUS at 09:12

## 2018-01-16 RX ADMIN — CLEVIPIDINE 4 MG/HR: 0.5 EMULSION INTRAVENOUS at 03:51

## 2018-01-16 RX ADMIN — IPRATROPIUM BROMIDE AND ALBUTEROL SULFATE 3 ML: .5; 3 SOLUTION RESPIRATORY (INHALATION) at 22:00

## 2018-01-16 RX ADMIN — IPRATROPIUM BROMIDE AND ALBUTEROL SULFATE 3 ML: .5; 3 SOLUTION RESPIRATORY (INHALATION) at 12:17

## 2018-01-16 RX ADMIN — IPRATROPIUM BROMIDE AND ALBUTEROL SULFATE 3 ML: .5; 3 SOLUTION RESPIRATORY (INHALATION) at 16:08

## 2018-01-16 RX ADMIN — IPRATROPIUM BROMIDE AND ALBUTEROL SULFATE 3 ML: .5; 3 SOLUTION RESPIRATORY (INHALATION) at 08:10

## 2018-01-16 RX ADMIN — METOPROLOL TARTRATE 5 MG: 5 INJECTION, SOLUTION INTRAVENOUS at 10:33

## 2018-01-16 RX ADMIN — CLEVIPIDINE 5 MG/HR: 0.5 EMULSION INTRAVENOUS at 21:02

## 2018-01-16 RX ADMIN — METOPROLOL TARTRATE 5 MG: 5 INJECTION, SOLUTION INTRAVENOUS at 17:19

## 2018-01-16 RX ADMIN — LISINOPRIL 20 MG: 20 TABLET ORAL at 16:03

## 2018-01-16 RX ADMIN — CLEVIPIDINE 5 MG/HR: 0.5 EMULSION INTRAVENOUS at 14:23

## 2018-01-16 RX ADMIN — INSULIN ASPART 4 UNITS: 100 INJECTION, SOLUTION INTRAVENOUS; SUBCUTANEOUS at 21:13

## 2018-01-16 RX ADMIN — CEFAZOLIN SODIUM 2 G: 10 INJECTION, POWDER, FOR SOLUTION INTRAVENOUS at 06:31

## 2018-01-16 NOTE — PROGRESS NOTES
Name: Sakshi Garcia ADMIT: 2018   : 1940  PCP: Provider Not In System    MRN: [id number] LOS: 15 days   AGE/SEX: 77 y.o. female  ROOM: [facility]/     Active Hospital Problems (** Indicates Principal Problem)    Diagnosis Date Noted   • **E-coli UTI w/ bacteremia [N39.0, B96.20] 2018   • Aspiration pneumonia [J69.0] 2018   • 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 [I63.9] 2018   • Metabolic encephalopathy present on admit [G93.41] 2018   • Tobacco abuse [Z72.0] 2018   • Bilateral pleural effusions [J90] 2018   • Oropharyngeal dysphagia [R13.12] 2018   • Closed nondisplaced fracture of head of right radius [S52.124A] 2018   • DNR (do not resuscitate) [Z66] 2018   • Sepsis [A41.9] 2018   • Non-traumatic rhabdomyolysis [M62.82] 2018   • Generalized weakness [R53.1] 2018   • Fall [W19.XXXA] 2018   • Stage 3 chronic kidney disease [N18.3] 2018   • Type 2 diabetes mellitus [E11.9] 2018   • HTN (hypertension) [I10] 2018      Resolved Hospital Problems    Diagnosis Date Noted Date Resolved   No resolved problems to display.     Subjective     77 y.o. female POD #1 s/p left carotid endarterectomy with saphenous vein patch  Long procedure due to difficult anatomy.  She is complaining of a sore throat. Had some nausea last night but this has improved now.   No new neuro symptoms  Review of Systems  As above  Objective     Vital Signs  Temp:  [97.3 °F (36.3 °C)-99.4 °F (37.4 °C)] 98.6 °F (37 °C)  Heart Rate:  [72-85] 81  Resp:  [14-20] 18  BP: ()/(45-95) 159/54  Arterial Line BP: (116-185)/(19-65) 145/43    Physical Exam   Constitutional: She appears well-developed and well-nourished.   HENT:   Head: Normocephalic and atraumatic.   Neck: Normal range of motion. No tracheal deviation present.   Left neck incision is clean, soft, no hematoma.      Tongue is midline   Cardiovascular: Normal rate and regular  rhythm.    Pulmonary/Chest: Effort normal. No respiratory distress.   Abdominal: Soft. There is no tenderness.   Musculoskeletal: Normal range of motion. She exhibits no deformity.   Neurological: She is alert.   Skin: Skin is warm and dry.   Neuro: unchanged RUE and RLE weakness  Tongue is midline  Left leg incision is clean.     Results Review:       I reviewed the patient's new clinical results.    Results from last 7 days  Lab Units 01/16/18  0541 01/15/18  1550 01/14/18  1731 01/12/18  0541 01/11/18  0822 01/10/18  0629   WBC 10*3/mm3 12.95*  --  12.30* 14.16* 16.74* 13.88*   HEMOGLOBIN g/dL 7.3* 8.1* 7.6* 8.2* 8.5* 8.7*   PLATELETS 10*3/mm3 297  --  384 343 340 316     Results from last 7 days  Lab Units 01/16/18  0541 01/14/18  1731 01/11/18  0455 01/10/18  0629   SODIUM mmol/L 140 140 141 145   POTASSIUM mmol/L 4.4 4.5 4.9 4.3   CHLORIDE mmol/L 105 100 105 107   CO2 mmol/L 23.2 26.5 26.4 27.7   BUN mg/dL 27* 25* 21 18   CREATININE mg/dL 1.55* 1.46* 1.21* 1.15*   GLUCOSE mg/dL 129* 193* 160* 138*   Estimated Creatinine Clearance: 27.1 mL/min (by C-G formula based on Cr of 1.55).  Results from last 7 days  Lab Units 01/16/18  0541 01/14/18  1731 01/11/18  0455 01/10/18  0629   CALCIUM mg/dL 8.4* 9.3 8.6 8.7       Assessment/Plan           Active Hospital Problems (** Indicates Principal Problem)    Diagnosis Date Noted   • **E-coli UTI w/ bacteremia [N39.0, B96.20] 01/12/2018   • Aspiration pneumonia [J69.0] 01/12/2018   • 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 [I63.9] 01/12/2018   • Metabolic encephalopathy present on admit [G93.41] 01/12/2018   • Tobacco abuse [Z72.0] 01/12/2018   • Bilateral pleural effusions [J90] 01/12/2018   • Oropharyngeal dysphagia [R13.12] 01/12/2018   • Closed nondisplaced fracture of head of right radius [S52.124A] 01/12/2018   • DNR (do not resuscitate) [Z66] 01/03/2018   • Sepsis [A41.9] 01/02/2018   • Non-traumatic rhabdomyolysis [M62.82] 01/01/2018   •  Generalized weakness [R53.1] 01/01/2018   • Fall [W19.XXXA] 01/01/2018   • Stage 3 chronic kidney disease [N18.3] 01/01/2018   • Type 2 diabetes mellitus [E11.9] 01/01/2018   • HTN (hypertension) [I10] 01/01/2018      Resolved Hospital Problems    Diagnosis Date Noted Date Resolved   No resolved problems to display.        Assessment & Plan  77 y.o. female s/p carotid endarterectomy   Remains on Cardene for hypertension - wean today and add oral BP meds. Increase metoprolol XL to 50 mg.   RN reports coughing with liquids- will have speech see again  Aspirin for carotid stenosis. DC Plavix.   Postop anemia with a history of CAD and a small apical infarct, will transfuse  Decrease IV fluids      Ozzie Kelsey MD  01/16/18   8:23 AM  Office Number [phone]

## 2018-01-16 NOTE — THERAPY RE-EVALUATION
Acute Care - Physical Therapy Re-Evaluation  [facility]     Patient Name: Sakshi Garcia  : 1940  MRN: [id number]  Today's Date: 2018   Onset of Illness/Injury or Date of Surgery Date: 01/15/18  Date of Referral to PT: 18  Referring Physician: Diony      Admit Date: 2018     Visit Dx:    ICD-10-CM ICD-9-CM   1. Traumatic rhabdomyolysis, initial encounter T79.6XXA 958.6   2. Fall, initial encounter W19.XXXA E888.9   3. Renal insufficiency N28.9 593.9   4. Dizziness R42 780.4   5. Unsteadiness on feet R26.81 781.2     Patient Active Problem List   Diagnosis   • Non-traumatic rhabdomyolysis   • Generalized weakness   • Fall   • Acute cystitis without hematuria   • Stage 3 chronic kidney disease   • Type 2 diabetes mellitus   • HTN (hypertension)   • Altered mental status   • Sepsis   • Bacteremia   • DNR (do not resuscitate)   • Aspiration pneumonia   • 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   • E-coli UTI w/ bacteremia   • Metabolic encephalopathy present on admit   • Tobacco abuse   • Bilateral pleural effusions   • Oropharyngeal dysphagia   • Closed nondisplaced fracture of head of right radius     Past Medical History:   Diagnosis Date   • Colon cancer    • Hypertension      Past Surgical History:   Procedure Laterality Date   • CAROTID ENDARTERECTOMY Left 1/15/2018    Procedure: LEFT CAROTID ENDARTERECTOMY WITH SAPHENOUS VEIN HARVEST;  Surgeon: Ozzie Kelsey MD;  Location: [facility];  Service:    • CHOLECYSTECTOMY     • COLON SURGERY     • COLONOSCOPY     • HERNIA REPAIR            PT ASSESSMENT (last 72 hours)      PT Evaluation       18 1430 18 1300    Rehab Evaluation    Document Type re-evaluation   refer to initial eval  for pertinent medical/social hx  -EE evaluation  -SH    Subjective Information agree to therapy;complains of;weakness;fatigue;pain  -EE agree to therapy  -SH    Patient Effort, Rehab Treatment adequate  -EE adequate   -SH    Symptoms Noted During/After Treatment fatigue;increased pain  -EE fatigue  -SH    Symptoms Noted Comment Pt required max encouragement from therapist and daughter to participate w/PT  -EE     General Information    Onset of Illness/Injury or Date of Surgery Date 01/15/18  -EE     Referring Physician Diony  -EE     General Observations Pt supine in bed in no acute distress, daughter present at bedside. Incision noted along L side of neck and L LE. Sling applied R UE. A line in L UE with protective foam splint.  -EE     Pertinent History Of Current Problem s/p L carotid endarterectomy  -EE     Precautions/Limitations fall precautions;oxygen therapy device and L/min;non-weight bearing status   NWB R UE, a line L UE w/protective splint  -EE     Plans/Goals Discussed With patient and family;agreed upon  -EE     Barriers to Rehab medically complex  -EE     Clinical Impression    Patient/Family Goals Statement Go home  -EE     Criteria for Skilled Therapeutic Interventions Met yes;treatment indicated  -EE     Pathology/Pathophysiology Noted (Describe Specifically for Each System) musculoskeletal  -EE     Impairments Found (describe specific impairments) gait, locomotion, and balance  -EE     Rehab Potential good, to achieve stated therapy goals  -EE     Pain Assessment    Pain Assessment 0-10  -EE No/denies pain  -SH    Gibson-Collins FACES Pain Rating 6  -EE     Pain Type Surgical pain  -EE     Pain Location Incision   L side of neck and L LE  -EE     Pain Intervention(s) Repositioned;Medication (See MAR)  -EE     Response to Interventions tolerated  -EE     Cognitive Assessment/Intervention    Current Cognitive/Communication Assessment impaired  -EE     Orientation Status oriented to;person;place  -EE     Follows Commands/Answers Questions 75% of the time;able to follow single-step instructions;needs cueing;needs increased time;needs repetition  -EE     Personal Safety moderate impairment;at risk behaviors  demonstrated;decreased awareness, need for assist;decreased awareness, need for safety  -EE     Personal Safety Interventions fall prevention program maintained;muscle strengthening facilitated;nonskid shoes/slippers when out of bed;supervised activity  -EE     ROM (Range of Motion)    General ROM other (see comments)  -EE     General ROM Detail B LEs grossly WFL; UEs deferred as R UE in sling and L UE a-line  -EE     MMT (Manual Muscle Testing)    General MMT Assessment lower extremity strength deficits identified  -EE     General MMT Assessment Detail B LEs grossly 3+/5  -EE     Mobility Assessment/Training    Extremity Weight-Bearing Status right upper extremity  -EE     Right Upper Extremity Weight-Bearing non weight-bearing  -EE     Bed Mobility, Assessment/Treatment    Bed Mobility, Assistive Device head of bed elevated;draw sheet  -EE     Bed Mob, Supine to Sit, [place] maximum assist (25% patient effort);2 person assist required;verbal cues required  -EE     Bed Mob, Sit to Supine, [place] maximum assist (25% patient effort);1 person + 1 person to manage equipment;verbal cues required  -EE     Bed Mobility, Safety Issues decreased use of arms for pushing/pulling;decreased use of legs for bridging/pushing  -EE     Bed Mobility, Impairments strength decreased;impaired balance;pain  -EE     Transfer Assessment/Treatment    Transfers, Sit-Stand [place] not tested  -EE     Transfer, Comment Pt reporting increased pain and fatigue while sitting EOB, awaiting 2 units of blood. Transfers deferred today; will plan to attempt tomorrow.   -EE     Motor Skills/Interventions    Additional Documentation Balance Skills Training (Group)  -EE     Balance Skills Training    Sitting-Level of Assistance Contact guard;Minimum assistance   impulsive, leans forward at EOB w/poor safety awareness  -EE     Sitting-Balance Support Feet supported  -EE     Sitting-Balance Activities Trunk control activities  -EE      Sitting # of Minutes 8  -EE     Therapy Exercises    Bilateral Lower Extremities AROM:;5 reps;ankle pumps/circles;LAQ  -EE     Positioning and Restraints    Pre-Treatment Position in bed  -EE     Post Treatment Position bed  -EE     In Bed fowlers;call light within reach;encouraged to call for assist;exit alarm on;with family/caregiver;notified nsg;SCD pump applied;RUE elevated;LUE elevated   SCD pump applied R LE  -EE       01/15/18 1645 01/15/18 1505    Muscle Tone Assessment    RUE Muscle Tone Assessment severely decreased tone  -BC severely decreased tone  -BC      User Key  (r) = Recorded By, (t) = Taken By, (c) = Cosigned By    Initials Name Provider Type    BC Shelly Bailey, RN Registered Nurse    CAROL Marcial, PT Physical Therapist    SH Lisa Mcadams MS CCC-SLP Speech and Language Pathologist          Physical Therapy Education     Title: PT OT SLP Therapies (Active)     Topic: Physical Therapy (Active)     Point: Mobility training (Active)    Learning Progress Summary    Learner Readiness Method Response Comment Documented by Status   Patient Acceptance E,TB NR  EE 01/16/18 1453 Active    Acceptance E,D NR  EJ 01/13/18 1116 Active    Acceptance E,TB,D VU,NR  CH 01/12/18 1418 Done    Acceptance E,TB,D NR  SH 01/11/18 1423 Active    Acceptance E,TB,D NR  RW 01/10/18 1538 Active    Acceptance E,TB,D VU,NR  RW 01/09/18 0932 Done    Acceptance E,TB,D VU,NR  RW 01/09/18 0923 Done    Acceptance E,TB,D NR  RW 01/08/18 1334 Active    Acceptance E,D NR  PC 01/06/18 1721 Active    Acceptance E,D NR,NL  JM 01/04/18 1702 Active    Acceptance E VU,NR  AA 01/02/18 1359 Done               Point: Home exercise program (Active)    Learning Progress Summary    Learner Readiness Method Response Comment Documented by Status   Patient Acceptance E,TB NR  EE 01/16/18 1453 Active    Acceptance E,TB,D VU,NR  CH 01/12/18 1418 Done    Acceptance E,TB,D NR  RW 01/10/18 1538 Active    Acceptance E,TB,D VU,NR  RW 01/09/18 0932 Done     Acceptance E,TB,D VU,NR   01/09/18 0923 Done    Acceptance E,TB,D NR   01/08/18 1334 Active    Acceptance E,D NR   01/06/18 1721 Active    Acceptance E,D NR,NL   01/04/18 1702 Active    Acceptance E VU,NR   01/02/18 1359 Done               Point: Body mechanics (Active)    Learning Progress Summary    Learner Readiness Method Response Comment Documented by Status   Patient Acceptance E,TB NR   01/16/18 1453 Active    Acceptance E,TB,D VU,NR   01/12/18 1418 Done    Acceptance E,TB,D NR   01/11/18 1423 Active    Acceptance E,TB,D NR   01/10/18 1538 Active    Acceptance E,TB,D VU,NR   01/09/18 0932 Done    Acceptance E,TB,D VU,NR   01/09/18 0923 Done    Acceptance E,TB,D NR   01/08/18 1334 Active    Acceptance E,D NR   01/06/18 1721 Active    Acceptance E,D NR,NL   01/04/18 1702 Active    Acceptance E VU,NR   01/02/18 1359 Done               Point: Precautions (Active)    Learning Progress Summary    Learner Readiness Method Response Comment Documented by Status   Patient Acceptance E,TB NR   01/16/18 1453 Active    Acceptance E,TB,D VU,NR   01/12/18 1418 Done    Acceptance E,TB,D NR   01/11/18 1423 Active    Acceptance E,TB,D NR   01/10/18 1538 Active    Acceptance E,TB,D VU,NR   01/09/18 0932 Done    Acceptance E,TB,D VU,NR   01/09/18 0923 Done    Acceptance E,TB,D NR   01/08/18 1334 Active    Acceptance E,D NR   01/06/18 1721 Active    Acceptance E,D NR,NL   01/04/18 1702 Active    Acceptance E VU,NR   01/02/18 1359 Done                      User Key     Initials Effective Dates Name Provider Type Discipline     12/01/15 -  Tiffany Lilly, PT Physical Therapist PT    PC 12/01/15 -  Mellissa Mendez, PT Physical Therapist PT     12/01/15 -  Kassy Marcial, PT Physical Therapist PT     02/18/16 -  Amarilis Johnson, PTA Physical Therapy Assistant PT    EJ 04/21/17 -  Anastasiya Chase, PT Physical Therapist PT    RW 04/06/16 -  Lilliana Carroll, PTA Physical  Therapy Assistant PT    AA 09/05/17 -  Paula Holley, PT Physical Therapist PT    SH 01/08/18 -  Lisa Peralta, PT Student PT Student PT                PT Recommendation and Plan  Anticipated Equipment Needs At Discharge: front wheeled walker  Anticipated Discharge Disposition: skilled nursing facility  Planned Therapy Interventions: balance training, bed mobility training, gait training, home exercise program, patient/family education, strengthening, transfer training  PT Frequency: daily  Plan of Care Review  Plan Of Care Reviewed With: patient  Outcome Summary/Follow up Plan: Pt seen for re-eval; s/p L carotid endarterectomy. Upon exam, pt demonstrates pain, generalized weakness, impaired balance, decreased endurance, and decreased safety awareness limiting moblity. Pt also NWB R UE due to radial fx. Pt currently requiring assist of two with bed mobility; unable to attempt transfers today due to pain and fatigue. Pt would benefit from continued PT to address impairments and increase independence with mobility. Recommend SNF for subacute rehab given current functional level.           IP PT Goals       01/16/18 1454 01/12/18 1418 01/06/18 1721    Bed Mobility PT LTG    Bed Mobility PT LTG, Time to Achieve 1 wk  -EE 1 wk  -CH     Bed Mobility PT LTG, Activity Type  all bed mobility  -CH     Bed Mobility PT LTG, [place] Level  minimum assist (75% patient effort)  -CH     Bed Mobility PT LTG, Date Goal Reviewed 01/16/18  -EE      Bed Mobility PT LTG, Outcome goal ongoing  -EE goal ongoing  -CH goal ongoing  -PC    Bed Mobility PT LTG, Reason Goal Not Met  progress slower than expected  -CH     Transfer Training PT LTG    Transfer Training PT LTG, Time to Achieve 1 wk  -EE 1 wk  -CH 1 wk  -PC    Transfer Training PT LTG, Activity Type  all transfers  -CH sit to stand/stand to sit;bed to chair /chair to bed  -PC    Transfer Training PT LTG, [place] Level  minimum assist (75% patient effort);2 person  assist required  -CH moderate assist (50% patient effort)  -PC    Transfer Training PT  LTG, Date Goal Reviewed 01/16/18  -EE      Transfer Training PT LTG, Outcome goal ongoing  -EE goal revised  -CH     Gait Training PT LTG    Gait Training Goal PT LTG, Date Established 01/16/18  -EE      Gait Training Goal PT LTG, Time to Achieve 1 wk  -EE 1 wk  -CH 1 wk  -PC    Gait Training Goal PT LTG, [place] Level minimum assist (75% patient effort);2 person assist required  -EE minimum assist (75% patient effort);2 person assist required  -CH moderate assist (50% patient effort);2 person assist required  -PC    Gait Training Goal PT LTG, Distance to Achieve 5  -EE 5  -CH 5 ft  -PC    Gait Training Goal PT LTG, Date Goal Reviewed 01/16/18  -EE      Gait Training Goal PT LTG, Outcome goal revised  -EE goal revised  -CH goal revised  -PC    Gait Training Goal PT LTG, Reason Goal Not Met   other (see comments)   new CVA  -PC      User Key  (r) = Recorded By, (t) = Taken By, (c) = Cosigned By    Initials Name Provider Type    CH Tiffany Lilly, PT Physical Therapist    PC Mellissa Mendez, PT Physical Therapist    EE Kassy Marcial, PT Physical Therapist                Outcome Measures       01/16/18 1400          How much help from another person do you currently need...    Turning from your back to your side while in flat bed without using bedrails? 2  -EE      Moving from lying on back to sitting on the side of a flat bed without bedrails? 2  -EE      Moving to and from a bed to a chair (including a wheelchair)? 1  -EE      Standing up from a chair using your arms (e.g., wheelchair, bedside chair)? 2  -EE      Climbing 3-5 steps with a railing? 1  -EE      To walk in hospital room? 1  -EE      AM-PAC 6 Clicks Score 9  -EE      Functional Assessment    Outcome Measure Options AM-PAC 6 Clicks Basic Mobility (PT)  -EE        User Key  (r) = Recorded By, (t) = Taken By, (c) = Cosigned By    Initials Name Provider Type    EE  Kassy Marcial PT Physical Therapist           Time Calculation:         PT Charges       01/16/18 1456          Time Calculation    Start Time 1430  -EE      Stop Time 1450  -EE      Time Calculation (min) 20 min  -EE      PT Received On 01/16/18  -EE      PT - Next Appointment 01/17/18  -EE      PT Goal Re-Cert Due Date 01/23/18  -EE        User Key  (r) = Recorded By, (t) = Taken By, (c) = Cosigned By    Initials Name Provider Type    EE Kassy Marcial PT Physical Therapist          Therapy Charges for Today     Code Description Service Date Service Provider Modifiers Qty    25243915169 HC PT RE-EVAL ESTABLISHED PLAN 2 1/16/2018 Kassy Marcial, PT GP 1    76236530772 HC PT THER SUPP EA 15 MIN 1/16/2018 Kassy Marcial, PT GP 1    65719737248 HC PT THER PROC EA 15 MIN 1/16/2018 Kassy Marcial PT GP 1          PT G-Codes  Outcome Measure Options: AM-PAC 6 Clicks Basic Mobility (PT)      Kassy Marcial PT  1/16/2018

## 2018-01-16 NOTE — PLAN OF CARE
Problem: Patient Care Overview (Adult)  Goal: Plan of Care Review   01/16/18 1425   Coping/Psychosocial Response Interventions   Plan Of Care Reviewed With patient   Patient Care Overview   Progress improving   Outcome Evaluation   Outcome Summary/Follow up Plan Bedside swallow completed. Increased s/s of asp despite modified diet post op. SLP recs puree and honey via spoon only and repeat VFSS.       Problem: Inpatient SLP  Goal: Dysphagia- Patient will safely consume diet as per recommendation with no signs/symptoms of aspiration   01/16/18 1425   Safely Consume Diet   Safely Consume Diet- SLP, Outcome goal ongoing

## 2018-01-16 NOTE — PLAN OF CARE
Problem: Patient Care Overview (Adult)  Goal: Plan of Care Review   01/16/18 1454   Coping/Psychosocial Response Interventions   Plan Of Care Reviewed With patient   Outcome Evaluation   Outcome Summary/Follow up Plan Pt seen for re-eval; s/p L carotid endarterectomy. Upon exam, pt demonstrates pain, generalized weakness, impaired balance, decreased endurance, and decreased safety awareness limiting moblity. Pt also NWB R UE due to radial fx. Pt currently requiring assist of two with bed mobility; unable to attempt transfers today due to pain and fatigue. Pt would benefit from continued PT to address impairments and increase independence with mobility. Recommend SNF for subacute rehab given current functional level.        Problem: Inpatient Physical Therapy  Goal: Bed Mobility Goal LTG- PT  Outcome: Ongoing (interventions implemented as appropriate)   01/12/18 1418 01/16/18 1454   Bed Mobility PT LTG   Bed Mobility PT LTG, Time to Achieve --  1 wk   Bed Mobility PT LTG, Activity Type all bed mobility --    Bed Mobility PT LTG, [place] Level minimum assist (75% patient effort) --    Bed Mobility PT LTG, Date Goal Reviewed --  01/16/18   Bed Mobility PT LTG, Outcome --  goal ongoing     Goal: Transfer Training Goal 1 LTG- PT  Outcome: Ongoing (interventions implemented as appropriate)   01/12/18 1418 01/16/18 1454   Transfer Training PT LTG   Transfer Training PT LTG, Time to Achieve --  1 wk   Transfer Training PT LTG, Activity Type all transfers --    Transfer Training PT LTG, [place] Level minimum assist (75% patient effort);2 person assist required --    Transfer Training PT LTG, Date Goal Reviewed --  01/16/18   Transfer Training PT LTG, Outcome --  goal ongoing     Goal: Gait Training Goal LTG- PT  Outcome: Revised   01/16/18 1454   Gait Training PT LTG   Gait Training Goal PT LTG, Date Established 01/16/18   Gait Training Goal PT LTG, Time to Achieve 1 wk   Gait Training Goal PT LTG, [place]  Level minimum assist (75% patient effort);2 person assist required   Gait Training Goal PT LTG, Distance to Achieve 5   Gait Training Goal PT LTG, Date Goal Reviewed 01/16/18   Gait Training Goal PT LTG, Outcome goal revised

## 2018-01-16 NOTE — PROGRESS NOTES
Name: Sakshi Garcia ADMIT: 2018   : 1940  PCP: Provider Not In System    MRN: [id number] LOS: 15 days   AGE/SEX: 77 y.o. female  ROOM: [facility]/     Subjective   Subjective  Anxious. Complains of discomfort left-sided neck.    Objective   Vital Signs  Temp:  [97.3 °F (36.3 °C)-99.4 °F (37.4 °C)] 98.6 °F (37 °C)  Heart Rate:  [72-85] 81  Resp:  [14-20] 18  BP: ()/(45-95) 159/54  Arterial Line BP: (116-185)/(19-65) 145/43  SpO2:  [95 %-99 %] 99 %  on  Flow (L/min):  [2-3] 2;   O2 Device: nasal cannula  Body mass index is 27.43 kg/(m^2).    Physical Exam   Constitutional: She is oriented to person, place, and time. No distress.   Neck:   + CEA scar   Cardiovascular: Normal rate and regular rhythm.    No murmur heard.  Pulmonary/Chest: Effort normal. She has decreased breath sounds.   Abdominal: Soft. Bowel sounds are normal. She exhibits no distension. There is no tenderness.   Musculoskeletal: She exhibits no edema.   RUE in sling   Neurological: She is alert and oriented to person, place, and time.   Skin: Skin is warm and dry. She is not diaphoretic.         Results Review:       I reviewed the patient's new clinical results.    Results from last 7 days  Lab Units 18  0541 01/15/18  1550 18  17318  0541 18  0822 01/10/18  0629   WBC 10*3/mm3 12.95*  --  12.30* 14.16* 16.74* 13.88*   HEMOGLOBIN g/dL 7.3* 8.1* 7.6* 8.2* 8.5* 8.7*   PLATELETS 10*3/mm3 297  --  384 343 340 316       Results from last 7 days  Lab Units 18  0541 18  1731 18  0455 01/10/18  0629   SODIUM mmol/L 140 140 141 145   POTASSIUM mmol/L 4.4 4.5 4.9 4.3   CHLORIDE mmol/L 105 100 105 107   CO2 mmol/L 23.2 26.5 26.4 27.7   BUN mg/dL 27* 25* 21 18   CREATININE mg/dL 1.55* 1.46* 1.21* 1.15*   GLUCOSE mg/dL 129* 193* 160* 138*   Estimated Creatinine Clearance: 27.1 mL/min (by C-G formula based on Cr of 1.55).    Results from last 7 days  Lab Units 18  0541 18  1731 18  0455  01/10/18  0629   CALCIUM mg/dL 8.4* 9.3 8.6 8.7       Results from last 7 days  Lab Units 01/11/18  0455   PROCALCITONIN ng/mL 0.14         Glucose   Date/Time Value Ref Range Status   01/15/2018 0909 127 70 - 130 mg/dL Final   01/15/2018 0632 132 (H) 70 - 130 mg/dL Final   01/14/2018 2103 195 (H) 70 - 130 mg/dL Final   01/14/2018 1622 170 (H) 70 - 130 mg/dL Final   01/14/2018 1108 174 (H) 70 - 130 mg/dL Final   01/14/2018 0605 132 (H) 70 - 130 mg/dL Final   01/13/2018 2056 153 (H) 70 - 130 mg/dL Final   01/13/2018 1651 202 (H) 70 - 130 mg/dL Final           amLODIPine 5 mg Oral Q24H   aspirin 325 mg Oral Daily   atorvastatin 80 mg Oral Nightly   clopidogrel 75 mg Oral Daily   glipiZIDE 2.5 mg Oral BID AC   heparin (porcine) 5,000 Units Subcutaneous Q12H   insulin aspart 0-9 Units Subcutaneous 4x Daily With Meals & Nightly   ipratropium-albuterol 3 mL Nebulization Q4H - RT   linagliptin 5 mg Oral Daily   lisinopril 20 mg Oral Q24H   metoprolol succinate XL 25 mg Oral Q24H   piperacillin-tazobactam 3.375 g Intravenous Q8H   potassium chloride 20 mEq Oral TID       clevidipine 2 mg/hr Last Rate: 6 mg/hr (01/16/18 0729)   lactated ringers 9 mL/hr Last Rate: 9 mL/hr (01/15/18 0930)   sodium chloride 75 mL/hr Last Rate: 75 mL/hr (01/15/18 1904)   NPO Diet      Assessment/Plan   Assessment:     Active Hospital Problems (** Indicates Principal Problem)    Diagnosis Date Noted   • **E-coli UTI w/ bacteremia [N39.0, B96.20] 01/12/2018   • Aspiration pneumonia [J69.0] 01/12/2018   • 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 [I63.9] 01/12/2018   • Metabolic encephalopathy present on admit [G93.41] 01/12/2018   • Tobacco abuse [Z72.0] 01/12/2018   • Bilateral pleural effusions [J90] 01/12/2018   • Oropharyngeal dysphagia [R13.12] 01/12/2018   • Closed nondisplaced fracture of head of right radius [S52.124A] 01/12/2018   • DNR (do not resuscitate) [Z66] 01/03/2018   • Sepsis [A41.9] 01/02/2018   • Non-traumatic  rhabdomyolysis [M62.82] 01/01/2018   • Generalized weakness [R53.1] 01/01/2018   • Fall [W19.XXXA] 01/01/2018   • Stage 3 chronic kidney disease [N18.3] 01/01/2018   • Type 2 diabetes mellitus [E11.9] 01/01/2018   • HTN (hypertension) [I10] 01/01/2018      Resolved Hospital Problems    Diagnosis Date Noted Date Resolved   No resolved problems to display.       1. Ecoli UTI with bacteremia  - needs 14 day course for the bacteremia. On Zosyn for likely aspiration pna which is covering for above. Dc tomorrow if stable.  - repeat BCx no growth  - WBC better. cont to monitor      2. Aspiration PNA with Moderate R pleural effusion  - On zosyn. No further abx needed for this per pulm.  - BCx NGTD. MRSA screen negative  - SLP eval noted  - mod R pleural effusion. CT shows only mild-mod bilaterally. No indication for thoracentesis per Pulm      3. Acute CVA   - on ASA and Plavix. MRI with multiple CVA c/w cardio-embolic source or atherosclerotic plaque. Carotid u/s showed severe stenosis on left.   - s/p LEFT CAROTID ENDARTERECTOMY WITH SAPHENOUS VEIN HARVEST  - getting pRBC postop per vascular.      4. Right radial head fracture  - NWB per ortho  - needs sling   - follow-up with Dr. Shell in 1 week for repeat radiographs. Tomorrow will have been 1 week.     Creatinine is elevated. We will hold potassium and Glucotrol for now.      Disposition  · [facility] in [facility] skilled rehab when medically ready.      Rob Carvajal MD  [facility]  01/16/18  8:19 AM

## 2018-01-16 NOTE — SIGNIFICANT NOTE
01/16/18 1143   Rehab Treatment   Discipline occupational therapist   Treatment Not Performed patient/family declined treatment  (pt refused OT stated she wanted to drink water. edcuated daughter and patient that SLP would assist with swallow)   Recommendation   OT - Next Appointment 01/17/18

## 2018-01-16 NOTE — THERAPY RE-EVALUATION
Acute Care - Speech Language Pathology   Swallow Re-Evaluation [facility]     Patient Name: Sakshi Garcia  : 1940  MRN: [id number]  Today's Date: 2018  Onset of Illness/Injury or Date of Surgery Date: 18            Admit Date: 2018    SPEECH-LANGUAGE PATHOLOGY EVALUATION - SWALLOW  Subjective: The patient was seen on this date for a Clinical Swallow evaluation.  Pt easily distracted.  Significant history: acute strokes. Pt made NPO for L carotid endarterectomy 1/15 and kept NPO d/t s/s of asp despite modified diet. Pt with edema on L neck, asking for PO. Dentures in place.   Objective: Textures given included ice, thin liquid, nectar thick liquid, honey thick liquid, puree consistency, mechanical soft consistency and regular consistency.  Assessment: Difficulties were noted with ice, none of the above consistencies, thin liquid, nectar thick liquid, honey thick liquid, mechanical soft consistency and regular consistency.  Observations:  No s/s of asp with ice. Wet voice and delayed cough with thins via spoon 1/4 trials. Wet voice with nectar via straw with belch. Cough with honey via straw. Initially no s/s of asp with honey via cup. Pt would become distracted and produced throat clear/cough before the swallow x2. No s/s of asp with honey or puree via spoon. Pt did often trigger a double swallow. Throat clearing with mech soft 1/4 trials. Delayed cough with regular. Of note, patient unable to feed self d/t brace and IV.  SLP Findings:  Patient presents with s/s of dysphagia impacted by cognition.   Recommendations: Diet Textures: honey thick liquid, puree consistency food.  Medications should be taken whole or crushed with puree.    Recommended Strategies: SPOON DRINKS ONLY, small bites and sips, no straw and supervision with all PO. Oral care before breakfast, after all meals and PRN.  Other Recommended Evaluations: VFSS    Dysphagia therapy is recommended. Rationale: to establish safest PO  diet.        Visit Dx:     ICD-10-CM ICD-9-CM   1. Traumatic rhabdomyolysis, initial encounter T79.6XXA 958.6   2. Fall, initial encounter W19.XXXA E888.9   3. Renal insufficiency N28.9 593.9   4. Dizziness R42 780.4   5. Unsteadiness on feet R26.81 781.2     Patient Active Problem List   Diagnosis   • Non-traumatic rhabdomyolysis   • Generalized weakness   • Fall   • Acute cystitis without hematuria   • Stage 3 chronic kidney disease   • Type 2 diabetes mellitus   • HTN (hypertension)   • Altered mental status   • Sepsis   • Bacteremia   • DNR (do not resuscitate)   • Aspiration pneumonia   • 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   • E-coli UTI w/ bacteremia   • Metabolic encephalopathy present on admit   • Tobacco abuse   • Bilateral pleural effusions   • Oropharyngeal dysphagia   • Closed nondisplaced fracture of head of right radius     Past Medical History:   Diagnosis Date   • Colon cancer    • Hypertension      Past Surgical History:   Procedure Laterality Date   • CAROTID ENDARTERECTOMY Left 1/15/2018    Procedure: LEFT CAROTID ENDARTERECTOMY WITH SAPHENOUS VEIN HARVEST;  Surgeon: Ozzie Kelsey MD;  Location: [facility];  Service:    • CHOLECYSTECTOMY     • COLON SURGERY     • COLONOSCOPY     • HERNIA REPAIR            SWALLOW EVALUATION (last 72 hours)      Swallow Evaluation       01/16/18 1300                Rehab Evaluation    Document Type evaluation  -SH        Subjective Information agree to therapy  -SH        Patient Effort, Rehab Treatment adequate  -SH        Symptoms Noted During/After Treatment fatigue  -SH        General Information    Patient Profile Review yes  -SH        Subjective Patient Observations Pt easily distractable  -SH        Pertinent History Of Current Problem coughing post op  -SH        Current Diet Limitations NPO  -SH        Prior Level of Function- Communication functional in all spheres  -SH        Prior Level of Function- Swallowing no  diet consistency restrictions  -        Plans/Goals Discussed With patient;family  -        Clinical Impression    Patient's Goals For Discharge return to regular diet  -        Family Goals For Discharge patient able to return to regular diet  -        SLP Swallowing Diagnosis moderate dysphagia  -        Rehab Potential/Prognosis, Swallowing good, to achieve stated therapy goals  -        Criteria for Skilled Therapeutic Interventions Met skilled criteria for dysphagia intervention met  -        FCM, Swallowing 3-->Level 3  -        Therapy Frequency PRN  -        Predicted Duration Therapy Interv (days) until discharge  -        Expected Duration Therapy Session (min) 15-30 minutes  -        SLP Diet Recommendation honey-thick liquids;II - pureed  -        Recommended Diagnostics reassess via VFSS ([facility])  -        Recommended Feeding/Eating Techniques no cups;no straws;small sips/bites;maintain upright posture during/after eating for 30 mins  -        SLP Rec. for Method of Medication Administration meds whole in pudding/applesauce;meds crushed in pudding/applesauce  -        Monitor For Signs Of Aspiration cough;elevated WBC count;gurgly voice;throat clearing;fever;upper respiratory infection;pneumonia;right lower lobe infiltrates  -        Anticipated Discharge Disposition inpatient rehabilitation facility  -        Pain Assessment    Pain Assessment No/denies pain  -          User Key  (r) = Recorded By, (t) = Taken By, (c) = Cosigned By    Initials Name Effective Dates     Lisa Mcadams MS [facility]-SLP 07/13/17 -         EDUCATION  The patient has been educated in the following areas:   Modified Diet Instruction.    SLP Recommendation and Plan  SLP Swallowing Diagnosis: moderate dysphagia  SLP Diet Recommendation: honey-thick liquids, II - pureed  Recommended Feeding/Eating Techniques: no cups, no straws, small sips/bites, maintain upright posture during/after eating for 30  mins  SLP Rec. for Method of Medication Administration: meds whole in pudding/applesauce, meds crushed in pudding/applesauce  Monitor For Signs Of Aspiration: cough, elevated WBC count, gurgly voice, throat clearing, fever, upper respiratory infection, pneumonia, right lower lobe infiltrates  Recommended Diagnostics: reassess via VFSS ([facility])  Criteria for Skilled Therapeutic Interventions Met: skilled criteria for dysphagia intervention met  Anticipated Discharge Disposition: inpatient rehabilitation facility  Rehab Potential/Prognosis, Swallowing: good, to achieve stated therapy goals  Therapy Frequency: PRN             Plan of Care Review  Plan Of Care Reviewed With: patient  Progress: improving  Outcome Summary/Follow up Plan: Bedside swallow completed. Increased s/s of asp despite modified diet post op. SLP recs puree and honey via spoon only and repeat VFSS.          IP SLP Goals       01/16/18 1425 01/09/18 1424 01/09/18 1410    Safely Consume Diet    Safely Consume Diet- SLP, Date Established   01/09/18  -    Safely Consume Diet- SLP, Time to Achieve   by discharge  -    Safely Consume Diet- SLP, Outcome goal ongoing  -  goal ongoing   Pt seen for dys Tx. Unable to assess for diet tolerance b/c   -    Cognitive Linguistic- Optimal Participation in Care    Cognitive Linguistic Optimal Participation in Care- SLP, Date Established  01/09/18   cog-communication eval completed; deficits noted warranting   -     Cognitive Linguistic Optimal Participation in Care- SLP, Outcome  goal ongoing  -RH       01/08/18 1001 01/06/18 1437 01/04/18 1547    Safely Consume Diet    Safely Consume Diet- SLP, Date Established  01/06/18  -HS 01/04/18  -SH    Safely Consume Diet- SLP, Time to Achieve  by discharge  - by discharge  -    Safely Consume Diet- SLP, Additional Goal  Bedside swallow evaluation completed. Recommend a mechanical soft diet with nectar thick liquids. Meds whole or crushed in puree. Ice chips or  small sips of water 30 minutes after meals only after oral care and with supervision. Plan to proceed with VFSS early next week to further assess swallow function.  -HS     Safely Consume Diet- SLP, Outcome goal ongoing  -        User Key  (r) = Recorded By, (t) = Taken By, (c) = Cosigned By    Initials Name Provider Type    BIB Buchanan, MS CCC-SLP Speech and Language Pathologist     Lisa Mcadams, MS CCC-SLP Speech and Language Pathologist     Sidney Azevedo, MS CCC-SLP Speech and Language Pathologist             SLP Outcome Measures (last 72 hours)      SLP Outcome Measures       01/16/18 1400          SLP Outcome Measures    Outcome Measure Used? Adult NOMS  -      FCM Scores    FCM Chosen Swallowing  -      Swallowing FCM Score 3  -        User Key  (r) = Recorded By, (t) = Taken By, (c) = Cosigned By    Initials Name Effective Dates     Lisa Mcadams MS CCC-SLP 07/13/17 -            Time Calculation:         Time Calculation- SLP       01/16/18 1426          Time Calculation- [facility]    SLP Start Time 1300  -      SLP Stop Time 1400  -      SLP Time Calculation (min) 60 min  -      SLP Received On 01/16/18  -        User Key  (r) = Recorded By, (t) = Taken By, (c) = Cosigned By    Initials Name Provider Type     Lisa Mcadams MS CCC-SLP Speech and Language Pathologist          Therapy Charges for Today     Code Description Service Date Service Provider Modifiers Qty    [id number]  ST EVAL ORAL PHARYNG SWALLOW 4 1/16/2018 Lisa Mcadams MS CCC-SLP GN 1               Lisa Mcadams MS CCC-ZAKIA  1/16/2018

## 2018-01-16 NOTE — PLAN OF CARE
Problem: Patient Care Overview (Adult)  Goal: Plan of Care Review  Outcome: Ongoing (interventions implemented as appropriate)   01/16/18 0604   Coping/Psychosocial Response Interventions   Plan Of Care Reviewed With patient;daughter   Patient Care Overview   Progress no change   Outcome Evaluation   Outcome Summary/Follow up Plan VSS, POD0 from carotid endarterectomy, cleveprex gtt to maintain sbp<140, no c/o pain, not cooperative to give accurate NIH score, significant swelling at incision site, daughter at bedside, continue to monitor.      Goal: Adult Individualization and Mutuality  Outcome: Ongoing (interventions implemented as appropriate)    Goal: Discharge Needs Assessment  Outcome: Ongoing (interventions implemented as appropriate)   01/16/18 0604   Discharge Needs Assessment   Discharge Disposition still a patient       Problem: Pressure Ulcer Risk (Tank Scale) (Adult,Obstetrics,Pediatric)  Goal: Skin Integrity  Outcome: Ongoing (interventions implemented as appropriate)   01/16/18 0604   Pressure Ulcer Risk (Tank Scale) (Adult,Obstetrics,Pediatric)   Skin Integrity making progress toward outcome       Problem: Skin Integrity Impairment, Risk/Actual (Adult)  Goal: Skin Integrity/Wound Healing  Outcome: Ongoing (interventions implemented as appropriate)   01/16/18 0604   Skin Integrity Impairment, Risk/Actual (Adult)   Skin Integrity/Wound Healing making progress toward outcome       Problem: Infection, Risk/Actual (Adult)  Goal: Infection Prevention/Resolution  Outcome: Ongoing (interventions implemented as appropriate)   01/16/18 0604   Infection, Risk/Actual (Adult)   Infection Prevention/Resolution making progress toward outcome

## 2018-01-17 ENCOUNTER — APPOINTMENT (OUTPATIENT)
Dept: GENERAL RADIOLOGY | Facility: HOSPITAL | Age: 78
End: 2018-01-17
Attending: INTERNAL MEDICINE

## 2018-01-17 ENCOUNTER — APPOINTMENT (OUTPATIENT)
Dept: GENERAL RADIOLOGY | Facility: HOSPITAL | Age: 78
End: 2018-01-17

## 2018-01-17 LAB
ABO + RH BLD: NORMAL
ABO + RH BLD: NORMAL
ANION GAP SERPL CALCULATED.3IONS-SCNC: 11 MMOL/L
BASOPHILS # BLD AUTO: 0.03 10*3/MM3 (ref 0–0.2)
BASOPHILS NFR BLD AUTO: 0.3 % (ref 0–1.5)
BH BB BLOOD EXPIRATION DATE: NORMAL
BH BB BLOOD EXPIRATION DATE: NORMAL
BH BB BLOOD TYPE BARCODE: 8400
BH BB BLOOD TYPE BARCODE: 8400
BH BB DISPENSE STATUS: NORMAL
BH BB DISPENSE STATUS: NORMAL
BH BB PRODUCT CODE: NORMAL
BH BB PRODUCT CODE: NORMAL
BH BB UNIT NUMBER: NORMAL
BH BB UNIT NUMBER: NORMAL
BUN BLD-MCNC: 25 MG/DL (ref 8–23)
BUN/CREAT SERPL: 17.1 (ref 7–25)
CALCIUM SPEC-SCNC: 8.9 MG/DL (ref 8.6–10.5)
CHLORIDE SERPL-SCNC: 105 MMOL/L (ref 98–107)
CO2 SERPL-SCNC: 23 MMOL/L (ref 22–29)
CREAT BLD-MCNC: 1.46 MG/DL (ref 0.57–1)
DEPRECATED RDW RBC AUTO: 54.3 FL (ref 37–54)
EOSINOPHIL # BLD AUTO: 0.38 10*3/MM3 (ref 0–0.7)
EOSINOPHIL NFR BLD AUTO: 3.3 % (ref 0.3–6.2)
ERYTHROCYTE [DISTWIDTH] IN BLOOD BY AUTOMATED COUNT: 15.7 % (ref 11.7–13)
GFR SERPL CREATININE-BSD FRML MDRD: 35 ML/MIN/1.73
GLUCOSE BLD-MCNC: 164 MG/DL (ref 65–99)
GLUCOSE BLDC GLUCOMTR-MCNC: 176 MG/DL (ref 70–130)
GLUCOSE BLDC GLUCOMTR-MCNC: 225 MG/DL (ref 70–130)
GLUCOSE BLDC GLUCOMTR-MCNC: 232 MG/DL (ref 70–130)
GLUCOSE BLDC GLUCOMTR-MCNC: 252 MG/DL (ref 70–130)
HCT VFR BLD AUTO: 29.7 % (ref 35.6–45.5)
HGB BLD-MCNC: 9.4 G/DL (ref 11.9–15.5)
IMM GRANULOCYTES # BLD: 0.07 10*3/MM3 (ref 0–0.03)
IMM GRANULOCYTES NFR BLD: 0.6 % (ref 0–0.5)
LYMPHOCYTES # BLD AUTO: 1.26 10*3/MM3 (ref 0.9–4.8)
LYMPHOCYTES NFR BLD AUTO: 11 % (ref 19.6–45.3)
MCH RBC QN AUTO: 29.7 PG (ref 26.9–32)
MCHC RBC AUTO-ENTMCNC: 31.6 G/DL (ref 32.4–36.3)
MCV RBC AUTO: 94 FL (ref 80.5–98.2)
MONOCYTES # BLD AUTO: 1.63 10*3/MM3 (ref 0.2–1.2)
MONOCYTES NFR BLD AUTO: 14.3 % (ref 5–12)
NEUTROPHILS # BLD AUTO: 8.04 10*3/MM3 (ref 1.9–8.1)
NEUTROPHILS NFR BLD AUTO: 70.5 % (ref 42.7–76)
PLATELET # BLD AUTO: 262 10*3/MM3 (ref 140–500)
PMV BLD AUTO: 10.3 FL (ref 6–12)
POTASSIUM BLD-SCNC: 4.1 MMOL/L (ref 3.5–5.2)
RBC # BLD AUTO: 3.16 10*6/MM3 (ref 3.9–5.2)
SODIUM BLD-SCNC: 139 MMOL/L (ref 136–145)
UNIT  ABO: NORMAL
UNIT  ABO: NORMAL
UNIT  RH: NORMAL
UNIT  RH: NORMAL
WBC NRBC COR # BLD: 11.41 10*3/MM3 (ref 4.5–10.7)

## 2018-01-17 PROCEDURE — 71045 X-RAY EXAM CHEST 1 VIEW: CPT

## 2018-01-17 PROCEDURE — 25010000002 HEPARIN (PORCINE) PER 1000 UNITS: Performed by: SURGERY

## 2018-01-17 PROCEDURE — 92611 MOTION FLUOROSCOPY/SWALLOW: CPT

## 2018-01-17 PROCEDURE — 63710000001 INSULIN ASPART PER 5 UNITS: Performed by: INTERNAL MEDICINE

## 2018-01-17 PROCEDURE — 74230 X-RAY XM SWLNG FUNCJ C+: CPT

## 2018-01-17 PROCEDURE — 94799 UNLISTED PULMONARY SVC/PX: CPT

## 2018-01-17 PROCEDURE — 25010000002 PIPERACILLIN SOD-TAZOBACTAM PER 1 G: Performed by: SURGERY

## 2018-01-17 PROCEDURE — 97110 THERAPEUTIC EXERCISES: CPT

## 2018-01-17 PROCEDURE — 80048 BASIC METABOLIC PNL TOTAL CA: CPT | Performed by: SURGERY

## 2018-01-17 PROCEDURE — 85025 COMPLETE CBC W/AUTO DIFF WBC: CPT | Performed by: SURGERY

## 2018-01-17 PROCEDURE — 82962 GLUCOSE BLOOD TEST: CPT

## 2018-01-17 RX ORDER — ECHINACEA PURPUREA EXTRACT 125 MG
2 TABLET ORAL AS NEEDED
Status: DISCONTINUED | OUTPATIENT
Start: 2018-01-17 | End: 2018-01-22 | Stop reason: HOSPADM

## 2018-01-17 RX ORDER — IPRATROPIUM BROMIDE AND ALBUTEROL SULFATE 2.5; .5 MG/3ML; MG/3ML
3 SOLUTION RESPIRATORY (INHALATION)
Status: DISCONTINUED | OUTPATIENT
Start: 2018-01-17 | End: 2018-01-19

## 2018-01-17 RX ORDER — METOPROLOL SUCCINATE 100 MG/1
100 TABLET, EXTENDED RELEASE ORAL
Status: DISCONTINUED | OUTPATIENT
Start: 2018-01-17 | End: 2018-01-22 | Stop reason: HOSPADM

## 2018-01-17 RX ADMIN — IPRATROPIUM BROMIDE AND ALBUTEROL SULFATE 3 ML: .5; 3 SOLUTION RESPIRATORY (INHALATION) at 20:33

## 2018-01-17 RX ADMIN — IPRATROPIUM BROMIDE AND ALBUTEROL SULFATE 3 ML: .5; 3 SOLUTION RESPIRATORY (INHALATION) at 08:41

## 2018-01-17 RX ADMIN — IPRATROPIUM BROMIDE AND ALBUTEROL SULFATE 3 ML: .5; 3 SOLUTION RESPIRATORY (INHALATION) at 12:09

## 2018-01-17 RX ADMIN — Medication: at 23:53

## 2018-01-17 RX ADMIN — CLEVIPIDINE 13 MG/HR: 0.5 EMULSION INTRAVENOUS at 23:52

## 2018-01-17 RX ADMIN — CLEVIPIDINE 13 MG/HR: 0.5 EMULSION INTRAVENOUS at 16:31

## 2018-01-17 RX ADMIN — TAZOBACTAM SODIUM AND PIPERACILLIN SODIUM 3.38 G: 375; 3 INJECTION, SOLUTION INTRAVENOUS at 01:32

## 2018-01-17 RX ADMIN — TAZOBACTAM SODIUM AND PIPERACILLIN SODIUM 3.38 G: 375; 3 INJECTION, SOLUTION INTRAVENOUS at 16:31

## 2018-01-17 RX ADMIN — CLEVIPIDINE 6 MG/HR: 0.5 EMULSION INTRAVENOUS at 01:32

## 2018-01-17 RX ADMIN — HYDROCODONE BITARTRATE AND ACETAMINOPHEN 1 TABLET: 5; 325 TABLET ORAL at 18:15

## 2018-01-17 RX ADMIN — HEPARIN SODIUM 5000 UNITS: 5000 INJECTION, SOLUTION INTRAVENOUS; SUBCUTANEOUS at 20:42

## 2018-01-17 RX ADMIN — INSULIN ASPART 4 UNITS: 100 INJECTION, SOLUTION INTRAVENOUS; SUBCUTANEOUS at 12:19

## 2018-01-17 RX ADMIN — LINAGLIPTIN 5 MG: 5 TABLET, FILM COATED ORAL at 08:53

## 2018-01-17 RX ADMIN — TAZOBACTAM SODIUM AND PIPERACILLIN SODIUM 3.38 G: 375; 3 INJECTION, SOLUTION INTRAVENOUS at 08:53

## 2018-01-17 RX ADMIN — METOPROLOL TARTRATE 5 MG: 5 INJECTION, SOLUTION INTRAVENOUS at 04:49

## 2018-01-17 RX ADMIN — AMLODIPINE BESYLATE 5 MG: 5 TABLET ORAL at 08:52

## 2018-01-17 RX ADMIN — CLEVIPIDINE 2 MG/HR: 0.5 EMULSION INTRAVENOUS at 21:17

## 2018-01-17 RX ADMIN — METOPROLOL SUCCINATE 100 MG: 100 TABLET, FILM COATED, EXTENDED RELEASE ORAL at 12:18

## 2018-01-17 RX ADMIN — INSULIN ASPART 2 UNITS: 100 INJECTION, SOLUTION INTRAVENOUS; SUBCUTANEOUS at 08:53

## 2018-01-17 RX ADMIN — CLEVIPIDINE 8 MG/HR: 0.5 EMULSION INTRAVENOUS at 06:23

## 2018-01-17 RX ADMIN — HYDROCODONE BITARTRATE AND ACETAMINOPHEN 1 TABLET: 5; 325 TABLET ORAL at 09:11

## 2018-01-17 RX ADMIN — INSULIN ASPART 6 UNITS: 100 INJECTION, SOLUTION INTRAVENOUS; SUBCUTANEOUS at 21:20

## 2018-01-17 RX ADMIN — ACETAMINOPHEN 650 MG: 325 TABLET ORAL at 00:01

## 2018-01-17 RX ADMIN — INSULIN ASPART 4 UNITS: 100 INJECTION, SOLUTION INTRAVENOUS; SUBCUTANEOUS at 18:01

## 2018-01-17 RX ADMIN — LISINOPRIL 20 MG: 20 TABLET ORAL at 08:52

## 2018-01-17 RX ADMIN — ATORVASTATIN CALCIUM 80 MG: 80 TABLET, FILM COATED ORAL at 20:42

## 2018-01-17 RX ADMIN — ASPIRIN 325 MG: 325 TABLET ORAL at 08:52

## 2018-01-17 RX ADMIN — CLEVIPIDINE 11 MG/HR: 0.5 EMULSION INTRAVENOUS at 12:19

## 2018-01-17 RX ADMIN — HEPARIN SODIUM 5000 UNITS: 5000 INJECTION, SOLUTION INTRAVENOUS; SUBCUTANEOUS at 08:53

## 2018-01-17 NOTE — THERAPY TREATMENT NOTE
Acute Care - Physical Therapy Treatment Note  [facility]     Patient Name: Sakshi Garcia  : 1940  MRN: [id number]  Today's Date: 2018  Onset of Illness/Injury or Date of Surgery Date: 01/15/18  Date of Referral to PT: 18  Referring Physician: Diony    Admit Date: 2018    Visit Dx:    ICD-10-CM ICD-9-CM   1. Traumatic rhabdomyolysis, initial encounter T79.6XXA 958.6   2. Fall, initial encounter W19.XXXA E888.9   3. Renal insufficiency N28.9 593.9   4. Dizziness R42 780.4   5. Unsteadiness on feet R26.81 781.2     Patient Active Problem List   Diagnosis   • Non-traumatic rhabdomyolysis   • Generalized weakness   • Fall   • Acute cystitis without hematuria   • Stage 3 chronic kidney disease   • Type 2 diabetes mellitus   • HTN (hypertension)   • Altered mental status   • Sepsis   • Bacteremia   • DNR (do not resuscitate)   • Aspiration pneumonia   • 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   • E-coli UTI w/ bacteremia   • Metabolic encephalopathy present on admit   • Tobacco abuse   • Bilateral pleural effusions   • Oropharyngeal dysphagia   • Closed nondisplaced fracture of head of right radius               Adult Rehabilitation Note       18 1352          Rehab Assessment/Intervention    Discipline physical therapist  -EE      Document Type therapy note (daily note)  -EE      Subjective Information agree to therapy;complains of;weakness;fatigue;pain  -EE      Patient Effort, Rehab Treatment adequate  -EE      Symptoms Noted During/After Treatment fatigue  -EE      Precautions/Limitations fall precautions;oxygen therapy device and L/min;non-weight bearing status   NWB R UE, sling R UE  -EE      Recorded by [EE] Kassy Marcial, PT      Vital Signs    Pre SpO2 (%) 95  -EE      O2 Delivery Pre Treatment supplemental O2  -EE      O2 Delivery Intra Treatment supplemental O2  -EE      Post SpO2 (%) 97  -EE      O2 Delivery Post Treatment supplemental O2  -EE      Pre Patient  Position Supine  -EE      Intra Patient Position Standing  -EE      Post Patient Position Sitting  -EE      Recorded by [EE] Kassy Marcial, PT      Pain Assessment    Pain Assessment 0-10  -EE      Pain Score 7  -EE      Pain Type Acute pain  -EE      Pain Location Shoulder   also L LE  -EE      Pain Orientation Left  -EE      Pain Intervention(s) Repositioned;Rest  -EE      Response to Interventions tolerated  -EE      Recorded by [EE] Kassy Marcial, PT      Cognitive Assessment/Intervention    Current Cognitive/Communication Assessment impaired  -EE      Orientation Status oriented to;person;place;situation  -EE      Follows Commands/Answers Questions 100% of the time;able to follow single-step instructions;needs cueing;needs increased time  -EE      Personal Safety mild impairment;at risk behaviors demonstrated;decreased awareness, need for assist;decreased awareness, need for safety  -EE      Personal Safety Interventions fall prevention program maintained;gait belt;muscle strengthening facilitated;nonskid shoes/slippers when out of bed;supervised activity  -EE      Recorded by [EE] Kassy Marcial, PT      Bed Mobility, Assessment/Treatment    Bed Mobility, Assistive Device bed rails;head of bed elevated  -EE      Bed Mob, Supine to Sit, [place] moderate assist (50% patient effort);verbal cues required  -EE      Bed Mob, Sit to Supine, [place] not tested   up in chair  -EE      Bed Mobility, Safety Issues decreased use of arms for pushing/pulling  -EE      Bed Mobility, Impairments strength decreased;impaired balance;pain  -EE      Recorded by [EE] Kassy Marcial, PT      Transfer Assessment/Treatment    Transfers, Sit-Stand [place] moderate assist (50% patient effort);2 person assist required;verbal cues required;nonverbal cues required (demo/gesture);hand held assist   L UE HHA; R UE sling  -EE      Transfers, Stand-Sit [place] moderate assist (50% patient effort);2 person assist required;verbal cues  required;nonverbal cues required (demo/gesture);hand held assist  -EE      Transfer, Safety Issues weight-shifting ability decreased;step length decreased;balance decreased during turns  -EE      Transfer, Impairments strength decreased;impaired balance;pain  -EE      Transfer, Comment Sit to stand performed x2 from EOB; verbal cues required for upright posture, pt initially very forward flexed  -EE      Recorded by [EE] Kassy Marcial, THU      Gait Assessment/Treatment    Gait, [place] moderate assist (50% patient effort);2 person assist required;verbal cues required;nonverbal cues required (demo/gesture);hand held assist   L UE HHA  -EE      Gait, Distance (Feet) 3  -EE      Gait, Gait Deviations forward flexed posture;nilda decreased;left:;antalgic;bilateral:;decreased heel strike;step length decreased;stride length decreased;toe-to-floor clearance decreased;weight-shifting ability decreased  -EE      Gait, Safety Issues step length decreased;weight-shifting ability decreased;sequencing ability decreased;balance decreased during turns  -EE      Gait, Impairments strength decreased;impaired balance;pain  -EE      Gait, Comment L LE pain and fatigue limiting  -EE      Recorded by [EE] Kassy Marcial, THU      Balance Skills Training    Sitting-Level of Assistance Close supervision  -EE      Sitting-Balance Support Feet supported  -EE      Sitting-Balance Activities Trunk control activities  -EE      Sitting # of Minutes 8  -EE      Standing-Level of Assistance Moderate assistance;x2  -EE      Static Standing Balance Support Left upper extremity supported  -EE      Recorded by [EE] Kassy Marcial PT      Therapy Exercises    Bilateral Lower Extremities AROM:;5 reps;ankle pumps/circles;LAQ  -EE      Recorded by [EE] Kassy Marcial PT      Positioning and Restraints    Pre-Treatment Position in bed  -EE      Post Treatment Position chair  -EE      In Chair reclined;call light within reach;encouraged to call for  assist;exit alarm on;RUE elevated;LUE elevated;legs elevated;notified nsg   R UE sling  -EE      Recorded by [EE] Kassy Marcial, PT        User Key  (r) = Recorded By, (t) = Taken By, (c) = Cosigned By    Initials Name Effective Dates    EE Kassy Marcial, PT 12/01/15 -                 IP PT Goals       01/16/18 1454 01/12/18 1418 01/06/18 1721    Bed Mobility PT LTG    Bed Mobility PT LTG, Time to Achieve 1 wk  -EE 1 wk  -CH     Bed Mobility PT LTG, Activity Type  all bed mobility  -CH     Bed Mobility PT LTG, [place] Level  minimum assist (75% patient effort)  -CH     Bed Mobility PT LTG, Date Goal Reviewed 01/16/18  -EE      Bed Mobility PT LTG, Outcome goal ongoing  -EE goal ongoing  -CH goal ongoing  -PC    Bed Mobility PT LTG, Reason Goal Not Met  progress slower than expected  -CH     Transfer Training PT LTG    Transfer Training PT LTG, Time to Achieve 1 wk  -EE 1 wk  -CH 1 wk  -PC    Transfer Training PT LTG, Activity Type  all transfers  - sit to stand/stand to sit;bed to chair /chair to bed  -PC    Transfer Training PT LTG, [place] Level  minimum assist (75% patient effort);2 person assist required  -CH moderate assist (50% patient effort)  -PC    Transfer Training PT  LTG, Date Goal Reviewed 01/16/18  -EE      Transfer Training PT LTG, Outcome goal ongoing  -EE goal revised  -CH     Gait Training PT LTG    Gait Training Goal PT LTG, Date Established 01/16/18  -EE      Gait Training Goal PT LTG, Time to Achieve 1 wk  -EE 1 wk  -CH 1 wk  -PC    Gait Training Goal PT LTG, [place] Level minimum assist (75% patient effort);2 person assist required  -EE minimum assist (75% patient effort);2 person assist required  -CH moderate assist (50% patient effort);2 person assist required  -PC    Gait Training Goal PT LTG, Distance to Achieve 5  -EE 5  -CH 5 ft  -PC    Gait Training Goal PT LTG, Date Goal Reviewed 01/16/18  -EE      Gait Training Goal PT LTG, Outcome goal revised  -EE goal revised  -CH goal  revised  -PC    Gait Training Goal PT LTG, Reason Goal Not Met   other (see comments)   new CVA  -PC      User Key  (r) = Recorded By, (t) = Taken By, (c) = Cosigned By    Initials Name Provider Type    CH Tiffany Lilly, PT Physical Therapist    PC Mellissa Mendez, PT Physical Therapist    EE Kassy Marcial, PT Physical Therapist          Physical Therapy Education     Title: PT OT SLP Therapies (Active)     Topic: Physical Therapy (Active)     Point: Mobility training (Active)    Learning Progress Summary    Learner Readiness Method Response Comment Documented by Status   Patient Acceptance E,TB NR  EE 01/17/18 1423 Active    Acceptance E,TB NR  EE 01/16/18 1453 Active    Acceptance E,D NR  EJ 01/13/18 1116 Active    Acceptance E,TB,D VU,NR   01/12/18 1418 Done    Acceptance E,TB,D NR  SH 01/11/18 1423 Active    Acceptance E,TB,D NR   01/10/18 1538 Active    Acceptance E,TB,D VU,NR   01/09/18 0932 Done    Acceptance E,TB,D VU,NR   01/09/18 0923 Done    Acceptance E,TB,D NR  RW 01/08/18 1334 Active    Acceptance E,D NR  PC 01/06/18 1721 Active    Acceptance E,D NR,NL   01/04/18 1702 Active    Acceptance E VU,NR  AA 01/02/18 1359 Done               Point: Home exercise program (Active)    Learning Progress Summary    Learner Readiness Method Response Comment Documented by Status   Patient Acceptance E,TB NR  EE 01/17/18 1423 Active    Acceptance E,TB NR  EE 01/16/18 1453 Active    Acceptance E,TB,D VU,NR   01/12/18 1418 Done    Acceptance E,TB,D NR  RW 01/10/18 1538 Active    Acceptance E,TB,D VU,NR   01/09/18 0932 Done    Acceptance E,TB,D VU,NR   01/09/18 0923 Done    Acceptance E,TB,D NR   01/08/18 1334 Active    Acceptance E,D NR  PC 01/06/18 1721 Active    Acceptance E,D NR,NL   01/04/18 1702 Active    Acceptance E VU,NR  AA 01/02/18 1359 Done               Point: Body mechanics (Active)    Learning Progress Summary    Learner Readiness Method Response Comment Documented by Status   Patient  Acceptance E,TB NR   01/17/18 1423 Active    Acceptance E,TB NR   01/16/18 1453 Active    Acceptance E,TB,D VU,NR   01/12/18 1418 Done    Acceptance E,TB,D NR   01/11/18 1423 Active    Acceptance E,TB,D NR   01/10/18 1538 Active    Acceptance E,TB,D VU,NR   01/09/18 0932 Done    Acceptance E,TB,D VU,NR   01/09/18 0923 Done    Acceptance E,TB,D NR   01/08/18 1334 Active    Acceptance E,D NR   01/06/18 1721 Active    Acceptance E,D NR,NL   01/04/18 1702 Active    Acceptance E VU,NR   01/02/18 1359 Done               Point: Precautions (Active)    Learning Progress Summary    Learner Readiness Method Response Comment Documented by Status   Patient Acceptance E,TB NR   01/17/18 1423 Active    Acceptance E,TB NR   01/16/18 1453 Active    Acceptance E,TB,D VU,NR   01/12/18 1418 Done    Acceptance E,TB,D NR   01/11/18 1423 Active    Acceptance E,TB,D NR   01/10/18 1538 Active    Acceptance E,TB,D VU,NR   01/09/18 0932 Done    Acceptance E,TB,D VU,NR   01/09/18 0923 Done    Acceptance E,TB,D NR   01/08/18 1334 Active    Acceptance E,D NR   01/06/18 1721 Active    Acceptance E,D NR,NL   01/04/18 1702 Active    Acceptance E VU,NR   01/02/18 1359 Done                      User Key     Initials Effective Dates Name Provider Type Discipline     12/01/15 -  Tiffany Lilly, PT Physical Therapist PT    PC 12/01/15 -  Mellissa Mendez, PT Physical Therapist PT     12/01/15 -  Kassy Marcial, PT Physical Therapist PT     02/18/16 -  Amarilis Johnson, PTA Physical Therapy Assistant PT     04/21/17 -  Anastasiya Chase, PT Physical Therapist PT     04/06/16 -  Lilliana Carroll, PTA Physical Therapy Assistant PT    AA 09/05/17 -  Paula Holley, PT Physical Therapist PT     01/08/18 -  Lisa Peralta, PT Student PT Student PT                    PT Recommendation and Plan  Anticipated Equipment Needs At Discharge: front wheeled walker  Anticipated Discharge Disposition: skilled  nursing facility  Planned Therapy Interventions: balance training, bed mobility training, gait training, home exercise program, patient/family education, strengthening, transfer training  PT Frequency: daily  Plan of Care Review  Plan Of Care Reviewed With: patient  Progress: progress towards functional goals is fair  Outcome Summary/Follow up Plan: Pt able to tolerated increased activity, tolerated taking several steps to move from bed to chair. Pt continues to require assist of two with mobility due to pain and weakness. No new concerns; will continue to increase activity as able.           Outcome Measures       01/17/18 1400 01/16/18 1400       How much help from another person do you currently need...    Turning from your back to your side while in flat bed without using bedrails? 2  -EE 2  -EE     Moving from lying on back to sitting on the side of a flat bed without bedrails? 2  -EE 2  -EE     Moving to and from a bed to a chair (including a wheelchair)? 2  -EE 1  -EE     Standing up from a chair using your arms (e.g., wheelchair, bedside chair)? 2  -EE 2  -EE     Climbing 3-5 steps with a railing? 1  -EE 1  -EE     To walk in hospital room? 2  -EE 1  -EE     AM-PAC 6 Clicks Score 11  -EE 9  -EE     Functional Assessment    Outcome Measure Options AM-PAC 6 Clicks Basic Mobility (PT)  -EE AM-PAC 6 Clicks Basic Mobility (PT)  -EE       User Key  (r) = Recorded By, (t) = Taken By, (c) = Cosigned By    Initials Name Provider Type    CAROL Marcial PT Physical Therapist           Time Calculation:         PT Charges       01/17/18 1417          Time Calculation    Start Time 1352  -EE      Stop Time 1415  -EE      Time Calculation (min) 23 min  -EE      PT Received On 01/17/18  -EE      PT - Next Appointment 01/18/18  -EE        User Key  (r) = Recorded By, (t) = Taken By, (c) = Cosigned By    Initials Name Provider Type    CAROL Marcial PT Physical Therapist          Therapy Charges for Today     Code  Description Service Date Service Provider Modifiers Qty    77908172432 HC PT RE-EVAL ESTABLISHED PLAN 2 1/16/2018 Kassy Marcial, PT GP 1    53668538051 HC PT THER SUPP EA 15 MIN 1/16/2018 Kassy Marcial, PT GP 1    49963012029 HC PT THER PROC EA 15 MIN 1/16/2018 Kassy Marcial, PT GP 1    68219427070 HC PT THER PROC EA 15 MIN 1/17/2018 Kassy Marcial, PT GP 2    47227901392 HC PT THER SUPP EA 15 MIN 1/17/2018 Kassy Marcial, PT GP 2          PT G-Codes  Outcome Measure Options: AM-PAC 6 Clicks Basic Mobility (PT)    Kassy Marcial, PT  1/17/2018

## 2018-01-17 NOTE — PROGRESS NOTES
Name: Sakshi Garcia ADMIT: 2018   : 1940  PCP: Provider Not In System    MRN: [id number] LOS: 16 days   AGE/SEX: 77 y.o. female  ROOM: [facility]/     Active Hospital Problems (** Indicates Principal Problem)    Diagnosis Date Noted   • **E-coli UTI w/ bacteremia [N39.0, B96.20] 2018   • Aspiration pneumonia [J69.0] 2018   • 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 [I63.9] 2018   • Metabolic encephalopathy present on admit [G93.41] 2018   • Tobacco abuse [Z72.0] 2018   • Bilateral pleural effusions [J90] 2018   • Oropharyngeal dysphagia [R13.12] 2018   • Closed nondisplaced fracture of head of right radius [S52.124A] 2018   • DNR (do not resuscitate) [Z66] 2018   • Sepsis [A41.9] 2018   • Non-traumatic rhabdomyolysis [M62.82] 2018   • Generalized weakness [R53.1] 2018   • Fall [W19.XXXA] 2018   • Stage 3 chronic kidney disease [N18.3] 2018   • Type 2 diabetes mellitus [E11.9] 2018   • HTN (hypertension) [I10] 2018      Resolved Hospital Problems    Diagnosis Date Noted Date Resolved   No resolved problems to display.     Subjective     77 y.o. female POD #2 s/p left carotid endarterectomy with saphenous vein patch   No new neuro symptoms  Taking nebulizer currently  Feels weak    Review of Systems  As above  Objective     Vital Signs  Temp:  [98.2 °F (36.8 °C)-99.3 °F (37.4 °C)] 99 °F (37.2 °C)  Heart Rate:  [69-92] 79  Resp:  [16-20] 18  BP: (107-161)/(30-93) 148/62  Arterial Line BP: (102-174)/(38-94) 102/67    Physical Exam   Constitutional: She appears well-developed and well-nourished.   HENT:   Head: Normocephalic and atraumatic.   Neck: Normal range of motion. No tracheal deviation present.   Left neck incision is clean, soft, no hematoma.      Tongue is midline   Cardiovascular: Normal rate and regular rhythm.    Pulmonary/Chest: Effort normal. No respiratory distress.   Abdominal: Soft. There  is no tenderness.   Musculoskeletal: Normal range of motion. She exhibits no deformity.   Neurological: She is alert.   Skin: Skin is warm and dry.   Neuro: unchanged RUE and RLE weakness  Tongue is midline  Left leg incision is clean.     Results Review:       I reviewed the patient's new clinical results.    Results from last 7 days  Lab Units 01/17/18 0418 01/16/18  0541 01/15/18  1550 01/14/18  1731 01/12/18  0541 01/11/18  0822   WBC 10*3/mm3 11.41* 12.95*  --  12.30* 14.16* 16.74*   HEMOGLOBIN g/dL 9.4* 7.3* 8.1* 7.6* 8.2* 8.5*   PLATELETS 10*3/mm3 262 297  --  384 343 340       Results from last 7 days  Lab Units 01/17/18 0418 01/16/18  0541 01/14/18 1731 01/11/18  0455   SODIUM mmol/L 139 140 140 141   POTASSIUM mmol/L 4.1 4.4 4.5 4.9   CHLORIDE mmol/L 105 105 100 105   CO2 mmol/L 23.0 23.2 26.5 26.4   BUN mg/dL 25* 27* 25* 21   CREATININE mg/dL 1.46* 1.55* 1.46* 1.21*   GLUCOSE mg/dL 164* 129* 193* 160*   Estimated Creatinine Clearance: 29.4 mL/min (by C-G formula based on Cr of 1.46).    Results from last 7 days  Lab Units 01/17/18 0418 01/16/18  0541 01/14/18 1731 01/11/18  0455   CALCIUM mg/dL 8.9 8.4* 9.3 8.6       Assessment/Plan           Active Hospital Problems (** Indicates Principal Problem)    Diagnosis Date Noted   • **E-coli UTI w/ bacteremia [N39.0, B96.20] 01/12/2018   • Aspiration pneumonia [J69.0] 01/12/2018   • 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 [I63.9] 01/12/2018   • Metabolic encephalopathy present on admit [G93.41] 01/12/2018   • Tobacco abuse [Z72.0] 01/12/2018   • Bilateral pleural effusions [J90] 01/12/2018   • Oropharyngeal dysphagia [R13.12] 01/12/2018   • Closed nondisplaced fracture of head of right radius [S52.124A] 01/12/2018   • DNR (do not resuscitate) [Z66] 01/03/2018   • Sepsis [A41.9] 01/02/2018   • Non-traumatic rhabdomyolysis [M62.82] 01/01/2018   • Generalized weakness [R53.1] 01/01/2018   • Fall [W19.XXXA] 01/01/2018   • Stage 3 chronic  kidney disease [N18.3] 01/01/2018   • Type 2 diabetes mellitus [E11.9] 01/01/2018   • HTN (hypertension) [I10] 01/01/2018      Resolved Hospital Problems    Diagnosis Date Noted Date Resolved   No resolved problems to display.        Assessment & Plan  77 y.o. female s/p carotid endarterectomy   Remains on Cardene for hypertension - wean today and increase oral BP meds. Increase metoprolol XL to 100 mg. DC arterial line- isn't working.   RN reports coughing with liquids- speech recommends honey thick diet, can still take pills  Aspirin for carotid stenosis. DC Plavix.   Postop anemia with a history of CAD and a small apical infarct- resolved after transfusion  Maintenance IV fluids while not eating much  Cr has started to come down      Ozzie Kelsey MD  01/17/18   8:48 AM  Office Number [phone]

## 2018-01-17 NOTE — PROGRESS NOTES
"                            Prabhu Kendrick MD                          [phone]      Patient ID:    Name:  Sakshi Garcia    MRN:  [id number]    1940   77 y.o.  female            Patient Care Team:  Provider Not In System as PCP - General    LOS: 16    CC/Reason for visit:     Subjective: Pt seen and examined this AM. No acute overnight events noted. Stable cough with some sputum. \"Want to get this over with - can't take anymore\"      ROS:   Denies any fevers/ chills/ CP/ palpitations/ Nausea/ vomiting/ Diarrhoea  No Worsening cough or SOA.     Objective     Vital Signs past 24hrs    BP range: BP: (107-168)/(30-78) 158/71  Pulse range: Heart Rate:  [69-82] 79  Resp rate range: Resp:  [16-20] 18  Temp range: Temp (24hrs), Av.6 °F (37 °C), Min:98.2 °F (36.8 °C), Max:99 °F (37.2 °C)      Ventilator/Non-Invasive Ventilation Settings     None          O2 Device: nasal cannula with humidification       71.4 kg (157 lb 6.4 oz); Body mass index is 28.78 kg/(m^2).      Intake/Output Summary (Last 24 hours) at 18 1651  Last data filed at 18 0417   Gross per 24 hour   Intake              900 ml   Output              325 ml   Net              575 ml       Exam:    GEN:  No respiratory distress, appears stated   Age  EYES:   EOM-i, anicteric sclera bilat  ENT:    External ears/nose normal, OP clear  NECK:  Supple, midline trachea, No JVD or   cervical LApathy, Lt large linear surgical   wound C/D/I  LUNGS: Bilateral breath sounds heard, No   adventitious sounds, Dec BS @ Bases  CV:  Regular rate and rhythm, No murmur  ABD:  Non tender, no distended, no palpable   liver or masses  EXT:  No cyanosis or clubbing, no    peripheral/sacral edema    Scheduled meds:      amLODIPine 5 mg Oral Q24H   aspirin 325 mg Oral Daily   atorvastatin 80 mg Oral Nightly   heparin (porcine) 5,000 Units Subcutaneous Q12H   insulin aspart 0-9 Units Subcutaneous 4x Daily With Meals & Nightly   ipratropium-albuterol 3 mL " Nebulization Q4H - RT   linagliptin 5 mg Oral Daily   lisinopril 20 mg Oral Q24H   metoprolol succinate  mg Oral Q24H   miconazole nitrate  Topical Q12H   piperacillin-tazobactam 3.375 g Intravenous Q8H       IV meds:                          clevidipine 2 mg/hr Last Rate: 13 mg/hr (01/17/18 1631)   sodium chloride 50 mL/hr Last Rate: 50 mL/hr (01/17/18 0300)       Data Review:        Results from last 7 days  Lab Units 01/17/18  0418 01/16/18  0541 01/15/18  1550 01/14/18  1731  01/11/18  0455   SODIUM mmol/L 139 140  --  140  --  141   POTASSIUM mmol/L 4.1 4.4  --  4.5  --  4.9   CHLORIDE mmol/L 105 105  --  100  --  105   CO2 mmol/L 23.0 23.2  --  26.5  --  26.4   BUN mg/dL 25* 27*  --  25*  --  21   CREATININE mg/dL 1.46* 1.55*  --  1.46*  --  1.21*   CALCIUM mg/dL 8.9 8.4*  --  9.3  --  8.6   GLUCOSE mg/dL 164* 129*  --  193*  --  160*   WBC 10*3/mm3 11.41* 12.95*  --  12.30*  < >  --    HEMOGLOBIN g/dL 9.4* 7.3* 8.1* 7.6*  < >  --    PLATELETS 10*3/mm3 262 297  --  384  < >  --    PROCALCITONIN ng/mL  --   --   --   --   --  0.14   < > = values in this interval not displayed.    Lab Results   Component Value Date    CALCIUM 8.9 01/17/2018    PHOS 2.5 01/02/2018                   Results Review:    I have reviewed the available laboratory results and reviewed the chest imaging personally    Imaging Results (all)     Procedure Component Value Units Date/Time    XR Chest 2 View [[id number]] Collected:  01/01/18 1656     Updated:  01/01/18 1701    Narrative:       XR CHEST 2 VW-     HISTORY: Female who is 77 years-old,  weakness     TECHNIQUE: Frontal and lateral views of the chest     COMPARISON: None available     FINDINGS: Heart size is borderline. Pulmonary vasculature is  unremarkable. Aorta is tortuous. No focal pulmonary consolidation,  pleural effusion, or pneumothorax. Arterial calcifications are present.  Degenerative changes are seen at the shoulders. No acute osseous  process.       Impression:        No focal pulmonary consolidation. Borderline heart size.  Tortuous aorta. Follow-up as clinically indicated.     This report was finalized on 1/1/2018 4:58 PM by Dr. Carlos Manuel Mary MD.       CT Head Without Contrast [[id number]] Collected:  01/01/18 1810     Updated:  01/01/18 1944    Narrative:       CT OF THE HEAD WITHOUT CONTRAST     HISTORY: 77-year-old female with a history of frequent falls and altered  mental status.     TECHNIQUE: Contiguous axial images were obtained through the head  without IV contrast.     COMPARISON: None.     FINDINGS: There is a severe hypodense appearance seen within the  bilateral paraventricular and subcortical white matter of the cerebral  hemispheres. No territorial edema is appreciated. There is moderate  diffuse atrophy and proportionate ventriculomegaly. The osseous  structures of the skull have a normal appearance.       Impression:       There is no evidence for an acute intracranial abnormality.  Findings consistent with severe chronic small vessel ischemic change of  the cerebral white matter are noted.     Radiation dose reduction techniques were utilized, including automated  exposure control and exposure modulation based on body size.     This report was finalized on 1/1/2018 7:41 PM by Dr. Philip Soares MD.       XR Chest 1 View [[id number]] Collected:  01/04/18 1211     Updated:  01/04/18 1219    Narrative:       XR CHEST 1 VW-     HISTORY: Female who is 77 years-old,  short of breath, cough     TECHNIQUE: Frontal view of the chest     COMPARISON: 01/01/2018     FINDINGS: Heart size is normal. Aorta is calcified. Old granulomatous  disease is seen.. Opacity has developed at the right base suggesting  atelectasis/infiltrate and pleural effusion, follow-up recommended No  pneumothorax. No acute osseous process.       Impression:       Opacity has developed at the right base suggesting  atelectasis/infiltrate and pleural effusion, follow-up recommended.       This  report was finalized on 1/4/2018 12:15 PM by Dr. Carlos Manuel Mary MD.       CT Head Without Contrast [[id number]] Collected:  01/04/18 1816     Updated:  01/04/18 1822    Narrative:       CT HEAD WO CONTRAST-     CLINICAL HISTORY: Right-sided weakness. No onset confusion. Possible  acute CVA     TECHNIQUE: Transverse 3 mm thick images were acquired from the base of  the skull to the vertex without IV contrast.     Radiation dose reduction techniques were utilized, including automated  exposure control and exposure modulation based on body size.     COMPARISON: CT head dated 1/1/2018.     FINDINGS: There is mild diffuse cortical atrophy. There is fairly  confluent abnormal diminished attenuation throughout the white matter of  both cerebral hemispheres that is compatible with sequela of extensive  small vessel chronic ischemic change. A tiny old lacunar infarct is  noted in the right thalamus. No acute infarct or hemorrhage is  identified. There is no mass effect. The paranasal sinuses appear well  aerated       Impression:       Evidence of extensive small vessel chronic ischemic change  as described. No acute intracranial abnormality is identified.     These findings were communicated to the stroke neurologist on 1/4/2018  at 6:15 PM.     This report was finalized on 1/4/2018 6:19 PM by Dr. Nico Tello MD.       MRI Angiogram Head Without Contrast [[id number]] Collected:  01/05/18 2132     Updated:  01/07/18 1948    Narrative:       MRI EXAMINATION OF THE BRAIN WITHOUT CONTRAST, MRA OF THE HEAD WITHOUT  CONTRAST AND MRA OF THE NECK WITHOUT CONTRAST     HISTORY: Stroke.     COMPARISON: CT head 01/04/2018. No prior MRI examination of the brain is  available for comparison.     MRI EXAMINATION OF THE BRAIN WITHOUT CONTRAST: The diffusion sequence  demonstrates an area of increased signal intensity involving the cortex  of the right middle frontal gyrus anteriorly and superiorly measuring 8  mm in size suspicious  for small acute infarct. A 1 mm area of subtle  increased signal intensity is appreciated involving the corona radiata  on the right on the diffusion sequence as well. There are multiple  cortical and subcortical areas of restricted diffusion involving the  left frontal parietal and occipital lobes suggesting a watershed  distribution infarct. The largest area of infarction involves the left  occipital lobe posteriorly measuring 18 mm in size.  The next largest area of infarction involves the subcortical white  matter of the left frontal lobe where an area of infarction measuring  approximately 13 mm in size is present.     There is moderate atrophy. There is extensive small vessel ischemic  disease. Small areas of signal loss are noted on the susceptibility  weighted imaging involving the posterior occipital infarct suggesting  small foci of blood products. There is no evidence of positive mass  effect, hydrocephalus or of midline shift. There is extensive small  vessel ischemic disease.     MRA OF THE HEAD WITHOUT CONTRAST: There is signal present within the  distal aspect of the vertebral arteries bilaterally. The vertebral  arteries are codominant. The basilar artery and the left posterior  cerebral artery appear unremarkable. There is signal present within the  distal aspects of the internal carotid arteries bilaterally. There is a  small focal protuberance involving the cavernous ICA laterally which may  be secondary to a small aneurysm or infundibulum. This does not project  into the subarachnoid space. The left A1 segment is mildly hypoplastic.  The anterior communicating artery is complex suggesting a fenestrated  right A1/A2 junction. No convincing aneurysm is identified. The study is  hampered by patient motion but the proximal aspects of the anterior and  middle cerebral arteries appeared unremarkable.     MRA OF THE NECK WITHOUT CONTRAST: There is a suggestion of a right  pleural effusion. There is  signal loss appreciated involving the left  common carotid artery at its origin. The sensitivity of the study is  reduced as intravenous contrast was not utilized. I could not exclude a  severe stenosis at the origin of the left common carotid artery. There  is irregularity involving the carotid bifurcations bilaterally with a  focal area of signal loss appreciated involving the left internal  carotid artery approximately 1.4 cm beyond its origin. I could not  exclude a severe stenosis at this location, greater than 80% using  NASCET criteria. There is a focal stenosis appreciated involving the  external carotid artery on the right and signal loss appreciated  involving the proximal aspect of the internal carotid artery on the  right. There is a focal eccentric stenosis involving the proximal aspect  of the right vertebral artery approximately 3 cm beyond its origin  resulting in a severe (greater than 90% stenosis). There is mild  irregularity but with no evidence of focal high-grade stenosis of the  left vertebral artery.       Impression:       1. Multiple lacunar infarcts are appreciated involving the left cerebral  hemisphere suggesting a watershed type distribution with infarcts  involving the frontal, parietal and occipital lobes. There are  questionable areas of restricted diffusion involving the right frontal  lobe. If true this would suggest a proximal cardiac source. However, the  MRA of the neck did demonstrate signal loss involving the proximal  aspect of the left internal carotid artery where there is likely  moderate-to-severe if not severe stenosis using NASCET criteria. There  is signal loss at the origin of the left common carotid artery  suggesting a severe stenosis. There is severe stenosis involving the  right vertebral artery approximately 3 cm beyond the origin and likely  moderate if not moderate-to-severe stenosis involving the right internal  carotid artery. Further evaluation with a CT  angiogram of the neck and  head is recommended. A cardiac echo could also be performed.  2. A right pleural effusion is present.     The above information was called to and discussed with Dr. Greer on  01/05/2018 at 2108 hours.     This report was finalized on 1/7/2018 7:45 PM by Dr. Dada Lewis MD.       MRI Brain Without Contrast [[id number]] Collected:  01/05/18 2132     Updated:  01/07/18 1948    Narrative:       MRI EXAMINATION OF THE BRAIN WITHOUT CONTRAST, MRA OF THE HEAD WITHOUT  CONTRAST AND MRA OF THE NECK WITHOUT CONTRAST     HISTORY: Stroke.     COMPARISON: CT head 01/04/2018. No prior MRI examination of the brain is  available for comparison.     MRI EXAMINATION OF THE BRAIN WITHOUT CONTRAST: The diffusion sequence  demonstrates an area of increased signal intensity involving the cortex  of the right middle frontal gyrus anteriorly and superiorly measuring 8  mm in size suspicious for small acute infarct. A 1 mm area of subtle  increased signal intensity is appreciated involving the corona radiata  on the right on the diffusion sequence as well. There are multiple  cortical and subcortical areas of restricted diffusion involving the  left frontal parietal and occipital lobes suggesting a watershed  distribution infarct. The largest area of infarction involves the left  occipital lobe posteriorly measuring 18 mm in size.  The next largest area of infarction involves the subcortical white  matter of the left frontal lobe where an area of infarction measuring  approximately 13 mm in size is present.     There is moderate atrophy. There is extensive small vessel ischemic  disease. Small areas of signal loss are noted on the susceptibility  weighted imaging involving the posterior occipital infarct suggesting  small foci of blood products. There is no evidence of positive mass  effect, hydrocephalus or of midline shift. There is extensive small  vessel ischemic disease.     MRA OF THE HEAD WITHOUT  CONTRAST: There is signal present within the  distal aspect of the vertebral arteries bilaterally. The vertebral  arteries are codominant. The basilar artery and the left posterior  cerebral artery appear unremarkable. There is signal present within the  distal aspects of the internal carotid arteries bilaterally. There is a  small focal protuberance involving the cavernous ICA laterally which may  be secondary to a small aneurysm or infundibulum. This does not project  into the subarachnoid space. The left A1 segment is mildly hypoplastic.  The anterior communicating artery is complex suggesting a fenestrated  right A1/A2 junction. No convincing aneurysm is identified. The study is  hampered by patient motion but the proximal aspects of the anterior and  middle cerebral arteries appeared unremarkable.     MRA OF THE NECK WITHOUT CONTRAST: There is a suggestion of a right  pleural effusion. There is signal loss appreciated involving the left  common carotid artery at its origin. The sensitivity of the study is  reduced as intravenous contrast was not utilized. I could not exclude a  severe stenosis at the origin of the left common carotid artery. There  is irregularity involving the carotid bifurcations bilaterally with a  focal area of signal loss appreciated involving the left internal  carotid artery approximately 1.4 cm beyond its origin. I could not  exclude a severe stenosis at this location, greater than 80% using  NASCET criteria. There is a focal stenosis appreciated involving the  external carotid artery on the right and signal loss appreciated  involving the proximal aspect of the internal carotid artery on the  right. There is a focal eccentric stenosis involving the proximal aspect  of the right vertebral artery approximately 3 cm beyond its origin  resulting in a severe (greater than 90% stenosis). There is mild  irregularity but with no evidence of focal high-grade stenosis of the  left vertebral  artery.       Impression:       1. Multiple lacunar infarcts are appreciated involving the left cerebral  hemisphere suggesting a watershed type distribution with infarcts  involving the frontal, parietal and occipital lobes. There are  questionable areas of restricted diffusion involving the right frontal  lobe. If true this would suggest a proximal cardiac source. However, the  MRA of the neck did demonstrate signal loss involving the proximal  aspect of the left internal carotid artery where there is likely  moderate-to-severe if not severe stenosis using NASCET criteria. There  is signal loss at the origin of the left common carotid artery  suggesting a severe stenosis. There is severe stenosis involving the  right vertebral artery approximately 3 cm beyond the origin and likely  moderate if not moderate-to-severe stenosis involving the right internal  carotid artery. Further evaluation with a CT angiogram of the neck and  head is recommended. A cardiac echo could also be performed.  2. A right pleural effusion is present.     The above information was called to and discussed with Dr. Greer on  01/05/2018 at 2108 hours.     This report was finalized on 1/7/2018 7:45 PM by Dr. Dada Lewis MD.       MRI Angiogram Neck Without Contrast [[id number]] Collected:  01/05/18 2132     Updated:  01/07/18 1948    Narrative:       MRI EXAMINATION OF THE BRAIN WITHOUT CONTRAST, MRA OF THE HEAD WITHOUT  CONTRAST AND MRA OF THE NECK WITHOUT CONTRAST     HISTORY: Stroke.     COMPARISON: CT head 01/04/2018. No prior MRI examination of the brain is  available for comparison.     MRI EXAMINATION OF THE BRAIN WITHOUT CONTRAST: The diffusion sequence  demonstrates an area of increased signal intensity involving the cortex  of the right middle frontal gyrus anteriorly and superiorly measuring 8  mm in size suspicious for small acute infarct. A 1 mm area of subtle  increased signal intensity is appreciated involving the corona  radiata  on the right on the diffusion sequence as well. There are multiple  cortical and subcortical areas of restricted diffusion involving the  left frontal parietal and occipital lobes suggesting a watershed  distribution infarct. The largest area of infarction involves the left  occipital lobe posteriorly measuring 18 mm in size.  The next largest area of infarction involves the subcortical white  matter of the left frontal lobe where an area of infarction measuring  approximately 13 mm in size is present.     There is moderate atrophy. There is extensive small vessel ischemic  disease. Small areas of signal loss are noted on the susceptibility  weighted imaging involving the posterior occipital infarct suggesting  small foci of blood products. There is no evidence of positive mass  effect, hydrocephalus or of midline shift. There is extensive small  vessel ischemic disease.     MRA OF THE HEAD WITHOUT CONTRAST: There is signal present within the  distal aspect of the vertebral arteries bilaterally. The vertebral  arteries are codominant. The basilar artery and the left posterior  cerebral artery appear unremarkable. There is signal present within the  distal aspects of the internal carotid arteries bilaterally. There is a  small focal protuberance involving the cavernous ICA laterally which may  be secondary to a small aneurysm or infundibulum. This does not project  into the subarachnoid space. The left A1 segment is mildly hypoplastic.  The anterior communicating artery is complex suggesting a fenestrated  right A1/A2 junction. No convincing aneurysm is identified. The study is  hampered by patient motion but the proximal aspects of the anterior and  middle cerebral arteries appeared unremarkable.     MRA OF THE NECK WITHOUT CONTRAST: There is a suggestion of a right  pleural effusion. There is signal loss appreciated involving the left  common carotid artery at its origin. The sensitivity of the study  is  reduced as intravenous contrast was not utilized. I could not exclude a  severe stenosis at the origin of the left common carotid artery. There  is irregularity involving the carotid bifurcations bilaterally with a  focal area of signal loss appreciated involving the left internal  carotid artery approximately 1.4 cm beyond its origin. I could not  exclude a severe stenosis at this location, greater than 80% using  NASCET criteria. There is a focal stenosis appreciated involving the  external carotid artery on the right and signal loss appreciated  involving the proximal aspect of the internal carotid artery on the  right. There is a focal eccentric stenosis involving the proximal aspect  of the right vertebral artery approximately 3 cm beyond its origin  resulting in a severe (greater than 90% stenosis). There is mild  irregularity but with no evidence of focal high-grade stenosis of the  left vertebral artery.       Impression:       1. Multiple lacunar infarcts are appreciated involving the left cerebral  hemisphere suggesting a watershed type distribution with infarcts  involving the frontal, parietal and occipital lobes. There are  questionable areas of restricted diffusion involving the right frontal  lobe. If true this would suggest a proximal cardiac source. However, the  MRA of the neck did demonstrate signal loss involving the proximal  aspect of the left internal carotid artery where there is likely  moderate-to-severe if not severe stenosis using NASCET criteria. There  is signal loss at the origin of the left common carotid artery  suggesting a severe stenosis. There is severe stenosis involving the  right vertebral artery approximately 3 cm beyond the origin and likely  moderate if not moderate-to-severe stenosis involving the right internal  carotid artery. Further evaluation with a CT angiogram of the neck and  head is recommended. A cardiac echo could also be performed.  2. A right pleural  effusion is present.     The above information was called to and discussed with Dr. Greer on  2018 at 2108 hours.     This report was finalized on 2018 7:45 PM by Dr. Dada Lewis MD.       XR Chest PA & Lateral [[id number]] Collected:  18 1316     Updated:  18    Narrative:       2 VIEWS CHEST     HISTORY:  Pneumonia, follow-up.     COMPARISON: Chest x-ray 2018.     FINDINGS: The heart is within normal limits in size. There is bibasilar  atelectasis/infiltrate more prominent on the right. The lateral  projection demonstrates a moderate pleural effusion on the right. Given  differences in technique, this appears similar to minimally more  prominent as compared to the examination of 2018. The diaphragm is  flattened and AP diameter increased consistent with COPD.     This report was finalized on 2018 8:04 PM by Dr. Dada Lewis MD.       CT Chest Without Contrast [[id number]] Collected:  18 1238     Updated:  18 1421    Narrative:       CT SCAN OF THE CHEST WITHOUT CONTRAST     HISTORY: Shortness of breath. Pleural effusions. Possible pneumonia.     The CT scan was performed through the chest without contrast and  demonstrates the followin. There are small-to-moderate bilateral pleural effusions layering  posteriorly, right larger than left. These are associated with some  localized compressive atelectasis, particularly at the right base  posteriorly and I cannot completely exclude a small component of  pneumonia. The lungs are otherwise clear except for calcified granulomas  in both upper lobes.   2. There is no mediastinal or hilar or axillary adenopathy. There is no  pericardial effusion. The CT images through the upper liver, spleen, and  both adrenal glands are unremarkable.     Radiation dose reduction techniques were utilized, including automated  exposure control and exposure modulation based on body size.     This report was finalized on  1/8/2018 2:18 PM by Dr. Andrzej Mcclendon MD.       XR Elbow 2 View Right [[id number]] Collected:  01/09/18 1459     Updated:  01/09/18 1540    Narrative:       TWO VIEW PORTABLE RIGHT ELBOW     HISTORY: Trauma. Pain.     A large joint effusion is present as seen in the lateral view. Although  I see no definite fracture, this raises the concern of an occult radial  head fracture and follow-up imaging in several days is recommended.     This report was finalized on 1/9/2018 3:37 PM by Dr. Andrzej Mcclendon MD.       FL Video Swallow [[id number]] Collected:  01/09/18 1545     Updated:  01/09/18 1549    Narrative:       BARIUM SWALLOW WITH VIDEO     CLINICAL HISTORY:   Female who is 77 years-old, with dysphagia.     TECHNIQUE: The swallowing mechanism was evaluated with real-time  fluoroscopic imaging, captured on digital disk and performed in  conjunction with speech pathologist (please see report).     FINDINGS: Pt presents with mild oropharyngeal dysphagia. No  penetration/aspiration with nectar, adequate swallow initiation.  Spillage to pyriforms intermittently with thins. Pt with mild pharyngeal  residue post swallow, pt did not clear without cues. Pt coughing  consistently after trials of thins when fluoro not on. Spillage to  valleculae with puree without penetration/aspiration. Mild diffuse  pharyngeal residue post swallow. Spillage to pyriforms with mechanical  soft without observed penetration. Mild BOT residue post swallow with  regular.        Impression:       Mild risk of aspiration.     FLUOROSCOPY TIME: 4 minutes 27 seconds, 3 images      This report was finalized on 1/9/2018 3:46 PM by Dr. Panfilo Hood MD.       XR Chest 1 View [[id number]] Collected:  01/12/18 0740     Updated:  01/12/18 0821    Narrative:       CLINICAL HISTORY: 77-year-old female with pneumonia and pleural  effusions as well as history of colon cancer.     PORTABLE AP SEMIERECT CHEST DATED 01/12/2018 AT 0541 HOURS     FINDINGS: When  compared to the AP and lateral erect chest radiographs of  01/07/2018 and CT chest multiplanar sections and AP  image of  01/08/2018, some haziness and strandy opacity at the lower left and  right chest is compatible with some probable residual of the atelectasis  or infiltrates and adjacent pleural fluid demonstrated at the lower  right and left lung and pleural space on the prior exams. There is only  trace blunting of the costophrenic angles accompanied by some modest  thickening of lateral pleural stripes at the costophrenic angles.  Cardiac silhouette is upper normal caliber. Aortic uncoiling and  calcification are again demonstrated. Degenerative changes in the spine  and shoulders are again demonstrated. No pneumothorax or acute change in  bony thorax is seen. Oxygen cannula tubing and monitoring lead wires are  present.     CONCLUSION: Persistent hazy to patchy opacity in the lower pleural  spaces and lower lung zones compatible with previously demonstrated  pleural effusions and adjacent airspace opacities.     This report was finalized on 1/12/2018 8:18 AM by Dr. Bandar Solomon MD.             ASSESSMENT:   1. Pneumonia s/p antibiotic course  2. Bilateral pleural effusions Rt >Lt - improved   3. Acute CVA  4. Severe carotid stenosis left s/p CEA with graft  5. Right radial head fracture  6. Escherichia coli UTI and bacteremia  7. Diabetes mellitus type 2  8. Metabolic encephalopathy  9. Oral pharyngeal dysphagia    Agree with repeat swallow eval. Will need to be OOB and participate in PT. Finish abx regimen per plan. Wean o2     No new recommendations. Mentioned to pt that she needs to be patient and follow recs and will continue to improve.     I have discussed my findings and recommendations with patient and nursing staff.     Prabhu Kendrick MD  1/17/2018

## 2018-01-17 NOTE — NURSING NOTE
Called Dr. Shell's office per Dr. Carvajal's request. Let him know this patient is still in the hospital and will not be able to make her follow up appointment for additional xrays. Office staff said he would call back for additional recommendations.

## 2018-01-17 NOTE — NURSING NOTE
CWOCN consult- patient has redness, irritation in her perineal area. She states that it itches. Noted patchy redness. Barrier ointment has been applied. Antifungal barrier may heal skin better and sooth. Order placed in chart.

## 2018-01-17 NOTE — MBS/VFSS/FEES
Acute Care - Speech Language Pathology   Swallow Re-Evaluation [facility]     Patient Name: Sakshi Garcia  : 1940  MRN: [id number]  Today's Date: 2018  Onset of Illness/Injury or Date of Surgery Date: 01/15/18            Admit Date: 2018     SPEECH-LANGUAGE PATHOLOGY EVALUTION - VFSS  Subjective: The patient was seen on this date for a VFSS(Videofluoroscopic Swallowing Study).  Patient was alert and cooperative  Objective: Risks/benefits were reviewed with the patient, and consent was obtained. The study was completed with SLP present and Radiologist review. The patient was seen in lateral view(s). Textures given included thin liquid, nectar thick liquid, honey thick liquid, puree consistency and mechanical soft consistency.  Assessment:  Pt presents with mild oral dysphagia. Pt with decreased mastication and mild oral residue this date. Pt able to clear residuals with liquid wash. Pt's pharyngeal phase of swallow appeared WFL. No penetration or aspiraiton with thin, nectar, honey, puree, and mech soft. Minimal pharyngeal residue. Inconsistent cough before, throughout, and after evaluation- appeared unrelated to swallow function.   SLP Findings: Patient presents with mild oral dysphagia.   Recommendations: Diet Textures: thin liquid, mechanical soft consistency food. Medications should be taken whole or crushed with puree. May have water and Ice between meals after oral care, under staff or family supervision and with the recommended strategies for safe swallowing.  Recommended Strategies: Upright for PO, small bites and sips, may use straw and alternate liquids and solids. Oral care before breakfast, after all meals and PRN.  Other Recommended Evaluations:   Recommend re-eval as bedside for upgrade as pt status returns to baseline.   Dysphagia therapy is recommended. Rationale: Tolerate least restrictive diet.      Visit Dx:     ICD-10-CM ICD-9-CM   1. Traumatic rhabdomyolysis, initial encounter  T79.6XXA 958.6   2. Fall, initial encounter W19.XXXA E888.9   3. Renal insufficiency N28.9 593.9   4. Dizziness R42 780.4   5. Unsteadiness on feet R26.81 781.2     Patient Active Problem List   Diagnosis   • Non-traumatic rhabdomyolysis   • Generalized weakness   • Fall   • Acute cystitis without hematuria   • Stage 3 chronic kidney disease   • Type 2 diabetes mellitus   • HTN (hypertension)   • Altered mental status   • Sepsis   • Bacteremia   • DNR (do not resuscitate)   • Aspiration pneumonia   • 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   • E-coli UTI w/ bacteremia   • Metabolic encephalopathy present on admit   • Tobacco abuse   • Bilateral pleural effusions   • Oropharyngeal dysphagia   • Closed nondisplaced fracture of head of right radius     Past Medical History:   Diagnosis Date   • Colon cancer    • Hypertension      Past Surgical History:   Procedure Laterality Date   • CAROTID ENDARTERECTOMY Left 1/15/2018    Procedure: LEFT CAROTID ENDARTERECTOMY WITH SAPHENOUS VEIN HARVEST;  Surgeon: Ozzie Kelsey MD;  Location: [facility];  Service:    • CHOLECYSTECTOMY     • COLON SURGERY     • COLONOSCOPY     • HERNIA REPAIR            SWALLOW EVALUATION (last 72 hours)      Swallow Evaluation       01/17/18 1000 01/16/18 1300             Rehab Evaluation    Document Type evaluation  -OC evaluation  -SH       Subjective Information no complaints;agree to therapy  -OC agree to therapy  -SH       Patient Effort, Rehab Treatment good  -OC adequate  -SH       Symptoms Noted During/After Treatment none  -OC fatigue  -SH       General Information    Patient Profile Review yes  -OC yes  -SH       Subjective Patient Observations  Pt easily distractable  -SH       Pertinent History Of Current Problem  coughing post op  -SH       Current Diet Limitations honey thick liquids;puree;other (see comments)   via spoon  -OC NPO  -SH       Prior Level of Function- Communication functional in all  spheres  -OC functional in all spheres  -       Prior Level of Function- Swallowing no diet consistency restrictions  -OC no diet consistency restrictions  -       Plans/Goals Discussed With patient  -OC patient;family  -       Clinical Impression    Patient's Goals For Discharge patient did not state  -OC return to regular diet  -       Family Goals For Discharge  patient able to return to regular diet  -       SLP Swallowing Diagnosis mild dysphagia;oral dysfunction  -OC moderate dysphagia  -SH       Rehab Potential/Prognosis, Swallowing good, to achieve stated therapy goals  -OC good, to achieve stated therapy goals  -       Criteria for Skilled Therapeutic Interventions Met skilled criteria for dysphagia intervention met  -OC skilled criteria for dysphagia intervention met  -       FCM, Swallowing 4-->Level 4  -OC 3-->Level 3  -SH       Therapy Frequency PRN  -OC PRN  -       Predicted Duration Therapy Interv (days) until discharge  -OC until discharge  -       Expected Duration Therapy Session (min) 15-30 minutes  -OC 15-30 minutes  -       SLP Diet Recommendation soft textures;ground;thin liquids  -OC honey-thick liquids;II - pureed  -       Recommended Diagnostics  reassess via VFSS (MBS)  -       Recommended Feeding/Eating Techniques alternate between small bites and sips of food/liquid;maintain upright posture during/after eating for 30 mins;small sips/bites  - no cups;no straws;small sips/bites;maintain upright posture during/after eating for 30 mins  -       SLP Rec. for Method of Medication Administration meds whole in pudding/applesauce  -OC meds whole in pudding/applesauce;meds crushed in pudding/applesauce  -       Monitor For Signs Of Aspiration pneumonia;right lower lobe infiltrates  -OC cough;elevated WBC count;gurgly voice;throat clearing;fever;upper respiratory infection;pneumonia;right lower lobe infiltrates  -       Anticipated Discharge Disposition other (see  comments)   unknown  -OC inpatient rehabilitation facility  -       Pain Assessment    Pain Assessment No/denies pain  -OC No/denies pain  -       Vision Assessment/Intervention    Visual Impairment WFL  -OC        Cognitive Assessment/Intervention    Current Cognitive/Communication Assessment impaired  -OC        SLP Communication to Radiology    Summary Statement  Pt presents with mild oral dysphagia. Pt with decreased mastication and mild oral residue this date. Pt able to clear residuals with liquid wash. Pt's pharyngeal phase appeared WFL. No pen/asp with thin, nectar, honey, puree, and mech soft. Min pharyngeal residue.   -OC          User Key  (r) = Recorded By, (t) = Taken By, (c) = Cosigned By    Initials Name Effective Dates    OC Pamella Palacios MA,CCC-SLP 04/05/17 -      Lisa Mcadams MS CCC-SLP 07/13/17 -         EDUCATION  The patient has been educated in the following areas:   Dysphagia (Swallowing Impairment).    SLP Recommendation and Plan  SLP Swallowing Diagnosis: mild dysphagia, oral dysfunction  SLP Diet Recommendation: soft textures, ground, thin liquids  Recommended Feeding/Eating Techniques: alternate between small bites and sips of food/liquid, maintain upright posture during/after eating for 30 mins, small sips/bites  SLP Rec. for Method of Medication Administration: meds whole in pudding/applesauce  Monitor For Signs Of Aspiration: pneumonia, right lower lobe infiltrates     Criteria for Skilled Therapeutic Interventions Met: skilled criteria for dysphagia intervention met  Anticipated Discharge Disposition: other (see comments) (unknown)  Rehab Potential/Prognosis, Swallowing: good, to achieve stated therapy goals  Therapy Frequency: PRN     Plan of Care Review  Plan Of Care Reviewed With: patient  Progress: improving  Outcome Summary/Follow up Plan: VFSS completed. Recommend thin liquids and mech soft. Meds whole or crushed with puree. ST to follow for diet tolerance.          IP SLP  Goals       01/17/18 1033 01/16/18 1425 01/09/18 1424    Safely Consume Diet    Safely Consume Diet- SLP, Date Established 01/17/18  -OC      Safely Consume Diet- SLP, Time to Achieve by discharge  -OC      Safely Consume Diet- SLP, Outcome goal ongoing  -OC goal ongoing  -SH     Cognitive Linguistic- Optimal Participation in Care    Cognitive Linguistic Optimal Participation in Care- SLP, Date Established   01/09/18   cog-communication eval completed; deficits noted warranting   -RH    Cognitive Linguistic Optimal Participation in Care- SLP, Outcome   goal ongoing  -RH      01/09/18 1410 01/08/18 1001 01/06/18 1437    Safely Consume Diet    Safely Consume Diet- SLP, Date Established 01/09/18  -RH  01/06/18  -HS    Safely Consume Diet- SLP, Time to Achieve by discharge  -RH  by discharge  -HS    Safely Consume Diet- SLP, Additional Goal   Bedside swallow evaluation completed. Recommend a mechanical soft diet with nectar thick liquids. Meds whole or crushed in puree. Ice chips or small sips of water 30 minutes after meals only after oral care and with supervision. Plan to proceed with VFSS early next week to further assess swallow function.  -HS    Safely Consume Diet- SLP, Outcome goal ongoing   Pt seen for dys Tx. Unable to assess for diet tolerance b/c   -RH goal ongoing  -SH       01/04/18 1547          Safely Consume Diet    Safely Consume Diet- SLP, Date Established 01/04/18  -SH      Safely Consume Diet- SLP, Time to Achieve by discharge  -        User Key  (r) = Recorded By, (t) = Taken By, (c) = Cosigned By    Initials Name Provider Type    OC Pamella Palacios MA,[facility]-SLP Speech and Language Pathologist    HS Alisa Buchanan, MS CCC-SLP Speech and Language Pathologist    SH Lisa Mcadams, MS CCC-SLP Speech and Language Pathologist    RH Sidney Azevedo, MS CCC-SLP Speech and Language Pathologist             SLP Outcome Measures (last 72 hours)      SLP Outcome Measures       01/17/18 1034 01/16/18 1400        SLP Outcome Measures    Outcome Measure Used? Adult NOMS  -OC Adult NOMS  -SH     FCM Scores    FCM Chosen Swallowing  -OC Swallowing  -SH     Swallowing FCM Score 4  -OC 3  -SH       User Key  (r) = Recorded By, (t) = Taken By, (c) = Cosigned By    Initials Name Effective Dates    OC Pamella Palacios MA,JANNET-SLP 04/05/17 -      Lisa LEILA Mcadams MS JANNET-SLP 07/13/17 -            Time Calculation:         Time Calculation- SLP       01/17/18 1035          Time Calculation- SLP    SLP Start Time 0900  -OC      SLP Stop Time 1000  -OC      SLP Time Calculation (min) 60 min  -      SLP Received On 01/17/18  -        User Key  (r) = Recorded By, (t) = Taken By, (c) = Cosigned By    Initials Name Provider Type    OC Pamella Palacios MA,[facility]-SLP Speech and Language Pathologist          Therapy Charges for Today     Code Description Service Date Service Provider Modifiers Qty    11043926156 HC ST MOTION FLUORO EVAL SWALLOW 4 1/17/2018 Pamella Palacios MA,CCC-SLP GN 1               Pamella Palacios MA,JANNET-SLP  1/17/2018

## 2018-01-17 NOTE — PROGRESS NOTES
Adult Nutrition  Assessment/PES    Patient Name:  Sakshi Garcia  YOB: 1940  MRN: [id number]  Admit Date:  1/1/2018    Assessment Date:  1/17/2018    Follow up-Speech Therapy advanced diet to mechanical soft +thin liquids; spoke with RN, patient's po intake has improved. Ordered glucerna BID          Reason for Assessment       01/17/18 1105    Reason for Assessment    Reason For Assessment/Visit follow up protocol                  Labs/Tests/Procedures/Meds       01/17/18 1105    Labs/Tests/Procedures/Meds    Diagnostic Test/Procedure Review reviewed    Labs/Tests Review Reviewed;Glucose;Creat;BUN    Medication Review Reviewed, pertinent   IV fluids due to not eating much    Significant Vitals reviewed                Nutrition Prescription Ordered       01/17/18 1106    Nutrition Prescription PO    Dysphagia Level 4  Mechanical soft no mixed consistencies    Fluid Consistency Thin            Evaluation of Received Nutrient/Fluid Intake       01/17/18 1106    PO Evaluation    % PO Intake eating very little            Problem/Interventions:                  Intervention Goal       01/17/18 1106    Intervention Goal    General Maintain nutrition;Meet nutritional needs for age/condition    PO Tolerate PO;Increase intake    Weight Maintain weight            Nutrition Intervention       01/17/18 1106    Nutrition Intervention    RD/Tech Action Interview for preference;Follow Tx progress;Care plan reviewd;Encourage intake;Supplement provided            Nutrition Prescription       01/17/18 1106    Nutrition Prescription PO    PO Prescription Begin/change supplement    Supplement Glucerna Shake    Supplement Frequency 2 times a day    New PO Prescription Ordered? Yes            Education/Evaluation       01/17/18 1106    Education    Education Will Instruct as appropriate    Monitor/Evaluation    Monitor Per protocol;I&O;PO intake;Pertinent labs;Weight;Skin status;Symptoms    Education Follow-up Reinforce PRN         Electronically signed by:  Maria Fernanda Maciel RD  01/17/18 11:06 AM

## 2018-01-17 NOTE — PLAN OF CARE
Problem: Patient Care Overview (Adult)  Goal: Plan of Care Review  Outcome: Ongoing (interventions implemented as appropriate)   01/17/18 0342   Coping/Psychosocial Response Interventions   Plan Of Care Reviewed With patient   Patient Care Overview   Progress improving   Outcome Evaluation   Outcome Summary/Follow up Plan VSS. On cleviprex gtt to maintain BP lower than 140 S. No c/o of pain. pt is weak. BG elevated sub q insulin given. NIH q shift. On honey thick pureed diet. Will cont. to monitor.     Goal: Adult Individualization and Mutuality  Outcome: Ongoing (interventions implemented as appropriate)      Problem: Pressure Ulcer Risk (Tank Scale) (Adult,Obstetrics,Pediatric)  Goal: Skin Integrity  Outcome: Ongoing (interventions implemented as appropriate)   01/17/18 0342   Pressure Ulcer Risk (Tank Scale) (Adult,Obstetrics,Pediatric)   Skin Integrity making progress toward outcome       Problem: Infection, Risk/Actual (Adult)  Goal: Infection Prevention/Resolution  Outcome: Ongoing (interventions implemented as appropriate)   01/17/18 0342   Infection, Risk/Actual (Adult)   Infection Prevention/Resolution making progress toward outcome       Problem: Respiratory Insufficiency (Adult)  Goal: Effective Ventilation  Outcome: Ongoing (interventions implemented as appropriate)   01/17/18 0342   Respiratory Insufficiency (Adult)   Effective Ventilation making progress toward outcome

## 2018-01-17 NOTE — SIGNIFICANT NOTE
01/17/18 1218   Rehab Treatment   Discipline occupational therapist   Treatment Not Performed patient unavailable for treatment  (off floor swallow test. will attempt later if time permits. 10:00)   Recommendation   OT - Next Appointment 01/18/18

## 2018-01-17 NOTE — PLAN OF CARE
Problem: Patient Care Overview (Adult)  Goal: Plan of Care Review  Outcome: Ongoing (interventions implemented as appropriate)   01/17/18 1033   Coping/Psychosocial Response Interventions   Plan Of Care Reviewed With patient   Patient Care Overview   Progress improving   Outcome Evaluation   Outcome Summary/Follow up Plan VFSS completed. Recommend thin liquids and mech soft. Meds whole or crushed with puree. ST to follow for diet tolerance.       Problem: Inpatient SLP  Goal: Dysphagia- Patient will safely consume diet as per recommendation with no signs/symptoms of aspiration  Outcome: Ongoing (interventions implemented as appropriate)   01/17/18 1033   Safely Consume Diet   Safely Consume Diet- SLP, Date Established 01/17/18   Safely Consume Diet- SLP, Time to Achieve by discharge   Safely Consume Diet- SLP, Outcome goal ongoing

## 2018-01-17 NOTE — PLAN OF CARE
Problem: Patient Care Overview (Adult)  Goal: Plan of Care Review   01/17/18 1423   Coping/Psychosocial Response Interventions   Plan Of Care Reviewed With patient   Patient Care Overview   Progress progress towards functional goals is fair   Outcome Evaluation   Outcome Summary/Follow up Plan Pt able to tolerated increased activity, tolerated taking several steps to move from bed to chair. Pt continues to require assist of two with mobility due to pain and weakness. No new concerns; will continue to increase activity as able.

## 2018-01-17 NOTE — PROGRESS NOTES
Name: Sakshi Garcia ADMIT: 2018   : 1940  PCP: Provider Not In System    MRN: [id number] LOS: 16 days   AGE/SEX: 77 y.o. female  ROOM: [facility]/     Subjective   Subjective  Sitting up in chair.     Objective   Vital Signs  Temp:  [98.2 °F (36.8 °C)-99.3 °F (37.4 °C)] 99 °F (37.2 °C)  Heart Rate:  [69-92] 79  Resp:  [16-20] 18  BP: (107-161)/(30-93) 148/62  Arterial Line BP: (102-174)/(38-94) 102/67  SpO2:  [90 %-100 %] 92 %  on  Flow (L/min):  [2] 2;   O2 Device: nasal cannula  Body mass index is 28.78 kg/(m^2).    Physical Exam   Constitutional: She is oriented to person, place, and time. No distress.   Neck:   + CEA scar   Cardiovascular: Normal rate and regular rhythm.    No murmur heard.  Pulmonary/Chest: Effort normal. She has decreased breath sounds.   Abdominal: Soft. Bowel sounds are normal. She exhibits no distension. There is no tenderness.   Musculoskeletal: She exhibits no edema.   RUE in sling   Neurological: She is alert and oriented to person, place, and time.   Skin: Skin is warm and dry. She is not diaphoretic.         Results Review:       I reviewed the patient's new clinical results.    Results from last 7 days  Lab Units 18  0541 01/15/18  1550 18  0541 18  0822   WBC 10*3/mm3 11.41* 12.95*  --  12.30* 14.16* 16.74*   HEMOGLOBIN g/dL 9.4* 7.3* 8.1* 7.6* 8.2* 8.5*   PLATELETS 10*3/mm3 262 297  --  384 343 340       Results from last 7 days  Lab Units 18  0541 18  17318  0455   SODIUM mmol/L 139 140 140 141   POTASSIUM mmol/L 4.1 4.4 4.5 4.9   CHLORIDE mmol/L 105 105 100 105   CO2 mmol/L 23.0 23.2 26.5 26.4   BUN mg/dL 25* 27* 25* 21   CREATININE mg/dL 1.46* 1.55* 1.46* 1.21*   GLUCOSE mg/dL 164* 129* 193* 160*   Estimated Creatinine Clearance: 29.4 mL/min (by C-G formula based on Cr of 1.46).    Results from last 7 days  Lab Units 18  0418 18  0541 18  1731 18  0455   CALCIUM  mg/dL 8.9 8.4* 9.3 8.6       Results from last 7 days  Lab Units 01/11/18  0455   PROCALCITONIN ng/mL 0.14         Glucose   Date/Time Value Ref Range Status   01/17/2018 0734 176 (H) 70 - 130 mg/dL Final   01/16/2018 2101 235 (H) 70 - 130 mg/dL Final   01/16/2018 1626 232 (H) 70 - 130 mg/dL Final   01/16/2018 1141 140 (H) 70 - 130 mg/dL Final   01/15/2018 0909 127 70 - 130 mg/dL Final   01/15/2018 0632 132 (H) 70 - 130 mg/dL Final   01/14/2018 2103 195 (H) 70 - 130 mg/dL Final   01/14/2018 1622 170 (H) 70 - 130 mg/dL Final           amLODIPine 5 mg Oral Q24H   aspirin 325 mg Oral Daily   atorvastatin 80 mg Oral Nightly   heparin (porcine) 5,000 Units Subcutaneous Q12H   insulin aspart 0-9 Units Subcutaneous 4x Daily With Meals & Nightly   ipratropium-albuterol 3 mL Nebulization Q4H - RT   linagliptin 5 mg Oral Daily   lisinopril 20 mg Oral Q24H   metoprolol succinate  mg Oral Q24H   piperacillin-tazobactam 3.375 g Intravenous Q8H       clevidipine 2 mg/hr Last Rate: 10 mg/hr (01/17/18 0736)   sodium chloride 50 mL/hr Last Rate: 50 mL/hr (01/17/18 0300)   Diet Dysphagia; II - Pureed; Honey Thick; No Straws      Assessment/Plan   Assessment:     Active Hospital Problems (** Indicates Principal Problem)    Diagnosis Date Noted   • **E-coli UTI w/ bacteremia [N39.0, B96.20] 01/12/2018   • Aspiration pneumonia [J69.0] 01/12/2018   • 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 [I63.9] 01/12/2018   • Metabolic encephalopathy present on admit [G93.41] 01/12/2018   • Tobacco abuse [Z72.0] 01/12/2018   • Bilateral pleural effusions [J90] 01/12/2018   • Oropharyngeal dysphagia [R13.12] 01/12/2018   • Closed nondisplaced fracture of head of right radius [S52.124A] 01/12/2018   • DNR (do not resuscitate) [Z66] 01/03/2018   • Sepsis [A41.9] 01/02/2018   • Non-traumatic rhabdomyolysis [M62.82] 01/01/2018   • Generalized weakness [R53.1] 01/01/2018   • Fall [W19.XXXA] 01/01/2018   • Stage 3 chronic kidney  disease [N18.3] 01/01/2018   • Type 2 diabetes mellitus [E11.9] 01/01/2018   • HTN (hypertension) [I10] 01/01/2018      Resolved Hospital Problems    Diagnosis Date Noted Date Resolved   No resolved problems to display.       1. Ecoli UTI with bacteremia  - needs 14 day course for the bacteremia. Her last dose will be at 4:00.  - repeat BCx no growth  - WBC better. cont to monitor      2. Aspiration PNA with Moderate R pleural effusion  - No further abx needed for this per pulm.  - SLP eval noted  - mod R pleural effusion. CT shows only mild-mod bilaterally. No indication for thoracentesis per Pulm      3. Acute CVA   - on ASA and Plavix. MRI with multiple CVA c/w cardio-embolic source or atherosclerotic plaque. Carotid u/s showed severe stenosis on left.   - s/p LEFT CAROTID ENDARTERECTOMY WITH SAPHENOUS VEIN HARVEST  - Blood pressure not adequately controlled. On IV CLEVIPREX per vascular.      4. Right radial head fracture  - NWB per ortho  - RN to notify Dr. Shell patient is still here and see if repeat xray needed.      Disposition  · [facility] in [facility] skilled rehab when medically ready. Can likely dc when okay with vascular.      Rob Carvajal MD  [facility]  01/17/18  8:59 AM

## 2018-01-18 ENCOUNTER — APPOINTMENT (OUTPATIENT)
Dept: GENERAL RADIOLOGY | Facility: HOSPITAL | Age: 78
End: 2018-01-18

## 2018-01-18 LAB
ANION GAP SERPL CALCULATED.3IONS-SCNC: 11.3 MMOL/L
BUN BLD-MCNC: 26 MG/DL (ref 8–23)
BUN/CREAT SERPL: 17.9 (ref 7–25)
CALCIUM SPEC-SCNC: 8.9 MG/DL (ref 8.6–10.5)
CHLORIDE SERPL-SCNC: 100 MMOL/L (ref 98–107)
CO2 SERPL-SCNC: 23.7 MMOL/L (ref 22–29)
CREAT BLD-MCNC: 1.45 MG/DL (ref 0.57–1)
DEPRECATED RDW RBC AUTO: 56.3 FL (ref 37–54)
ERYTHROCYTE [DISTWIDTH] IN BLOOD BY AUTOMATED COUNT: 16 % (ref 11.7–13)
GFR SERPL CREATININE-BSD FRML MDRD: 35 ML/MIN/1.73
GLUCOSE BLD-MCNC: 167 MG/DL (ref 65–99)
GLUCOSE BLDC GLUCOMTR-MCNC: 149 MG/DL (ref 70–130)
GLUCOSE BLDC GLUCOMTR-MCNC: 159 MG/DL (ref 70–130)
GLUCOSE BLDC GLUCOMTR-MCNC: 175 MG/DL (ref 70–130)
GLUCOSE BLDC GLUCOMTR-MCNC: 177 MG/DL (ref 70–130)
HCT VFR BLD AUTO: 27.4 % (ref 35.6–45.5)
HGB BLD-MCNC: 8.6 G/DL (ref 11.9–15.5)
MCH RBC QN AUTO: 30.2 PG (ref 26.9–32)
MCHC RBC AUTO-ENTMCNC: 31.4 G/DL (ref 32.4–36.3)
MCV RBC AUTO: 96.1 FL (ref 80.5–98.2)
PLATELET # BLD AUTO: 242 10*3/MM3 (ref 140–500)
PMV BLD AUTO: 10.1 FL (ref 6–12)
POTASSIUM BLD-SCNC: 3.9 MMOL/L (ref 3.5–5.2)
RBC # BLD AUTO: 2.85 10*6/MM3 (ref 3.9–5.2)
SODIUM BLD-SCNC: 135 MMOL/L (ref 136–145)
WBC NRBC COR # BLD: 11.98 10*3/MM3 (ref 4.5–10.7)

## 2018-01-18 PROCEDURE — 97110 THERAPEUTIC EXERCISES: CPT

## 2018-01-18 PROCEDURE — 80048 BASIC METABOLIC PNL TOTAL CA: CPT | Performed by: HOSPITALIST

## 2018-01-18 PROCEDURE — 94799 UNLISTED PULMONARY SVC/PX: CPT

## 2018-01-18 PROCEDURE — 82962 GLUCOSE BLOOD TEST: CPT

## 2018-01-18 PROCEDURE — 63710000001 INSULIN ASPART PER 5 UNITS: Performed by: INTERNAL MEDICINE

## 2018-01-18 PROCEDURE — 25010000002 HEPARIN (PORCINE) PER 1000 UNITS: Performed by: SURGERY

## 2018-01-18 PROCEDURE — 85027 COMPLETE CBC AUTOMATED: CPT | Performed by: HOSPITALIST

## 2018-01-18 PROCEDURE — 97530 THERAPEUTIC ACTIVITIES: CPT

## 2018-01-18 PROCEDURE — 73070 X-RAY EXAM OF ELBOW: CPT

## 2018-01-18 RX ORDER — DOCUSATE SODIUM 100 MG/1
100 CAPSULE, LIQUID FILLED ORAL 2 TIMES DAILY
Status: DISCONTINUED | OUTPATIENT
Start: 2018-01-18 | End: 2018-01-22 | Stop reason: HOSPADM

## 2018-01-18 RX ADMIN — BENZONATATE 200 MG: 100 CAPSULE ORAL at 23:32

## 2018-01-18 RX ADMIN — CLEVIPIDINE 12 MG/HR: 0.5 EMULSION INTRAVENOUS at 06:03

## 2018-01-18 RX ADMIN — IPRATROPIUM BROMIDE AND ALBUTEROL SULFATE 3 ML: .5; 3 SOLUTION RESPIRATORY (INHALATION) at 04:26

## 2018-01-18 RX ADMIN — IPRATROPIUM BROMIDE AND ALBUTEROL SULFATE 3 ML: .5; 3 SOLUTION RESPIRATORY (INHALATION) at 08:48

## 2018-01-18 RX ADMIN — ATORVASTATIN CALCIUM 80 MG: 80 TABLET, FILM COATED ORAL at 23:33

## 2018-01-18 RX ADMIN — LISINOPRIL 20 MG: 20 TABLET ORAL at 08:25

## 2018-01-18 RX ADMIN — LINAGLIPTIN 5 MG: 5 TABLET, FILM COATED ORAL at 08:25

## 2018-01-18 RX ADMIN — IPRATROPIUM BROMIDE AND ALBUTEROL SULFATE 3 ML: .5; 3 SOLUTION RESPIRATORY (INHALATION) at 16:41

## 2018-01-18 RX ADMIN — METOPROLOL SUCCINATE 100 MG: 100 TABLET, FILM COATED, EXTENDED RELEASE ORAL at 08:25

## 2018-01-18 RX ADMIN — Medication: at 08:27

## 2018-01-18 RX ADMIN — IPRATROPIUM BROMIDE AND ALBUTEROL SULFATE 3 ML: .5; 3 SOLUTION RESPIRATORY (INHALATION) at 00:30

## 2018-01-18 RX ADMIN — INSULIN ASPART 2 UNITS: 100 INJECTION, SOLUTION INTRAVENOUS; SUBCUTANEOUS at 08:26

## 2018-01-18 RX ADMIN — BENZONATATE 200 MG: 100 CAPSULE ORAL at 15:47

## 2018-01-18 RX ADMIN — HEPARIN SODIUM 5000 UNITS: 5000 INJECTION, SOLUTION INTRAVENOUS; SUBCUTANEOUS at 08:25

## 2018-01-18 RX ADMIN — AMLODIPINE BESYLATE 5 MG: 5 TABLET ORAL at 08:25

## 2018-01-18 RX ADMIN — IPRATROPIUM BROMIDE AND ALBUTEROL SULFATE 3 ML: .5; 3 SOLUTION RESPIRATORY (INHALATION) at 20:11

## 2018-01-18 RX ADMIN — INSULIN ASPART 2 UNITS: 100 INJECTION, SOLUTION INTRAVENOUS; SUBCUTANEOUS at 23:30

## 2018-01-18 RX ADMIN — DOCUSATE SODIUM 100 MG: 100 CAPSULE, LIQUID FILLED ORAL at 23:31

## 2018-01-18 RX ADMIN — Medication: at 23:32

## 2018-01-18 RX ADMIN — IPRATROPIUM BROMIDE AND ALBUTEROL SULFATE 3 ML: .5; 3 SOLUTION RESPIRATORY (INHALATION) at 12:10

## 2018-01-18 RX ADMIN — CLEVIPIDINE 12 MG/HR: 0.5 EMULSION INTRAVENOUS at 03:09

## 2018-01-18 RX ADMIN — ASPIRIN 325 MG: 325 TABLET ORAL at 08:25

## 2018-01-18 RX ADMIN — HEPARIN SODIUM 5000 UNITS: 5000 INJECTION, SOLUTION INTRAVENOUS; SUBCUTANEOUS at 23:31

## 2018-01-18 RX ADMIN — INSULIN ASPART 2 UNITS: 100 INJECTION, SOLUTION INTRAVENOUS; SUBCUTANEOUS at 13:09

## 2018-01-18 NOTE — THERAPY TREATMENT NOTE
Acute Care - Occupational Therapy Progress Note  [facility]     Patient Name: Sakshi Garcia  : 1940  MRN: [id number]  Today's Date: 2018  Onset of Illness/Injury or Date of Surgery Date: 01/15/18     Referring Physician: Diony      Admit Date: 2018    Visit Dx:     ICD-10-CM ICD-9-CM   1. Traumatic rhabdomyolysis, initial encounter T79.6XXA 958.6   2. Fall, initial encounter W19.XXXA E888.9   3. Renal insufficiency N28.9 593.9   4. Dizziness R42 780.4   5. Unsteadiness on feet R26.81 781.2     Patient Active Problem List   Diagnosis   • Non-traumatic rhabdomyolysis   • Generalized weakness   • Fall   • Acute cystitis without hematuria   • Stage 3 chronic kidney disease   • Type 2 diabetes mellitus   • HTN (hypertension)   • Altered mental status   • Sepsis   • Bacteremia   • DNR (do not resuscitate)   • Aspiration pneumonia   • 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   • E-coli UTI w/ bacteremia   • Metabolic encephalopathy present on admit   • Tobacco abuse   • Bilateral pleural effusions   • Oropharyngeal dysphagia   • Closed nondisplaced fracture of head of right radius             Adult Rehabilitation Note       18 1427 18 1352       Rehab Assessment/Intervention    Discipline occupational therapist  -LE physical therapist  -EE     Document Type therapy note (daily note)  -LE therapy note (daily note)  -EE     Subjective Information agree to therapy  -LE agree to therapy;complains of;weakness;fatigue;pain  -EE     Patient Effort, Rehab Treatment adequate   wants to lay down but agrees to stay up after reposition  -LE adequate  -EE     Symptoms Noted During/After Treatment  fatigue  -EE     Precautions/Limitations fall precautions   ortho to see pt  f/u R UE  -LE fall precautions;oxygen therapy device and L/min;non-weight bearing status   NWB R UE, sling R UE  -EE     Specific Treatment Considerations --   reposition/adjust R UE Sling.  ed pt on proper  position   -LE      Patient Response to Treatment --   pt somewhat reluctant but agrees to stay up longer   -LE      Recorded by [LE] ISELA Mayo [EE] Kassy Marcial, PT     Vital Signs    Pre SpO2 (%)  95  -EE     O2 Delivery Pre Treatment  supplemental O2  -EE     O2 Delivery Intra Treatment  supplemental O2  -EE     Post SpO2 (%)  97  -EE     O2 Delivery Post Treatment  supplemental O2  -EE     Pre Patient Position  Supine  -EE     Intra Patient Position  Standing  -EE     Post Patient Position  Sitting  -EE     Recorded by  [EE] Kassy Marcial, PT     Pain Assessment    Pain Assessment No/denies pain  -LE 0-10  -EE     Pain Score  7  -EE     Pain Type  Acute pain  -EE     Pain Location  Shoulder   also L LE  -EE     Pain Orientation  Left  -EE     Pain Intervention(s)  Repositioned;Rest  -EE     Response to Interventions  tolerated  -EE     Recorded by [LE] ISELA Mayo [EE] Kassy Marcial, PT     Cognitive Assessment/Intervention    Current Cognitive/Communication Assessment functional  -LE impaired  -EE     Orientation Status  oriented to;person;place;situation  -EE     Follows Commands/Answers Questions  100% of the time;able to follow single-step instructions;needs cueing;needs increased time  -EE     Personal Safety  mild impairment;at risk behaviors demonstrated;decreased awareness, need for assist;decreased awareness, need for safety  -EE     Personal Safety Interventions  fall prevention program maintained;gait belt;muscle strengthening facilitated;nonskid shoes/slippers when out of bed;supervised activity  -EE     Recorded by [AILYN] ISELA Mayo [EE] Kassy Marcial, PT     ROM (Range of Motion)    General ROM --   reports limited L UE movement as well as R UE in sling  -LE      Recorded by [AILYN] ISELA Mayo      Bed Mobility, Assessment/Treatment    Bed Mobility, Assistive Device  bed rails;head of bed elevated  -EE     Bed Mobility, Scoot/Bridge, Brooke maximum assist (25% patient effort)    scoot hips back in chair, reposition  -LE      Bed Mob, Supine to Sit, Harrell  moderate assist (50% patient effort);verbal cues required  -EE     Bed Mob, Sit to Supine, Harrell  not tested   up in chair  -EE     Bed Mobility, Safety Issues  decreased use of arms for pushing/pulling  -EE     Bed Mobility, Impairments  strength decreased;impaired balance;pain  -EE     Bed Mobility, Comment has been up in chair about an hour  -LE      Recorded by [LE] Tiffany Medrano OTR [EE] Kassy Marcial, PT     Transfer Assessment/Treatment    Transfers, Sit-Stand Harrell  moderate assist (50% patient effort);2 person assist required;verbal cues required;nonverbal cues required (demo/gesture);hand held assist   L UE HHA; R UE sling  -EE     Transfers, Stand-Sit Harrell  moderate assist (50% patient effort);2 person assist required;verbal cues required;nonverbal cues required (demo/gesture);hand held assist  -EE     Transfer, Safety Issues  weight-shifting ability decreased;step length decreased;balance decreased during turns  -EE     Transfer, Impairments  strength decreased;impaired balance;pain  -EE     Transfer, Comment  Sit to stand performed x2 from EOB; verbal cues required for upright posture, pt initially very forward flexed  -EE     Recorded by  [EE] Kassy Marcial PT     Gait Assessment/Treatment    Gait, [place] Level  moderate assist (50% patient effort);2 person assist required;verbal cues required;nonverbal cues required (demo/gesture);hand held assist   L UE HHA  -EE     Gait, Distance (Feet)  3  -EE     Gait, Gait Deviations  forward flexed posture;nilda decreased;left:;antalgic;bilateral:;decreased heel strike;step length decreased;stride length decreased;toe-to-floor clearance decreased;weight-shifting ability decreased  -EE     Gait, Safety Issues  step length decreased;weight-shifting ability decreased;sequencing ability decreased;balance decreased during turns  -EE     Gait, Impairments   strength decreased;impaired balance;pain  -EE     Gait, Comment  L LE pain and fatigue limiting  -EE     Recorded by  [EE] Kassy Marcial, THU     Balance Skills Training    Sitting-Level of Assistance Close supervision   sitting edge of chair  -LE Close supervision  -EE     Sitting-Balance Support  Feet supported  -EE     Sitting-Balance Activities  Trunk control activities  -EE     Sitting # of Minutes  8  -EE     Standing-Level of Assistance  Moderate assistance;x2  -EE     Static Standing Balance Support  Left upper extremity supported  -EE     Recorded by [LE] Tiffany Medrano OTR [EE] Kassy Marcial, THU     Therapy Exercises    Bilateral Lower Extremities  AROM:;5 reps;ankle pumps/circles;LAQ  -EE     Recorded by  [EE] Kassy Marcial, THU     Positioning and Restraints    Pre-Treatment Position sitting in chair/recliner  -LE in bed  -EE     Post Treatment Position  chair  -EE     In Chair reclined;call light within reach;encouraged to call for assist   R UE in sling  -LE reclined;call light within reach;encouraged to call for assist;exit alarm on;RUE elevated;LUE elevated;legs elevated;notified nsg   R UE sling  -EE     Recorded by [LE] Tiffany Medrano, OTR [EE] Kassy Marcial, PT       User Key  (r) = Recorded By, (t) = Taken By, (c) = Cosigned By    Initials Name Effective Dates    ISELA Miller 04/13/15 -     EE Kassy Marcial, PT 12/01/15 -                 OT Goals       01/06/18 1149          Patient Education OT LTG    Patient Education OT LTG, Date Established 01/06/18  -      Patient Education OT LTG, Time to Achieve 1 wk  -      Patient Education OT LTG, Education Type 1 hand/chyna technique;HEP  -KP      Patient Education OT LTG, Education Understanding independent;demonstrates adequately;verbalizes understanding  -      Isolated Movement OT LTG    Isolated Movement OT LTG, Date Established 01/06/18  -      Isolated Movement OT LTG, Time to Achieve 1 wk  -KP      Isolated Movement OT LTG, Body Area right  scapula;right shoulder;right elbow;right forearm;right wrist;right fingers  -      Isolated Movement OT LTG, Level facilitate movement;1/2;1/4  -      ADL OT LTG    ADL OT LTG, Date Established 01/06/18  -      ADL OT LTG, Time to Achieve 1 wk  -      ADL OT LTG, Activity Type ADL skills  -      ADL OT LTG, [place] Level setup;assistive device;mod assist  -        User Key  (r) = Recorded By, (t) = Taken By, (c) = Cosigned By    Initials Name Provider Type     Yaima Barkley OTPAKO Occupational Therapist          Occupational Therapy Education     Title: PT OT SLP Therapies (Active)     Topic: Occupational Therapy (Active)     Point: ADL training (Done)    Description: Instruct learner(s) on proper safety adaptation and remediation techniques during self care or transfers.   Instruct in proper use of assistive devices.    Learning Progress Summary    Learner Readiness Method Response Comment Documented by Status   Patient Acceptance EMIKE,NR ed pt on role of OT. benefit of therapy. POC w. OT. ed on chyna- technique using L UE for bathing and dressing. pt very weak and had to complete bathing in bed  01/06/18 1148 Done               Point: Precautions (Done)    Description: Instruct learner(s) on prescribed precautions during self-care and functional transfers.    Learning Progress Summary    Learner Readiness Method Response Comment Documented by Status   Patient Acceptance MIKE DASH,NR ed pt on role of OT. benefit of therapy. POC w. OT. ed on chyna- technique using L UE for bathing and dressing. pt very weak and had to complete bathing in bed  01/06/18 1148 Done               Point: Body mechanics (Done)    Description: Instruct learner(s) on proper positioning and spine alignment during self-care, functional mobility activities and/or exercises.    Learning Progress Summary    Learner Readiness Method Response Comment Documented by Status   Patient Acceptance MIKE DASH,NR ed pt on role of OT.  benefit of therapy. POC w. OT. ed on chyna- technique using L UE for bathing and dressing. pt very weak and had to complete bathing in bed  01/06/18 1148 Done                      User Key     Initials Effective Dates Name Provider Type Discipline     04/13/15 -  Yaima Barkley, OTR Occupational Therapist OT                  OT Recommendation and Plan  Anticipated Equipment Needs At Discharge: tub bench  Anticipated Discharge Disposition: skilled nursing facility, inpatient rehabilitation facility  Planned Therapy Interventions: ADL retraining, activity intolerance, home exercise program, transfer training, strengthening, neuromuscular re-education  Therapy Frequency: 3-5 times/wk  Plan of Care Review  Plan Of Care Reviewed With: patient  Progress: progress towards functional goals is fair  Outcome Summary/Follow up Plan: Reposition pt in chair, ed on benefits of sitting up (pt with wet sounding cough when enter room).  Adjusted R sling and ed on on proper position of sling for supporting UE and avoiding impingement at wrist.         Outcome Measures       01/18/18 1432 01/18/18 1400 01/17/18 1400    How much help from another person do you currently need...    Turning from your back to your side while in flat bed without using bedrails?   2  -EE    Moving from lying on back to sitting on the side of a flat bed without bedrails?   2  -EE    Moving to and from a bed to a chair (including a wheelchair)?   2  -EE    Standing up from a chair using your arms (e.g., wheelchair, bedside chair)?   2  -EE    Climbing 3-5 steps with a railing?   1  -EE    To walk in hospital room?   2  -EE    AM-PAC 6 Clicks Score   11  -EE    How much help from another is currently needed...    Putting on and taking off regular lower body clothing? 2  -LE      Bathing (including washing, rinsing, and drying) 2  -LE      Toileting (which includes using toilet bed pan or urinal) 2  -LE      Putting on and taking off regular upper  body clothing 2  -LE      Taking care of personal grooming (such as brushing teeth) 3  -LE      Eating meals 3  -LE      Score 14  -LE      Functional Assessment    Outcome Measure Options  AM-PAC 6 Clicks Daily Activity (OT)  -LE AM-PAC 6 Clicks Basic Mobility (PT)  -EE      01/16/18 1400          How much help from another person do you currently need...    Turning from your back to your side while in flat bed without using bedrails? 2  -EE      Moving from lying on back to sitting on the side of a flat bed without bedrails? 2  -EE      Moving to and from a bed to a chair (including a wheelchair)? 1  -EE      Standing up from a chair using your arms (e.g., wheelchair, bedside chair)? 2  -EE      Climbing 3-5 steps with a railing? 1  -EE      To walk in hospital room? 1  -EE      AM-PAC 6 Clicks Score 9  -EE      Functional Assessment    Outcome Measure Options AM-PAC 6 Clicks Basic Mobility (PT)  -EE        User Key  (r) = Recorded By, (t) = Taken By, (c) = Cosigned By    Initials Name Provider Type    ISELA Miller Occupational Therapist    EE Kassy Marcial, THU Physical Therapist           Time Calculation:         Time Calculation- OT       01/18/18 1434          Time Calculation- OT    OT Start Time 1415  -LE      OT Stop Time 1426  -LE      OT Time Calculation (min) 11 min  -LE      OT Received On 01/18/18  -LE        User Key  (r) = Recorded By, (t) = Taken By, (c) = Cosigned By    Initials Name Provider Type    ISELA Miller Occupational Therapist           Therapy Charges for Today     Code Description Service Date Service Provider Modifiers Qty    [id number]  OT THERAPEUTIC ACT EA 15 MIN 1/18/2018 ISELA Mayo GO 1               ISELA Mayo  1/18/2018

## 2018-01-18 NOTE — PLAN OF CARE
Problem: Patient Care Overview (Adult)  Goal: Plan of Care Review   01/18/18 0258   Coping/Psychosocial Response Interventions   Plan Of Care Reviewed With patient   Patient Care Overview   Progress progress toward functional goals as expected   Outcome Evaluation   Outcome Summary/Follow up Plan VSS. Pt congested and requiring more oxygen at 4 L. Pulmonary notified CXR ordered and showed cont. pulmonary effusions. Pulmonary hyigene encouraged. Cleviprex gtt cont. BP stayed lower than 140 S. No c/o of pain. Daughter at bedside. Will cont. to monitor      01/18/18 0258   Coping/Psychosocial Response Interventions   Plan Of Care Reviewed With patient   Patient Care Overview   Progress progress toward functional goals as expected   Outcome Evaluation   Outcome Summary/Follow up Plan VSS. Pt congested and requiring more oxygen at 4 L NC. Pulmonary notified CXR ordered and showed cont. pulmonary effusions. Pulmonary hyigene encouraged. Cleviprex gtt cont. BP stayed lower than 140 S. No c/o of pain. Daughter at bedside. Will cont. to monitor     Goal: Adult Individualization and Mutuality  Outcome: Ongoing (interventions implemented as appropriate)    Goal: Discharge Needs Assessment  Outcome: Ongoing (interventions implemented as appropriate)      Problem: Pressure Ulcer Risk (Tank Scale) (Adult,Obstetrics,Pediatric)  Goal: Skin Integrity  Outcome: Ongoing (interventions implemented as appropriate)   01/18/18 0258   Pressure Ulcer Risk (Tank Scale) (Adult,Obstetrics,Pediatric)   Skin Integrity making progress toward outcome       Problem: Infection, Risk/Actual (Adult)  Goal: Infection Prevention/Resolution  Outcome: Ongoing (interventions implemented as appropriate)   01/18/18 0258   Infection, Risk/Actual (Adult)   Infection Prevention/Resolution making progress toward outcome       Problem: Perioperative Period (Adult)  Goal: Signs and Symptoms of Listed Potential Problems Will be Absent or Manageable  (Perioperative Period)  Outcome: Ongoing (interventions implemented as appropriate)      Problem: Respiratory Insufficiency (Adult)  Goal: Effective Ventilation  Outcome: Ongoing (interventions implemented as appropriate)   01/18/18 0258   Respiratory Insufficiency (Adult)   Effective Ventilation making progress toward outcome

## 2018-01-18 NOTE — PROGRESS NOTES
Name: Sakshi Garcia ADMIT: 2018   : 1940  PCP: Provider Not In System    MRN: [id number] LOS: 17 days   AGE/SEX: 77 y.o. female  ROOM: [facility]/     Active Hospital Problems (** Indicates Principal Problem)    Diagnosis Date Noted   • **E-coli UTI w/ bacteremia [N39.0, B96.20] 2018   • Aspiration pneumonia [J69.0] 2018   • 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 [I63.9] 2018   • Metabolic encephalopathy present on admit [G93.41] 2018   • Tobacco abuse [Z72.0] 2018   • Bilateral pleural effusions [J90] 2018   • Oropharyngeal dysphagia [R13.12] 2018   • Closed nondisplaced fracture of head of right radius [S52.124A] 2018   • DNR (do not resuscitate) [Z66] 2018   • Sepsis [A41.9] 2018   • Non-traumatic rhabdomyolysis [M62.82] 2018   • Generalized weakness [R53.1] 2018   • Fall [W19.XXXA] 2018   • Stage 3 chronic kidney disease [N18.3] 2018   • Type 2 diabetes mellitus [E11.9] 2018   • HTN (hypertension) [I10] 2018      Resolved Hospital Problems    Diagnosis Date Noted Date Resolved   No resolved problems to display.     Subjective     77 y.o. female POD #3 s/p left carotid endarterectomy with saphenous vein patch   No new neuro symptoms  Feels better.  Neurologically significantly improved.  Mild left neck pain.    Review of Systems  As above  Objective     Vital Signs  Temp:  [98.5 °F (36.9 °C)-99.1 °F (37.3 °C)] 99.1 °F (37.3 °C)  Heart Rate:  [67-87] 70  Resp:  [18-20] 20  BP: (118-180)/(46-92) 136/50    Physical Exam   Constitutional: She appears well-developed and well-nourished.   HENT:   Head: Normocephalic and atraumatic.   Neck: Normal range of motion. No tracheal deviation present.   Left neck incision is clean, soft, no hematoma.      Tongue is midline   Cardiovascular: Normal rate and regular rhythm.    Pulmonary/Chest: Effort normal. No respiratory distress.   Abdominal: Soft. There is  no tenderness.   Musculoskeletal: Normal range of motion. She exhibits no deformity.   Neurological: She is alert.   Skin: Skin is warm and dry.   Neuro: unchanged RUE and RLE weakness  Tongue is midline  Left leg incision is clean.     Results Review:       I reviewed the patient's new clinical results.    Results from last 7 days  Lab Units 01/18/18  0509 01/17/18  0418 01/16/18  0541 01/15/18  1550 01/14/18  1731 01/12/18  0541 01/11/18  0822   WBC 10*3/mm3 11.98* 11.41* 12.95*  --  12.30* 14.16* 16.74*   HEMOGLOBIN g/dL 8.6* 9.4* 7.3* 8.1* 7.6* 8.2* 8.5*   PLATELETS 10*3/mm3 242 262 297  --  384 343 340       Results from last 7 days  Lab Units 01/18/18  0509 01/17/18  0418 01/16/18  0541 01/14/18  1731   SODIUM mmol/L 135* 139 140 140   POTASSIUM mmol/L 3.9 4.1 4.4 4.5   CHLORIDE mmol/L 100 105 105 100   CO2 mmol/L 23.7 23.0 23.2 26.5   BUN mg/dL 26* 25* 27* 25*   CREATININE mg/dL 1.45* 1.46* 1.55* 1.46*   GLUCOSE mg/dL 167* 164* 129* 193*   Estimated Creatinine Clearance: 29.6 mL/min (by C-G formula based on Cr of 1.45).    Results from last 7 days  Lab Units 01/18/18  0509 01/17/18  0418 01/16/18  0541 01/14/18  1731   CALCIUM mg/dL 8.9 8.9 8.4* 9.3       Assessment/Plan           Active Hospital Problems (** Indicates Principal Problem)    Diagnosis Date Noted   • **E-coli UTI w/ bacteremia [N39.0, B96.20] 01/12/2018   • Aspiration pneumonia [J69.0] 01/12/2018   • 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 [I63.9] 01/12/2018   • Metabolic encephalopathy present on admit [G93.41] 01/12/2018   • Tobacco abuse [Z72.0] 01/12/2018   • Bilateral pleural effusions [J90] 01/12/2018   • Oropharyngeal dysphagia [R13.12] 01/12/2018   • Closed nondisplaced fracture of head of right radius [S52.124A] 01/12/2018   • DNR (do not resuscitate) [Z66] 01/03/2018   • Sepsis [A41.9] 01/02/2018   • Non-traumatic rhabdomyolysis [M62.82] 01/01/2018   • Generalized weakness [R53.1] 01/01/2018   • Fall [W19.XXXA]  01/01/2018   • Stage 3 chronic kidney disease [N18.3] 01/01/2018   • Type 2 diabetes mellitus [E11.9] 01/01/2018   • HTN (hypertension) [I10] 01/01/2018      Resolved Hospital Problems    Diagnosis Date Noted Date Resolved   No resolved problems to display.        Assessment & Plan  77 y.o. female s/p carotid endarterectomy   Will DC cleviprex.  Far enough out that we will let her pressures normalized.  No complaints of headache currently  RN reports coughing with liquids- speech recommends honey thick diet, can still take pills  Aspirin for carotid stenosis. DC Plavix.   Postop anemia with a history of CAD and a small apical infarct- resolved after transfusion  Eating more.  We'll stop his fluids  Cr stable  Will plan follow-up in 2 weeks to remove left ankle staples.  We'll watch neck wound will inpatient.  Okay for discharge when stable from medical standpoint    Jose Bean MD  01/18/18   7:34 AM  Office Number [phone]

## 2018-01-18 NOTE — PROGRESS NOTES
Name: Sakshi Garcia ADMIT: 2018   : 1940  PCP: Provider Not In System    MRN: [id number] LOS: 17 days   AGE/SEX: 77 y.o. female  ROOM: [facility]/     Subjective   Subjective  Lying in bed. Does not feel very well.    Objective   Vital Signs  Temp:  [98.5 °F (36.9 °C)-99.1 °F (37.3 °C)] 99.1 °F (37.3 °C)  Heart Rate:  [67-87] 70  Resp:  [18-20] 20  BP: (118-180)/(46-92) 136/50  SpO2:  [90 %-99 %] 96 %  on  Flow (L/min):  [2-4] 4;   O2 Device: nasal cannula with humidification  Body mass index is 28.78 kg/(m^2).    Physical Exam   Constitutional: She is oriented to person, place, and time. No distress.   Neck:   + CEA scar   Cardiovascular: Normal rate and regular rhythm.    No murmur heard.  Pulmonary/Chest: Effort normal. She has decreased breath sounds.   Abdominal: Soft. Bowel sounds are normal. She exhibits no distension. There is no tenderness.   Musculoskeletal: She exhibits no edema.   RUE in sling   Neurological: She is alert and oriented to person, place, and time.   Skin: Skin is warm and dry. She is not diaphoretic.         Results Review:       I reviewed the patient's new clinical results.    Results from last 7 days  Lab Units 18  0509 18  0418 18  0541 01/15/18  1550 18  1731 18  0541 18  0822   WBC 10*3/mm3 11.98* 11.41* 12.95*  --  12.30* 14.16* 16.74*   HEMOGLOBIN g/dL 8.6* 9.4* 7.3* 8.1* 7.6* 8.2* 8.5*   PLATELETS 10*3/mm3 242 262 297  --  384 343 340       Results from last 7 days  Lab Units 18  0509 18  0418 18  0541 18  1731   SODIUM mmol/L 135* 139 140 140   POTASSIUM mmol/L 3.9 4.1 4.4 4.5   CHLORIDE mmol/L 100 105 105 100   CO2 mmol/L 23.7 23.0 23.2 26.5   BUN mg/dL 26* 25* 27* 25*   CREATININE mg/dL 1.45* 1.46* 1.55* 1.46*   GLUCOSE mg/dL 167* 164* 129* 193*   Estimated Creatinine Clearance: 29.6 mL/min (by C-G formula based on Cr of 1.45).    Results from last 7 days  Lab Units 18  0509 18  0418 18  0541  01/14/18  1731   CALCIUM mg/dL 8.9 8.9 8.4* 9.3             Glucose   Date/Time Value Ref Range Status   01/18/2018 0752 175 (H) 70 - 130 mg/dL Final   01/17/2018 2104 252 (H) 70 - 130 mg/dL Final   01/17/2018 1713 225 (H) 70 - 130 mg/dL Final   01/17/2018 1143 232 (H) 70 - 130 mg/dL Final   01/17/2018 0734 176 (H) 70 - 130 mg/dL Final   01/16/2018 2101 235 (H) 70 - 130 mg/dL Final   01/16/2018 1626 232 (H) 70 - 130 mg/dL Final   01/16/2018 1141 140 (H) 70 - 130 mg/dL Final           amLODIPine 5 mg Oral Q24H   aspirin 325 mg Oral Daily   atorvastatin 80 mg Oral Nightly   heparin (porcine) 5,000 Units Subcutaneous Q12H   insulin aspart 0-9 Units Subcutaneous 4x Daily With Meals & Nightly   ipratropium-albuterol 3 mL Nebulization Q4H - RT   linagliptin 5 mg Oral Daily   lisinopril 20 mg Oral Q24H   metoprolol succinate  mg Oral Q24H   miconazole nitrate  Topical Q12H      Diet Soft Texture; Ground; Thin; Consistent Carbohydrate      Assessment/Plan   Assessment:     Active Hospital Problems (** Indicates Principal Problem)    Diagnosis Date Noted   • **E-coli UTI w/ bacteremia [N39.0, B96.20] 01/12/2018   • Aspiration pneumonia [J69.0] 01/12/2018   • 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 [I63.9] 01/12/2018   • Metabolic encephalopathy present on admit [G93.41] 01/12/2018   • Tobacco abuse [Z72.0] 01/12/2018   • Bilateral pleural effusions [J90] 01/12/2018   • Oropharyngeal dysphagia [R13.12] 01/12/2018   • Closed nondisplaced fracture of head of right radius [S52.124A] 01/12/2018   • DNR (do not resuscitate) [Z66] 01/03/2018   • Sepsis [A41.9] 01/02/2018   • Non-traumatic rhabdomyolysis [M62.82] 01/01/2018   • Generalized weakness [R53.1] 01/01/2018   • Fall [W19.XXXA] 01/01/2018   • Stage 3 chronic kidney disease [N18.3] 01/01/2018   • Type 2 diabetes mellitus [E11.9] 01/01/2018   • HTN (hypertension) [I10] 01/01/2018      Resolved Hospital Problems    Diagnosis Date Noted Date Resolved    No resolved problems to display.       1. Ecoli UTI with bacteremia  - s/p 14 day course for the bacteremia.   - repeat BCx no growth  - WBC better. cont to monitor      2. Aspiration PNA with Moderate R pleural effusion  - No further abx needed for this per pulm.  - SLP eval noted  - mod R pleural effusion. CT shows only mild-mod bilaterally. No indication for thoracentesis per Pulm      3. Acute CVA   - on ASA and Plavix. MRI with multiple CVA c/w cardio-embolic source or atherosclerotic plaque. Carotid u/s showed severe stenosis on left.   - s/p LEFT CAROTID ENDARTERECTOMY WITH SAPHENOUS VEIN HARVEST  - Blood pressure still not adequately controlled. Off IV CLEVIPREX per vascular.      4. Right radial head fracture (occult?)  - NWB per ortho. In [facility].  - RN notified Dr. Shell yesterday that patient is still here and see if repeat xray needed. Evidently never got a call back. Will try again today.      Disposition  [facility] in [facility] skilled rehab when medically ready. Probably in a day or 2.      Rob Carvajal MD  [facility]  01/18/18  8:09 AM

## 2018-01-18 NOTE — THERAPY TREATMENT NOTE
Acute Care - Physical Therapy Treatment Note  [facility]     Patient Name: Sakshi Garcia  : 1940  MRN: [id number]  Today's Date: 2018  Onset of Illness/Injury or Date of Surgery Date: 01/15/18  Date of Referral to PT: 18  Referring Physician: Diony    Admit Date: 2018    Visit Dx:    ICD-10-CM ICD-9-CM   1. Traumatic rhabdomyolysis, initial encounter T79.6XXA 958.6   2. Fall, initial encounter W19.XXXA E888.9   3. Renal insufficiency N28.9 593.9   4. Dizziness R42 780.4   5. Unsteadiness on feet R26.81 781.2     Patient Active Problem List   Diagnosis   • Non-traumatic rhabdomyolysis   • Generalized weakness   • Fall   • Acute cystitis without hematuria   • Stage 3 chronic kidney disease   • Type 2 diabetes mellitus   • HTN (hypertension)   • Altered mental status   • Sepsis   • Bacteremia   • DNR (do not resuscitate)   • Aspiration pneumonia   • 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   • E-coli UTI w/ bacteremia   • Metabolic encephalopathy present on admit   • Tobacco abuse   • Bilateral pleural effusions   • Oropharyngeal dysphagia   • Closed nondisplaced fracture of head of right radius               Adult Rehabilitation Note       18 1610 18 1427 18 1352    Rehab Assessment/Intervention    Discipline physical therapist  -EE occupational therapist  -LE physical therapist  -EE    Document Type therapy note (daily note)  -EE therapy note (daily note)  -LE therapy note (daily note)  -EE    Subjective Information agree to therapy;complains of;weakness;fatigue;pain  -EE agree to therapy  -LE agree to therapy;complains of;weakness;fatigue;pain  -EE    Patient Effort, Rehab Treatment adequate  -EE adequate   wants to lay down but agrees to stay up after reposition  -LE adequate  -EE    Symptoms Noted During/After Treatment fatigue  -EE  fatigue  -EE    Precautions/Limitations fall precautions;oxygen therapy device and L/min;non-weight bearing status   NWB R  UE, sling RUE  -EE fall precautions   ortho to see pt 1/19 f/u R UE  -LE fall precautions;oxygen therapy device and L/min;non-weight bearing status   NWB R UE, sling R UE  -EE    Specific Treatment Considerations  --   reposition/adjust R UE Sling.  ed pt on proper position   -LE     Patient Response to Treatment  --   pt somewhat reluctant but agrees to stay up longer   -LE     Recorded by [EE] Kassy Marcial, PT [LE] Tiffany Medrano OTR [EE] Kassy Marcial PT    Vital Signs    Pre SpO2 (%)   95  -EE    O2 Delivery Pre Treatment   supplemental O2  -EE    O2 Delivery Intra Treatment   supplemental O2  -EE    Post SpO2 (%)   97  -EE    O2 Delivery Post Treatment   supplemental O2  -EE    Pre Patient Position   Supine  -EE    Intra Patient Position   Standing  -EE    Post Patient Position   Sitting  -EE    Recorded by   [EE] Kassy Marcial PT    Pain Assessment    Pain Assessment 0-10  -EE No/denies pain  -LE 0-10  -EE    Pain Score 5  -EE  7  -EE    Pain Type Surgical pain  -EE  Acute pain  -EE    Pain Location Leg  -EE  Shoulder   also L LE  -EE    Pain Orientation Left  -EE  Left  -EE    Pain Intervention(s) Repositioned  -EE  Repositioned;Rest  -EE    Response to Interventions tolerated  -EE  tolerated  -EE    Recorded by [EE] Kassy Marcial, PT [LE] Tiffany Medrano OTR [EE] Kassy Marcial PT    Cognitive Assessment/Intervention    Current Cognitive/Communication Assessment impaired  -EE functional  -LE impaired  -EE    Orientation Status oriented to;person;place   cues to reorient to situation  -EE  oriented to;person;place;situation  -EE    Follows Commands/Answers Questions 75% of the time;able to follow single-step instructions;needs cueing  -EE  100% of the time;able to follow single-step instructions;needs cueing;needs increased time  -EE    Personal Safety mild impairment;at risk behaviors demonstrated;decreased awareness, need for assist;decreased awareness, need for safety  -EE  mild impairment;at risk behaviors  demonstrated;decreased awareness, need for assist;decreased awareness, need for safety  -EE    Personal Safety Interventions fall prevention program maintained;gait belt;muscle strengthening facilitated;nonskid shoes/slippers when out of bed;supervised activity  -EE  fall prevention program maintained;gait belt;muscle strengthening facilitated;nonskid shoes/slippers when out of bed;supervised activity  -EE    Recorded by [EE] Kassy Marcial PT [LE] Tiffany Medrano OTR [EE] Kassy Marcial PT    ROM (Range of Motion)    General ROM  --   reports limited L UE movement as well as R UE in sling  -LE     Recorded by  [LE] ISELA Mayo     Bed Mobility, Assessment/Treatment    Bed Mobility, Assistive Device   bed rails;head of bed elevated  -EE    Bed Mobility, Roll Left, [place] moderate assist (50% patient effort);verbal cues required  -EE      Bed Mobility, Roll Right, [place] moderate assist (50% patient effort);verbal cues required  -EE      Bed Mobility, Scoot/Bridge, [place]  maximum assist (25% patient effort)   scoot hips back in chair, reposition  -LE     Bed Mob, Supine to Sit, [place] not tested   up in chair  -EE  moderate assist (50% patient effort);verbal cues required  -EE    Bed Mob, Sit to Supine, [place] moderate assist (50% patient effort);2 person assist required;verbal cues required;nonverbal cues required (demo/gesture)  -EE  not tested   up in chair  -EE    Bed Mobility, Safety Issues   decreased use of arms for pushing/pulling  -EE    Bed Mobility, Impairments strength decreased;pain  -EE  strength decreased;impaired balance;pain  -EE    Bed Mobility, Comment  has been up in chair about an hour  -LE     Recorded by [EE] Kassy Marcial, PT [LE] Tiffany Medrano OTR [EE] Kassy Marcial PT    Transfer Assessment/Treatment    Transfers, Sit-Stand [place] moderate assist (50% patient effort);2 person assist required;verbal cues required;nonverbal cues required (demo/gesture);hand held  assist   HHA L UE  -EE  moderate assist (50% patient effort);2 person assist required;verbal cues required;nonverbal cues required (demo/gesture);hand held assist   L UE HHA; R UE sling  -EE    Transfers, Stand-Sit [place] minimum assist (75% patient effort);moderate assist (50% patient effort);2 person assist required;verbal cues required;nonverbal cues required (demo/gesture);hand held assist  -EE  moderate assist (50% patient effort);2 person assist required;verbal cues required;nonverbal cues required (demo/gesture);hand held assist  -EE    Transfer, Safety Issues weight-shifting ability decreased;loses balance backward  -EE  weight-shifting ability decreased;step length decreased;balance decreased during turns  -EE    Transfer, Impairments strength decreased;impaired balance;pain  -EE  strength decreased;impaired balance;pain  -EE    Transfer, Comment cues for upright posture, pt remains somewhat forward flexed despite verbal and tactile cues  -EE  Sit to stand performed x2 from EOB; verbal cues required for upright posture, pt initially very forward flexed  -EE    Recorded by [EE] Kassy Marcial, PT  [EE] Kassy Marcial, PT    Gait Assessment/Treatment    Gait, [place] Level moderate assist (50% patient effort);2 person assist required;verbal cues required;nonverbal cues required (demo/gesture);hand held assist  -EE  moderate assist (50% patient effort);2 person assist required;verbal cues required;nonverbal cues required (demo/gesture);hand held assist   L UE HHA  -EE    Gait, Distance (Feet) 3  -EE  3  -EE    Gait, Gait Deviations forward flexed posture;nilda decreased;decreased heel strike;step length decreased;stride length decreased;weight-shifting ability decreased;left:;antalgic  -EE  forward flexed posture;nilda decreased;left:;antalgic;bilateral:;decreased heel strike;step length decreased;stride length decreased;toe-to-floor clearance decreased;weight-shifting ability decreased  -EE    Gait,  Safety Issues step length decreased;weight-shifting ability decreased;sequencing ability decreased;balance decreased during turns  -EE  step length decreased;weight-shifting ability decreased;sequencing ability decreased;balance decreased during turns  -EE    Gait, Impairments strength decreased;impaired balance;pain  -EE  strength decreased;impaired balance;pain  -EE    Gait, Comment Pt able to take several shuffled steps bed to chair. Cues required for upright posture and to increase step length.   -EE  L LE pain and fatigue limiting  -EE    Recorded by [EE] Kassy Marcial PT  [EE] Kassy Marcial PT    Balance Skills Training    Sitting-Level of Assistance  Close supervision   sitting edge of chair  -LE Close supervision  -EE    Sitting-Balance Support   Feet supported  -EE    Sitting-Balance Activities   Trunk control activities  -EE    Sitting # of Minutes   8  -EE    Standing-Level of Assistance   Moderate assistance;x2  -EE    Static Standing Balance Support   Left upper extremity supported  -EE    Recorded by  [LE] ISELA Mayo [EE] Kassy Marcial PT    Therapy Exercises    Bilateral Lower Extremities   AROM:;5 reps;ankle pumps/circles;LAQ  -EE    Recorded by   [EE] Kassy Marcial PT    Positioning and Restraints    Pre-Treatment Position sitting in chair/recliner  -EE sitting in chair/recliner  -LE in bed  -EE    Post Treatment Position bed  -EE  chair  -EE    In Bed supine;call light within reach;encouraged to call for assist;with nsg  -EE      In Chair  reclined;call light within reach;encouraged to call for assist   R UE in sling  -LE reclined;call light within reach;encouraged to call for assist;exit alarm on;RUE elevated;LUE elevated;legs elevated;notified nsg   R UE sling  -EE    Recorded by [EE] Kassy Marcial PT [LE] ISELA Mayo [EE] Kassy Marcial PT      User Key  (r) = Recorded By, (t) = Taken By, (c) = Cosigned By    Initials Name Effective Dates    ISELA Miller 04/13/15 -     EE Kassy Marcial  PT 12/01/15 -                 IP PT Goals       01/16/18 1454 01/12/18 1418 01/06/18 1721    Bed Mobility PT LTG    Bed Mobility PT LTG, Time to Achieve 1 wk  -EE 1 wk  -CH     Bed Mobility PT LTG, Activity Type  all bed mobility  -CH     Bed Mobility PT LTG, [place] Level  minimum assist (75% patient effort)  -CH     Bed Mobility PT LTG, Date Goal Reviewed 01/16/18  -EE      Bed Mobility PT LTG, Outcome goal ongoing  -EE goal ongoing  -CH goal ongoing  -PC    Bed Mobility PT LTG, Reason Goal Not Met  progress slower than expected  -CH     Transfer Training PT LTG    Transfer Training PT LTG, Time to Achieve 1 wk  -EE 1 wk  -CH 1 wk  -PC    Transfer Training PT LTG, Activity Type  all transfers  -CH sit to stand/stand to sit;bed to chair /chair to bed  -PC    Transfer Training PT LTG, [place] Level  minimum assist (75% patient effort);2 person assist required  -CH moderate assist (50% patient effort)  -PC    Transfer Training PT  LTG, Date Goal Reviewed 01/16/18  -EE      Transfer Training PT LTG, Outcome goal ongoing  -EE goal revised  -CH     Gait Training PT LTG    Gait Training Goal PT LTG, Date Established 01/16/18  -EE      Gait Training Goal PT LTG, Time to Achieve 1 wk  -EE 1 wk  -CH 1 wk  -PC    Gait Training Goal PT LTG, [place] Level minimum assist (75% patient effort);2 person assist required  -EE minimum assist (75% patient effort);2 person assist required  -CH moderate assist (50% patient effort);2 person assist required  -PC    Gait Training Goal PT LTG, Distance to Achieve 5  -EE 5  -CH 5 ft  -PC    Gait Training Goal PT LTG, Date Goal Reviewed 01/16/18  -EE      Gait Training Goal PT LTG, Outcome goal revised  -EE goal revised  -CH goal revised  -PC    Gait Training Goal PT LTG, Reason Goal Not Met   other (see comments)   new CVA  -PC      User Key  (r) = Recorded By, (t) = Taken By, (c) = Cosigned By    Initials Name Provider Type    CAM Lilly, PT Physical Therapist    PC  Mellissa Mendez, PT Physical Therapist     Kassy Marcial, PT Physical Therapist          Physical Therapy Education     Title: PT OT SLP Therapies (Active)     Topic: Physical Therapy (Active)     Point: Mobility training (Active)    Learning Progress Summary    Learner Readiness Method Response Comment Documented by Status   Patient Acceptance E,TB NR  EE 01/18/18 1626 Active    Acceptance E,TB NR  EE 01/17/18 1423 Active    Acceptance E,TB NR  EE 01/16/18 1453 Active    Acceptance E,D NR  EJ 01/13/18 1116 Active    Acceptance E,TB,D VU,NR  CH 01/12/18 1418 Done    Acceptance E,TB,D NR   01/11/18 1423 Active    Acceptance E,TB,D NR   01/10/18 1538 Active    Acceptance E,TB,D VU,NR  RW 01/09/18 0932 Done    Acceptance E,TB,D VU,NR  RW 01/09/18 0923 Done    Acceptance E,TB,D NR  RW 01/08/18 1334 Active    Acceptance E,D NR  PC 01/06/18 1721 Active    Acceptance E,D NR,NL   01/04/18 1702 Active    Acceptance E VU,NR  AA 01/02/18 1359 Done               Point: Home exercise program (Active)    Learning Progress Summary    Learner Readiness Method Response Comment Documented by Status   Patient Acceptance E,TB NR  EE 01/17/18 1423 Active    Acceptance E,TB NR  EE 01/16/18 1453 Active    Acceptance E,TB,D VU,NR  CH 01/12/18 1418 Done    Acceptance E,TB,D NR  RW 01/10/18 1538 Active    Acceptance E,TB,D VU,NR  RW 01/09/18 0932 Done    Acceptance E,TB,D VU,NR  RW 01/09/18 0923 Done    Acceptance E,TB,D NR  RW 01/08/18 1334 Active    Acceptance E,D NR  PC 01/06/18 1721 Active    Acceptance E,D NR,NL   01/04/18 1702 Active    Acceptance E VU,NR  AA 01/02/18 1359 Done               Point: Body mechanics (Active)    Learning Progress Summary    Learner Readiness Method Response Comment Documented by Status   Patient Acceptance E,TB NR  EE 01/18/18 1626 Active    Acceptance E,TB NR  EE 01/17/18 1423 Active    Acceptance E,TB NR  EE 01/16/18 1453 Active    Acceptance E,TB,D VU,NR  CH 01/12/18 1418 Done    Acceptance  E,TB,D NR   01/11/18 1423 Active    Acceptance E,TB,D NR   01/10/18 1538 Active    Acceptance E,TB,D VU,NR   01/09/18 0932 Done    Acceptance E,TB,D VU,NR   01/09/18 0923 Done    Acceptance E,TB,D NR   01/08/18 1334 Active    Acceptance E,D NR   01/06/18 1721 Active    Acceptance E,D NR,NL   01/04/18 1702 Active    Acceptance E VU,NR  AA 01/02/18 1359 Done               Point: Precautions (Active)    Learning Progress Summary    Learner Readiness Method Response Comment Documented by Status   Patient Acceptance E,TB NR   01/18/18 1626 Active    Acceptance E,TB NR   01/17/18 1423 Active    Acceptance E,TB NR   01/16/18 1453 Active    Acceptance E,TB,D VU,NR   01/12/18 1418 Done    Acceptance E,TB,D NR   01/11/18 1423 Active    Acceptance E,TB,D NR   01/10/18 1538 Active    Acceptance E,TB,D VU,NR   01/09/18 0932 Done    Acceptance E,TB,D VU,NR   01/09/18 0923 Done    Acceptance E,TB,D NR   01/08/18 1334 Active    Acceptance E,D NR   01/06/18 1721 Active    Acceptance E,D NR,NL   01/04/18 1702 Active    Acceptance E VU,NR   01/02/18 1359 Done                      User Key     Initials Effective Dates Name Provider Type Discipline     12/01/15 -  Tiffany Lilly, PT Physical Therapist PT     12/01/15 -  Mellissa Mendez, PT Physical Therapist PT     12/01/15 -  Kassy Marcial, PT Physical Therapist PT     02/18/16 -  Amarilis Johnson, PTA Physical Therapy Assistant PT    EJ 04/21/17 -  Anastasiya Chase, PT Physical Therapist PT     04/06/16 -  Lilliana Carroll, PTA Physical Therapy Assistant PT    AA 09/05/17 -  Paula Holley, PT Physical Therapist PT     01/08/18 -  Lisa Peralta, PT Student PT Student PT                    PT Recommendation and Plan  Anticipated Equipment Needs At Discharge: front wheeled walker  Anticipated Discharge Disposition: skilled nursing facility  Planned Therapy Interventions: balance training, bed mobility training, gait training, home  exercise program, patient/family education, strengthening, transfer training  PT Frequency: daily  Plan of Care Review  Plan Of Care Reviewed With: patient  Progress: progress toward functional goals is gradual  Outcome Summary/Follow up Plan: Pt able to maintain activity level and take several shuffled steps from chair back to bed. Continues to require assist of two for all mobility due to weakness, impaired balance, and pain. Will continue to increase activity as able.           Outcome Measures       01/18/18 1600 01/18/18 1432 01/18/18 1400    How much help from another person do you currently need...    Turning from your back to your side while in flat bed without using bedrails? 2  -EE      Moving from lying on back to sitting on the side of a flat bed without bedrails? 2  -EE      Moving to and from a bed to a chair (including a wheelchair)? 2  -EE      Standing up from a chair using your arms (e.g., wheelchair, bedside chair)? 2  -EE      Climbing 3-5 steps with a railing? 1  -EE      To walk in hospital room? 2  -EE      AM-PAC 6 Clicks Score 11  -EE      How much help from another is currently needed...    Putting on and taking off regular lower body clothing?  2  -LE     Bathing (including washing, rinsing, and drying)  2  -LE     Toileting (which includes using toilet bed pan or urinal)  2  -LE     Putting on and taking off regular upper body clothing  2  -LE     Taking care of personal grooming (such as brushing teeth)  3  -LE     Eating meals  3  -LE     Score  14  -LE     Functional Assessment    Outcome Measure Options AM-PAC 6 Clicks Basic Mobility (PT)  -EE  AM-PAC 6 Clicks Daily Activity (OT)  -LE      01/17/18 1400 01/16/18 1400       How much help from another person do you currently need...    Turning from your back to your side while in flat bed without using bedrails? 2  -EE 2  -EE     Moving from lying on back to sitting on the side of a flat bed without bedrails? 2  -EE 2  -EE     Moving to  and from a bed to a chair (including a wheelchair)? 2  -EE 1  -EE     Standing up from a chair using your arms (e.g., wheelchair, bedside chair)? 2  -EE 2  -EE     Climbing 3-5 steps with a railing? 1  -EE 1  -EE     To walk in hospital room? 2  -EE 1  -EE     AM-PAC 6 Clicks Score 11  -EE 9  -EE     Functional Assessment    Outcome Measure Options AM-PAC 6 Clicks Basic Mobility (PT)  -EE AM-PAC 6 Clicks Basic Mobility (PT)  -EE       User Key  (r) = Recorded By, (t) = Taken By, (c) = Cosigned By    Initials Name Provider Type    AILYN Medrano, OTR Occupational Therapist    EE Kassy Marcial PT Physical Therapist           Time Calculation:         PT Charges       01/18/18 1627          Time Calculation    Start Time 1410  -EE      Stop Time 1422  -EE      Time Calculation (min) 12 min  -EE      PT Received On 01/18/18  -EE      PT - Next Appointment 01/19/18  -EE        User Key  (r) = Recorded By, (t) = Taken By, (c) = Cosigned By    Initials Name Provider Type    EE Kassy Marcial PT Physical Therapist          Therapy Charges for Today     Code Description Service Date Service Provider Modifiers Qty    83091783463 HC PT THER PROC EA 15 MIN 1/17/2018 Kassy Marcial PT GP 2    34830805176 HC PT THER SUPP EA 15 MIN 1/17/2018 Kassy Marcial PT GP 2    81676943087 HC PT THER PROC EA 15 MIN 1/18/2018 Kassy Marcial PT GP 1          PT G-Codes  Outcome Measure Options: AM-PAC 6 Clicks Basic Mobility (PT)    Kassy Marcial PT  1/18/2018

## 2018-01-18 NOTE — PLAN OF CARE
Problem: Patient Care Overview (Adult)  Goal: Plan of Care Review   01/18/18 8198   Coping/Psychosocial Response Interventions   Plan Of Care Reviewed With patient   Patient Care Overview   Progress progress toward functional goals is gradual   Outcome Evaluation   Outcome Summary/Follow up Plan Pt able to maintain activity level and take several shuffled steps from chair back to bed. Continues to require assist of two for all mobility due to weakness, impaired balance, and pain. Will continue to increase activity as able.

## 2018-01-18 NOTE — NURSING NOTE
Per patient MARINE Do (no longer present at bedside), discussed with patient and I wishes for other family members.     Per POA, patient daughter Rut and granddaughter Bessy ARE allowed to visit but NOT able to make any medical decisions or retrieve any type of medical information.     Clinicians be advised when speaking with patient in room.

## 2018-01-18 NOTE — PROGRESS NOTES
Continued Stay Note  [facility]     Patient Name: Sakshi Garcia  MRN: [id number]  Today's Date: 1/18/2018    Admit Date: 1/1/2018          Discharge Plan       01/18/18 1119    Case Management/Social Work Plan    Plan [facility] in [facility] for skilled rehab    Patient/Family In Agreement With Plan yes    Additional Comments Met with pt and daughter Channing at bedside.  Ernestina confirms plan for [facility] in [place], [place] when discharged.  Channing will be flying home to [place] this afternoon but would like to be notified when pt discharged.  Spoke with Nissa who states bed will be available if pt discharged over weekend.  Packet in CCP office.   FADY Wallace RN              Discharge Codes     None        Expected Discharge Date and Time     Expected Discharge Date Expected Discharge Time    Jan 4, 2018             Felisa Wallace

## 2018-01-18 NOTE — NURSING NOTE
Called Dr. Shell office regarding recs for elbow- ordered elbow XR and will see patient tomorrow morning.

## 2018-01-18 NOTE — PLAN OF CARE
Problem: Patient Care Overview (Adult)  Goal: Plan of Care Review  Outcome: Ongoing (interventions implemented as appropriate)   01/18/18 1710   Coping/Psychosocial Response Interventions   Plan Of Care Reviewed With patient   Patient Care Overview   Progress improving   Outcome Evaluation   Outcome Summary/Follow up Plan patient on room air most of day, up to chair with nsg staff, generalized weakness. ineffective cough, congested- tessalon given. cleviprex d/c, monitoring BP. reconsulted wound care for excoriation on buttocks. NIH 4- moderate aphasia. turn Q2, incontinence care. will cont to monitor.       Problem: Pressure Ulcer Risk (Tank Scale) (Adult,Obstetrics,Pediatric)  Goal: Skin Integrity  Outcome: Ongoing (interventions implemented as appropriate)      Problem: Fall Risk (Adult)  Goal: Absence of Falls  Outcome: Ongoing (interventions implemented as appropriate)      Problem: Skin Integrity Impairment, Risk/Actual (Adult)  Goal: Skin Integrity/Wound Healing  Outcome: Ongoing (interventions implemented as appropriate)      Problem: Infection, Risk/Actual (Adult)  Goal: Infection Prevention/Resolution  Outcome: Ongoing (interventions implemented as appropriate)      Problem: Perioperative Period (Adult)  Goal: Signs and Symptoms of Listed Potential Problems Will be Absent or Manageable (Perioperative Period)  Outcome: Ongoing (interventions implemented as appropriate)      Problem: Respiratory Insufficiency (Adult)  Goal: Effective Ventilation  Outcome: Ongoing (interventions implemented as appropriate)

## 2018-01-18 NOTE — PLAN OF CARE
Problem: Patient Care Overview (Adult)  Goal: Plan of Care Review  Outcome: Ongoing (interventions implemented as appropriate)   01/18/18 1431   Coping/Psychosocial Response Interventions   Plan Of Care Reviewed With patient   Patient Care Overview   Progress progress towards functional goals is fair   Outcome Evaluation   Outcome Summary/Follow up Plan Reposition pt in chair, ed on benefits of sitting up (pt with wet sounding cough when enter room). Adjusted R sling and ed on on proper position of sling for supporting UE and avoiding impingement at wrist.

## 2018-01-19 ENCOUNTER — APPOINTMENT (OUTPATIENT)
Dept: GENERAL RADIOLOGY | Facility: HOSPITAL | Age: 78
End: 2018-01-19

## 2018-01-19 ENCOUNTER — APPOINTMENT (OUTPATIENT)
Dept: CT IMAGING | Facility: HOSPITAL | Age: 78
End: 2018-01-19

## 2018-01-19 LAB
ANION GAP SERPL CALCULATED.3IONS-SCNC: 12.1 MMOL/L
BUN BLD-MCNC: 25 MG/DL (ref 8–23)
BUN/CREAT SERPL: 19.7 (ref 7–25)
CALCIUM SPEC-SCNC: 9.1 MG/DL (ref 8.6–10.5)
CHLORIDE SERPL-SCNC: 98 MMOL/L (ref 98–107)
CO2 SERPL-SCNC: 23.9 MMOL/L (ref 22–29)
CREAT BLD-MCNC: 1.27 MG/DL (ref 0.57–1)
DEPRECATED RDW RBC AUTO: 52.4 FL (ref 37–54)
ERYTHROCYTE [DISTWIDTH] IN BLOOD BY AUTOMATED COUNT: 15.4 % (ref 11.7–13)
GFR SERPL CREATININE-BSD FRML MDRD: 41 ML/MIN/1.73
GLUCOSE BLD-MCNC: 131 MG/DL (ref 65–99)
GLUCOSE BLDC GLUCOMTR-MCNC: 127 MG/DL (ref 70–130)
GLUCOSE BLDC GLUCOMTR-MCNC: 143 MG/DL (ref 70–130)
GLUCOSE BLDC GLUCOMTR-MCNC: 205 MG/DL (ref 70–130)
GLUCOSE BLDC GLUCOMTR-MCNC: 222 MG/DL (ref 70–130)
GLUCOSE BLDC GLUCOMTR-MCNC: 226 MG/DL (ref 70–130)
HCT VFR BLD AUTO: 27.4 % (ref 35.6–45.5)
HGB BLD-MCNC: 8.6 G/DL (ref 11.9–15.5)
MCH RBC QN AUTO: 29.9 PG (ref 26.9–32)
MCHC RBC AUTO-ENTMCNC: 31.4 G/DL (ref 32.4–36.3)
MCV RBC AUTO: 95.1 FL (ref 80.5–98.2)
PLATELET # BLD AUTO: 267 10*3/MM3 (ref 140–500)
PMV BLD AUTO: 9.8 FL (ref 6–12)
POTASSIUM BLD-SCNC: 3.6 MMOL/L (ref 3.5–5.2)
RBC # BLD AUTO: 2.88 10*6/MM3 (ref 3.9–5.2)
SODIUM BLD-SCNC: 134 MMOL/L (ref 136–145)
WBC NRBC COR # BLD: 11.32 10*3/MM3 (ref 4.5–10.7)

## 2018-01-19 PROCEDURE — 63710000001 INSULIN ASPART PER 5 UNITS: Performed by: INTERNAL MEDICINE

## 2018-01-19 PROCEDURE — 85027 COMPLETE CBC AUTOMATED: CPT | Performed by: HOSPITALIST

## 2018-01-19 PROCEDURE — 94799 UNLISTED PULMONARY SVC/PX: CPT

## 2018-01-19 PROCEDURE — 97110 THERAPEUTIC EXERCISES: CPT

## 2018-01-19 PROCEDURE — 73200 CT UPPER EXTREMITY W/O DYE: CPT

## 2018-01-19 PROCEDURE — 25010000002 HYDRALAZINE PER 20 MG: Performed by: INTERNAL MEDICINE

## 2018-01-19 PROCEDURE — 82962 GLUCOSE BLOOD TEST: CPT

## 2018-01-19 PROCEDURE — 80048 BASIC METABOLIC PNL TOTAL CA: CPT | Performed by: HOSPITALIST

## 2018-01-19 PROCEDURE — 71045 X-RAY EXAM CHEST 1 VIEW: CPT

## 2018-01-19 PROCEDURE — 25010000002 HEPARIN (PORCINE) PER 1000 UNITS: Performed by: SURGERY

## 2018-01-19 PROCEDURE — 25010000002 FUROSEMIDE PER 20 MG: Performed by: INTERNAL MEDICINE

## 2018-01-19 RX ORDER — IPRATROPIUM BROMIDE AND ALBUTEROL SULFATE 2.5; .5 MG/3ML; MG/3ML
3 SOLUTION RESPIRATORY (INHALATION) EVERY 4 HOURS PRN
Status: DISCONTINUED | OUTPATIENT
Start: 2018-01-19 | End: 2018-01-22 | Stop reason: HOSPADM

## 2018-01-19 RX ORDER — AMLODIPINE BESYLATE 10 MG/1
10 TABLET ORAL
Status: DISCONTINUED | OUTPATIENT
Start: 2018-01-20 | End: 2018-01-22 | Stop reason: HOSPADM

## 2018-01-19 RX ORDER — SODIUM CHLORIDE FOR INHALATION 7 %
4 VIAL, NEBULIZER (ML) INHALATION
Status: DISCONTINUED | OUTPATIENT
Start: 2018-01-19 | End: 2018-01-22 | Stop reason: HOSPADM

## 2018-01-19 RX ORDER — ARFORMOTEROL TARTRATE 15 UG/2ML
15 SOLUTION RESPIRATORY (INHALATION)
Status: DISCONTINUED | OUTPATIENT
Start: 2018-01-19 | End: 2018-01-22 | Stop reason: HOSPADM

## 2018-01-19 RX ORDER — FUROSEMIDE 10 MG/ML
40 INJECTION INTRAMUSCULAR; INTRAVENOUS ONCE
Status: COMPLETED | OUTPATIENT
Start: 2018-01-19 | End: 2018-01-19

## 2018-01-19 RX ADMIN — Medication 1 APPLICATION: at 21:13

## 2018-01-19 RX ADMIN — IPRATROPIUM BROMIDE AND ALBUTEROL SULFATE 3 ML: .5; 3 SOLUTION RESPIRATORY (INHALATION) at 00:49

## 2018-01-19 RX ADMIN — METOPROLOL SUCCINATE 100 MG: 100 TABLET, FILM COATED, EXTENDED RELEASE ORAL at 08:03

## 2018-01-19 RX ADMIN — Medication: at 09:35

## 2018-01-19 RX ADMIN — IPRATROPIUM BROMIDE AND ALBUTEROL SULFATE 3 ML: .5; 3 SOLUTION RESPIRATORY (INHALATION) at 16:08

## 2018-01-19 RX ADMIN — ATORVASTATIN CALCIUM 80 MG: 80 TABLET, FILM COATED ORAL at 21:12

## 2018-01-19 RX ADMIN — LISINOPRIL 20 MG: 20 TABLET ORAL at 08:03

## 2018-01-19 RX ADMIN — FUROSEMIDE 40 MG: 10 INJECTION, SOLUTION INTRAMUSCULAR; INTRAVENOUS at 02:38

## 2018-01-19 RX ADMIN — HEPARIN SODIUM 5000 UNITS: 5000 INJECTION, SOLUTION INTRAVENOUS; SUBCUTANEOUS at 09:35

## 2018-01-19 RX ADMIN — ASPIRIN 325 MG: 325 TABLET ORAL at 09:34

## 2018-01-19 RX ADMIN — IPRATROPIUM BROMIDE AND ALBUTEROL SULFATE 3 ML: .5; 3 SOLUTION RESPIRATORY (INHALATION) at 08:41

## 2018-01-19 RX ADMIN — IPRATROPIUM BROMIDE AND ALBUTEROL SULFATE 3 ML: .5; 3 SOLUTION RESPIRATORY (INHALATION) at 04:17

## 2018-01-19 RX ADMIN — HYDRALAZINE HYDROCHLORIDE 10 MG: 20 INJECTION INTRAMUSCULAR; INTRAVENOUS at 06:36

## 2018-01-19 RX ADMIN — INSULIN ASPART 4 UNITS: 100 INJECTION, SOLUTION INTRAVENOUS; SUBCUTANEOUS at 21:12

## 2018-01-19 RX ADMIN — DOCUSATE SODIUM 100 MG: 100 CAPSULE, LIQUID FILLED ORAL at 21:12

## 2018-01-19 RX ADMIN — AMLODIPINE BESYLATE 5 MG: 5 TABLET ORAL at 08:03

## 2018-01-19 RX ADMIN — INSULIN ASPART 4 UNITS: 100 INJECTION, SOLUTION INTRAVENOUS; SUBCUTANEOUS at 18:23

## 2018-01-19 RX ADMIN — HEPARIN SODIUM 5000 UNITS: 5000 INJECTION, SOLUTION INTRAVENOUS; SUBCUTANEOUS at 21:12

## 2018-01-19 RX ADMIN — DOCUSATE SODIUM 100 MG: 100 CAPSULE, LIQUID FILLED ORAL at 09:34

## 2018-01-19 RX ADMIN — HYDRALAZINE HYDROCHLORIDE 10 MG: 20 INJECTION INTRAMUSCULAR; INTRAVENOUS at 00:29

## 2018-01-19 RX ADMIN — LINAGLIPTIN 5 MG: 5 TABLET, FILM COATED ORAL at 09:35

## 2018-01-19 NOTE — PLAN OF CARE
Problem: Patient Care Overview (Adult)  Goal: Plan of Care Review  Outcome: Ongoing (interventions implemented as appropriate)   01/19/18 1650   Coping/Psychosocial Response Interventions   Plan Of Care Reviewed With patient   Patient Care Overview   Progress improving   Outcome Evaluation   Outcome Summary/Follow up Plan MD believes mucus plug overnight to explain increased 02 requirement. patient doing better, up to chair x3, no report of pain, only discomfort. swelling in neck unchanged. patient is now on 2L NC. encouraging to eat and increase activity. BP coming back down with home meds, increased norvasc. will cont to monitor.       Problem: Pressure Ulcer Risk (Tank Scale) (Adult,Obstetrics,Pediatric)  Goal: Skin Integrity  Outcome: Ongoing (interventions implemented as appropriate)      Problem: Fall Risk (Adult)  Goal: Absence of Falls  Outcome: Ongoing (interventions implemented as appropriate)      Problem: Skin Integrity Impairment, Risk/Actual (Adult)  Goal: Skin Integrity/Wound Healing  Outcome: Ongoing (interventions implemented as appropriate)      Problem: Infection, Risk/Actual (Adult)  Goal: Infection Prevention/Resolution  Outcome: Ongoing (interventions implemented as appropriate)      Problem: Respiratory Insufficiency (Adult)  Goal: Effective Ventilation  Outcome: Ongoing (interventions implemented as appropriate)

## 2018-01-19 NOTE — SIGNIFICANT NOTE
01/19/18 1258   Rehab Treatment   Discipline occupational therapist   Treatment Not Performed patient unavailable for treatment  (CT 4229)

## 2018-01-19 NOTE — THERAPY TREATMENT NOTE
Acute Care - Physical Therapy Treatment Note  [facility]     Patient Name: Sakshi Garcia  : 1940  MRN: [id number]  Today's Date: 2018  Onset of Illness/Injury or Date of Surgery Date: 01/15/18  Date of Referral to PT: 18  Referring Physician: Diony    Admit Date: 2018    Visit Dx:    ICD-10-CM ICD-9-CM   1. Traumatic rhabdomyolysis, initial encounter T79.6XXA 958.6   2. Fall, initial encounter W19.XXXA E888.9   3. Renal insufficiency N28.9 593.9   4. Dizziness R42 780.4   5. Unsteadiness on feet R26.81 781.2     Patient Active Problem List   Diagnosis   • Non-traumatic rhabdomyolysis   • Generalized weakness   • Fall   • Acute cystitis without hematuria   • Stage 3 chronic kidney disease   • Type 2 diabetes mellitus   • HTN (hypertension)   • Altered mental status   • Sepsis   • Bacteremia   • DNR (do not resuscitate)   • Aspiration pneumonia   • 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   • E-coli UTI w/ bacteremia   • Metabolic encephalopathy present on admit   • Tobacco abuse   • Bilateral pleural effusions   • Oropharyngeal dysphagia   • Closed nondisplaced fracture of head of right radius               Adult Rehabilitation Note       18 1540 18 1610 18 1427    Rehab Assessment/Intervention    Discipline physical therapy assistant  -JM physical therapist  -EE occupational therapist  -LE    Document Type therapy note (daily note)  -JM therapy note (daily note)  -EE therapy note (daily note)  -LE    Subjective Information agree to therapy;complains of;fatigue  -NEO agree to therapy;complains of;weakness;fatigue;pain  -EE agree to therapy  -LE    Patient Effort, Rehab Treatment  adequate  -EE adequate   wants to lay down but agrees to stay up after reposition  -LE    Symptoms Noted During/After Treatment  fatigue  -EE     Precautions/Limitations  fall precautions;oxygen therapy device and L/min;non-weight bearing status   NWB R UE, sling RUE  -EE fall  precautions   ortho to see pt 1/19 f/u R UE  -LE    Specific Treatment Considerations R UE NWB w/sling   -  --   reposition/adjust R UE Sling.  ed pt on proper position   -LE    Patient Response to Treatment   --   pt somewhat reluctant but agrees to stay up longer   -LE    Recorded by [JM] Amarilis Johnson PTA [EE] Kassy Marcial, PT [LE] Tiffany Medrano OTR    Pain Assessment    Pain Assessment Unable to assess  - 0-10  -EE No/denies pain  -LE    Pain Score  5  -EE     Pain Type  Surgical pain  -EE     Pain Location  Leg  -EE     Pain Orientation  Left  -EE     Pain Intervention(s)  Repositioned  -EE     Response to Interventions  tolerated  -EE     Recorded by [JM] Amarilis Johnson PTA [EE] Kassy Marcial, PT [LE] ISELA Mayo    Cognitive Assessment/Intervention    Current Cognitive/Communication Assessment  impaired  -EE functional  -LE    Orientation Status  oriented to;person;place   cues to reorient to situation  -EE     Follows Commands/Answers Questions  75% of the time;able to follow single-step instructions;needs cueing  -EE     Personal Safety  mild impairment;at risk behaviors demonstrated;decreased awareness, need for assist;decreased awareness, need for safety  -EE     Personal Safety Interventions  fall prevention program maintained;gait belt;muscle strengthening facilitated;nonskid shoes/slippers when out of bed;supervised activity  -EE     Recorded by  [EE] Kassy Marcial, PT [LE] ISELA Mayo    ROM (Range of Motion)    General ROM   --   reports limited L UE movement as well as R UE in sling  -LE    Recorded by   [LE] ISELA Mayo    Bed Mobility, Assessment/Treatment    Bed Mobility, Roll Left, [place]  moderate assist (50% patient effort);verbal cues required  -EE     Bed Mobility, Roll Right, [place]  moderate assist (50% patient effort);verbal cues required  -EE     Bed Mobility, Scoot/Bridge, [place]   maximum assist (25% patient effort)   scoot hips back in chair,  reposition  -LE    Bed Mob, Supine to Sit, [place] 2 person assist required;moderate assist (50% patient effort);maximum assist (25% patient effort)  - not tested   up in chair  -EE     Bed Mob, Sit to Supine, [place]  moderate assist (50% patient effort);2 person assist required;verbal cues required;nonverbal cues required (demo/gesture)  -EE     Bed Mobility, Impairments  strength decreased;pain  -EE     Bed Mobility, Comment   has been up in chair about an hour  -LE    Recorded by [JM] Amarilis Johnson PTA [EE] Kassy Marcial, PT [LE] Tiffany Medrano, OTR    Transfer Assessment/Treatment    Transfers, Bed-Chair [place] 2 person assist required;maximum assist (25% patient effort);verbal cues required;nonverbal cues required (demo/gesture)  -      Transfers, Sit-Stand [place] maximum assist (25% patient effort);2 person assist required;hand held assist  - moderate assist (50% patient effort);2 person assist required;verbal cues required;nonverbal cues required (demo/gesture);hand held assist   HHA L UE  -EE     Transfers, Stand-Sit [place]  minimum assist (75% patient effort);moderate assist (50% patient effort);2 person assist required;verbal cues required;nonverbal cues required (demo/gesture);hand held assist  -EE     Transfer, Safety Issues  weight-shifting ability decreased;loses balance backward  -EE     Transfer, Impairments  strength decreased;impaired balance;pain  -EE     Transfer, Comment 2 attempts to complete  - cues for upright posture, pt remains somewhat forward flexed despite verbal and tactile cues  -EE     Recorded by [JM] Amarilis Johnson PTA [EE] Kassy Marcial, PT     Gait Assessment/Treatment    Gait, [place] Level  moderate assist (50% patient effort);2 person assist required;verbal cues required;nonverbal cues required (demo/gesture);hand held assist  -EE     Gait, Distance (Feet)  3  -EE     Gait, Gait Deviations  forward flexed posture;nilda  decreased;decreased heel strike;step length decreased;stride length decreased;weight-shifting ability decreased;left:;antalgic  -EE     Gait, Safety Issues  step length decreased;weight-shifting ability decreased;sequencing ability decreased;balance decreased during turns  -EE     Gait, Impairments  strength decreased;impaired balance;pain  -EE     Gait, Comment  Pt able to take several shuffled steps bed to chair. Cues required for upright posture and to increase step length.   -EE     Recorded by  [EE] Kassy Marcial, PT     Balance Skills Training    Sitting-Level of Assistance   Close supervision   sitting edge of chair  -LE    Recorded by   [LE] Tiffany Medrano, OTR    Positioning and Restraints    Pre-Treatment Position in bed  -JM sitting in chair/recliner  -EE sitting in chair/recliner  -LE    Post Treatment Position  bed  -EE     In Bed  supine;call light within reach;encouraged to call for assist;with nsg  -EE     In Chair reclined;call light within reach;encouraged to call for assist;exit alarm on;notified nsg  -JM  reclined;call light within reach;encouraged to call for assist   R UE in sling  -LE    Recorded by [JM] Amarilis Johnson PTA [EE] Kassy Marcial, PT [LE] Tiffany Medrano, OTR      01/17/18 1352          Rehab Assessment/Intervention    Discipline physical therapist  -EE      Document Type therapy note (daily note)  -EE      Subjective Information agree to therapy;complains of;weakness;fatigue;pain  -EE      Patient Effort, Rehab Treatment adequate  -EE      Symptoms Noted During/After Treatment fatigue  -EE      Precautions/Limitations fall precautions;oxygen therapy device and L/min;non-weight bearing status   NWB R UE, sling R UE  -EE      Recorded by [EE] Kassy Marcial, PT      Vital Signs    Pre SpO2 (%) 95  -EE      O2 Delivery Pre Treatment supplemental O2  -EE      O2 Delivery Intra Treatment supplemental O2  -EE      Post SpO2 (%) 97  -EE      O2 Delivery Post Treatment supplemental O2  -EE      Pre  Patient Position Supine  -EE      Intra Patient Position Standing  -EE      Post Patient Position Sitting  -EE      Recorded by [EE] Kassy Marcial, PT      Pain Assessment    Pain Assessment 0-10  -EE      Pain Score 7  -EE      Pain Type Acute pain  -EE      Pain Location Shoulder   also L LE  -EE      Pain Orientation Left  -EE      Pain Intervention(s) Repositioned;Rest  -EE      Response to Interventions tolerated  -EE      Recorded by [EE] Kassy Marcial, PT      Cognitive Assessment/Intervention    Current Cognitive/Communication Assessment impaired  -EE      Orientation Status oriented to;person;place;situation  -EE      Follows Commands/Answers Questions 100% of the time;able to follow single-step instructions;needs cueing;needs increased time  -EE      Personal Safety mild impairment;at risk behaviors demonstrated;decreased awareness, need for assist;decreased awareness, need for safety  -EE      Personal Safety Interventions fall prevention program maintained;gait belt;muscle strengthening facilitated;nonskid shoes/slippers when out of bed;supervised activity  -EE      Recorded by [EE] Kassy Marcial, PT      Bed Mobility, Assessment/Treatment    Bed Mobility, Assistive Device bed rails;head of bed elevated  -EE      Bed Mob, Supine to Sit, [place] moderate assist (50% patient effort);verbal cues required  -EE      Bed Mob, Sit to Supine, [place] not tested   up in chair  -EE      Bed Mobility, Safety Issues decreased use of arms for pushing/pulling  -EE      Bed Mobility, Impairments strength decreased;impaired balance;pain  -EE      Recorded by [EE] Kassy Marcial, PT      Transfer Assessment/Treatment    Transfers, Sit-Stand [place] moderate assist (50% patient effort);2 person assist required;verbal cues required;nonverbal cues required (demo/gesture);hand held assist   L UE HHA; R UE sling  -EE      Transfers, Stand-Sit [place] moderate assist (50% patient effort);2 person assist  required;verbal cues required;nonverbal cues required (demo/gesture);hand held assist  -EE      Transfer, Safety Issues weight-shifting ability decreased;step length decreased;balance decreased during turns  -EE      Transfer, Impairments strength decreased;impaired balance;pain  -EE      Transfer, Comment Sit to stand performed x2 from EOB; verbal cues required for upright posture, pt initially very forward flexed  -EE      Recorded by [EE] Kassy Marcial, THU      Gait Assessment/Treatment    Gait, [place] Level moderate assist (50% patient effort);2 person assist required;verbal cues required;nonverbal cues required (demo/gesture);hand held assist   L UE HHA  -EE      Gait, Distance (Feet) 3  -EE      Gait, Gait Deviations forward flexed posture;nilda decreased;left:;antalgic;bilateral:;decreased heel strike;step length decreased;stride length decreased;toe-to-floor clearance decreased;weight-shifting ability decreased  -EE      Gait, Safety Issues step length decreased;weight-shifting ability decreased;sequencing ability decreased;balance decreased during turns  -EE      Gait, Impairments strength decreased;impaired balance;pain  -EE      Gait, Comment L LE pain and fatigue limiting  -EE      Recorded by [EE] Kassy Marcial, THU      Balance Skills Training    Sitting-Level of Assistance Close supervision  -EE      Sitting-Balance Support Feet supported  -EE      Sitting-Balance Activities Trunk control activities  -EE      Sitting # of Minutes 8  -EE      Standing-Level of Assistance Moderate assistance;x2  -EE      Static Standing Balance Support Left upper extremity supported  -EE      Recorded by [EE] Kassy Marcial PT      Therapy Exercises    Bilateral Lower Extremities AROM:;5 reps;ankle pumps/circles;LAQ  -EE      Recorded by [EE] Kassy Marcial PT      Positioning and Restraints    Pre-Treatment Position in bed  -EE      Post Treatment Position chair  -EE      In Chair reclined;call light within  reach;encouraged to call for assist;exit alarm on;RUE elevated;LUE elevated;legs elevated;notified nsg   R UE sling  -EE      Recorded by [EE] Kassy Marcial, PT        User Key  (r) = Recorded By, (t) = Taken By, (c) = Cosigned By    Initials Name Effective Dates    AILYN Tiffany Medrano, OTR 04/13/15 -     EE Kassy Marcial, PT 12/01/15 -     JM Amarilis Johnson, PTA 02/18/16 -                 IP PT Goals       01/16/18 1454 01/12/18 1418 01/06/18 1721    Bed Mobility PT LTG    Bed Mobility PT LTG, Time to Achieve 1 wk  -EE 1 wk  -CH     Bed Mobility PT LTG, Activity Type  all bed mobility  -CH     Bed Mobility PT LTG, [place] Level  minimum assist (75% patient effort)  -CH     Bed Mobility PT LTG, Date Goal Reviewed 01/16/18  -EE      Bed Mobility PT LTG, Outcome goal ongoing  -EE goal ongoing  -CH goal ongoing  -PC    Bed Mobility PT LTG, Reason Goal Not Met  progress slower than expected  -CH     Transfer Training PT LTG    Transfer Training PT LTG, Time to Achieve 1 wk  -EE 1 wk  -CH 1 wk  -PC    Transfer Training PT LTG, Activity Type  all transfers  -CH sit to stand/stand to sit;bed to chair /chair to bed  -PC    Transfer Training PT LTG, [place] Level  minimum assist (75% patient effort);2 person assist required  -CH moderate assist (50% patient effort)  -PC    Transfer Training PT  LTG, Date Goal Reviewed 01/16/18  -EE      Transfer Training PT LTG, Outcome goal ongoing  -EE goal revised  -CH     Gait Training PT LTG    Gait Training Goal PT LTG, Date Established 01/16/18  -EE      Gait Training Goal PT LTG, Time to Achieve 1 wk  -EE 1 wk  -CH 1 wk  -PC    Gait Training Goal PT LTG, [place] Level minimum assist (75% patient effort);2 person assist required  -EE minimum assist (75% patient effort);2 person assist required  -CH moderate assist (50% patient effort);2 person assist required  -PC    Gait Training Goal PT LTG, Distance to Achieve 5  -EE 5  -CH 5 ft  -PC    Gait Training Goal PT LTG, Date Goal  Reviewed 01/16/18  -EE      Gait Training Goal PT LTG, Outcome goal revised  -EE goal revised  -CH goal revised  -PC    Gait Training Goal PT LTG, Reason Goal Not Met   other (see comments)   new CVA  -PC      User Key  (r) = Recorded By, (t) = Taken By, (c) = Cosigned By    Initials Name Provider Type    CH Tiffany Lilly, PT Physical Therapist    PC Mellissa Mendez, PT Physical Therapist    EE Kassy Marcial, PT Physical Therapist          Physical Therapy Education     Title: PT OT SLP Therapies (Active)     Topic: Physical Therapy (Active)     Point: Mobility training (Active)    Learning Progress Summary    Learner Readiness Method Response Comment Documented by Status   Patient Acceptance E,D NR  JM 01/19/18 1549 Active    Acceptance E,TB NR  EE 01/18/18 1626 Active    Acceptance E,TB NR  EE 01/17/18 1423 Active    Acceptance E,TB NR  EE 01/16/18 1453 Active    Acceptance E,D NR  EJ 01/13/18 1116 Active    Acceptance E,TB,D VU,NR   01/12/18 1418 Done    Acceptance E,TB,D NR  SH 01/11/18 1423 Active    Acceptance E,TB,D NR   01/10/18 1538 Active    Acceptance E,TB,D VU,NR  RW 01/09/18 0932 Done    Acceptance E,TB,D VU,NR  RW 01/09/18 0923 Done    Acceptance E,TB,D NR  RW 01/08/18 1334 Active    Acceptance E,D NR  PC 01/06/18 1721 Active    Acceptance E,D NR,NL   01/04/18 1702 Active    Acceptance E VU,NR  AA 01/02/18 1359 Done               Point: Home exercise program (Active)    Learning Progress Summary    Learner Readiness Method Response Comment Documented by Status   Patient Acceptance E,D NR  JM 01/19/18 1549 Active    Acceptance E,TB NR  EE 01/17/18 1423 Active    Acceptance E,TB NR  EE 01/16/18 1453 Active    Acceptance E,TB,D VU,NR   01/12/18 1418 Done    Acceptance E,TB,D NR  RW 01/10/18 1538 Active    Acceptance E,TB,D VU,NR  RW 01/09/18 0932 Done    Acceptance E,TB,D VU,NR  RW 01/09/18 0923 Done    Acceptance E,TB,D NR  RW 01/08/18 1334 Active    Acceptance E,D NR  PC 01/06/18 1728 Active     Acceptance E,D NR,NL   01/04/18 1702 Active    Acceptance E VU,NR  AA 01/02/18 1359 Done               Point: Body mechanics (Active)    Learning Progress Summary    Learner Readiness Method Response Comment Documented by Status   Patient Acceptance E,D NR   01/19/18 1549 Active    Acceptance E,TB NR   01/18/18 1626 Active    Acceptance E,TB NR   01/17/18 1423 Active    Acceptance E,TB NR   01/16/18 1453 Active    Acceptance E,TB,D VU,NR   01/12/18 1418 Done    Acceptance E,TB,D NR   01/11/18 1423 Active    Acceptance E,TB,D NR   01/10/18 1538 Active    Acceptance E,TB,D VU,NR   01/09/18 0932 Done    Acceptance E,TB,D VU,NR   01/09/18 0923 Done    Acceptance E,TB,D NR   01/08/18 1334 Active    Acceptance E,D NR   01/06/18 1721 Active    Acceptance E,D NR,NL   01/04/18 1702 Active    Acceptance E VU,NR  AA 01/02/18 1359 Done               Point: Precautions (Active)    Learning Progress Summary    Learner Readiness Method Response Comment Documented by Status   Patient Acceptance E,D NR   01/19/18 1549 Active    Acceptance E,TB NR   01/18/18 1626 Active    Acceptance E,TB NR   01/17/18 1423 Active    Acceptance E,TB NR   01/16/18 1453 Active    Acceptance E,TB,D VU,NR   01/12/18 1418 Done    Acceptance E,TB,D NR   01/11/18 1423 Active    Acceptance E,TB,D NR   01/10/18 1538 Active    Acceptance E,TB,D VU,NR   01/09/18 0932 Done    Acceptance E,TB,D VU,NR   01/09/18 0923 Done    Acceptance E,TB,D NR   01/08/18 1334 Active    Acceptance E,D NR   01/06/18 1721 Active    Acceptance E,D NR,NL   01/04/18 1702 Active    Acceptance E VU,NR  AA 01/02/18 1359 Done                      User Key     Initials Effective Dates Name Provider Type Discipline     12/01/15 -  Tiffany Lilly, PT Physical Therapist PT    PC 12/01/15 -  Mellissa Mendez, PT Physical Therapist PT    EE 12/01/15 -  Kassy Marcial, PT Physical Therapist PT    JM 02/18/16 -  Amarilis Johnson, PTA Physical  Therapy Assistant PT    EJ 04/21/17 -  Anastasiya Chase, PT Physical Therapist PT    RW 04/06/16 -  Lilliana Carroll, PTA Physical Therapy Assistant PT    AA 09/05/17 -  Paula Holley, PT Physical Therapist PT    SH 01/08/18 -  Lisa Peralta, PT Student PT Student PT                    PT Recommendation and Plan  Anticipated Equipment Needs At Discharge: front wheeled walker  Anticipated Discharge Disposition: skilled nursing facility  Planned Therapy Interventions: balance training, bed mobility training, gait training, home exercise program, patient/family education, strengthening, transfer training  PT Frequency: daily  Plan of Care Review  Plan Of Care Reviewed With: patient  Progress: progress toward functional goals is gradual  Outcome Summary/Follow up Plan: pt able to pivot to chair but needs encouragement to participate at all          Outcome Measures       01/19/18 1500 01/18/18 1600 01/18/18 1432    How much help from another person do you currently need...    Turning from your back to your side while in flat bed without using bedrails? 2  -JM 2  -EE     Moving from lying on back to sitting on the side of a flat bed without bedrails? 2  -JM 2  -EE     Moving to and from a bed to a chair (including a wheelchair)? 2  -JM 2  -EE     Standing up from a chair using your arms (e.g., wheelchair, bedside chair)? 2  -JM 2  -EE     Climbing 3-5 steps with a railing? 1  -JM 1  -EE     To walk in hospital room? 1  -JM 2  -EE     AM-PAC 6 Clicks Score 10  -JM 11  -EE     How much help from another is currently needed...    Putting on and taking off regular lower body clothing?   2  -LE    Bathing (including washing, rinsing, and drying)   2  -LE    Toileting (which includes using toilet bed pan or urinal)   2  -LE    Putting on and taking off regular upper body clothing   2  -LE    Taking care of personal grooming (such as brushing teeth)   3  -LE    Eating meals   3  -LE    Score   14  -LE    Functional  Assessment    Outcome Measure Options  AM-PAC 6 Clicks Basic Mobility (PT)  -EE       01/18/18 1400 01/17/18 1400       How much help from another person do you currently need...    Turning from your back to your side while in flat bed without using bedrails?  2  -EE     Moving from lying on back to sitting on the side of a flat bed without bedrails?  2  -EE     Moving to and from a bed to a chair (including a wheelchair)?  2  -EE     Standing up from a chair using your arms (e.g., wheelchair, bedside chair)?  2  -EE     Climbing 3-5 steps with a railing?  1  -EE     To walk in hospital room?  2  -EE     AM-PAC 6 Clicks Score  11  -EE     Functional Assessment    Outcome Measure Options AM-PAC 6 Clicks Daily Activity (OT)  -LE AM-PAC 6 Clicks Basic Mobility (PT)  -EE       User Key  (r) = Recorded By, (t) = Taken By, (c) = Cosigned By    Initials Name Provider Type    AILYN Medrano, OTR Occupational Therapist    CAROL Marcial, PT Physical Therapist    NEO Johnson PTA Physical Therapy Assistant           Time Calculation:         PT Charges       01/19/18 1550          Time Calculation    Start Time 1520  -      Stop Time 1540  -      Time Calculation (min) 20 min  -NEO      PT Received On 01/19/18  -NEO      PT - Next Appointment 01/20/18  -NEO        User Key  (r) = Recorded By, (t) = Taken By, (c) = Cosigned By    Initials Name Provider Type    NEO Johnson PTA Physical Therapy Assistant          Therapy Charges for Today     Code Description Service Date Service Provider Modifiers Qty    84385114226 HC PT THER PROC EA 15 MIN 1/19/2018 Amarilis Johnson PTA GP 1    39074177635 HC PT THER SUPP EA 15 MIN 1/19/2018 Amarilis Johnson PTA GP 1          PT G-Codes  Outcome Measure Options: AM-PAC 6 Clicks Basic Mobility (PT)    Amarilis Johnson PTA  1/19/2018

## 2018-01-19 NOTE — PROGRESS NOTES
LOS: 18 days     Name: Sakshi Garcia  Age/Sex: 77 y.o. female  :  1940        PCP: Provider Not In System    Subjective   No new issues or complaints.  Oxygen requirements increased overnight but better this AM.    Lots of coughing and difficulty during episode of hypoxia    General: No Fever or Chills, Cardiac: No Chest Pain or Palpitations, Resp: No Cough or SOA, GI: No Nausea, Vomiting, or Diarrhea and Other: No bleeding      amLODIPine 5 mg Oral Q24H   aspirin 325 mg Oral Daily   atorvastatin 80 mg Oral Nightly   docusate sodium 100 mg Oral BID   heparin (porcine) 5,000 Units Subcutaneous Q12H   insulin aspart 0-9 Units Subcutaneous 4x Daily With Meals & Nightly   ipratropium-albuterol 3 mL Nebulization Q4H - RT   linagliptin 5 mg Oral Daily   lisinopril 20 mg Oral Q24H   metoprolol succinate  mg Oral Q24H   miconazole nitrate  Topical Q12H          Objective   Vital Signs  Temp:  [97.7 °F (36.5 °C)-100.1 °F (37.8 °C)] 97.7 °F (36.5 °C)  Heart Rate:  [66-88] 83  Resp:  [18-24] 20  BP: (153-210)/(59-86) 153/71  Body mass index is 28.78 kg/(m^2).    Intake/Output Summary (Last 24 hours) at 18 0833  Last data filed at 18 0800   Gross per 24 hour   Intake                0 ml   Output              400 ml   Net             -400 ml       Physical Exam   Constitutional: She is oriented to person, place, and time. She appears well-developed and well-nourished.   HENT:   Head: Atraumatic.   Eyes: EOM are normal.   Neck: Neck supple.   Cardiovascular: Normal rate and regular rhythm.    Pulmonary/Chest: Effort normal and breath sounds normal.   Abdominal: Soft. Bowel sounds are normal.   Musculoskeletal: Normal range of motion. She exhibits no edema.   Neurological: She is alert and oriented to person, place, and time.   Skin: Skin is warm and dry.   Psychiatric: She has a normal mood and affect. Her behavior is normal.   Nursing note and vitals reviewed.        Results Review:       I reviewed  the patient's new clinical results.    Results from last 7 days  Lab Units 01/19/18  0551 01/18/18  0509 01/17/18  0418 01/16/18  0541 01/15/18  1550 01/14/18  1731   WBC 10*3/mm3 11.32* 11.98* 11.41* 12.95*  --  12.30*   HEMOGLOBIN g/dL 8.6* 8.6* 9.4* 7.3* 8.1* 7.6*   PLATELETS 10*3/mm3 267 242 262 297  --  384     Results from last 7 days  Lab Units 01/19/18  0551 01/18/18  0509 01/17/18  0418 01/16/18  0541 01/14/18  1731   SODIUM mmol/L 134* 135* 139 140 140   POTASSIUM mmol/L 3.6 3.9 4.1 4.4 4.5   CHLORIDE mmol/L 98 100 105 105 100   CO2 mmol/L 23.9 23.7 23.0 23.2 26.5   BUN mg/dL 25* 26* 25* 27* 25*   CREATININE mg/dL 1.27* 1.45* 1.46* 1.55* 1.46*   CALCIUM mg/dL 9.1 8.9 8.9 8.4* 9.3   Estimated Creatinine Clearance: 33.8 mL/min (by C-G formula based on Cr of 1.27).    Lab Results   Component Value Date    HGBA1C 6.50 (H) 01/02/2018     Glucose   Date/Time Value Ref Range Status   01/19/2018 1123 143 (H) 70 - 130 mg/dL Final   01/19/2018 0733 127 70 - 130 mg/dL Final   01/18/2018 2026 159 (H) 70 - 130 mg/dL Final   01/18/2018 1746 149 (H) 70 - 130 mg/dL Final   01/18/2018 1222 177 (H) 70 - 130 mg/dL Final   01/18/2018 0752 175 (H) 70 - 130 mg/dL Final   01/17/2018 2104 252 (H) 70 - 130 mg/dL Final   01/17/2018 1713 225 (H) 70 - 130 mg/dL Final      Assessment/Plan   Principal Problem:    E-coli UTI w/ bacteremia  Active Problems:    Non-traumatic rhabdomyolysis    Generalized weakness    Fall    Stage 3 chronic kidney disease    Type 2 diabetes mellitus    HTN (hypertension)    Sepsis    DNR (do not resuscitate)    Aspiration pneumonia    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    Metabolic encephalopathy present on admit    Tobacco abuse    Bilateral pleural effusions    Oropharyngeal dysphagia    Closed nondisplaced fracture of head of right radius      PLAN  1. Ecoli UTI with bacteremia  - s/p 14 day course for the bacteremia.   - repeat BCx no growth  - WBC better. cont to monitor      2.  Aspiration PNA with Moderate R pleural effusion  - No further abx needed for this per pulm.  - SLP eval noted  - mod R pleural effusion. CT shows only mild-mod bilaterally. No indication for thoracentesis per Pulm      3. Acute CVA   - on ASA and Plavix. MRI with multiple CVA c/w cardio-embolic source or atherosclerotic plaque. Carotid u/s showed severe stenosis on left.   - s/p LEFT CAROTID ENDARTERECTOMY WITH SAPHENOUS VEIN HARVEST  - Blood pressure still not adequately controlled. Increase norvasc      4. Right radial head fracture (occult?)  - NWB per ortho. In sling.  - await final recs from Dr Shell    5. Acute resp failure with hypoxia  - ? Mucus plug overnight  - add mucinex      Disposition  Hopefully out over the weekend      Dada Jackson MD  [facility]  01/19/18  8:33 AM

## 2018-01-19 NOTE — PLAN OF CARE
Problem: Patient Care Overview (Adult)  Goal: Plan of Care Review  Outcome: Ongoing (interventions implemented as appropriate)   01/19/18 0470   Coping/Psychosocial Response Interventions   Plan Of Care Reviewed With patient   Patient Care Overview   Progress progress toward functional goals is gradual   Outcome Evaluation   Outcome Summary/Follow up Plan pt able to pivot to chair but needs encouragement to participate at all

## 2018-01-19 NOTE — PROGRESS NOTES
Prabhu Kendrick MD                          [phone]      Patient ID:    Name:  Sakshi Garcia    MRN:  [id number]    1940   77 y.o.  female            Patient Care Team:  Provider Not In System as PCP - General    LOS: 18    CC/Reason for visit:     Subjective: Pt seen and examined this AM. Overnight events noted. Inc o2 needs early AM needing higher o2 upto optiflow. Got 1 dose of lasix for congestions - some output but not much. Sitting in the chair but poor cough effort. Generalized weakness.     ROS:   Denies any fevers/ chills/ CP/ palpitations/ Nausea/ vomiting/ Diarrhoea    Objective     Vital Signs past 24hrs    BP range: BP: (130-210)/(44-86) 153/51  Pulse range: Heart Rate:  [75-88] 78  Resp rate range: Resp:  [18-24] 18  Temp range: Temp (24hrs), Av.8 °F (37.1 °C), Min:97.7 °F (36.5 °C), Max:100.1 °F (37.8 °C)      Ventilator/Non-Invasive Ventilation Settings     None          O2 Device: nasal cannula with humidification       71.4 kg (157 lb 6.4 oz); Body mass index is 28.78 kg/(m^2).      Intake/Output Summary (Last 24 hours) at 18 1650  Last data filed at 18 1334   Gross per 24 hour   Intake              240 ml   Output              400 ml   Net             -160 ml       Exam:    GEN:  No respiratory distress, appears stated Age  EYES:   EOM-i, anicteric sclera bilat  ENT:    External ears/nose normal, OP clear  NECK:  Supple, midline trachea, No JVD or cervical LApathy, Lt large linear surgical wound C/D/I  LUNGS: Bilateral breath sounds heard, No adventitious sounds, Dec BS @ Bases  CV:  Regular rate and rhythm, No murmur  ABD:  Non tender, no distended, no palpable liver or masses  EXT:  No cyanosis or clubbing, no peripheral/sacral edema    Scheduled meds:      [START ON 2018] amLODIPine 10 mg Oral Q24H   aspirin 325 mg Oral Daily   atorvastatin 80 mg Oral Nightly   docusate sodium 100 mg Oral BID   heparin (porcine) 5,000 Units  Subcutaneous Q12H   insulin aspart 0-9 Units Subcutaneous 4x Daily With Meals & Nightly   ipratropium-albuterol 3 mL Nebulization Q4H - RT   linagliptin 5 mg Oral Daily   lisinopril 20 mg Oral Q24H   metoprolol succinate  mg Oral Q24H   miconazole nitrate  Topical Q12H       IV meds:                             Data Review:        Results from last 7 days  Lab Units 01/19/18  0551 01/18/18  0509 01/17/18  0418   SODIUM mmol/L 134* 135* 139   POTASSIUM mmol/L 3.6 3.9 4.1   CHLORIDE mmol/L 98 100 105   CO2 mmol/L 23.9 23.7 23.0   BUN mg/dL 25* 26* 25*   CREATININE mg/dL 1.27* 1.45* 1.46*   CALCIUM mg/dL 9.1 8.9 8.9   GLUCOSE mg/dL 131* 167* 164*   WBC 10*3/mm3 11.32* 11.98* 11.41*   HEMOGLOBIN g/dL 8.6* 8.6* 9.4*   PLATELETS 10*3/mm3 267 242 262       Lab Results   Component Value Date    CALCIUM 9.1 01/19/2018    PHOS 2.5 01/02/2018                   Results Review:    I have reviewed the available laboratory results and reviewed the chest imaging personally    Imaging Results (all)     Procedure Component Value Units Date/Time    XR Chest 2 View [[id number]] Collected:  01/01/18 1656     Updated:  01/01/18 1701    Narrative:       XR CHEST 2 VW-     HISTORY: Female who is 77 years-old,  weakness     TECHNIQUE: Frontal and lateral views of the chest     COMPARISON: None available     FINDINGS: Heart size is borderline. Pulmonary vasculature is  unremarkable. Aorta is tortuous. No focal pulmonary consolidation,  pleural effusion, or pneumothorax. Arterial calcifications are present.  Degenerative changes are seen at the shoulders. No acute osseous  process.       Impression:       No focal pulmonary consolidation. Borderline heart size.  Tortuous aorta. Follow-up as clinically indicated.     This report was finalized on 1/1/2018 4:58 PM by Dr. Carlos Manuel Mary MD.       CT Head Without Contrast [[id number]] Collected:  01/01/18 1810     Updated:  01/01/18 1944    Narrative:       CT OF THE HEAD WITHOUT CONTRAST      HISTORY: 77-year-old female with a history of frequent falls and altered  mental status.     TECHNIQUE: Contiguous axial images were obtained through the head  without IV contrast.     COMPARISON: None.     FINDINGS: There is a severe hypodense appearance seen within the  bilateral paraventricular and subcortical white matter of the cerebral  hemispheres. No territorial edema is appreciated. There is moderate  diffuse atrophy and proportionate ventriculomegaly. The osseous  structures of the skull have a normal appearance.       Impression:       There is no evidence for an acute intracranial abnormality.  Findings consistent with severe chronic small vessel ischemic change of  the cerebral white matter are noted.     Radiation dose reduction techniques were utilized, including automated  exposure control and exposure modulation based on body size.     This report was finalized on 1/1/2018 7:41 PM by Dr. Philip Soares MD.       XR Chest 1 View [[id number]] Collected:  01/04/18 1211     Updated:  01/04/18 1219    Narrative:       XR CHEST 1 VW-     HISTORY: Female who is 77 years-old,  short of breath, cough     TECHNIQUE: Frontal view of the chest     COMPARISON: 01/01/2018     FINDINGS: Heart size is normal. Aorta is calcified. Old granulomatous  disease is seen.. Opacity has developed at the right base suggesting  atelectasis/infiltrate and pleural effusion, follow-up recommended No  pneumothorax. No acute osseous process.       Impression:       Opacity has developed at the right base suggesting  atelectasis/infiltrate and pleural effusion, follow-up recommended.       This report was finalized on 1/4/2018 12:15 PM by Dr. Carlos Manuel Mary MD.       CT Head Without Contrast [[id number]] Collected:  01/04/18 1816     Updated:  01/04/18 1822    Narrative:       CT HEAD WO CONTRAST-     CLINICAL HISTORY: Right-sided weakness. No onset confusion. Possible  acute CVA     TECHNIQUE: Transverse 3 mm thick images  were acquired from the base of  the skull to the vertex without IV contrast.     Radiation dose reduction techniques were utilized, including automated  exposure control and exposure modulation based on body size.     COMPARISON: CT head dated 1/1/2018.     FINDINGS: There is mild diffuse cortical atrophy. There is fairly  confluent abnormal diminished attenuation throughout the white matter of  both cerebral hemispheres that is compatible with sequela of extensive  small vessel chronic ischemic change. A tiny old lacunar infarct is  noted in the right thalamus. No acute infarct or hemorrhage is  identified. There is no mass effect. The paranasal sinuses appear well  aerated       Impression:       Evidence of extensive small vessel chronic ischemic change  as described. No acute intracranial abnormality is identified.     These findings were communicated to the stroke neurologist on 1/4/2018  at 6:15 PM.     This report was finalized on 1/4/2018 6:19 PM by Dr. Nico Tello MD.       MRI Angiogram Head Without Contrast [[id number]] Collected:  01/05/18 2132     Updated:  01/07/18 1948    Narrative:       MRI EXAMINATION OF THE BRAIN WITHOUT CONTRAST, MRA OF THE HEAD WITHOUT  CONTRAST AND MRA OF THE NECK WITHOUT CONTRAST     HISTORY: Stroke.     COMPARISON: CT head 01/04/2018. No prior MRI examination of the brain is  available for comparison.     MRI EXAMINATION OF THE BRAIN WITHOUT CONTRAST: The diffusion sequence  demonstrates an area of increased signal intensity involving the cortex  of the right middle frontal gyrus anteriorly and superiorly measuring 8  mm in size suspicious for small acute infarct. A 1 mm area of subtle  increased signal intensity is appreciated involving the corona radiata  on the right on the diffusion sequence as well. There are multiple  cortical and subcortical areas of restricted diffusion involving the  left frontal parietal and occipital lobes suggesting a watershed  distribution  infarct. The largest area of infarction involves the left  occipital lobe posteriorly measuring 18 mm in size.  The next largest area of infarction involves the subcortical white  matter of the left frontal lobe where an area of infarction measuring  approximately 13 mm in size is present.     There is moderate atrophy. There is extensive small vessel ischemic  disease. Small areas of signal loss are noted on the susceptibility  weighted imaging involving the posterior occipital infarct suggesting  small foci of blood products. There is no evidence of positive mass  effect, hydrocephalus or of midline shift. There is extensive small  vessel ischemic disease.     MRA OF THE HEAD WITHOUT CONTRAST: There is signal present within the  distal aspect of the vertebral arteries bilaterally. The vertebral  arteries are codominant. The basilar artery and the left posterior  cerebral artery appear unremarkable. There is signal present within the  distal aspects of the internal carotid arteries bilaterally. There is a  small focal protuberance involving the cavernous ICA laterally which may  be secondary to a small aneurysm or infundibulum. This does not project  into the subarachnoid space. The left A1 segment is mildly hypoplastic.  The anterior communicating artery is complex suggesting a fenestrated  right A1/A2 junction. No convincing aneurysm is identified. The study is  hampered by patient motion but the proximal aspects of the anterior and  middle cerebral arteries appeared unremarkable.     MRA OF THE NECK WITHOUT CONTRAST: There is a suggestion of a right  pleural effusion. There is signal loss appreciated involving the left  common carotid artery at its origin. The sensitivity of the study is  reduced as intravenous contrast was not utilized. I could not exclude a  severe stenosis at the origin of the left common carotid artery. There  is irregularity involving the carotid bifurcations bilaterally with a  focal area  of signal loss appreciated involving the left internal  carotid artery approximately 1.4 cm beyond its origin. I could not  exclude a severe stenosis at this location, greater than 80% using  NASCET criteria. There is a focal stenosis appreciated involving the  external carotid artery on the right and signal loss appreciated  involving the proximal aspect of the internal carotid artery on the  right. There is a focal eccentric stenosis involving the proximal aspect  of the right vertebral artery approximately 3 cm beyond its origin  resulting in a severe (greater than 90% stenosis). There is mild  irregularity but with no evidence of focal high-grade stenosis of the  left vertebral artery.       Impression:       1. Multiple lacunar infarcts are appreciated involving the left cerebral  hemisphere suggesting a watershed type distribution with infarcts  involving the frontal, parietal and occipital lobes. There are  questionable areas of restricted diffusion involving the right frontal  lobe. If true this would suggest a proximal cardiac source. However, the  MRA of the neck did demonstrate signal loss involving the proximal  aspect of the left internal carotid artery where there is likely  moderate-to-severe if not severe stenosis using NASCET criteria. There  is signal loss at the origin of the left common carotid artery  suggesting a severe stenosis. There is severe stenosis involving the  right vertebral artery approximately 3 cm beyond the origin and likely  moderate if not moderate-to-severe stenosis involving the right internal  carotid artery. Further evaluation with a CT angiogram of the neck and  head is recommended. A cardiac echo could also be performed.  2. A right pleural effusion is present.     The above information was called to and discussed with Dr. Greer on  01/05/2018 at 2108 hours.     This report was finalized on 1/7/2018 7:45 PM by Dr. Dada Lewis MD.       MRI Brain Without Contrast  [[id number]] Collected:  01/05/18 2132     Updated:  01/07/18 1948    Narrative:       MRI EXAMINATION OF THE BRAIN WITHOUT CONTRAST, MRA OF THE HEAD WITHOUT  CONTRAST AND MRA OF THE NECK WITHOUT CONTRAST     HISTORY: Stroke.     COMPARISON: CT head 01/04/2018. No prior MRI examination of the brain is  available for comparison.     MRI EXAMINATION OF THE BRAIN WITHOUT CONTRAST: The diffusion sequence  demonstrates an area of increased signal intensity involving the cortex  of the right middle frontal gyrus anteriorly and superiorly measuring 8  mm in size suspicious for small acute infarct. A 1 mm area of subtle  increased signal intensity is appreciated involving the corona radiata  on the right on the diffusion sequence as well. There are multiple  cortical and subcortical areas of restricted diffusion involving the  left frontal parietal and occipital lobes suggesting a watershed  distribution infarct. The largest area of infarction involves the left  occipital lobe posteriorly measuring 18 mm in size.  The next largest area of infarction involves the subcortical white  matter of the left frontal lobe where an area of infarction measuring  approximately 13 mm in size is present.     There is moderate atrophy. There is extensive small vessel ischemic  disease. Small areas of signal loss are noted on the susceptibility  weighted imaging involving the posterior occipital infarct suggesting  small foci of blood products. There is no evidence of positive mass  effect, hydrocephalus or of midline shift. There is extensive small  vessel ischemic disease.     MRA OF THE HEAD WITHOUT CONTRAST: There is signal present within the  distal aspect of the vertebral arteries bilaterally. The vertebral  arteries are codominant. The basilar artery and the left posterior  cerebral artery appear unremarkable. There is signal present within the  distal aspects of the internal carotid arteries bilaterally. There is a  small focal  protuberance involving the cavernous ICA laterally which may  be secondary to a small aneurysm or infundibulum. This does not project  into the subarachnoid space. The left A1 segment is mildly hypoplastic.  The anterior communicating artery is complex suggesting a fenestrated  right A1/A2 junction. No convincing aneurysm is identified. The study is  hampered by patient motion but the proximal aspects of the anterior and  middle cerebral arteries appeared unremarkable.     MRA OF THE NECK WITHOUT CONTRAST: There is a suggestion of a right  pleural effusion. There is signal loss appreciated involving the left  common carotid artery at its origin. The sensitivity of the study is  reduced as intravenous contrast was not utilized. I could not exclude a  severe stenosis at the origin of the left common carotid artery. There  is irregularity involving the carotid bifurcations bilaterally with a  focal area of signal loss appreciated involving the left internal  carotid artery approximately 1.4 cm beyond its origin. I could not  exclude a severe stenosis at this location, greater than 80% using  NASCET criteria. There is a focal stenosis appreciated involving the  external carotid artery on the right and signal loss appreciated  involving the proximal aspect of the internal carotid artery on the  right. There is a focal eccentric stenosis involving the proximal aspect  of the right vertebral artery approximately 3 cm beyond its origin  resulting in a severe (greater than 90% stenosis). There is mild  irregularity but with no evidence of focal high-grade stenosis of the  left vertebral artery.       Impression:       1. Multiple lacunar infarcts are appreciated involving the left cerebral  hemisphere suggesting a watershed type distribution with infarcts  involving the frontal, parietal and occipital lobes. There are  questionable areas of restricted diffusion involving the right frontal  lobe. If true this would suggest a  proximal cardiac source. However, the  MRA of the neck did demonstrate signal loss involving the proximal  aspect of the left internal carotid artery where there is likely  moderate-to-severe if not severe stenosis using NASCET criteria. There  is signal loss at the origin of the left common carotid artery  suggesting a severe stenosis. There is severe stenosis involving the  right vertebral artery approximately 3 cm beyond the origin and likely  moderate if not moderate-to-severe stenosis involving the right internal  carotid artery. Further evaluation with a CT angiogram of the neck and  head is recommended. A cardiac echo could also be performed.  2. A right pleural effusion is present.     The above information was called to and discussed with Dr. Greer on  01/05/2018 at 2108 hours.     This report was finalized on 1/7/2018 7:45 PM by Dr. Dada Lewis MD.       MRI Angiogram Neck Without Contrast [[id number]] Collected:  01/05/18 2132     Updated:  01/07/18 1948    Narrative:       MRI EXAMINATION OF THE BRAIN WITHOUT CONTRAST, MRA OF THE HEAD WITHOUT  CONTRAST AND MRA OF THE NECK WITHOUT CONTRAST     HISTORY: Stroke.     COMPARISON: CT head 01/04/2018. No prior MRI examination of the brain is  available for comparison.     MRI EXAMINATION OF THE BRAIN WITHOUT CONTRAST: The diffusion sequence  demonstrates an area of increased signal intensity involving the cortex  of the right middle frontal gyrus anteriorly and superiorly measuring 8  mm in size suspicious for small acute infarct. A 1 mm area of subtle  increased signal intensity is appreciated involving the corona radiata  on the right on the diffusion sequence as well. There are multiple  cortical and subcortical areas of restricted diffusion involving the  left frontal parietal and occipital lobes suggesting a watershed  distribution infarct. The largest area of infarction involves the left  occipital lobe posteriorly measuring 18 mm in size.  The  next largest area of infarction involves the subcortical white  matter of the left frontal lobe where an area of infarction measuring  approximately 13 mm in size is present.     There is moderate atrophy. There is extensive small vessel ischemic  disease. Small areas of signal loss are noted on the susceptibility  weighted imaging involving the posterior occipital infarct suggesting  small foci of blood products. There is no evidence of positive mass  effect, hydrocephalus or of midline shift. There is extensive small  vessel ischemic disease.     MRA OF THE HEAD WITHOUT CONTRAST: There is signal present within the  distal aspect of the vertebral arteries bilaterally. The vertebral  arteries are codominant. The basilar artery and the left posterior  cerebral artery appear unremarkable. There is signal present within the  distal aspects of the internal carotid arteries bilaterally. There is a  small focal protuberance involving the cavernous ICA laterally which may  be secondary to a small aneurysm or infundibulum. This does not project  into the subarachnoid space. The left A1 segment is mildly hypoplastic.  The anterior communicating artery is complex suggesting a fenestrated  right A1/A2 junction. No convincing aneurysm is identified. The study is  hampered by patient motion but the proximal aspects of the anterior and  middle cerebral arteries appeared unremarkable.     MRA OF THE NECK WITHOUT CONTRAST: There is a suggestion of a right  pleural effusion. There is signal loss appreciated involving the left  common carotid artery at its origin. The sensitivity of the study is  reduced as intravenous contrast was not utilized. I could not exclude a  severe stenosis at the origin of the left common carotid artery. There  is irregularity involving the carotid bifurcations bilaterally with a  focal area of signal loss appreciated involving the left internal  carotid artery approximately 1.4 cm beyond its origin. I  could not  exclude a severe stenosis at this location, greater than 80% using  NASCET criteria. There is a focal stenosis appreciated involving the  external carotid artery on the right and signal loss appreciated  involving the proximal aspect of the internal carotid artery on the  right. There is a focal eccentric stenosis involving the proximal aspect  of the right vertebral artery approximately 3 cm beyond its origin  resulting in a severe (greater than 90% stenosis). There is mild  irregularity but with no evidence of focal high-grade stenosis of the  left vertebral artery.       Impression:       1. Multiple lacunar infarcts are appreciated involving the left cerebral  hemisphere suggesting a watershed type distribution with infarcts  involving the frontal, parietal and occipital lobes. There are  questionable areas of restricted diffusion involving the right frontal  lobe. If true this would suggest a proximal cardiac source. However, the  MRA of the neck did demonstrate signal loss involving the proximal  aspect of the left internal carotid artery where there is likely  moderate-to-severe if not severe stenosis using NASCET criteria. There  is signal loss at the origin of the left common carotid artery  suggesting a severe stenosis. There is severe stenosis involving the  right vertebral artery approximately 3 cm beyond the origin and likely  moderate if not moderate-to-severe stenosis involving the right internal  carotid artery. Further evaluation with a CT angiogram of the neck and  head is recommended. A cardiac echo could also be performed.  2. A right pleural effusion is present.     The above information was called to and discussed with Dr. Greer on  01/05/2018 at 2108 hours.     This report was finalized on 1/7/2018 7:45 PM by Dr. Dada Lewis MD.       XR Chest PA & Lateral [[id number]] Collected:  01/07/18 1316     Updated:  01/07/18 2007    Narrative:       2 VIEWS CHEST     HISTORY:   Pneumonia, follow-up.     COMPARISON: Chest x-ray 2018.     FINDINGS: The heart is within normal limits in size. There is bibasilar  atelectasis/infiltrate more prominent on the right. The lateral  projection demonstrates a moderate pleural effusion on the right. Given  differences in technique, this appears similar to minimally more  prominent as compared to the examination of 2018. The diaphragm is  flattened and AP diameter increased consistent with COPD.     This report was finalized on 2018 8:04 PM by Dr. Dada Lewis MD.       CT Chest Without Contrast [[id number]] Collected:  18 1238     Updated:  18 1421    Narrative:       CT SCAN OF THE CHEST WITHOUT CONTRAST     HISTORY: Shortness of breath. Pleural effusions. Possible pneumonia.     The CT scan was performed through the chest without contrast and  demonstrates the followin. There are small-to-moderate bilateral pleural effusions layering  posteriorly, right larger than left. These are associated with some  localized compressive atelectasis, particularly at the right base  posteriorly and I cannot completely exclude a small component of  pneumonia. The lungs are otherwise clear except for calcified granulomas  in both upper lobes.   2. There is no mediastinal or hilar or axillary adenopathy. There is no  pericardial effusion. The CT images through the upper liver, spleen, and  both adrenal glands are unremarkable.     Radiation dose reduction techniques were utilized, including automated  exposure control and exposure modulation based on body size.     This report was finalized on 2018 2:18 PM by Dr. Andrzej Mcclendon MD.       XR Elbow 2 View Right [[id number]] Collected:  18 1459     Updated:  18 1540    Narrative:       TWO VIEW PORTABLE RIGHT ELBOW     HISTORY: Trauma. Pain.     A large joint effusion is present as seen in the lateral view. Although  I see no definite fracture, this raises the concern of  an occult radial  head fracture and follow-up imaging in several days is recommended.     This report was finalized on 1/9/2018 3:37 PM by Dr. Andrzej Mcclendon MD.       FL Video Swallow [[id number]] Collected:  01/09/18 1545     Updated:  01/09/18 1549    Narrative:       BARIUM SWALLOW WITH VIDEO     CLINICAL HISTORY:   Female who is 77 years-old, with dysphagia.     TECHNIQUE: The swallowing mechanism was evaluated with real-time  fluoroscopic imaging, captured on digital disk and performed in  conjunction with speech pathologist (please see report).     FINDINGS: Pt presents with mild oropharyngeal dysphagia. No  penetration/aspiration with nectar, adequate swallow initiation.  Spillage to pyriforms intermittently with thins. Pt with mild pharyngeal  residue post swallow, pt did not clear without cues. Pt coughing  consistently after trials of thins when fluoro not on. Spillage to  valleculae with puree without penetration/aspiration. Mild diffuse  pharyngeal residue post swallow. Spillage to pyriforms with mechanical  soft without observed penetration. Mild BOT residue post swallow with  regular.        Impression:       Mild risk of aspiration.     FLUOROSCOPY TIME: 4 minutes 27 seconds, 3 images      This report was finalized on 1/9/2018 3:46 PM by Dr. Panfilo Hood MD.       XR Chest 1 View [[id number]] Collected:  01/12/18 0740     Updated:  01/12/18 0821    Narrative:       CLINICAL HISTORY: 77-year-old female with pneumonia and pleural  effusions as well as history of colon cancer.     PORTABLE AP SEMIERECT CHEST DATED 01/12/2018 AT 0541 HOURS     FINDINGS: When compared to the AP and lateral erect chest radiographs of  01/07/2018 and CT chest multiplanar sections and AP  image of  01/08/2018, some haziness and strandy opacity at the lower left and  right chest is compatible with some probable residual of the atelectasis  or infiltrates and adjacent pleural fluid demonstrated at the lower  right and left  lung and pleural space on the prior exams. There is only  trace blunting of the costophrenic angles accompanied by some modest  thickening of lateral pleural stripes at the costophrenic angles.  Cardiac silhouette is upper normal caliber. Aortic uncoiling and  calcification are again demonstrated. Degenerative changes in the spine  and shoulders are again demonstrated. No pneumothorax or acute change in  bony thorax is seen. Oxygen cannula tubing and monitoring lead wires are  present.     CONCLUSION: Persistent hazy to patchy opacity in the lower pleural  spaces and lower lung zones compatible with previously demonstrated  pleural effusions and adjacent airspace opacities.     This report was finalized on 1/12/2018 8:18 AM by Dr. Bandar Solomon MD.             ASSESSMENT:   1. Pneumonia s/p antibiotic course  2. Bilateral pleural effusions Rt >Lt - improved   3. Acute CVA  4. Severe carotid stenosis left s/p CEA with graft  5. Right radial head fracture  6. Escherichia coli UTI and bacteremia  7. Diabetes mellitus type 2  8. Metabolic encephalopathy  9. Oral pharyngeal dysphagia    Overnight decompensation sounds more like mucus plugging given the rapid decompensation and recovery. Agree with gentle diuresis. RAJI noted. Mucolytics/ flutter valve.   OOB/ PT.     I have discussed my findings and recommendations with patient and nursing staff.     Prabhu Kendrick MD  1/19/2018

## 2018-01-19 NOTE — PROGRESS NOTES
Name: Sakshi Garcia ADMIT: 2018   : 1940  PCP: Provider Not In System    MRN: [id number] LOS: 18 days   AGE/SEX: 77 y.o. female  ROOM: [facility]/     Active Hospital Problems (** Indicates Principal Problem)    Diagnosis Date Noted   • **E-coli UTI w/ bacteremia [N39.0, B96.20] 2018   • Aspiration pneumonia [J69.0] 2018   • 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 [I63.9] 2018   • Metabolic encephalopathy present on admit [G93.41] 2018   • Tobacco abuse [Z72.0] 2018   • Bilateral pleural effusions [J90] 2018   • Oropharyngeal dysphagia [R13.12] 2018   • Closed nondisplaced fracture of head of right radius [S52.124A] 2018   • DNR (do not resuscitate) [Z66] 2018   • Sepsis [A41.9] 2018   • Non-traumatic rhabdomyolysis [M62.82] 2018   • Generalized weakness [R53.1] 2018   • Fall [W19.XXXA] 2018   • Stage 3 chronic kidney disease [N18.3] 2018   • Type 2 diabetes mellitus [E11.9] 2018   • HTN (hypertension) [I10] 2018      Resolved Hospital Problems    Diagnosis Date Noted Date Resolved   No resolved problems to display.     Subjective     77 y.o. female POD #4 s/p left carotid endarterectomy with saphenous vein patch   No new neuro symptoms,  Blood pressure issues and lung congestion are worse today.  Feels poorly.    Mild left neck pain.       Review of Systems  Feels poorly  Objective     Vital Signs  Temp:  [97.7 °F (36.5 °C)-100.1 °F (37.8 °C)] 97.7 °F (36.5 °C)  Heart Rate:  [66-88] 81  Resp:  [18-24] 20  BP: (154-210)/(58-86) 167/62    Physical Exam   Constitutional: She appears well-developed and well-nourished.   HENT:   Head: Normocephalic and atraumatic.   Neck: Normal range of motion. No tracheal deviation present.   Left neck incision is clean, soft, no hematoma.      Tongue is midline   Cardiovascular: Normal rate and regular rhythm.    Pulmonary/Chest: Effort normal. No respiratory  distress.   Abdominal: Soft. There is no tenderness.   Musculoskeletal: Normal range of motion. She exhibits no deformity.   Neurological: She is alert.   Skin: Skin is warm and dry.   Neuro: unchanged RUE and RLE weakness  Tongue is midline  Left leg incision is clean.  There is minimal erythema in the area and no evidence of infection.    Results Review:       I reviewed the patient's new clinical results.    Results from last 7 days  Lab Units 01/19/18  0551 01/18/18  0509 01/17/18  0418 01/16/18  0541 01/15/18  1550 01/14/18  1731   WBC 10*3/mm3 11.32* 11.98* 11.41* 12.95*  --  12.30*   HEMOGLOBIN g/dL 8.6* 8.6* 9.4* 7.3* 8.1* 7.6*   PLATELETS 10*3/mm3 267 242 262 297  --  384       Results from last 7 days  Lab Units 01/19/18  0551 01/18/18  0509 01/17/18  0418 01/16/18  0541 01/14/18  1731   SODIUM mmol/L 134* 135* 139 140 140   POTASSIUM mmol/L 3.6 3.9 4.1 4.4 4.5   CHLORIDE mmol/L 98 100 105 105 100   CO2 mmol/L 23.9 23.7 23.0 23.2 26.5   BUN mg/dL 25* 26* 25* 27* 25*   CREATININE mg/dL 1.27* 1.45* 1.46* 1.55* 1.46*   GLUCOSE mg/dL 131* 167* 164* 129* 193*   Estimated Creatinine Clearance: 33.8 mL/min (by C-G formula based on Cr of 1.27).    Results from last 7 days  Lab Units 01/19/18  0551 01/18/18  0509 01/17/18  0418 01/16/18  0541 01/14/18  1731   CALCIUM mg/dL 9.1 8.9 8.9 8.4* 9.3       Assessment/Plan           Active Hospital Problems (** Indicates Principal Problem)    Diagnosis Date Noted   • **E-coli UTI w/ bacteremia [N39.0, B96.20] 01/12/2018   • Aspiration pneumonia [J69.0] 01/12/2018   • 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 [I63.9] 01/12/2018   • Metabolic encephalopathy present on admit [G93.41] 01/12/2018   • Tobacco abuse [Z72.0] 01/12/2018   • Bilateral pleural effusions [J90] 01/12/2018   • Oropharyngeal dysphagia [R13.12] 01/12/2018   • Closed nondisplaced fracture of head of right radius [S52.124A] 01/12/2018   • DNR (do not resuscitate) [Z66] 01/03/2018   • Sepsis  [A41.9] 01/02/2018   • Non-traumatic rhabdomyolysis [M62.82] 01/01/2018   • Generalized weakness [R53.1] 01/01/2018   • Fall [W19.XXXA] 01/01/2018   • Stage 3 chronic kidney disease [N18.3] 01/01/2018   • Type 2 diabetes mellitus [E11.9] 01/01/2018   • HTN (hypertension) [I10] 01/01/2018      Resolved Hospital Problems    Diagnosis Date Noted Date Resolved   No resolved problems to display.        Assessment & Plan  77 y.o. female s/p carotid endarterectomy    DCd cleviprex.  Blood pressure control has been poor off of the Cleviprex.  Awaiting morning meds.  She needs stricter blood pressure control.  Pulmonary status appears to be significantly worsened.  We'll order stat chest x-ray.  We'll defer pulmonary management of pulmonary services already involved.  Aspirin for carotid stenosis. DC Plavix.   Postop anemia with a history of CAD and a small apical infarct- resolved after transfusion  Eating more.    Cr stable  Will plan follow-up in 2 weeks to remove left ankle staples.  We'll watch neck wound will inpatient.  Okay for discharge when stable from medical standpoint    Jose Bean MD  01/19/18   7:55 AM  Office Number [phone]

## 2018-01-19 NOTE — NURSING NOTE
PT seen for perineal dermatitis, yeast rash.  Recommend nsg staff to continue with miconazole barrier ointment to area; no brief; keep as dry as possible, check frequently.  Discussed with nsg staff

## 2018-01-19 NOTE — NURSING NOTE
1/18/18    Pt started out the night feeling poorly.  Pt stated she just feels bad everywhere.  Pt bp was elevated at the start of the shift. Gave pt her scheduled bp meds however pt was still hypertensive 185/66 0030.  Gave 10mg of prn hydralazine iv.  At 0200 pt's O2 was at 85% on 2L.  Increased pt O2 and had to titrate pt's o2 up to 12L high flow n/c.  At this time a call was placed to dr. Dailey.  New orders were received for 40mg of IV lasix. However pt's bp remained elevated and pt hadn't voided even after IV lasix.  A second dose of IV hydralazine was given at 0630.  Bladder scan was performed pt was holding 650 in bladder.   Placed call to Dr. Zapata and received new orders for a straight cath. In hopes that this would help with pressure.  Dr. Bean was doing his morning rounds and gave verbal orders for a portable chest xray and orders to give scheduled HTN meds early.  Dr. Bean also asked that pulmonology see pt today.  Dayshift RN will contact Pulmonology when chest ray results are in.

## 2018-01-20 LAB
ANION GAP SERPL CALCULATED.3IONS-SCNC: 11.8 MMOL/L
BASOPHILS # BLD AUTO: 0.05 10*3/MM3 (ref 0–0.2)
BASOPHILS NFR BLD AUTO: 0.5 % (ref 0–1.5)
BUN BLD-MCNC: 32 MG/DL (ref 8–23)
BUN/CREAT SERPL: 29.4 (ref 7–25)
CALCIUM SPEC-SCNC: 9.2 MG/DL (ref 8.6–10.5)
CHLORIDE SERPL-SCNC: 101 MMOL/L (ref 98–107)
CO2 SERPL-SCNC: 24.2 MMOL/L (ref 22–29)
CREAT BLD-MCNC: 1.09 MG/DL (ref 0.57–1)
DEPRECATED RDW RBC AUTO: 53.9 FL (ref 37–54)
EOSINOPHIL # BLD AUTO: 0.28 10*3/MM3 (ref 0–0.7)
EOSINOPHIL NFR BLD AUTO: 3 % (ref 0.3–6.2)
ERYTHROCYTE [DISTWIDTH] IN BLOOD BY AUTOMATED COUNT: 15.3 % (ref 11.7–13)
GFR SERPL CREATININE-BSD FRML MDRD: 49 ML/MIN/1.73
GLUCOSE BLD-MCNC: 128 MG/DL (ref 65–99)
GLUCOSE BLDC GLUCOMTR-MCNC: 138 MG/DL (ref 70–130)
GLUCOSE BLDC GLUCOMTR-MCNC: 173 MG/DL (ref 70–130)
GLUCOSE BLDC GLUCOMTR-MCNC: 217 MG/DL (ref 70–130)
GLUCOSE BLDC GLUCOMTR-MCNC: 235 MG/DL (ref 70–130)
HCT VFR BLD AUTO: 27.4 % (ref 35.6–45.5)
HGB BLD-MCNC: 8.4 G/DL (ref 11.9–15.5)
IMM GRANULOCYTES # BLD: 0.04 10*3/MM3 (ref 0–0.03)
IMM GRANULOCYTES NFR BLD: 0.4 % (ref 0–0.5)
LYMPHOCYTES # BLD AUTO: 1.11 10*3/MM3 (ref 0.9–4.8)
LYMPHOCYTES NFR BLD AUTO: 11.8 % (ref 19.6–45.3)
MCH RBC QN AUTO: 29.6 PG (ref 26.9–32)
MCHC RBC AUTO-ENTMCNC: 30.7 G/DL (ref 32.4–36.3)
MCV RBC AUTO: 96.5 FL (ref 80.5–98.2)
MONOCYTES # BLD AUTO: 1.31 10*3/MM3 (ref 0.2–1.2)
MONOCYTES NFR BLD AUTO: 13.9 % (ref 5–12)
NEUTROPHILS # BLD AUTO: 6.63 10*3/MM3 (ref 1.9–8.1)
NEUTROPHILS NFR BLD AUTO: 70.4 % (ref 42.7–76)
PLATELET # BLD AUTO: 279 10*3/MM3 (ref 140–500)
PMV BLD AUTO: 9.8 FL (ref 6–12)
POTASSIUM BLD-SCNC: 3.6 MMOL/L (ref 3.5–5.2)
RBC # BLD AUTO: 2.84 10*6/MM3 (ref 3.9–5.2)
SODIUM BLD-SCNC: 137 MMOL/L (ref 136–145)
WBC NRBC COR # BLD: 9.42 10*3/MM3 (ref 4.5–10.7)

## 2018-01-20 PROCEDURE — 94799 UNLISTED PULMONARY SVC/PX: CPT

## 2018-01-20 PROCEDURE — 80048 BASIC METABOLIC PNL TOTAL CA: CPT | Performed by: SURGERY

## 2018-01-20 PROCEDURE — 82962 GLUCOSE BLOOD TEST: CPT

## 2018-01-20 PROCEDURE — 85025 COMPLETE CBC W/AUTO DIFF WBC: CPT | Performed by: SURGERY

## 2018-01-20 PROCEDURE — 63710000001 INSULIN ASPART PER 5 UNITS: Performed by: INTERNAL MEDICINE

## 2018-01-20 PROCEDURE — 25010000002 HEPARIN (PORCINE) PER 1000 UNITS: Performed by: SURGERY

## 2018-01-20 RX ADMIN — INSULIN ASPART 4 UNITS: 100 INJECTION, SOLUTION INTRAVENOUS; SUBCUTANEOUS at 17:18

## 2018-01-20 RX ADMIN — SODIUM CHLORIDE SOLN NEBU 7% 4 ML: 7 NEBU SOLN at 13:05

## 2018-01-20 RX ADMIN — AMLODIPINE BESYLATE 10 MG: 10 TABLET ORAL at 08:32

## 2018-01-20 RX ADMIN — DOCUSATE SODIUM 100 MG: 100 CAPSULE, LIQUID FILLED ORAL at 21:01

## 2018-01-20 RX ADMIN — ARFORMOTEROL TARTRATE 15 MCG: 15 SOLUTION RESPIRATORY (INHALATION) at 13:04

## 2018-01-20 RX ADMIN — Medication: at 21:02

## 2018-01-20 RX ADMIN — ARFORMOTEROL TARTRATE 15 MCG: 15 SOLUTION RESPIRATORY (INHALATION) at 20:47

## 2018-01-20 RX ADMIN — LINAGLIPTIN 5 MG: 5 TABLET, FILM COATED ORAL at 08:32

## 2018-01-20 RX ADMIN — LISINOPRIL 20 MG: 20 TABLET ORAL at 08:32

## 2018-01-20 RX ADMIN — INSULIN ASPART 2 UNITS: 100 INJECTION, SOLUTION INTRAVENOUS; SUBCUTANEOUS at 12:12

## 2018-01-20 RX ADMIN — METOPROLOL SUCCINATE 100 MG: 100 TABLET, FILM COATED, EXTENDED RELEASE ORAL at 08:32

## 2018-01-20 RX ADMIN — ATORVASTATIN CALCIUM 80 MG: 80 TABLET, FILM COATED ORAL at 21:01

## 2018-01-20 RX ADMIN — INSULIN ASPART 4 UNITS: 100 INJECTION, SOLUTION INTRAVENOUS; SUBCUTANEOUS at 21:02

## 2018-01-20 RX ADMIN — HYDROCODONE BITARTRATE AND ACETAMINOPHEN 1 TABLET: 5; 325 TABLET ORAL at 12:11

## 2018-01-20 RX ADMIN — ASPIRIN 325 MG: 325 TABLET ORAL at 08:32

## 2018-01-20 RX ADMIN — HEPARIN SODIUM 5000 UNITS: 5000 INJECTION, SOLUTION INTRAVENOUS; SUBCUTANEOUS at 21:01

## 2018-01-20 RX ADMIN — Medication: at 09:00

## 2018-01-20 RX ADMIN — DOCUSATE SODIUM 100 MG: 100 CAPSULE, LIQUID FILLED ORAL at 09:00

## 2018-01-20 RX ADMIN — HEPARIN SODIUM 5000 UNITS: 5000 INJECTION, SOLUTION INTRAVENOUS; SUBCUTANEOUS at 09:00

## 2018-01-20 RX ADMIN — SODIUM CHLORIDE SOLN NEBU 7% 4 ML: 7 NEBU SOLN at 20:47

## 2018-01-20 NOTE — PROGRESS NOTES
LOS: 19 days     Name: Sakshi Garcia  Age/Sex: 77 y.o. female  :  1940        PCP: Provider Not In System    Subjective   No new issues or complaints.  No problems overnight remains on 2L NC    General: No Fever or Chills, Cardiac: No Chest Pain or Palpitations, Resp: No Cough or SOA, GI: No Nausea, Vomiting, or Diarrhea and Other: No bleeding      amLODIPine 10 mg Oral Q24H   arformoterol 15 mcg Nebulization BID - RT   aspirin 325 mg Oral Daily   atorvastatin 80 mg Oral Nightly   docusate sodium 100 mg Oral BID   heparin (porcine) 5,000 Units Subcutaneous Q12H   insulin aspart 0-9 Units Subcutaneous 4x Daily With Meals & Nightly   linagliptin 5 mg Oral Daily   lisinopril 20 mg Oral Q24H   metoprolol succinate  mg Oral Q24H   miconazole nitrate  Topical Q12H   sodium chloride 4 mL Nebulization BID - RT          Objective   Vital Signs  Temp:  [96.9 °F (36.1 °C)-99.6 °F (37.6 °C)] 98.4 °F (36.9 °C)  Heart Rate:  [69-87] 87  Resp:  [18-20] 20  BP: (131-168)/(51-76) 162/61  Body mass index is 28.56 kg/(m^2).    Intake/Output Summary (Last 24 hours) at 18 1305  Last data filed at 18 1334   Gross per 24 hour   Intake              240 ml   Output                0 ml   Net              240 ml       Physical Exam   Constitutional: She is oriented to person, place, and time. She appears well-developed and well-nourished.   HENT:   Head: Atraumatic.   Eyes: EOM are normal.   Neck: Neck supple.   Cardiovascular: Normal rate and regular rhythm.    Pulmonary/Chest: Effort normal and breath sounds normal.   Abdominal: Soft. Bowel sounds are normal.   Musculoskeletal: Normal range of motion. She exhibits no edema.   Neurological: She is alert and oriented to person, place, and time.   Skin: Skin is warm and dry.   Psychiatric: She has a normal mood and affect. Her behavior is normal.   Nursing note and vitals reviewed.        Results Review:       I reviewed the patient's new clinical  results.    Results from last 7 days  Lab Units 01/20/18  0545 01/19/18  0551 01/18/18  0509 01/17/18  0418 01/16/18  0541 01/15/18  1550 01/14/18  1731   WBC 10*3/mm3 9.42 11.32* 11.98* 11.41* 12.95*  --  12.30*   HEMOGLOBIN g/dL 8.4* 8.6* 8.6* 9.4* 7.3* 8.1* 7.6*   PLATELETS 10*3/mm3 279 267 242 262 297  --  384       Results from last 7 days  Lab Units 01/20/18  0545 01/19/18  0551 01/18/18  0509 01/17/18  0418 01/16/18  0541 01/14/18  1731   SODIUM mmol/L 137 134* 135* 139 140 140   POTASSIUM mmol/L 3.6 3.6 3.9 4.1 4.4 4.5   CHLORIDE mmol/L 101 98 100 105 105 100   CO2 mmol/L 24.2 23.9 23.7 23.0 23.2 26.5   BUN mg/dL 32* 25* 26* 25* 27* 25*   CREATININE mg/dL 1.09* 1.27* 1.45* 1.46* 1.55* 1.46*   CALCIUM mg/dL 9.2 9.1 8.9 8.9 8.4* 9.3   Estimated Creatinine Clearance: 39.2 mL/min (by C-G formula based on Cr of 1.09).    Lab Results   Component Value Date    HGBA1C 6.50 (H) 01/02/2018     Glucose   Date/Time Value Ref Range Status   01/20/2018 1157 173 (H) 70 - 130 mg/dL Final   01/20/2018 0731 138 (H) 70 - 130 mg/dL Final   01/19/2018 2021 226 (H) 70 - 130 mg/dL Final   01/19/2018 1650 222 (H) 70 - 130 mg/dL Final   01/19/2018 1649 205 (H) 70 - 130 mg/dL Final   01/19/2018 1123 143 (H) 70 - 130 mg/dL Final   01/19/2018 0733 127 70 - 130 mg/dL Final   01/18/2018 2026 159 (H) 70 - 130 mg/dL Final      Assessment/Plan   Principal Problem:    E-coli UTI w/ bacteremia  Active Problems:    Non-traumatic rhabdomyolysis    Generalized weakness    Fall    Stage 3 chronic kidney disease    Type 2 diabetes mellitus    HTN (hypertension)    Sepsis    DNR (do not resuscitate)    Aspiration pneumonia    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    Metabolic encephalopathy present on admit    Tobacco abuse    Bilateral pleural effusions    Oropharyngeal dysphagia    Closed nondisplaced fracture of head of right radius      PLAN  1. Ecoli UTI with bacteremia  - s/p 14 day course for the bacteremia.   - repeat  BCx no growth  - WBC better. cont to monitor      2. Aspiration PNA with Moderate R pleural effusion  - No further abx needed for this per pulm.  - SLP eval noted  - mod R pleural effusion. CT shows only mild-mod bilaterally. No indication for thoracentesis per Pulm      3. Acute CVA   - on ASA and Plavix. MRI with multiple CVA c/w cardio-embolic source or atherosclerotic plaque. Carotid u/s showed severe stenosis on left.   - s/p LEFT CAROTID ENDARTERECTOMY WITH SAPHENOUS VEIN HARVEST  - Blood pressure still not adequately controlled. Increase norvasc      4. Right radial head fracture (occult?)  - NWB per ortho. In sling.  - await final recs from Dr Shell    5. Acute resp failure with hypoxia  - ? Mucus plug overnight  - add mucinex    6.  Radial head fracture  - discussed with Dr Shell and plan PT and follow up in the office    Disposition  Hopefully out over the weekend      Dada Jackson MD  [facility]  01/20/18  1:05 PM

## 2018-01-20 NOTE — PLAN OF CARE
Problem: Patient Care Overview (Adult)  Goal: Plan of Care Review  Outcome: Ongoing (interventions implemented as appropriate)   01/20/18 4390   Coping/Psychosocial Response Interventions   Plan Of Care Reviewed With patient   Patient Care Overview   Progress progress toward functional goals is gradual   Outcome Evaluation   Outcome Summary/Follow up Plan VSS. NSR on monitor. Taking PO food and fluids with some assistance. Took breathing treatment but refused physical therapy. Will continue to monitor.     Goal: Adult Individualization and Mutuality  Outcome: Ongoing (interventions implemented as appropriate)    Goal: Discharge Needs Assessment  Outcome: Ongoing (interventions implemented as appropriate)      Problem: Pressure Ulcer Risk (Tank Scale) (Adult,Obstetrics,Pediatric)  Goal: Skin Integrity  Outcome: Ongoing (interventions implemented as appropriate)      Problem: Fall Risk (Adult)  Goal: Absence of Falls  Outcome: Ongoing (interventions implemented as appropriate)      Problem: Skin Integrity Impairment, Risk/Actual (Adult)  Goal: Skin Integrity/Wound Healing  Outcome: Ongoing (interventions implemented as appropriate)      Problem: Infection, Risk/Actual (Adult)  Goal: Infection Prevention/Resolution  Outcome: Ongoing (interventions implemented as appropriate)      Problem: Perioperative Period (Adult)  Goal: Signs and Symptoms of Listed Potential Problems Will be Absent or Manageable (Perioperative Period)  Outcome: Ongoing (interventions implemented as appropriate)      Problem: Respiratory Insufficiency (Adult)  Goal: Effective Ventilation  Outcome: Ongoing (interventions implemented as appropriate)

## 2018-01-20 NOTE — PROGRESS NOTES
Freddie Ko MD                          [phone]      Patient ID:  Name:  Sakshi Garcia  MRN:  [id number]    1940   77 y.o.  female            Patient Care Team:  Provider Not In System as PCP - General    LOS: 19  CC/Reason for visit:     Subjective: Overnight of - pt had acute respiratory difficulty and increased oxygen demands associated with some htn and was given diuretics.  Seems to have resolved quickly and patient is on 2lnc currently.  She is weak post operatively and has poor participation in her PT and pulmonary therapies.    She is laying in bed on 2lnc, no conversational dyspnea.   States she hasnt been using her flutter valve routinely.  No chest pain, no chest tightness.    Objective     Vital Signs past 24hrs    BP range: BP: (130-168)/(44-76) 168/62  Pulse range: Heart Rate:  [69-79] 74  Resp rate range: Resp:  [18-20] 20  Temp range: Temp (24hrs), Av.6 °F (37 °C), Min:96.9 °F (36.1 °C), Max:99.6 °F (37.6 °C)    O2 Device: nasal cannula with humidification       70.9 kg (156 lb 3.2 oz); Body mass index is 28.56 kg/(m^2).      Intake/Output Summary (Last 24 hours) at 18 1012  Last data filed at 18 1334   Gross per 24 hour   Intake              240 ml   Output                0 ml   Net              240 ml     Exam:  GEN:  No respiratory distress, appears stated Age  EYES:   EOM-i, anicteric sclera bilat  ENT:    External ears/nose normal, OP clear  NECK:  Supple, midline trachea, No JVD or cervical Lymphadenopathy, Left linear surgical wound C/D/I  LUNGS: Bilateral breath sounds heard, No adventitious sounds, diminished at bilteral bases  CV:  Regular rate and rhythm, No murmur  ABD:  Non tender, no distended, no palpable liver or masses  EXT:  No cyanosis or clubbing, no peripheral/sacral edema    Scheduled meds:      amLODIPine 10 mg Oral Q24H   arformoterol 15 mcg Nebulization BID - RT   aspirin 325 mg Oral Daily   atorvastatin 80  mg Oral Nightly   docusate sodium 100 mg Oral BID   heparin (porcine) 5,000 Units Subcutaneous Q12H   insulin aspart 0-9 Units Subcutaneous 4x Daily With Meals & Nightly   linagliptin 5 mg Oral Daily   lisinopril 20 mg Oral Q24H   metoprolol succinate  mg Oral Q24H   miconazole nitrate  Topical Q12H   sodium chloride 4 mL Nebulization BID - RT       IV meds:                             Data Review:      Results from last 7 days  Lab Units 01/20/18  0545 01/19/18  0551 01/18/18  0509   SODIUM mmol/L 137 134* 135*   POTASSIUM mmol/L 3.6 3.6 3.9   CHLORIDE mmol/L 101 98 100   CO2 mmol/L 24.2 23.9 23.7   BUN mg/dL 32* 25* 26*   CREATININE mg/dL 1.09* 1.27* 1.45*   CALCIUM mg/dL 9.2 9.1 8.9   GLUCOSE mg/dL 128* 131* 167*   WBC 10*3/mm3 9.42 11.32* 11.98*   HEMOGLOBIN g/dL 8.4* 8.6* 8.6*   PLATELETS 10*3/mm3 279 267 242       Lab Results   Component Value Date    CALCIUM 9.2 01/20/2018    PHOS 2.5 01/02/2018                   Results Review:    I have reviewed the available laboratory results and reviewed the chest imaging personally        ASSESSMENT:   1. Pneumonia s/p antibiotic course  2. Bilateral pleural effusions Rt >Lt - improved   3. Acute CVA  4. Severe carotid stenosis left s/p CEA with graft  5. Right radial head fracture  6. Escherichia coli UTI and bacteremia  7. Diabetes mellitus type 2  8. Metabolic encephalopathy  9. Oral pharyngeal dysphagia    Strongly encouraged PT and more active participation in her pulmonary clearance.- flutter, IS, PT HTS BDs  cxr tomorrow am    I have discussed my findings and recommendations with patient and nursing staff.     Freddie Ko MD  1/20/2018

## 2018-01-20 NOTE — PROGRESS NOTES
"Orthopaedic Surgery  Daily Progress Note    /62 (BP Location: Right arm, Patient Position: Lying)  Pulse 74  Temp 96.9 °F (36.1 °C) (Oral)   Resp 20  Ht 157.5 cm (62.01\")  Wt 70.9 kg (156 lb 3.2 oz)  SpO2 96%  BMI 28.56 kg/m2      Imaging Results (last 24 hours)     Procedure Component Value Units Date/Time    CT Upper Extremity Right Without Contrast [[id number]] Collected:  01/19/18 1608     Updated:  01/19/18 1915    Narrative:       CT RIGHT ELBOW WITHOUT CONTRAST     HISTORY: 77-year-old female with right elbow pain.  Right elbow x-rays  obtained 01/09/2018 for trauma demonstrated a large effusion and  suspected occult fracture. Subsequent right elbow x-rays 01/18/2018  demonstrated persistent effusion and suspected occult fracture.     TECHNIQUE: CT includes axial imaging through the right elbow and this  data was reconstructed in coronal and sagittal planes.     FINDINGS: There is an elbow joint effusion. Edema is present within the  subcutaneous fat about the elbow. On axial imaging, there is a subtle  lucency along the lateral cortex of the radial head consistent with a  subtle nondisplaced intra-articular fracture of the lateral radius.   Exam is somewhat limited by streak artifact as the patient was scanned  with her arm to her side.  There is no dislocation and no additional  fracture is evident. There is mild spurring at the tip of the coronoid  process.      Moderate right pleural effusion is present.  There is right basilar  atelectasis.       Impression:       1.  Joint effusion with generalized edema within the subcutaneous fat  about the elbow. There is a subtle nondisplaced intraarticular fracture  of the lateral radial head.   2.  Moderate right pleural effusion with right basilar atelectasis.     Radiation dose reduction techniques were utilized, including automated  exposure control and exposure modulation based on body size.     This report was finalized on 1/19/2018 7:12 PM by " Medardo Daley MD.             Patient Care Team:  Provider Not In System as PCP - General    SUBJECTIVE  Pain in elbow improving    PHYSICAL EXAM  Resting in NAD  Sling in place    Examination of her right arm shows improvement in swelling around the elbow. Tolerates passive range of motion from full extension to 140° of flexion without any significant pain.  50° of pronation and 50° of supination with no pain.  She does have tenderness to palpation directly over the radial head.  She is nontender over the olecranon or of the distal humerus.     Distally, she has active wrist extension and wrist flexion.  Sensation is intact to light touch in radial, median, ulnar distribution.  Fingers are warm and well perfused with brisk capillary refill.  Palpable radial pulse.           Principal Problem:    E-coli UTI w/ bacteremia  Active Problems:    Non-traumatic rhabdomyolysis    Generalized weakness    Fall    Stage 3 chronic kidney disease    Type 2 diabetes mellitus    HTN (hypertension)    Sepsis    DNR (do not resuscitate)    Aspiration pneumonia    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    Metabolic encephalopathy present on admit    Tobacco abuse    Bilateral pleural effusions    Oropharyngeal dysphagia    Closed nondisplaced fracture of head of right radius      PLAN / DISPOSITION:  This is a 77-year-old female with multiple medical comorbidities including diabetes and recent stroke presenting with nondisplaced occult radial head fracture confirmed on CT following fall during transfer one week ago.        - Recommend sling for comfort for right upper extremity  - PT: Can begin gentle progressive PROM/AAROM of elbow, no lifting > 1 pound  - DVT: On Plavix, POP/SCDs  - Patient can follow-up with me in 2 weeks at the [place] [facility].  Call [phone] to make appointment.       Please call with questions.    Derek Shell Jr, MD  01/20/18  9:10 AM

## 2018-01-20 NOTE — PLAN OF CARE
Problem: Patient Care Overview (Adult)  Goal: Plan of Care Review  Outcome: Ongoing (interventions implemented as appropriate)   01/20/18 0143   Coping/Psychosocial Response Interventions   Plan Of Care Reviewed With patient   Patient Care Overview   Progress improving   Outcome Evaluation   Outcome Summary/Follow up Plan Monitor pain,labs,and vitals. O2 2L NC. Pt refusing breathing treatments. Turn Q 2 hours. Will cont. to monitor.     Goal: Adult Individualization and Mutuality  Outcome: Ongoing (interventions implemented as appropriate)    Goal: Discharge Needs Assessment  Outcome: Ongoing (interventions implemented as appropriate)      Problem: Pressure Ulcer Risk (Tank Scale) (Adult,Obstetrics,Pediatric)  Goal: Skin Integrity  Outcome: Ongoing (interventions implemented as appropriate)   01/20/18 0143   Pressure Ulcer Risk (Tank Scale) (Adult,Obstetrics,Pediatric)   Skin Integrity making progress toward outcome       Problem: Fall Risk (Adult)  Goal: Absence of Falls  Outcome: Ongoing (interventions implemented as appropriate)   01/20/18 0143   Fall Risk (Adult)   Absence of Falls making progress toward outcome       Problem: Skin Integrity Impairment, Risk/Actual (Adult)  Goal: Skin Integrity/Wound Healing  Outcome: Ongoing (interventions implemented as appropriate)   01/20/18 0143   Skin Integrity Impairment, Risk/Actual (Adult)   Skin Integrity/Wound Healing making progress toward outcome       Problem: Infection, Risk/Actual (Adult)  Goal: Infection Prevention/Resolution  Outcome: Ongoing (interventions implemented as appropriate)   01/20/18 0143   Infection, Risk/Actual (Adult)   Infection Prevention/Resolution making progress toward outcome       Problem: Perioperative Period (Adult)  Goal: Signs and Symptoms of Listed Potential Problems Will be Absent or Manageable (Perioperative Period)  Outcome: Ongoing (interventions implemented as appropriate)   01/20/18 0143   Perioperative Period   Problems  Assessed (Perioperative Period) all   Problems Present (Perioperative Period) situational response       Problem: Respiratory Insufficiency (Adult)  Goal: Effective Ventilation  Outcome: Ongoing (interventions implemented as appropriate)   01/20/18 0143   Respiratory Insufficiency (Adult)   Effective Ventilation making progress toward outcome

## 2018-01-20 NOTE — PROGRESS NOTES
Name: Sakshi Garcia ADMIT: 2018   : 1940  PCP: Provider Not In System    MRN: [id number] LOS: 19 days   AGE/SEX: 77 y.o. female  ROOM: [facility]/     Active Hospital Problems (** Indicates Principal Problem)    Diagnosis Date Noted   • **E-coli UTI w/ bacteremia [N39.0, B96.20] 2018   • Aspiration pneumonia [J69.0] 2018   • 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 [I63.9] 2018   • Metabolic encephalopathy present on admit [G93.41] 2018   • Tobacco abuse [Z72.0] 2018   • Bilateral pleural effusions [J90] 2018   • Oropharyngeal dysphagia [R13.12] 2018   • Closed nondisplaced fracture of head of right radius [S52.124A] 2018   • DNR (do not resuscitate) [Z66] 2018   • Sepsis [A41.9] 2018   • Non-traumatic rhabdomyolysis [M62.82] 2018   • Generalized weakness [R53.1] 2018   • Fall [W19.XXXA] 2018   • Stage 3 chronic kidney disease [N18.3] 2018   • Type 2 diabetes mellitus [E11.9] 2018   • HTN (hypertension) [I10] 2018      Resolved Hospital Problems    Diagnosis Date Noted Date Resolved   No resolved problems to display.     Subjective     77 y.o. female POD #5 s/p left carotid endarterectomy with saphenous vein patch   No new neuro symptoms, Blood pressure under better control thanks to medical management  Feels A little better..    Mild left neck pain.       Review of Systems  Feels poorly  Objective     Vital Signs  Temp:  [98.8 °F (37.1 °C)-99.6 °F (37.6 °C)] 99.6 °F (37.6 °C)  Heart Rate:  [69-86] 69  Resp:  [18-22] 20  BP: (130-157)/(44-76) 131/53    Physical Exam   Constitutional: She appears well-developed and well-nourished.   HENT:   Head: Normocephalic and atraumatic.   Neck: Normal range of motion. No tracheal deviation present.   Left neck incision is clean, soft, no hematoma.      Tongue is midline   Cardiovascular: Normal rate and regular rhythm.    Pulmonary/Chest: Effort normal. No  respiratory distress.   Abdominal: Soft. There is no tenderness.   Musculoskeletal: Normal range of motion. She exhibits no deformity.   Neurological: She is alert.   Skin: Skin is warm and dry.   Neuro: unchanged RUE and RLE weakness  Tongue is midline  Left leg incision is clean.  There is minimal erythema in the area and no evidence of infection.    Results Review:       I reviewed the patient's new clinical results.    Results from last 7 days  Lab Units 01/20/18  0545 01/19/18  0551 01/18/18  0509 01/17/18  0418 01/16/18  0541 01/15/18  1550 01/14/18  1731   WBC 10*3/mm3 9.42 11.32* 11.98* 11.41* 12.95*  --  12.30*   HEMOGLOBIN g/dL 8.4* 8.6* 8.6* 9.4* 7.3* 8.1* 7.6*   PLATELETS 10*3/mm3 279 267 242 262 297  --  384       Results from last 7 days  Lab Units 01/20/18  0545 01/19/18  0551 01/18/18  0509 01/17/18  0418 01/16/18  0541 01/14/18  1731   SODIUM mmol/L 137 134* 135* 139 140 140   POTASSIUM mmol/L 3.6 3.6 3.9 4.1 4.4 4.5   CHLORIDE mmol/L 101 98 100 105 105 100   CO2 mmol/L 24.2 23.9 23.7 23.0 23.2 26.5   BUN mg/dL 32* 25* 26* 25* 27* 25*   CREATININE mg/dL 1.09* 1.27* 1.45* 1.46* 1.55* 1.46*   GLUCOSE mg/dL 128* 131* 167* 164* 129* 193*   Estimated Creatinine Clearance: 39.2 mL/min (by C-G formula based on Cr of 1.09).    Results from last 7 days  Lab Units 01/20/18  0545 01/19/18  0551 01/18/18  0509 01/17/18  0418 01/16/18  0541 01/14/18  1731   CALCIUM mg/dL 9.2 9.1 8.9 8.9 8.4* 9.3       Assessment/Plan           Active Hospital Problems (** Indicates Principal Problem)    Diagnosis Date Noted   • **E-coli UTI w/ bacteremia [N39.0, B96.20] 01/12/2018   • Aspiration pneumonia [J69.0] 01/12/2018   • 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 [I63.9] 01/12/2018   • Metabolic encephalopathy present on admit [G93.41] 01/12/2018   • Tobacco abuse [Z72.0] 01/12/2018   • Bilateral pleural effusions [J90] 01/12/2018   • Oropharyngeal dysphagia [R13.12] 01/12/2018   • Closed nondisplaced  fracture of head of right radius [S52.124A] 01/12/2018   • DNR (do not resuscitate) [Z66] 01/03/2018   • Sepsis [A41.9] 01/02/2018   • Non-traumatic rhabdomyolysis [M62.82] 01/01/2018   • Generalized weakness [R53.1] 01/01/2018   • Fall [W19.XXXA] 01/01/2018   • Stage 3 chronic kidney disease [N18.3] 01/01/2018   • Type 2 diabetes mellitus [E11.9] 01/01/2018   • HTN (hypertension) [I10] 01/01/2018      Resolved Hospital Problems    Diagnosis Date Noted Date Resolved   No resolved problems to display.        Assessment & Plan  77 y.o. female s/p carotid endarterectomy    Chest x-ray with some mild edema.  Aspirin for carotid stenosis. DC Plavix.   Postop anemia with a history of CAD and a small apical infarct- resolved after transfusion  Elbow fracture noted on CT.  Unclear where that came from.  Will plan follow-up in 2 weeks to remove left ankle staples.  We'll watch neck wound while inpatient.  Okay for discharge when stable from medical standpoint    Jose Bean MD  01/20/18   7:47 AM  Office Number [phone]

## 2018-01-21 ENCOUNTER — APPOINTMENT (OUTPATIENT)
Dept: GENERAL RADIOLOGY | Facility: HOSPITAL | Age: 78
End: 2018-01-21

## 2018-01-21 LAB
ANION GAP SERPL CALCULATED.3IONS-SCNC: 9.3 MMOL/L
BASOPHILS # BLD AUTO: 0.03 10*3/MM3 (ref 0–0.2)
BASOPHILS NFR BLD AUTO: 0.3 % (ref 0–1.5)
BUN BLD-MCNC: 29 MG/DL (ref 8–23)
BUN/CREAT SERPL: 28.7 (ref 7–25)
CALCIUM SPEC-SCNC: 9.5 MG/DL (ref 8.6–10.5)
CHLORIDE SERPL-SCNC: 98 MMOL/L (ref 98–107)
CO2 SERPL-SCNC: 26.7 MMOL/L (ref 22–29)
CREAT BLD-MCNC: 1.01 MG/DL (ref 0.57–1)
DEPRECATED RDW RBC AUTO: 51.3 FL (ref 37–54)
EOSINOPHIL # BLD AUTO: 0.33 10*3/MM3 (ref 0–0.7)
EOSINOPHIL NFR BLD AUTO: 3.8 % (ref 0.3–6.2)
ERYTHROCYTE [DISTWIDTH] IN BLOOD BY AUTOMATED COUNT: 14.9 % (ref 11.7–13)
GFR SERPL CREATININE-BSD FRML MDRD: 53 ML/MIN/1.73
GLUCOSE BLD-MCNC: 141 MG/DL (ref 65–99)
GLUCOSE BLDC GLUCOMTR-MCNC: 146 MG/DL (ref 70–130)
GLUCOSE BLDC GLUCOMTR-MCNC: 170 MG/DL (ref 70–130)
GLUCOSE BLDC GLUCOMTR-MCNC: 198 MG/DL (ref 70–130)
GLUCOSE BLDC GLUCOMTR-MCNC: 245 MG/DL (ref 70–130)
HCT VFR BLD AUTO: 26.2 % (ref 35.6–45.5)
HGB BLD-MCNC: 8.1 G/DL (ref 11.9–15.5)
IMM GRANULOCYTES # BLD: 0.05 10*3/MM3 (ref 0–0.03)
IMM GRANULOCYTES NFR BLD: 0.6 % (ref 0–0.5)
LYMPHOCYTES # BLD AUTO: 1.29 10*3/MM3 (ref 0.9–4.8)
LYMPHOCYTES NFR BLD AUTO: 14.7 % (ref 19.6–45.3)
MCH RBC QN AUTO: 29.5 PG (ref 26.9–32)
MCHC RBC AUTO-ENTMCNC: 30.9 G/DL (ref 32.4–36.3)
MCV RBC AUTO: 95.3 FL (ref 80.5–98.2)
MONOCYTES # BLD AUTO: 1.24 10*3/MM3 (ref 0.2–1.2)
MONOCYTES NFR BLD AUTO: 14.1 % (ref 5–12)
NEUTROPHILS # BLD AUTO: 5.85 10*3/MM3 (ref 1.9–8.1)
NEUTROPHILS NFR BLD AUTO: 66.5 % (ref 42.7–76)
PLATELET # BLD AUTO: 292 10*3/MM3 (ref 140–500)
PMV BLD AUTO: 10.1 FL (ref 6–12)
POTASSIUM BLD-SCNC: 3.5 MMOL/L (ref 3.5–5.2)
RBC # BLD AUTO: 2.75 10*6/MM3 (ref 3.9–5.2)
SODIUM BLD-SCNC: 134 MMOL/L (ref 136–145)
WBC NRBC COR # BLD: 8.79 10*3/MM3 (ref 4.5–10.7)

## 2018-01-21 PROCEDURE — 25010000002 HEPARIN (PORCINE) PER 1000 UNITS: Performed by: SURGERY

## 2018-01-21 PROCEDURE — 85025 COMPLETE CBC W/AUTO DIFF WBC: CPT | Performed by: HOSPITALIST

## 2018-01-21 PROCEDURE — 82962 GLUCOSE BLOOD TEST: CPT

## 2018-01-21 PROCEDURE — 94799 UNLISTED PULMONARY SVC/PX: CPT

## 2018-01-21 PROCEDURE — 80048 BASIC METABOLIC PNL TOTAL CA: CPT | Performed by: HOSPITALIST

## 2018-01-21 PROCEDURE — 63710000001 INSULIN ASPART PER 5 UNITS: Performed by: INTERNAL MEDICINE

## 2018-01-21 PROCEDURE — 97110 THERAPEUTIC EXERCISES: CPT | Performed by: PHYSICAL THERAPIST

## 2018-01-21 PROCEDURE — 71046 X-RAY EXAM CHEST 2 VIEWS: CPT

## 2018-01-21 RX ORDER — ATORVASTATIN CALCIUM 80 MG/1
80 TABLET, FILM COATED ORAL NIGHTLY
Start: 2018-01-21

## 2018-01-21 RX ORDER — LISINOPRIL 20 MG/1
40 TABLET ORAL DAILY
Start: 2018-01-21

## 2018-01-21 RX ORDER — IPRATROPIUM BROMIDE AND ALBUTEROL SULFATE 2.5; .5 MG/3ML; MG/3ML
3 SOLUTION RESPIRATORY (INHALATION) EVERY 4 HOURS PRN
Qty: 360 ML
Start: 2018-01-21

## 2018-01-21 RX ORDER — ARFORMOTEROL TARTRATE 15 UG/2ML
15 SOLUTION RESPIRATORY (INHALATION)
Qty: 120 ML
Start: 2018-01-21

## 2018-01-21 RX ORDER — ECHINACEA PURPUREA EXTRACT 125 MG
2 TABLET ORAL AS NEEDED
Refills: 12
Start: 2018-01-21

## 2018-01-21 RX ORDER — HYDROCODONE BITARTRATE AND ACETAMINOPHEN 5; 325 MG/1; MG/1
1 TABLET ORAL EVERY 4 HOURS PRN
Qty: 10 TABLET | Refills: 0 | Status: SHIPPED | OUTPATIENT
Start: 2018-01-21 | End: 2018-01-25

## 2018-01-21 RX ORDER — QUETIAPINE FUMARATE 25 MG/1
6.25 TABLET, FILM COATED ORAL AS NEEDED
Qty: 10 TABLET | Refills: 0 | Status: SHIPPED | OUTPATIENT
Start: 2018-01-21 | End: 2018-01-21

## 2018-01-21 RX ORDER — METOPROLOL SUCCINATE 100 MG/1
100 TABLET, EXTENDED RELEASE ORAL
Start: 2018-01-22

## 2018-01-21 RX ORDER — QUETIAPINE FUMARATE 25 MG/1
6.25 TABLET, FILM COATED ORAL AS NEEDED
Qty: 10 TABLET | Refills: 0 | Status: SHIPPED | OUTPATIENT
Start: 2018-01-21

## 2018-01-21 RX ORDER — SODIUM CHLORIDE FOR INHALATION 7 %
4 VIAL, NEBULIZER (ML) INHALATION
Start: 2018-01-21

## 2018-01-21 RX ORDER — ASPIRIN 325 MG
325 TABLET ORAL DAILY
Qty: 30 TABLET | Refills: 1 | Status: SHIPPED | OUTPATIENT
Start: 2018-01-22

## 2018-01-21 RX ORDER — GUAIFENESIN/DEXTROMETHORPHAN 100-10MG/5
5 SYRUP ORAL EVERY 4 HOURS PRN
Start: 2018-01-21

## 2018-01-21 RX ORDER — PSEUDOEPHEDRINE HCL 30 MG
100 TABLET ORAL 2 TIMES DAILY
Start: 2018-01-21

## 2018-01-21 RX ORDER — AMLODIPINE BESYLATE 5 MG/1
10 TABLET ORAL DAILY
Start: 2018-01-21

## 2018-01-21 RX ADMIN — SODIUM CHLORIDE SOLN NEBU 7% 4 ML: 7 NEBU SOLN at 08:59

## 2018-01-21 RX ADMIN — HYDROCODONE BITARTRATE AND ACETAMINOPHEN 1 TABLET: 5; 325 TABLET ORAL at 16:49

## 2018-01-21 RX ADMIN — INSULIN ASPART 2 UNITS: 100 INJECTION, SOLUTION INTRAVENOUS; SUBCUTANEOUS at 17:51

## 2018-01-21 RX ADMIN — HYDROCODONE BITARTRATE AND ACETAMINOPHEN 1 TABLET: 5; 325 TABLET ORAL at 09:05

## 2018-01-21 RX ADMIN — LISINOPRIL 20 MG: 20 TABLET ORAL at 08:22

## 2018-01-21 RX ADMIN — INSULIN ASPART 2 UNITS: 100 INJECTION, SOLUTION INTRAVENOUS; SUBCUTANEOUS at 21:10

## 2018-01-21 RX ADMIN — SODIUM CHLORIDE SOLN NEBU 7% 4 ML: 7 NEBU SOLN at 23:48

## 2018-01-21 RX ADMIN — METOPROLOL SUCCINATE 100 MG: 100 TABLET, FILM COATED, EXTENDED RELEASE ORAL at 08:22

## 2018-01-21 RX ADMIN — ATORVASTATIN CALCIUM 80 MG: 80 TABLET, FILM COATED ORAL at 21:10

## 2018-01-21 RX ADMIN — LINAGLIPTIN 5 MG: 5 TABLET, FILM COATED ORAL at 08:22

## 2018-01-21 RX ADMIN — IPRATROPIUM BROMIDE AND ALBUTEROL SULFATE 3 ML: .5; 3 SOLUTION RESPIRATORY (INHALATION) at 23:44

## 2018-01-21 RX ADMIN — INSULIN ASPART 4 UNITS: 100 INJECTION, SOLUTION INTRAVENOUS; SUBCUTANEOUS at 11:56

## 2018-01-21 RX ADMIN — ARFORMOTEROL TARTRATE 15 MCG: 15 SOLUTION RESPIRATORY (INHALATION) at 08:59

## 2018-01-21 RX ADMIN — AMLODIPINE BESYLATE 10 MG: 10 TABLET ORAL at 08:22

## 2018-01-21 RX ADMIN — HEPARIN SODIUM 5000 UNITS: 5000 INJECTION, SOLUTION INTRAVENOUS; SUBCUTANEOUS at 21:10

## 2018-01-21 RX ADMIN — DOCUSATE SODIUM 100 MG: 100 CAPSULE, LIQUID FILLED ORAL at 21:18

## 2018-01-21 RX ADMIN — HEPARIN SODIUM 5000 UNITS: 5000 INJECTION, SOLUTION INTRAVENOUS; SUBCUTANEOUS at 09:00

## 2018-01-21 RX ADMIN — ASPIRIN 325 MG: 325 TABLET ORAL at 08:22

## 2018-01-21 RX ADMIN — Medication: at 21:11

## 2018-01-21 RX ADMIN — DOCUSATE SODIUM 100 MG: 100 CAPSULE, LIQUID FILLED ORAL at 09:00

## 2018-01-21 RX ADMIN — Medication: at 09:00

## 2018-01-21 NOTE — PLAN OF CARE
Problem: Patient Care Overview (Adult)  Goal: Plan of Care Review  Outcome: Ongoing (interventions implemented as appropriate)   01/21/18 0117   Coping/Psychosocial Response Interventions   Plan Of Care Reviewed With patient   Patient Care Overview   Progress progress toward functional goals is gradual   Outcome Evaluation   Outcome Summary/Follow up Plan Monitor pain,labs,and vitals.Pt refused to complete NIH assessment on current shift. Pt refusing PT. O2 2L NC. Sling to R arm. Will cont. to monitor.     Goal: Adult Individualization and Mutuality  Outcome: Ongoing (interventions implemented as appropriate)    Goal: Discharge Needs Assessment  Outcome: Ongoing (interventions implemented as appropriate)      Problem: Pressure Ulcer Risk (Tank Scale) (Adult,Obstetrics,Pediatric)  Goal: Skin Integrity  Outcome: Ongoing (interventions implemented as appropriate)   01/21/18 0117   Pressure Ulcer Risk (Tank Scale) (Adult,Obstetrics,Pediatric)   Skin Integrity making progress toward outcome       Problem: Fall Risk (Adult)  Goal: Absence of Falls  Outcome: Ongoing (interventions implemented as appropriate)   01/21/18 0117   Fall Risk (Adult)   Absence of Falls making progress toward outcome       Problem: Skin Integrity Impairment, Risk/Actual (Adult)  Goal: Skin Integrity/Wound Healing  Outcome: Ongoing (interventions implemented as appropriate)   01/21/18 0117   Skin Integrity Impairment, Risk/Actual (Adult)   Skin Integrity/Wound Healing making progress toward outcome       Problem: Infection, Risk/Actual (Adult)  Goal: Infection Prevention/Resolution  Outcome: Ongoing (interventions implemented as appropriate)   01/21/18 0117   Infection, Risk/Actual (Adult)   Infection Prevention/Resolution making progress toward outcome       Problem: Perioperative Period (Adult)  Goal: Signs and Symptoms of Listed Potential Problems Will be Absent or Manageable (Perioperative Period)  Outcome: Ongoing (interventions  implemented as appropriate)   01/21/18 0117   Perioperative Period   Problems Assessed (Perioperative Period) all   Problems Present (Perioperative Period) situational response       Problem: Respiratory Insufficiency (Adult)  Goal: Effective Ventilation  Outcome: Ongoing (interventions implemented as appropriate)   01/21/18 0117   Respiratory Insufficiency (Adult)   Effective Ventilation making progress toward outcome

## 2018-01-21 NOTE — PLAN OF CARE
Problem: Patient Care Overview (Adult)  Goal: Plan of Care Review  Outcome: Ongoing (interventions implemented as appropriate)   01/21/18 3843   Coping/Psychosocial Response Interventions   Plan Of Care Reviewed With patient;daughter;son   Patient Care Overview   Progress improving   Outcome Evaluation   Outcome Summary/Follow up Plan Plan to be discharged to rehab tomorrow--unable to secure transportation today. Continues to need lots of help with ADLs-eating and personal care. Does use bedpan. Attempted BSC--would not bear weight. VSS. NSR on cardiac monitor. Will continue to monitor.     Goal: Adult Individualization and Mutuality  Outcome: Ongoing (interventions implemented as appropriate)    Goal: Discharge Needs Assessment  Outcome: Ongoing (interventions implemented as appropriate)      Problem: Pressure Ulcer Risk (Tank Scale) (Adult,Obstetrics,Pediatric)  Goal: Skin Integrity  Outcome: Ongoing (interventions implemented as appropriate)      Problem: Fall Risk (Adult)  Goal: Absence of Falls  Outcome: Ongoing (interventions implemented as appropriate)      Problem: Skin Integrity Impairment, Risk/Actual (Adult)  Goal: Skin Integrity/Wound Healing  Outcome: Ongoing (interventions implemented as appropriate)      Problem: Infection, Risk/Actual (Adult)  Goal: Infection Prevention/Resolution  Outcome: Ongoing (interventions implemented as appropriate)      Problem: Perioperative Period (Adult)  Goal: Signs and Symptoms of Listed Potential Problems Will be Absent or Manageable (Perioperative Period)  Outcome: Ongoing (interventions implemented as appropriate)      Problem: Respiratory Insufficiency (Adult)  Goal: Effective Ventilation  Outcome: Ongoing (interventions implemented as appropriate)

## 2018-01-21 NOTE — PROGRESS NOTES
Name: Sakshi Garcia ADMIT: 2018   : 1940  PCP: Provider Not In System    MRN: [id number] LOS: 20 days   AGE/SEX: 77 y.o. female  ROOM: [facility]/     Active Hospital Problems (** Indicates Principal Problem)    Diagnosis Date Noted   • **E-coli UTI w/ bacteremia [N39.0, B96.20] 2018   • Aspiration pneumonia [J69.0] 2018   • 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 [I63.9] 2018   • Metabolic encephalopathy present on admit [G93.41] 2018   • Tobacco abuse [Z72.0] 2018   • Bilateral pleural effusions [J90] 2018   • Oropharyngeal dysphagia [R13.12] 2018   • Closed nondisplaced fracture of head of right radius [S52.124A] 2018   • DNR (do not resuscitate) [Z66] 2018   • Sepsis [A41.9] 2018   • Non-traumatic rhabdomyolysis [M62.82] 2018   • Generalized weakness [R53.1] 2018   • Fall [W19.XXXA] 2018   • Stage 3 chronic kidney disease [N18.3] 2018   • Type 2 diabetes mellitus [E11.9] 2018   • HTN (hypertension) [I10] 2018      Resolved Hospital Problems    Diagnosis Date Noted Date Resolved   No resolved problems to display.     Subjective     77 y.o. female POD #6 s/p left carotid endarterectomy with saphenous vein patch   No new neuro symptoms, Blood pressure under better control thanks to medical management  Feels A little weak..    Mild left neck pain.       Review of Systems  Feels poorly  Objective     Vital Signs  Temp:  [98.1 °F (36.7 °C)-98.9 °F (37.2 °C)] 98.6 °F (37 °C)  Heart Rate:  [62-87] 78  Resp:  [16-20] 18  BP: (146-171)/(56-72) 171/72    Physical Exam   Constitutional: She appears well-developed and well-nourished.   HENT:   Head: Normocephalic and atraumatic.   Neck: Normal range of motion. No tracheal deviation present.   Left neck incision is clean, soft, no hematoma.      Tongue is midline   Cardiovascular: Normal rate and regular rhythm.    Pulmonary/Chest: Effort normal. No  respiratory distress.   Abdominal: Soft. There is no tenderness.   Musculoskeletal: Normal range of motion. She exhibits no deformity.   Neurological: She is alert.   Skin: Skin is warm and dry.   Neuro: unchanged RUE and RLE weakness  Tongue is midline  Left leg incision is clean.  There is minimal erythema in the area and no evidence of infection.    Results Review:       I reviewed the patient's new clinical results.    Results from last 7 days  Lab Units 01/21/18  0505 01/20/18  0545 01/19/18  0551 01/18/18  0509 01/17/18  0418 01/16/18  0541 01/15/18  1550 01/14/18  1731   WBC 10*3/mm3 8.79 9.42 11.32* 11.98* 11.41* 12.95*  --  12.30*   HEMOGLOBIN g/dL 8.1* 8.4* 8.6* 8.6* 9.4* 7.3* 8.1* 7.6*   PLATELETS 10*3/mm3 292 279 267 242 262 297  --  384       Results from last 7 days  Lab Units 01/21/18  0505 01/20/18  0545 01/19/18  0551 01/18/18  0509 01/17/18  0418 01/16/18  0541 01/14/18  1731   SODIUM mmol/L 134* 137 134* 135* 139 140 140   POTASSIUM mmol/L 3.5 3.6 3.6 3.9 4.1 4.4 4.5   CHLORIDE mmol/L 98 101 98 100 105 105 100   CO2 mmol/L 26.7 24.2 23.9 23.7 23.0 23.2 26.5   BUN mg/dL 29* 32* 25* 26* 25* 27* 25*   CREATININE mg/dL 1.01* 1.09* 1.27* 1.45* 1.46* 1.55* 1.46*   GLUCOSE mg/dL 141* 128* 131* 167* 164* 129* 193*   Estimated Creatinine Clearance: 42.4 mL/min (by C-G formula based on Cr of 1.01).    Results from last 7 days  Lab Units 01/21/18  0505 01/20/18  0545 01/19/18  0551 01/18/18  0509 01/17/18  0418 01/16/18  0541 01/14/18  1731   CALCIUM mg/dL 9.5 9.2 9.1 8.9 8.9 8.4* 9.3       Assessment/Plan           Active Hospital Problems (** Indicates Principal Problem)    Diagnosis Date Noted   • **E-coli UTI w/ bacteremia [N39.0, B96.20] 01/12/2018   • Aspiration pneumonia [J69.0] 01/12/2018   • 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 [I63.9] 01/12/2018   • Metabolic encephalopathy present on admit [G93.41] 01/12/2018   • Tobacco abuse [Z72.0] 01/12/2018   • Bilateral pleural effusions  [J90] 01/12/2018   • Oropharyngeal dysphagia [R13.12] 01/12/2018   • Closed nondisplaced fracture of head of right radius [S52.124A] 01/12/2018   • DNR (do not resuscitate) [Z66] 01/03/2018   • Sepsis [A41.9] 01/02/2018   • Non-traumatic rhabdomyolysis [M62.82] 01/01/2018   • Generalized weakness [R53.1] 01/01/2018   • Fall [W19.XXXA] 01/01/2018   • Stage 3 chronic kidney disease [N18.3] 01/01/2018   • Type 2 diabetes mellitus [E11.9] 01/01/2018   • HTN (hypertension) [I10] 01/01/2018      Resolved Hospital Problems    Diagnosis Date Noted Date Resolved   No resolved problems to display.        Assessment & Plan  77 y.o. female s/p carotid endarterectomy    Chest x-ray with some mild edema.  Aspirin for carotid stenosis.off Plavix.   Postop anemia with a history of CAD and a small apical infarct- resolved after transfusionAnd remained stable.  Elbow fracture noted on CT.  Unclear where that came from.  Will plan follow-up in 2 weeks to remove left ankle staples.  Neck wound clear with mild hematoma.  Okay for discharge when stable from medical standpoint    Jose Bean MD  01/21/18   10:20 AM  Office Number [phone]

## 2018-01-21 NOTE — PLAN OF CARE
Problem: Patient Care Overview (Adult)  Goal: Plan of Care Review  Outcome: Ongoing (interventions implemented as appropriate)   01/21/18 1528   Coping/Psychosocial Response Interventions   Plan Of Care Reviewed With patient   Patient Care Overview   Progress progress toward functional goals is gradual   Outcome Evaluation   Outcome Summary/Follow up Plan Patient with marked forward lean in sitting. Requiring max A x 2 to pivot from bed to BSC back to bed. While trying to get patient back into bed, max cues to sit with upright position as laying to sidelying to log roll to back. Patient with forward push and as trying to lay her down and her pushing forward she hit her nose on bed rail. States minor increase in pain. No abrasion or bruising noted. Patient requiring max A for most mobility and transfers today.

## 2018-01-21 NOTE — PROGRESS NOTES
Continued Stay Note  [facility]     Patient Name: Sakshi Garcia  MRN: [id number]  Today's Date: 1/21/2018    Admit Date: 1/1/2018          Discharge Plan       01/21/18 1845    Case Management/Social Work Plan    Plan CCP will follow-up tomorrow with transportation arrangements and family, earliest transport was 9:30pm pick-up this evening which would put patient arriving very late this evening at facility. [facility] also requesting money up front. Will follow-up tomorrow with ambulance company and family tomorrow...............Cristina MONTERO     Patient/Family In Agreement With Plan yes    Additional Comments Call back from [facility] stating they had on call  run information and pt. would be required to pay up front as there is no reason pt. must go that distance to receive care. [facility] states they will decrease price to $1265 up front, first available time slot for pick-up is 9:30pm. Unable to reach family to discuss options and explain [facility] requesting money up front. RN states she discussed [facility] dtr earlier who stated ambulance service have always been covered for her mom in the past. CCP will follow=up tomorrow..............Cristina MONTERO               Discharge Codes     None        Expected Discharge Date and Time     Expected Discharge Date Expected Discharge Time    Jan 21, 2018             Shayy Mac, [place]

## 2018-01-21 NOTE — DISCHARGE SUMMARY
Date of Admission: 1/1/2018  Date of Discharge:  1/21/2018    PCP: Provider Not In System      DISCHARGE DIAGNOSIS  Principal Problem:    E-coli UTI w/ bacteremia  Active Problems:    Non-traumatic rhabdomyolysis    Generalized weakness    Fall    Stage 3 chronic kidney disease    Type 2 diabetes mellitus    HTN (hypertension)    Sepsis    DNR (do not resuscitate)    Aspiration pneumonia    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    Metabolic encephalopathy present on admit    Tobacco abuse    Bilateral pleural effusions    Oropharyngeal dysphagia    Closed nondisplaced fracture of head of right radius      SECONDARY DIAGNOSES  Past Medical History:   Diagnosis Date   • Colon cancer    • Hypertension        CONSULTS   Consults     Date and Time Order Name Status Description    1/10/2018 0856 Inpatient Consult to Vascular Surgery Completed     1/9/2018 1721 Inpatient Consult to Orthopedic Surgery Completed     1/7/2018 1543 Inpatient Consult to Pulmonology Completed     1/7/2018 1524 Inpatient Consult to Cardiology Completed     1/4/2018 2222 Inpatient Consult to Neurology Completed     1/4/2018 1456 Inpatient Consult to Neurology Completed     1/1/2018 1902 [facility] (on-call MD unless specified) Completed           PROCEDURES PERFORMED  CT SCAN OF THE CHEST WITHOUT CONTRAST  1. There are small-to-moderate bilateral pleural effusions layering  posteriorly, right larger than left. These are associated with some  localized compressive atelectasis, particularly at the right base  posteriorly and I cannot completely exclude a small component of  pneumonia. The lungs are otherwise clear except for calcified granulomas  in both upper lobes.   2. There is no mediastinal or hilar or axillary adenopathy. There is no  pericardial effusion. The CT images through the upper liver, spleen, and  both adrenal glands are unremarkable.    BUE VENOUS DOPPLER   1/9/2018  · Acute right upper extremity superficial  thrombophlebitis noted in the cephalic (upper arm).  · All other right sided vessels appear normal.    CT brain 1/4/18:  No acute stroke mass or hemorrhage, there is extensive confluent white matter disease in the periventricular and subcortex most prominent in the bifrontal regions, there is evidence of old lacunar strokes in the thalami as well as right anterior limb internal capsule, this is unchanged compared with the scan from 1/1/18      MRI brain, MRA h/n 1/5/18:   IMPRESSION:  1. Multiple lacunar infarcts are appreciated involving the left cerebral  hemisphere suggesting a watershed type distribution with infarcts  involving the frontal, parietal and occipital lobes. There are  questionable areas of restricted diffusion involving the right frontal  lobe. If true this would suggest a proximal cardiac source. However, the  MRA of the neck did demonstrate signal loss involving the proximal  aspect of the left internal carotid artery where there is likely  moderate-to-severe if not severe stenosis using NASCET criteria. There  is signal loss at the origin of the left common carotid artery  suggesting a severe stenosis. There is severe stenosis involving the  right vertebral artery approximately 3 cm beyond the origin and likely  moderate if not moderate-to-severe stenosis involving the right internal  carotid artery. Further evaluation with a CT angiogram of the neck and  head is recommended. A cardiac echo could also be performed.  2. A right pleural effusion is present.      Carotid US 1/6/18:   · Proximal right internal carotid artery moderate stenosis.  · Proximal left internal carotid artery severe stenosis.    TTE 1/6/18: Interpretation Summary   · Left ventricular systolic function is hyperdynamic (EF > 70).  · calcification of the aortic valve  · Left ventricular wall thickness is consistent with mild concentric hypertrophy.  · Mild tricuspid valve regurgitation is present.  · Mild mitral valve  regurgitation is present  · Estimated right ventricular systolic pressure from tricuspid regurgitation is moderately elevated (45-55 mmHg). Mild to moderate pulmonary hypertension is present.       HOSPITAL COURSE  Patient is a 77 y.o. female presented to [facility] complaining of falling a lot.  Please see the admitting history and physical for further details.  Straight was pretty limited on admission given her confusion and underlying dementia.  She was admitted with history of CKD comes in with UTI, mild rhabdo. She had blood culture with ecoli bacteremia. Please see her admitting H&P for further details. Her hospital course has been complicated by several features. She had an acute stroke while here and developed aspiration pneumonia, encephalopathy, and a likely right radial head fracture after a fall prior to the stroke. Initial plan was to transition her to levaquin to complete a 14 day course for the UTI with bacteremia in the beginning. She was switched to zosyn and completed treatment for her bacteremia and aspiration pneumonia. She has completed a 14 day course of antibiotics. The stroke was felt likely from a high grade stenosis she has of her L ICA. Vascular performed left CEA on 1/15. Postoperatively she had a arterial line and required IV Cleviprex, dc'ed on POD#3.  Were still working on blood pressure control goal systolic pressures to keep it under 140.  She's had some issues with hypoxia likely related to the underlying pneumonia and is having difficulty clearing her airways.  She's had a couple episodes of mucous plugging that these seem to have cleared she's been stable the last 48 hours.  A chest x-ray was checked prior to the time of discharge and does show continued improvement.  Discussed the plan with the pulmonologist prior to discharge and plan is to continue her on hypertonic saline nebulized treatments as well as breathing treatments for the next few weeks and hopefully  "this will encourage pulmonary clearance and resolution of her hypoxia.  Dr. Shell did evaluate the patient prior to discharge and has allowed physical therapy with restrictions.  Plan is for her to follow-up in the outpatient setting with him in 2 weeks for further management of the radial head fracture.  Vascular surgery to follow-up with her in 2 weeks to remove the sutures and follow-up the neck wound.  Otherwise today she is feeling a lot better and at this point is stable for discharge to skilled nursing facility for further rehabilitation and management.    ADDENDUM 1/22/18:   This is an addendum to Dr. Jackson discharge summary.  The patient stated one additional day in the hospital due to transportation issues.  She is still medically stable and has not had any events overnight.  She is cleared to go to subacute rehabilitation today.  CONDITION ON DISCHARGE  Stable.      VITAL SIGNS  /72 (BP Location: Right arm, Patient Position: Lying)  Pulse 78  Temp 98.6 °F (37 °C) (Oral)   Resp 18  Ht 157.5 cm (62.01\")  Wt 71.2 kg (157 lb)  SpO2 96%  BMI 28.71 kg/m2  Objective:  General Appearance:  Comfortable.    Vital signs: (most recent): Blood pressure 171/72, pulse 78, temperature 98.6 °F (37 °C), temperature source Oral, resp. rate 18, height 157.5 cm (62.01\"), weight 71.2 kg (157 lb), SpO2 96 %.  Vital signs are normal.    Output: Producing urine.    HEENT: Normal HEENT exam.    Lungs:  Normal respiratory rate and normal effort.  She is not in respiratory distress.  There are rhonchi.    Heart: Normal rate.  S1 normal and S2 normal.    Abdomen: Abdomen is soft.  Bowel sounds are normal.   There is no abdominal tenderness.     Extremities: Normal range of motion.    Pulses: Distal pulses are intact.    Neurological: Patient is alert.    Skin:  Warm and dry.                DISCHARGE DISPOSITION   Skilled Nursing Facility (DC - External)      DISCHARGE MEDICATIONS   Sakshi Garcia   Home Medication " Instructions [facility][id number]    Printed on:01/21/18 5333   Medication Information                      amLODIPine (NORVASC) 5 MG tablet  Take 2 tablets by mouth Daily.             arformoterol (BROVANA) 15 MCG/2ML nebulizer solution  Take 2 mL by nebulization 2 (Two) Times a Day.             aspirin 325 MG tablet  Take 1 tablet by mouth Daily.             atorvastatin (LIPITOR) 80 MG tablet  Take 1 tablet by mouth Every Night.             docusate sodium 100 MG capsule  Take 100 mg by mouth 2 (Two) Times a Day.             glipiZIDE (GLUCOTROL) 5 MG ER tablet  Take 5 mg by mouth Daily.             guaifenesin-dextromethorphan (ROBITUSSIN DM) 100-10 MG/5ML syrup  Take 5 mL by mouth Every 4 (Four) Hours As Needed for Cough.             HYDROcodone-acetaminophen (NORCO) 5-325 MG per tablet  Take 1 tablet by mouth Every 4 (Four) Hours As Needed for Moderate Pain  for up to 4 days.             ipratropium-albuterol (DUO-NEB) 0.5-2.5 mg/mL nebulizer  Take 3 mL by nebulization Every 4 (Four) Hours As Needed for Shortness of Air.             linagliptin (TRADJENTA) 5 MG tablet tablet  Take 5 mg by mouth Daily.             lisinopril (PRINIVIL,ZESTRIL) 20 MG tablet  Take 2 tablets by mouth Daily.             metoprolol succinate XL (TOPROL-XL) 100 MG 24 hr tablet  Take 1 tablet by mouth Daily.             QUEtiapine (SEROquel) 25 MG tablet  Take 0.25 tablets by mouth As Needed (prn severe agitaion may repeat  in 30 min times one.).             sodium chloride (OCEAN) 0.65 % nasal spray  2 sprays into each nostril As Needed for Congestion.             sodium chloride 7 % nebulizer solution nebulizer solution  Take 4 mL by nebulization 2 (Two) Times a Day.             triamcinolone (KENALOG) 0.1 % cream  Apply  topically 3 (Three) Times a Day. Apply sparingly.               Diet Instructions     Diet: Regular, Consistent Carbohydrate; Thin       Discharge Diet:   Regular  Consistent Carbohydrate      Fluid Consistency:  Thin               Activity Instructions     Activity as Tolerated       - Recommend sling for comfort for right upper extremity  - PT: Can begin gentle progressive PROM/AAROM of elbow, no lifting > 1 pound       Discharge Activity Restrictions       1) Do not lift / push / pull more then 1 lbs.             Future Appointments  Date Time Provider Department Center   4/12/2018 10:00 AM JIMENA Denton None     Additional Instructions for the Follow-ups that You Need to Schedule     Discharge Follow-up with PCP    As directed    Follow Up Details:  2-4 weeks           Discharge Follow-up with Specified Provider: Dr Bean; 2 Weeks    As directed    To:  Dr Bean    Follow Up:  2 Weeks    Follow Up Details:  Will plan follow-up in 2 weeks to remove left ankle staples.  We'll watch neck wound           Discharge Follow-up with Specified Provider: Dr Ko; 1 Month    As directed    To:  Dr Ko    Follow Up:  1 Month           Discharge Follow-up with Specified Provider: Dr matos; 2 Weeks    As directed    To:  Dr matos    Follow Up:  2 Weeks                 Follow-up Information     Follow up with Ozzie Kelsey MD Follow up in 2 week(s).    Specialty:  Vascular Surgery    Contact information:    [facility] [place] [id number] [phone]          Follow up with Provider Not In System .    Why:  2-4 weeks    Contact information:    [facility] [address]            TEST  RESULTS PENDING AT DISCHARGE     None    Dada Jackson MD  [facility]  01/21/18  9:27 AM      Time: greater than 30 minutes.

## 2018-01-21 NOTE — PROGRESS NOTES
Freddie Ko MD                          [phone]      Patient ID:  Name:  Sakshi Garcia  MRN:  [id number]    1940   77 y.o.  female            Patient Care Team:  Provider Not In System as PCP - General    LOS: 20  CC/Reason for visit:     Subjective: Overnight of - pt had acute respiratory difficulty and increased oxygen demands associated with some htn and was given diuretics.  Seems to have resolved quickly and patient is on 2lnc currently.  She is weak post operatively and has poor participation in her PT and pulmonary therapies.     a little cantankerous today.  No soa.  Still on oxygen  No chest pain.      Objective     Vital Signs past 24hrs    BP range: BP: (146-171)/(56-72) 156/68  Pulse range: Heart Rate:  [62-78] 72  Resp rate range: Resp:  [16-20] 18  Temp range: Temp (24hrs), Av.6 °F (37 °C), Min:98.1 °F (36.7 °C), Max:98.9 °F (37.2 °C)    O2 Device: nasal cannula       71.2 kg (157 lb); Body mass index is 28.71 kg/(m^2).      Intake/Output Summary (Last 24 hours) at 18 1401  Last data filed at 18 0922   Gross per 24 hour   Intake              580 ml   Output                0 ml   Net              580 ml     Exam:  GEN:  No respiratory distress, appears stated Age  EYES:   EOM-i, anicteric sclera bilat  ENT:    External ears/nose normal, OP clear  NECK:  Supple, midline trachea, No JVD or cervical Lymphadenopathy, Left linear surgical wound C/D/I  LUNGS: Bilateral breath sounds heard, No adventitious sounds, diminished at bilteral bases  CV:  Regular rate and rhythm, No murmur  ABD:  Non tender, no distended, no palpable liver or masses  EXT:  No cyanosis or clubbing, no peripheral/sacral edema    Scheduled meds:      amLODIPine 10 mg Oral Q24H   arformoterol 15 mcg Nebulization BID - RT   aspirin 325 mg Oral Daily   atorvastatin 80 mg Oral Nightly   docusate sodium 100 mg Oral BID   heparin (porcine) 5,000 Units Subcutaneous Q12H    insulin aspart 0-9 Units Subcutaneous 4x Daily With Meals & Nightly   linagliptin 5 mg Oral Daily   lisinopril 20 mg Oral Q24H   metoprolol succinate  mg Oral Q24H   miconazole nitrate  Topical Q12H   sodium chloride 4 mL Nebulization BID - RT       IV meds:                             Data Review:      Results from last 7 days  Lab Units 01/21/18  0505 01/20/18  0545 01/19/18  0551   SODIUM mmol/L 134* 137 134*   POTASSIUM mmol/L 3.5 3.6 3.6   CHLORIDE mmol/L 98 101 98   CO2 mmol/L 26.7 24.2 23.9   BUN mg/dL 29* 32* 25*   CREATININE mg/dL 1.01* 1.09* 1.27*   CALCIUM mg/dL 9.5 9.2 9.1   GLUCOSE mg/dL 141* 128* 131*   WBC 10*3/mm3 8.79 9.42 11.32*   HEMOGLOBIN g/dL 8.1* 8.4* 8.6*   PLATELETS 10*3/mm3 292 279 267       Lab Results   Component Value Date    CALCIUM 9.5 01/21/2018    PHOS 2.5 01/02/2018       Results Review:    I have reviewed the available laboratory results and reviewed the chest imaging personally        ASSESSMENT:   1. Pneumonia s/p antibiotic course  2. Bilateral pleural effusions Rt >Lt - improved   3. Acute CVA  4. Severe carotid stenosis left s/p CEA with graft  5. Right radial head fracture  6. Escherichia coli UTI and bacteremia  7. Diabetes mellitus type 2  8. Metabolic encephalopathy  9. Oral pharyngeal dysphagia    Suspect that pulmonary edema likely played a role in her acute difficulty . She will need to have fluid sttus monitored closely as wel las BP.  No reason to not discharge today     I have discussed my findings and recommendations with patient and nursing staff.     Freddie Ko MD  1/21/2018

## 2018-01-21 NOTE — PROGRESS NOTES
Continued Stay Note  [facility]     Patient Name: Sakshi Garcia  MRN: [id number]  Today's Date: 1/21/2018    Admit Date: 1/1/2018          Discharge Plan       01/21/18 1133    Case Management/Social Work Plan    Plan [facility] in [place], [place] [facility], will need ambulance transport when this can be arranged..............Cristina MONTERO     Patient/Family In Agreement With Plan yes    Additional Comments S/W RN who states DC orders for patient ot DC to [facility] in [place], [place] today. [facility]. [facility] stated they would need to wait until accounting in tomorrow. S/W CCP supervisor Roxie Decker and updated to situation, call to [facility] on call supervisor Nila who states she does not believe pt. meets necessity even with max assist 2-3, inability to sit in car and O2 need. RN called and S/W Nila as well to answer questions regarding medical condition and was informed that if [facility] was to transport patient it would be at 9pm this evening and they would require $1800 up front from family. RN states she has left message for son, awaiting call back. Update to Roxie/CCP supervisor. Nila/[facility] Supervisor states when accounting staff back in tomorrow they may be able to approve without requiring payment up front from family. Unable to DC patient at this time as we do not have a safe way to transport her to facility. CCP will continue to follow..............Cristina MONTERO               Discharge Codes     None        Expected Discharge Date and Time     Expected Discharge Date Expected Discharge Time    Jan 21, 2018             Shayy Mac, [place]

## 2018-01-22 VITALS
BODY MASS INDEX: 28.89 KG/M2 | DIASTOLIC BLOOD PRESSURE: 56 MMHG | SYSTOLIC BLOOD PRESSURE: 148 MMHG | OXYGEN SATURATION: 95 % | HEART RATE: 63 BPM | RESPIRATION RATE: 16 BRPM | TEMPERATURE: 98.8 F | HEIGHT: 62 IN | WEIGHT: 157 LBS

## 2018-01-22 LAB
GLUCOSE BLDC GLUCOMTR-MCNC: 141 MG/DL (ref 70–130)
GLUCOSE BLDC GLUCOMTR-MCNC: 224 MG/DL (ref 70–130)
GLUCOSE BLDC GLUCOMTR-MCNC: 227 MG/DL (ref 70–130)

## 2018-01-22 PROCEDURE — 25010000002 HEPARIN (PORCINE) PER 1000 UNITS: Performed by: SURGERY

## 2018-01-22 PROCEDURE — 94799 UNLISTED PULMONARY SVC/PX: CPT

## 2018-01-22 PROCEDURE — 82962 GLUCOSE BLOOD TEST: CPT

## 2018-01-22 PROCEDURE — 63710000001 INSULIN ASPART PER 5 UNITS: Performed by: INTERNAL MEDICINE

## 2018-01-22 RX ADMIN — LINAGLIPTIN 5 MG: 5 TABLET, FILM COATED ORAL at 09:24

## 2018-01-22 RX ADMIN — Medication: at 09:25

## 2018-01-22 RX ADMIN — ARFORMOTEROL TARTRATE 15 MCG: 15 SOLUTION RESPIRATORY (INHALATION) at 09:35

## 2018-01-22 RX ADMIN — ASPIRIN 325 MG: 325 TABLET ORAL at 09:24

## 2018-01-22 RX ADMIN — HEPARIN SODIUM 5000 UNITS: 5000 INJECTION, SOLUTION INTRAVENOUS; SUBCUTANEOUS at 09:24

## 2018-01-22 RX ADMIN — DOCUSATE SODIUM 100 MG: 100 CAPSULE, LIQUID FILLED ORAL at 09:24

## 2018-01-22 RX ADMIN — AMLODIPINE BESYLATE 10 MG: 10 TABLET ORAL at 09:24

## 2018-01-22 RX ADMIN — INSULIN ASPART 4 UNITS: 100 INJECTION, SOLUTION INTRAVENOUS; SUBCUTANEOUS at 17:36

## 2018-01-22 RX ADMIN — METOPROLOL SUCCINATE 100 MG: 100 TABLET, FILM COATED, EXTENDED RELEASE ORAL at 09:24

## 2018-01-22 RX ADMIN — INSULIN ASPART 3 UNITS: 100 INJECTION, SOLUTION INTRAVENOUS; SUBCUTANEOUS at 12:00

## 2018-01-22 RX ADMIN — LISINOPRIL 20 MG: 20 TABLET ORAL at 09:24

## 2018-01-22 RX ADMIN — SODIUM CHLORIDE SOLN NEBU 7% 4 ML: 7 NEBU SOLN at 09:35

## 2018-01-22 NOTE — PROGRESS NOTES
[facility]    Physicians Statement of Medical Necessity for Ambulance Transportation    It is medically necessary for:    Patient Name: Sakshi Garcia    Insurance Information:  Medicare [id number]    To be transported by ambulance:    From (if nursing facility, specify level of care: skilled, half-way, etc): [facility]    To (specify level of care if nursing facility): [facility]    Date of Service: 1/22/18    For dialysis patients state date dialysis began:     Diagnosis: UTI with bacteremia    Past Medical/Surgical History:  Past Medical History:   Diagnosis Date   • Colon cancer    • Hypertension       Past Surgical History:   Procedure Laterality Date   • CAROTID ENDARTERECTOMY Left 1/15/2018    Procedure: LEFT CAROTID ENDARTERECTOMY WITH SAPHENOUS VEIN HARVEST;  Surgeon: Ozzie Kelsey MD;  Location: [facility];  Service:    • CHOLECYSTECTOMY     • COLON SURGERY     • COLONOSCOPY     • HERNIA REPAIR          Current Objective Medical Evidence(including physical exam finding to support reason for limitations):    Immobilization syndrome  Confused/combative: may require restraints  Requires oxygen    Other:    Physician Signature:           (RN,NP,PA,CAN, Discharge Planner) Date/Time:      Printed Name:    __________________________________    [facility]   Phone: [phone] Phone: [phone] Phone: [phone]   Fax: [phone] Fax: [phone] Fax: [phone]

## 2018-01-22 NOTE — PROGRESS NOTES
Continued Stay Note  [facility]     Patient Name: Sakshi Garcia  MRN: [id number]  Today's Date: 1/22/2018    Admit Date: 1/1/2018          Discharge Plan       01/22/18 1429    Case Management/Social Work Plan    Plan [facility]    Patient/Family In Agreement With Plan yes    Additional Comments Spoke with Silvana with [facility] who confirms that family will need to pay $1265 for transport to [facility]...Silvana confirmed with supervisor.  Spoke with pt daughter Ernestina and explained that ambulance transport to [facility] would need to be paid up front due to out of town transfer not being a necessity.  Ernestina states she will choose a local facility for her mother.  Requests another [facility] facility.  Nissa states bed available at [facility].  Ernestina agreeable.  [facility] arrange for 5:30 pm.  FADY Wallace RN              Discharge Codes     None        Expected Discharge Date and Time     Expected Discharge Date Expected Discharge Time    Jan 22, 2018             Felisa Wallace

## 2018-01-22 NOTE — PROGRESS NOTES
Name: Sakshi Garcia ADMIT: 2018   : 1940  PCP: Provider Not In System    MRN: [id number] LOS: 21 days   AGE/SEX: 77 y.o. female  ROOM: [facility]/     Active Hospital Problems (** Indicates Principal Problem)    Diagnosis Date Noted   • **E-coli UTI w/ bacteremia [N39.0, B96.20] 2018   • Aspiration pneumonia [J69.0] 2018   • 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 [I63.9] 2018   • Metabolic encephalopathy present on admit [G93.41] 2018   • Tobacco abuse [Z72.0] 2018   • Bilateral pleural effusions [J90] 2018   • Oropharyngeal dysphagia [R13.12] 2018   • Closed nondisplaced fracture of head of right radius [S52.124A] 2018   • DNR (do not resuscitate) [Z66] 2018   • Sepsis [A41.9] 2018   • Non-traumatic rhabdomyolysis [M62.82] 2018   • Generalized weakness [R53.1] 2018   • Fall [W19.XXXA] 2018   • Stage 3 chronic kidney disease [N18.3] 2018   • Type 2 diabetes mellitus [E11.9] 2018   • HTN (hypertension) [I10] 2018      Resolved Hospital Problems    Diagnosis Date Noted Date Resolved   No resolved problems to display.     Subjective     77 y.o. female POD #7 s/p left carotid endarterectomy with saphenous vein patch   No new neuro symptoms, Blood pressure under better control   Feels A little weak..    Mild left neck pain.       Review of Systems  Feels poorly  Objective     Vital Signs  Temp:  [97.7 °F (36.5 °C)-99.5 °F (37.5 °C)] 99.5 °F (37.5 °C)  Heart Rate:  [60-78] 71  Resp:  [16-18] 16  BP: (139-182)/(52-68) 179/61    Physical Exam   Constitutional: She appears well-developed and well-nourished.   HENT:   Head: Normocephalic and atraumatic.   Neck: Normal range of motion. No tracheal deviation present.   Left neck incision is clean, soft, no hematoma.      Tongue is midline   Cardiovascular: Normal rate and regular rhythm.    Pulmonary/Chest: Effort normal. No respiratory distress.   Abdominal:  Soft. There is no tenderness.   Musculoskeletal: Normal range of motion. She exhibits no deformity.   Neurological: She is alert.   Skin: Skin is warm and dry.   Neuro: unchanged RUE and RLE weakness  Tongue is midline  Left leg incision is clean.  There is minimal erythema in the area and no evidence of infection.    Results Review:       I reviewed the patient's new clinical results.    Results from last 7 days  Lab Units 01/21/18  0505 01/20/18  0545 01/19/18  0551 01/18/18  0509 01/17/18  0418 01/16/18  0541 01/15/18  1550   WBC 10*3/mm3 8.79 9.42 11.32* 11.98* 11.41* 12.95*  --    HEMOGLOBIN g/dL 8.1* 8.4* 8.6* 8.6* 9.4* 7.3* 8.1*   PLATELETS 10*3/mm3 292 279 267 242 262 297  --        Results from last 7 days  Lab Units 01/21/18  0505 01/20/18  0545 01/19/18  0551 01/18/18  0509 01/17/18  0418 01/16/18  0541   SODIUM mmol/L 134* 137 134* 135* 139 140   POTASSIUM mmol/L 3.5 3.6 3.6 3.9 4.1 4.4   CHLORIDE mmol/L 98 101 98 100 105 105   CO2 mmol/L 26.7 24.2 23.9 23.7 23.0 23.2   BUN mg/dL 29* 32* 25* 26* 25* 27*   CREATININE mg/dL 1.01* 1.09* 1.27* 1.45* 1.46* 1.55*   GLUCOSE mg/dL 141* 128* 131* 167* 164* 129*   Estimated Creatinine Clearance: 42.4 mL/min (by C-G formula based on Cr of 1.01).    Results from last 7 days  Lab Units 01/21/18  0505 01/20/18  0545 01/19/18  0551 01/18/18  0509 01/17/18  0418 01/16/18  0541   CALCIUM mg/dL 9.5 9.2 9.1 8.9 8.9 8.4*       Assessment/Plan           Active Hospital Problems (** Indicates Principal Problem)    Diagnosis Date Noted   • **E-coli UTI w/ bacteremia [N39.0, B96.20] 01/12/2018   • Aspiration pneumonia [J69.0] 01/12/2018   • 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 [I63.9] 01/12/2018   • Metabolic encephalopathy present on admit [G93.41] 01/12/2018   • Tobacco abuse [Z72.0] 01/12/2018   • Bilateral pleural effusions [J90] 01/12/2018   • Oropharyngeal dysphagia [R13.12] 01/12/2018   • Closed nondisplaced fracture of head of right radius  [S52.124A] 01/12/2018   • DNR (do not resuscitate) [Z66] 01/03/2018   • Sepsis [A41.9] 01/02/2018   • Non-traumatic rhabdomyolysis [M62.82] 01/01/2018   • Generalized weakness [R53.1] 01/01/2018   • Fall [W19.XXXA] 01/01/2018   • Stage 3 chronic kidney disease [N18.3] 01/01/2018   • Type 2 diabetes mellitus [E11.9] 01/01/2018   • HTN (hypertension) [I10] 01/01/2018      Resolved Hospital Problems    Diagnosis Date Noted Date Resolved   No resolved problems to display.        Assessment & Plan  77 y.o. female s/p carotid endarterectomy   Aspirin for carotid stenosis. Off Plavix.   Postop anemia with a history of CAD and a small apical infarct- resolved after transfusion and remained stable.  Will plan follow-up in 2 weeks to remove left ankle staples.  Neck wound clear with mild hematoma.  Okay for discharge when stable from medical standpoint    Ozzie Kelsey MD  01/22/18   7:51 AM  Office Number [phone]

## 2018-01-23 NOTE — PROGRESS NOTES
Case Management Discharge Note    Final Note: Pt discharged to [facility].  Transported by [facility].     Discharge Placement     Facility/Agency Request Status Selected? Address Phone Number Fax Number    [facility]    [facility] [address] [facility] [address] [phone]    [facility] Accepted     [address] [address] [phone]        Camilla Britton, WINSTON 1/11/2018 15:32    Referral for [facility] in [place]. Per Nissa, Pt is approved and bed is available.               [facility] Declined     [address] [address] [phone]         Camilla Britton, WINSTON 1/11/2018 15:33    Per [place], pt is more appropriate for subacute.               [facility] Declined     [address] [address] [phone]        Camilla Britton, WINSTON 1/11/2018 15:31    Nissa states no beds available at any of the [facility] facilities.                   [facility]: [facility]    Discharge Codes: 03  Discharged/transferred to skilled nursing facility (SNF) with Medicare certification in anticipation of skilled care

## 2018-01-23 NOTE — PLAN OF CARE
Problem: Patient Care Overview (Adult)  Goal: Plan of Care Review  Outcome: Ongoing (interventions implemented as appropriate)   01/22/18 4049   Coping/Psychosocial Response Interventions   Plan Of Care Reviewed With patient   Patient Care Overview   Progress progress toward functional goals is gradual   Outcome Evaluation   Outcome Summary/Follow up Plan VSS. patient denies pain, no S/S of discomfort or distress. will continue to monitor. new orders recieved for discharge      Goal: Adult Individualization and Mutuality  Outcome: Ongoing (interventions implemented as appropriate)      Problem: Pressure Ulcer Risk (Tank Scale) (Adult,Obstetrics,Pediatric)  Goal: Skin Integrity  Outcome: Ongoing (interventions implemented as appropriate)      Problem: Fall Risk (Adult)  Goal: Absence of Falls  Outcome: Ongoing (interventions implemented as appropriate)      Problem: Infection, Risk/Actual (Adult)  Goal: Infection Prevention/Resolution  Outcome: Ongoing (interventions implemented as appropriate)      Problem: Perioperative Period (Adult)  Goal: Signs and Symptoms of Listed Potential Problems Will be Absent or Manageable (Perioperative Period)  Outcome: Ongoing (interventions implemented as appropriate)      Problem: Respiratory Insufficiency (Adult)  Goal: Effective Ventilation  Outcome: Ongoing (interventions implemented as appropriate)

## 2018-02-05 ENCOUNTER — TRANSCRIBE ORDERS (OUTPATIENT)
Dept: ADMINISTRATIVE | Facility: HOSPITAL | Age: 78
End: 2018-02-05

## 2018-02-05 DIAGNOSIS — D50.8 OTHER IRON DEFICIENCY ANEMIA: Primary | ICD-10-CM

## 2018-02-07 ENCOUNTER — HOSPITAL ENCOUNTER (OUTPATIENT)
Dept: INFUSION THERAPY | Facility: HOSPITAL | Age: 78
Discharge: HOME | End: 2018-02-07
Admitting: INTERNAL MEDICINE

## 2018-02-07 VITALS
OXYGEN SATURATION: 97 % | SYSTOLIC BLOOD PRESSURE: 158 MMHG | DIASTOLIC BLOOD PRESSURE: 64 MMHG | HEART RATE: 59 BPM | TEMPERATURE: 97.9 F | RESPIRATION RATE: 18 BRPM

## 2018-02-07 DIAGNOSIS — I10 HYPERTENSION, UNSPECIFIED TYPE: ICD-10-CM

## 2018-02-07 DIAGNOSIS — D64.9 ANEMIA, UNSPECIFIED TYPE: Primary | ICD-10-CM

## 2018-02-07 LAB
ABO GROUP BLD: NORMAL
BLD GP AB SCN SERPL QL: NEGATIVE
GLUCOSE BLDC GLUCOMTR-MCNC: 110 MG/DL (ref 70–130)
RH BLD: POSITIVE

## 2018-02-07 PROCEDURE — 63710000001 LISINOPRIL 40 MG TABLET: Performed by: INTERNAL MEDICINE

## 2018-02-07 PROCEDURE — 86850 RBC ANTIBODY SCREEN: CPT | Performed by: INTERNAL MEDICINE

## 2018-02-07 PROCEDURE — A9270 NON-COVERED ITEM OR SERVICE: HCPCS | Performed by: INTERNAL MEDICINE

## 2018-02-07 PROCEDURE — 82962 GLUCOSE BLOOD TEST: CPT

## 2018-02-07 PROCEDURE — 86901 BLOOD TYPING SEROLOGIC RH(D): CPT | Performed by: INTERNAL MEDICINE

## 2018-02-07 PROCEDURE — 86900 BLOOD TYPING SEROLOGIC ABO: CPT | Performed by: INTERNAL MEDICINE

## 2018-02-07 PROCEDURE — P9016 RBC LEUKOCYTES REDUCED: HCPCS

## 2018-02-07 PROCEDURE — 63710000001 AMLODIPINE 10 MG TABLET: Performed by: INTERNAL MEDICINE

## 2018-02-07 PROCEDURE — 63710000001 METOPROLOL TARTRATE 100 MG TABLET: Performed by: INTERNAL MEDICINE

## 2018-02-07 PROCEDURE — 86900 BLOOD TYPING SEROLOGIC ABO: CPT

## 2018-02-07 PROCEDURE — 36415 COLL VENOUS BLD VENIPUNCTURE: CPT

## 2018-02-07 PROCEDURE — 86923 COMPATIBILITY TEST ELECTRIC: CPT

## 2018-02-07 PROCEDURE — 36430 TRANSFUSION BLD/BLD COMPNT: CPT

## 2018-02-07 RX ORDER — AMLODIPINE BESYLATE 10 MG/1
10 TABLET ORAL
Status: CANCELLED
Start: 2018-02-07

## 2018-02-07 RX ORDER — HYDROCODONE BITARTRATE AND ACETAMINOPHEN 5; 325 MG/1; MG/1
1 TABLET ORAL EVERY 4 HOURS PRN
COMMUNITY

## 2018-02-07 RX ORDER — METOPROLOL TARTRATE 100 MG/1
100 TABLET ORAL ONCE
Status: CANCELLED
Start: 2018-02-07 | End: 2018-02-07

## 2018-02-07 RX ORDER — METOPROLOL TARTRATE 100 MG/1
100 TABLET ORAL ONCE
Status: COMPLETED | OUTPATIENT
Start: 2018-02-07 | End: 2018-02-07

## 2018-02-07 RX ORDER — LISINOPRIL 40 MG/1
40 TABLET ORAL DAILY
Status: DISCONTINUED | OUTPATIENT
Start: 2018-02-07 | End: 2018-02-09 | Stop reason: HOSPADM

## 2018-02-07 RX ORDER — LISINOPRIL 40 MG/1
40 TABLET ORAL DAILY
Status: CANCELLED
Start: 2018-02-07

## 2018-02-07 RX ORDER — GUAIFENESIN 600 MG/1
600 TABLET, EXTENDED RELEASE ORAL EVERY 12 HOURS SCHEDULED
COMMUNITY

## 2018-02-07 RX ORDER — AMLODIPINE BESYLATE 10 MG/1
10 TABLET ORAL
Status: DISCONTINUED | OUTPATIENT
Start: 2018-02-07 | End: 2018-02-09 | Stop reason: HOSPADM

## 2018-02-07 RX ADMIN — LISINOPRIL 40 MG: 40 TABLET ORAL at 13:09

## 2018-02-07 RX ADMIN — METOPROLOL TARTRATE 100 MG: 100 TABLET, FILM COATED ORAL at 13:09

## 2018-02-07 RX ADMIN — AMLODIPINE BESYLATE 10 MG: 10 TABLET ORAL at 13:09

## 2018-02-07 NOTE — PATIENT INSTRUCTIONS
Blood Transfusion, Adult, Care After  This sheet gives you information about how to care for yourself after your procedure. Your health care provider may also give you more specific instructions. If you have problems or questions, contact your health care provider.  What can I expect after the procedure?  After your procedure, it is common to have:  · Bruising and soreness where the IV tube was inserted.  · Headache.  Follow these instructions at home:  · Take over-the-counter and prescription medicines only as told by your health care provider.  · Return to your normal activities as told by your health care provider.  · Follow instructions from your health care provider about how to take care of your IV insertion site. Make sure you:  ¨ Wash your hands with soap and water before you change your bandage (dressing). If soap and water are not available, use hand .  ¨ Change your dressing as told by your health care provider.  · Check your IV insertion site every day for signs of infection. Check for:  ¨ More redness, swelling, or pain.  ¨ More fluid or blood.  ¨ Warmth.  ¨ Pus or a bad smell.  Contact a health care provider if:  · You have more redness, swelling, or pain around the IV insertion site.  · You have more fluid or blood coming from the IV insertion site.  · Your IV insertion site feels warm to the touch.  · You have pus or a bad smell coming from the IV insertion site.  · Your urine turns pink, red, or brown.  · You feel weak after doing your normal activities.  Get help right away if:  · You have signs of a serious allergic or immune system reaction, including:  ¨ Itchiness.  ¨ Hives.  ¨ Trouble breathing.  ¨ Anxiety.  ¨ Chest or lower back pain.  ¨ Fever, flushing, and chills.  ¨ Rapid pulse.  ¨ Rash.  ¨ Diarrhea.  ¨ Vomiting.  ¨ Dark urine.  ¨ Serious headache.  ¨ Dizziness.  ¨ Stiff neck.  ¨ Yellow coloration of the face or the white parts of the eyes (jaundice).  This information is not  intended to replace advice given to you by your health care provider. Make sure you discuss any questions you have with your health care provider.  Document Released: 01/08/2016 Document Revised: 08/16/2017 Document Reviewed: 07/03/2017  Elsevier Interactive Patient Education © 2017 [employer]

## 2018-02-07 NOTE — PROGRESS NOTES
1545 S/W transporter Santiago Massey [phone]. He is to pick patient up at 1630.  1630 Called [facility] to give report on patient. Patient's nurse not available to take call.   1655 Report given to Deedee MONTERO with [facility].    1717 Transport Santiago arrived. Patient transferred to wheelchair with portable O2 at 2LNC. Discharged paperwork sent with patient at discharged.

## 2018-02-07 NOTE — PROGRESS NOTES
1150-No meds given this morning at nursing home. Dr Gudino called and orders received. Tolerating blood well.     1300- Up to bedside commode with assist x 2. Right leg very weak and right arm in sling due to fracture. Pt then placed in recliner. Right arm supported with pillow. Derik crackers and applesauce given to pt.

## 2018-02-08 ENCOUNTER — HOSPITAL ENCOUNTER (OUTPATIENT)
Dept: INFUSION THERAPY | Facility: HOSPITAL | Age: 78
Discharge: HOME | End: 2018-02-08

## 2018-02-08 LAB
ABO + RH BLD: NORMAL
ABO + RH BLD: NORMAL
BH BB BLOOD EXPIRATION DATE: NORMAL
BH BB BLOOD EXPIRATION DATE: NORMAL
BH BB BLOOD TYPE BARCODE: 8400
BH BB BLOOD TYPE BARCODE: 8400
BH BB DISPENSE STATUS: NORMAL
BH BB DISPENSE STATUS: NORMAL
BH BB PRODUCT CODE: NORMAL
BH BB PRODUCT CODE: NORMAL
BH BB UNIT NUMBER: NORMAL
BH BB UNIT NUMBER: NORMAL
UNIT  ABO: NORMAL
UNIT  ABO: NORMAL
UNIT  RH: NORMAL
UNIT  RH: NORMAL

## 2018-02-12 ENCOUNTER — TELEPHONE (OUTPATIENT)
Dept: NEUROLOGY | Facility: CLINIC | Age: 78
End: 2018-02-12

## 2018-02-12 NOTE — TELEPHONE ENCOUNTER
Ernestina Rivera, daughter of Sakshi Garcia returned my call.  She stated that her mother is progressing very slowly at [facility].  She will be in the facility till the end of April.  She was not familiar with her current medications.  She did tell me after discharge from [facility] she may need to find an assisted living facility.  I mailed her follow up instructions from our office to [place], where Ms. Rivera lives.  She is the POA of Ms. Garcia.  HARDY Kowalski RN

## 2018-03-04 NOTE — SIGNIFICANT NOTE
01/20/18 1536   Rehab Treatment   Discipline physical therapist   Treatment Not Performed patient/family declined treatment  (Patient refused treatment today. Will check back on patient tomorrow for skilled PT.)   Recommendation   PT - Next Appointment 01/21/18      Physical Therapy  Treatment    Katelyn Huynh   MRN: [id number]   Admitting Diagnosis: Wound dehiscence    PT Received On: 03/04/18  Total Time (min): 45       Billable Minutes:  Gait Training 15, Therapeutic Activity 30 and Therapeutic Exercise 0    Treatment Type: Treatment  PT/PTA: PT     PTA Visit Number: 0       General Precautions: Standard, fall  Orthopedic Precautions: LUE non weight bearing   Braces: Hinged knee brace         Subjective:  Communicated with pt prior to session. Agreeable to PT services. Pleasant demeanor.    Pain/Comfort  Pain Rating 1: 7/10  Location - Side 1: Left  Location - Orientation 1: generalized  Location 1: knee  Pain Addressed 1: Reposition  Pain Rating Post-Intervention 1: 7/10    Objective:  Patient found supine in bed with Patient found with: peripheral IV, wound vac       Functional Status:  MDS G  Bed Mobility Functional Status: CGA-Min (A)  Transfer Functional Status: mod(A)-Max(A)  Walk in Room Functional Status: CGA-Min (A)  Walk in Corridor Functional Status: CGA-Min (A)  Moving from seated to standing position: Not steady, only able to stabilize with staff assistance  Walking (with assistive device if used): Not steady, only able to stabilize with staff assistance  Turning around and facing the opposite direction while walking: Not steady, only able to stabilize with staff assistance  Moving on and off the toilet: Not steady, only able to stabilize with staff assistance  Surface-to-surface transfer (transfer between bed and chair or wheelchair): Not steady, only able to stabilize with staff assistance      AM-PAC 6 CLICK MOBILITY  Total Score:13    Bed Mobility:  Supine>Sit: Min A provided at LLE    Transfers:  Sit<>Stand: Mod A from wheelchair, Min A from bed (elevated height)  Stand Pivot Transfer: CGA with use of L platform walker    Gait:   55, 35 feet with a seated rest break in between trials, platform RW, CGA-- step-to gait pattern.    Balance:  Pt was  assisted to toilet, able to toilet herself, and needed Max A for managing her pants. Mod-Max A for sit<>stand [place] [facility]      Patient left up in chair with call button in reach.    Assessment:  Katelyn Huynh is a 64 y.o. female with a medical diagnosis of Wound dehiscence.  Pt demonstrated her ability to utilize a bedside commode (bariatric) with assistance as noted above. Will benefit from further PT services.     Rehab identified problem list/impairments: weakness, impaired endurance, impaired functional mobilty, gait instability, impaired balance, decreased lower extremity function, decreased upper extremity function, pain, impaired skin, edema, orthopedic precautions    Rehab potential is fair.    Activity tolerance: Fair    Discharge recommendations: home with home health     Barriers to discharge: Decreased caregiver support    Equipment recommendations: wheelchair (platform for RW)     GOALS:    Physical Therapy Goals        Problem: Physical Therapy Goal    Goal Priority Disciplines Outcome Goal Variances Interventions   Physical Therapy Goal     PT/OT, PT Ongoing (interventions implemented as appropriate)     Description:  Goals to be met by: 14 days     Patient will increase functional independence with mobility by performin. Supine to sit with Stand-by Assistance  2. Sit to supine with Stand-by Assistance4  3. Sit to stand transfer with Stand-by Assistance with RLE brace locked in extension and with left platform rolling walker  4. Bed to chair transfer with Stand-by Assistance  with RLE brace locked in extension and with left platform rolling walker  5. Gait  x 150 feet with Stand-by Assistance  with RLE brace locked in extension and with left platform rolling walker  6. Wheelchair propulsion x50 feet with Stand-by Assistance using bilateral upper extremities  7. Ascend/Descend 4 inch curb step with Contact Guard Assistance  with RLE brace locked in extension and with left  platform rolling walker.  8. Lower extremity exercise program x20 reps per handout, with assistance as needed and gym therex                      PLAN:    Patient to be seen 5 x/week  to address the above listed problems via gait training, therapeutic activities, therapeutic exercises, wheelchair management/training  Plan of Care expires: 03/31/18  Plan of Care reviewed with: patient    Alejandra DURHAM Asim, PT  03/04/2018

## 2021-03-02 DIAGNOSIS — Z23 IMMUNIZATION DUE: ICD-10-CM

## (undated) DEVICE — NDL HYPO ECLPS SFTY 18G 1 1/2IN

## (undated) DEVICE — SYR LL TP 10ML STRL

## (undated) DEVICE — SOL NS 500ML

## (undated) DEVICE — DRSNG WND GZ PAD BORDERED 4X8IN STRL

## (undated) DEVICE — ADHS SKIN DERMABOND TOP ADVANCED

## (undated) DEVICE — SUT SILK 3/0 TIES 18IN A184H

## (undated) DEVICE — ANTIBACTERIAL UNDYED BRAIDED (POLYGLACTIN 910), SYNTHETIC ABSORBABLE SUTURE: Brand: COATED VICRYL

## (undated) DEVICE — PK ENDART CARTOID 40

## (undated) DEVICE — SUT SILK 2/0 TIES 18IN A185H

## (undated) DEVICE — GLV SURG BIOGEL M LTX PF 7 1/2

## (undated) DEVICE — SUT PROLN 6/0 BV1 D/A 30IN 8709H

## (undated) DEVICE — SUT SILK 4/0 TIES 18IN A183H

## (undated) DEVICE — SUT SILK 3/0 SH CR5 18IN C0135

## (undated) DEVICE — TP UMB COTN 1/8X36 U12T

## (undated) DEVICE — CATHETER,URETHRAL,REDRUBBER,STERILE,20FR: Brand: MEDLINE

## (undated) DEVICE — FLEXCEL CAROTID SHUNT (8F, 10F, 12F, 14F): Brand: FLEXCEL CAROTID SHUNT

## (undated) DEVICE — SPNG GZ WOVN 4X4IN 12PLY 10/BX STRL